# Patient Record
Sex: MALE | Race: WHITE | HISPANIC OR LATINO | Employment: OTHER | URBAN - METROPOLITAN AREA
[De-identification: names, ages, dates, MRNs, and addresses within clinical notes are randomized per-mention and may not be internally consistent; named-entity substitution may affect disease eponyms.]

---

## 2017-01-03 ENCOUNTER — NON-PROVIDER VISIT (OUTPATIENT)
Dept: CARDIOLOGY | Facility: MEDICAL CENTER | Age: 62
End: 2017-01-03
Payer: COMMERCIAL

## 2017-01-03 DIAGNOSIS — Z95.810 AICD (AUTOMATIC CARDIOVERTER/DEFIBRILLATOR) PRESENT: ICD-10-CM

## 2017-01-03 PROCEDURE — 93282 PRGRMG EVAL IMPLANTABLE DFB: CPT | Performed by: INTERNAL MEDICINE

## 2017-01-10 ENCOUNTER — OFFICE VISIT (OUTPATIENT)
Dept: CARDIOLOGY | Facility: MEDICAL CENTER | Age: 62
End: 2017-01-10
Payer: COMMERCIAL

## 2017-01-10 VITALS
HEART RATE: 62 BPM | WEIGHT: 150 LBS | SYSTOLIC BLOOD PRESSURE: 102 MMHG | BODY MASS INDEX: 24.99 KG/M2 | HEIGHT: 65 IN | DIASTOLIC BLOOD PRESSURE: 72 MMHG | OXYGEN SATURATION: 92 %

## 2017-01-10 DIAGNOSIS — I25.10 CORONARY ARTERY DISEASE DUE TO CALCIFIED CORONARY LESION: ICD-10-CM

## 2017-01-10 DIAGNOSIS — I25.84 CORONARY ARTERY DISEASE DUE TO CALCIFIED CORONARY LESION: ICD-10-CM

## 2017-01-10 DIAGNOSIS — Z95.810 AICD (AUTOMATIC CARDIOVERTER/DEFIBRILLATOR) PRESENT: ICD-10-CM

## 2017-01-10 DIAGNOSIS — E78.5 DYSLIPIDEMIA: ICD-10-CM

## 2017-01-10 DIAGNOSIS — R06.00 NOCTURNAL DYSPNEA: ICD-10-CM

## 2017-01-10 DIAGNOSIS — I25.5 CARDIOMYOPATHY, ISCHEMIC: ICD-10-CM

## 2017-01-10 PROCEDURE — 99214 OFFICE O/P EST MOD 30 MIN: CPT | Performed by: INTERNAL MEDICINE

## 2017-01-10 NOTE — PROGRESS NOTES
"Subjective:   Abram aBrillas is a 61 y.o. male who presents today for followup of his coronary artery disease with an ischemic cardiomyopathy and AICD. He also has a history of hypertension and hyperlipidemia.     A couple times a month he will have significant anxiety from job stress. With this he has some dyspnea. He will become \"dizzy\". This usually lasts 5 minutes and improves when he goes out and walks around a bit.    He is walking at 3 miles an hour for about an hour on his treadmill. He does this about 3 times a week.   Over the last several months he's noted some nocturnal dyspnea. He will awaken at night dyspneic 2-3 times a week. This will resolve in a few minutes. He's also noticed some daytime somnolence. He's also had some mild edema.    He's had no chest discomfort, palpitations or lightheadedness.      Past Medical History   Diagnosis Date   • Fatty liver    • Dyslipidemia    • Hypertension      ON NO MEDS, now on meds   • Bronchitis    • Cardiogenic shock 1/2012   • Myocardial infarction of anterolateral wall (HCC) 12/29/2011   • Pacemaker 04/23/2012   • Congestive heart failure    • Atrial fib/flutter, transient      when in cardiogenic shock   • Acute MI, anterior wall s/p bms to LAD 12/11 with -RCA and residual CX dz (stent failed) 12/31/2011   • Cardiomyopathy, ischemic EF 35% 12/31/2011   • AICD (automatic cardioverter/defibrillator) present St Zach placed 4/23/12 4/24/2012     DEVICE DATA:  1. Pulse generator:  St. Zach, model #KB1543-17, serial  #[id number]  2. Right ventricular lead:  St. Zach, model #7120/60,  serial #[id number]  MEASURED INTRAOPERATIVE DATA: R-waves measured 11.7 mV, pacing  threshold 0.75 volts at 0.5 msec, lead impedance of 859 ohms.  DEVICE SETTINGS: VVI 40 to 120.    • CAD (coronary artery disease)    • Arrhythmia    • Myocardial infarct 12/29/2011   • ASTHMA      as a child   • Tuberculosis      20 years ago on meds for 6  mo     Past " "Surgical History   Procedure Laterality Date   • Multiple coronary artery bypass endo vein harvest  1/3/2012     Performed by PADMINI OCHOA at SURGERY [facility] ORS   • Colonoscopy  2007     normal   • Umbilical hernia repair  1/14/2011     Performed by CHUY CHRISTENSEN at SURGERY SAME DAY [facility] ORS   • Recovery  4/23/2012     Performed by SURGERY, CATH-RECOVERY at SURGERY [facility]   • Recovery  8/30/2013     Performed by Cath-Recovery Surgery at SURGERY [facility] DAY [facility]     Family History   Problem Relation Age of Onset   • Cancer Mother      ovarian cancer   • Heart Disease Maternal Aunt 17     unclear but MI!   • Heart Disease Maternal Uncle 38     History   Smoking status   • Former Smoker   • Types: Cigarettes   • Quit date: 03/01/1982   Smokeless tobacco   • Never Used     Comment: QUIT 1985     Allergies   Allergen Reactions   • Pcn [Penicillins] Rash     Outpatient Encounter Prescriptions as of 1/10/2017   Medication Sig Dispense Refill   • clopidogrel (PLAVIX) 75 MG Tab TAKE 1 TABLET BY MOUTH EVERY DAY 90 Tab 3   • metoprolol SR (TOPROL XL) 50 MG TABLET SR 24 HR TAKE 1 TABLET BY MOUTH EVERY DAY 30 Tab 6   • lisinopril (PRINIVIL) 10 MG Tab TAKE 1 TABLET BY MOUTH EVERY DAY 30 Tab 6   • atorvastatin (LIPITOR) 40 MG Tab TAKE 1 TAB BY MOUTH EVERY DAY. 30 Tab 11   • aspirin EC (ECOTRIN) 81 MG TBEC Take 81 mg by mouth every day.     • Multiple Vitamin (MULTI-VITAMIN PO) Take  by mouth.       No facility-administered encounter medications on file as of 1/10/2017.     ROS       Objective:   /72 mmHg  Pulse 62  Ht 1.651 m (5' 5\")  Wt 68.04 kg (150 lb)  BMI 24.96 kg/m2  SpO2 92%    Physical Exam   Neck: No JVD present.   Cardiovascular: Normal rate and regular rhythm.  Exam reveals no gallop.    No murmur heard.  Pulmonary/Chest: Effort normal. He has no rales.   Abdominal: Soft. There is no tenderness.   Musculoskeletal: He exhibits no edema.     Lab Results   Component Value " Date/Time    CHOLESTEROL, 12/27/2016 07:15 AM    LDL 68 12/27/2016 07:15 AM    HDL 27* 12/27/2016 07:15 AM    TRIGLYCERIDES 107 12/27/2016 07:15 AM       Lab Results   Component Value Date/Time    SODIUM 138 12/27/2016 07:15 AM    POTASSIUM 4.1 12/27/2016 07:15 AM    CHLORIDE 104 12/27/2016 07:15 AM    CO2 28 12/27/2016 07:15 AM    GLUCOSE 101* 12/27/2016 07:15 AM    BUN 10 12/27/2016 07:15 AM    CREATININE 0.62 12/27/2016 07:15 AM    BUN-CREATININE RATIO 15 12/08/2010 07:11 AM    GLOM FILT RATE, EST >59 12/08/2010 07:11 AM     Lab Results   Component Value Date/Time    ALKALINE PHOSPHATASE 84 12/27/2016 07:15 AM    AST(SGOT) 26 12/27/2016 07:15 AM    ALT(SGPT) 30 12/27/2016 07:15 AM    TOTAL BILIRUBIN 0.7 12/27/2016 07:15 AM      Lab Results   Component Value Date/Time    B NATRIURETIC PEPTIDE 42 12/27/2016 07:15 AM      Echocardiogram:  CONCLUSIONS  Normal left ventricular chamber size.  Left ventricular ejection fraction is visually estimated to be 45%.  [place], anteroseptum and inferoseptum hypokinetic.  Grade I diastolic dysfunction.  Mild mitral regurgitation.  Estimated right ventricular systolic pressure is 20 mmHg.  Normal inferior vena cava size and inspiratory collapse.  Exam Date: 06/17/2016       Assessment:     1. Coronary artery disease due to calcified coronary lesion:Acute MI, anterior wall s/p bms to LAD 12/11 with -RCA and residual CX dz (stent failed)     2. AICD (automatic cardioverter/defibrillator) present St Zach placed 4/23/12     3. Cardiomyopathy, ischemic EF 35%     4. Dyslipidemia         Medical Decision Making:  Today's Assessment / Status / Plan:     Mr. Barillas is clinically stable. He is having some difficulty with nocturnal dyspnea. However, his BNP was quite low when it was checked a couple of weeks ago. He may have nocturnal hypoxemia and we will obtain an overnight pulse oximetry. His lipid status has deteriorated. This may be due to some dietary indiscretion over the  holidays. We discussed better dietary compliance and we will recheck a lipid panel in a few months prior to his follow-up appointment.

## 2017-01-10 NOTE — MR AVS SNAPSHOT
"Abram Barillas   1/10/2017 2:30 PM   Office Visit   MRN: [id number]    Department:  [facility]   Dept Phone:  [phone]    Description:  Male : 1955   Provider:  Kyree Son M.D.           Allergies as of 1/10/2017     Allergen Noted Reactions    Pcn [Penicillins] 2010   Rash      You were diagnosed with     Coronary artery disease due to calcified coronary lesion   [2493543]       AICD (automatic cardioverter/defibrillator) present   [426742]       Cardiomyopathy, ischemic   [717186]       Dyslipidemia   [664817]       Nocturnal dyspnea   [178808]         Vital Signs     Blood Pressure Pulse Height Weight Body Mass Index Oxygen Saturation    102/72 mmHg 62 1.651 m (5' 5\") 68.04 kg (150 lb) 24.96 kg/m2 92%    Smoking Status                   Former Smoker           Basic Information     Date Of Birth Sex Race Ethnicity Preferred Language    1955 Male  or   Origin (Peruvian,Kosovan,Serbian,Jonnie, etc) English      Your appointments     2017  2:20 PM   Established Patient with Jessy Cervantes M.D.   [facility] [address]  [place] [address]   [phone]           You will be receiving a confirmation call a few days before your appointment from our automated call confirmation system.            Mar 30, 2017  2:30 PM   PACER CHECK ONLY with PACER CHECK-[facility]   [facility] (--)    [address], [address]  [place] [address]   [phone]            Mar 30, 2017  2:45 PM   FOLLOW UP with Kyree Son M.D.   [facility] (--)    [address], [address]  [place] [address]   [phone]            2017  3:00 PM   Employee Health MA 10 with [place] GURJIT LANGLEY MA   [facility] [address]  [address]  [place] [address]   [phone]              Problem List              ICD-10-CM " Priority Class Noted - Resolved    Dyslipidemia E78.5   Unknown - Present    Fatty liver K76.0   Unknown - Present    Acute MI, anterior wall s/p bms to LAD 12/11 with -RCA and residual CX dz (stent failed) I21.09   12/31/2011 - Present    Cardiomyopathy, ischemic EF 35% I25.5   12/31/2011 - Present    AICD (automatic cardioverter/defibrillator) present St Zach placed 4/23/12 Z95.810   4/24/2012 - Present    Mild intermittent asthma J45.20   1/15/2013 - Present    Coronary artery disease due to calcified coronary lesion:Acute MI, anterior wall s/p bms to LAD 12/11 with -RCA and residual CX dz (stent failed) I25.10, I25.84   Unknown - Present    AICD lead malfunction T82.110A   8/23/2013 - Present    Mechanical complication of cardiac pacemaker electrode T82.190A   8/30/2013 - Present    Essential hypertension I10   Unknown - Present    Cardiogenic shock (HCC) R57.0   1/1/2012 - Present    Myocardial infarction of anterolateral wall (Chronic) I21.09   12/29/2011 - Present    Pacemaker (Chronic) Z95.0   4/23/2012 - Present    Congestive heart failure (Chronic) I50.9   Unknown - Present    Atrial fib/flutter, transient (Chronic) [id number]   Unknown - Present    Impotence of organic origin N52.9   2/22/2016 - Present      Health Maintenance        Date Due Completion Dates    IMM DTaP/Tdap/Td Vaccine (1 - Tdap) 8/10/1975 ---    IMM ZOSTER VACCINE 8/10/2016 ---    COLONOSCOPY 1/18/2017 1/18/2007 (Prv Comp)    Override on 1/18/2007: Previously completed (angi, Dr. Post)            Current Immunizations     Hepatitis A Vaccine, Adult 12/27/2016    Influenza TIV (IM) 10/11/2013, 10/1/2012, 12/1/2011    Influenza Vaccine Quad Inj (Pf) 10/3/2016 10:43 AM, 10/15/2015, 10/8/2014    Pneumococcal Vaccine (UF)Historical Data 1/1/2007    Pneumococcal polysaccharide vaccine (PPSV-23) 10/1/2012      Below and/or attached are the medications your provider expects you to take. Review all of your home medications and newly  ordered medications with your provider and/or pharmacist. Follow medication instructions as directed by your provider and/or pharmacist. Please keep your medication list with you and share with your provider. Update the information when medications are discontinued, doses are changed, or new medications (including over-the-counter products) are added; and carry medication information at all times in the event of emergency situations     Allergies:  PCN - Rash               Medications  Valid as of: January 10, 2017 -  2:49 PM    Generic Name Brand Name Tablet Size Instructions for use    Aspirin (Tablet Delayed Response) ECOTRIN 81 MG Take 81 mg by mouth every day.        Atorvastatin Calcium (Tab) LIPITOR 40 MG TAKE 1 TAB BY MOUTH EVERY DAY.        Clopidogrel Bisulfate (Tab) PLAVIX 75 MG TAKE 1 TABLET BY MOUTH EVERY DAY        Lisinopril (Tab) PRINIVIL 10 MG TAKE 1 TABLET BY MOUTH EVERY DAY        Metoprolol Succinate (TABLET SR 24 HR) TOPROL XL 50 MG TAKE 1 TABLET BY MOUTH EVERY DAY        Multiple Vitamin   Take  by mouth.        .                 Medicines prescribed today were sent to:     [facility]/PHARMACY #[id number] - [place], [place] - [address] [place] [address] AT [address] [address]    Phone: [phone] Fax: [phone]    Open 24 Hours?: No      Medication refill instructions:       If your prescription bottle indicates you have medication refills left, it is not necessary to call your provider’s office. Please contact your pharmacy and they will refill your medication.    If your prescription bottle indicates you do not have any refills left, you may request refills at any time through one of the following ways: The online [employer] system (except Urgent Care), by calling your provider’s office, or by asking your pharmacy to contact your provider’s office with a refill request. Medication refills are processed only during regular business hours and may not be available until the next  business day. Your provider may request additional information or to have a follow-up visit with you prior to refilling your medication.   *Please Note: Medication refills are assigned a new Rx number when refilled electronically. Your pharmacy may indicate that no refills were authorized even though a new prescription for the same medication is available at the pharmacy. Please request the medicine by name with the pharmacy before contacting your provider for a refill.        Your To Do List     Future Labs/Procedures Complete By Expires    LIPID PROFILE  As directed 1/10/2018         [employer] Access Code: Activation code not generated  Current [employer] Status: Active

## 2017-01-11 ENCOUNTER — TELEPHONE (OUTPATIENT)
Dept: CARDIOLOGY | Facility: MEDICAL CENTER | Age: 62
End: 2017-01-11

## 2017-01-18 ENCOUNTER — OFFICE VISIT (OUTPATIENT)
Dept: MEDICAL GROUP | Facility: CLINIC | Age: 62
End: 2017-01-18
Payer: COMMERCIAL

## 2017-01-18 VITALS
SYSTOLIC BLOOD PRESSURE: 100 MMHG | RESPIRATION RATE: 12 BRPM | DIASTOLIC BLOOD PRESSURE: 72 MMHG | OXYGEN SATURATION: 92 % | HEIGHT: 65 IN | HEART RATE: 71 BPM | TEMPERATURE: 97.7 F | WEIGHT: 147 LBS | BODY MASS INDEX: 24.49 KG/M2

## 2017-01-18 DIAGNOSIS — Z23 NEED FOR PROPHYLACTIC VACCINATION WITH TETANUS-DIPHTHERIA (TD): ICD-10-CM

## 2017-01-18 DIAGNOSIS — N52.9 IMPOTENCE OF ORGANIC ORIGIN: ICD-10-CM

## 2017-01-18 DIAGNOSIS — Z12.5 PROSTATE CANCER SCREENING: ICD-10-CM

## 2017-01-18 DIAGNOSIS — Z23 NEED FOR SHINGLES VACCINE: ICD-10-CM

## 2017-01-18 DIAGNOSIS — Z12.11 COLON CANCER SCREENING: ICD-10-CM

## 2017-01-18 PROCEDURE — 90472 IMMUNIZATION ADMIN EACH ADD: CPT | Performed by: FAMILY MEDICINE

## 2017-01-18 PROCEDURE — 90471 IMMUNIZATION ADMIN: CPT | Performed by: FAMILY MEDICINE

## 2017-01-18 PROCEDURE — 90715 TDAP VACCINE 7 YRS/> IM: CPT | Performed by: FAMILY MEDICINE

## 2017-01-18 PROCEDURE — 99213 OFFICE O/P EST LOW 20 MIN: CPT | Mod: 25 | Performed by: FAMILY MEDICINE

## 2017-01-18 PROCEDURE — 90736 HZV VACCINE LIVE SUBQ: CPT | Performed by: FAMILY MEDICINE

## 2017-01-18 NOTE — MR AVS SNAPSHOT
"Abram Barillas   2017 2:20 PM   Office Visit   MRN: [id number]    Department:  [facility]   Dept Phone:  [phone]    Description:  Male : 1955   Provider:  Jessy Cervantes M.D.           Reason for Visit     Back Pain x 1 week      Allergies as of 2017     Allergen Noted Reactions    Pcn [Penicillins] 2010   Rash      You were diagnosed with     Need for shingles vaccine   [475990]       Need for prophylactic vaccination with tetanus-diphtheria (TD)   [V06.5.ICD-9-CM]       Colon cancer screening   [884012]       Impotence of organic origin   [607.84.ICD-9-CM]       Prostate cancer screening   [735505]         Vital Signs     Blood Pressure Pulse Temperature Respirations Height Weight    100/72 mmHg 71 36.5 °C (97.7 °F) 12 1.651 m (5' 5\") 66.679 kg (147 lb)    Body Mass Index Oxygen Saturation Smoking Status             24.46 kg/m2 92% Former Smoker         Basic Information     Date Of Birth Sex Race Ethnicity Preferred Language    1955 Male  or   Origin (Japanese,British,Sierra Leonean,Botswanan, etc) English      Your appointments     Mar 30, 2017  2:30 PM   PACER CHECK ONLY with PACER CHECK-[facility] B   [facility] (--)    [address] [facility] [address]  [place] [address]   [phone]            Mar 30, 2017  2:45 PM   FOLLOW UP with Kyree Son M.D.   [facility] (--)    [address]  [place] [address]   [phone]            2017  3:00 PM   Employee Health MA 10 with [facility] MA   [facility] - [address]  [address]  [place] [address]   [phone]              Problem List              ICD-10-CM Priority Class Noted - Resolved    Dyslipidemia E78.5   Unknown - Present    Fatty liver K76.0   Unknown - Present    Acute MI, anterior wall s/p bms to LAD  with -RCA and residual CX dz (stent failed) " I21.09   12/31/2011 - Present    Cardiomyopathy, ischemic EF 35% I25.5   12/31/2011 - Present    AICD (automatic cardioverter/defibrillator) present St Zach placed 4/23/12 Z95.810   4/24/2012 - Present    Mild intermittent asthma J45.20   1/15/2013 - Present    Coronary artery disease due to calcified coronary lesion:Acute MI, anterior wall s/p bms to LAD 12/11 with -RCA and residual CX dz (stent failed) I25.10, I25.84   Unknown - Present    AICD lead malfunction T82.110A   8/23/2013 - Present    Mechanical complication of cardiac pacemaker electrode T82.190A   8/30/2013 - Present    Essential hypertension I10   Unknown - Present    Cardiogenic shock (CMS-HCC) R57.0   1/1/2012 - Present    Myocardial infarction of anterolateral wall (Chronic) I21.09   12/29/2011 - Present    Pacemaker (Chronic) Z95.0   4/23/2012 - Present    Congestive heart failure (Chronic) I50.9   Unknown - Present    Atrial fib/flutter, transient (Chronic) LEJ8104   Unknown - Present    Impotence of organic origin N52.9   2/22/2016 - Present      Health Maintenance        Date Due Completion Dates    IMM DTaP/Tdap/Td Vaccine (1 - Tdap) 8/10/1975 ---    IMM ZOSTER VACCINE 8/10/2016 ---    COLONOSCOPY 1/18/2017 1/18/2007 (Prv Comp)    Override on 1/18/2007: Previously completed (angi, Dr. Post)            Current Immunizations     Hepatitis A Vaccine, Adult 12/27/2016    Influenza TIV (IM) 10/11/2013, 10/1/2012, 12/1/2011    Influenza Vaccine Quad Inj (Pf) 10/3/2016 10:43 AM, 10/15/2015, 10/8/2014    Pneumococcal Vaccine (UF)Historical Data 1/1/2007    Pneumococcal polysaccharide vaccine (PPSV-23) 10/1/2012    SHINGLES VACCINE 1/18/2017    Tdap Vaccine 1/18/2017      Below and/or attached are the medications your provider expects you to take. Review all of your home medications and newly ordered medications with your provider and/or pharmacist. Follow medication instructions as directed by your provider and/or pharmacist. Please keep your  medication list with you and share with your provider. Update the information when medications are discontinued, doses are changed, or new medications (including over-the-counter products) are added; and carry medication information at all times in the event of emergency situations     Allergies:  PCN - Rash               Medications  Valid as of: January 18, 2017 -  3:20 PM    Generic Name Brand Name Tablet Size Instructions for use    Aspirin (Tablet Delayed Response) ECOTRIN 81 MG Take 81 mg by mouth every day.        Atorvastatin Calcium (Tab) LIPITOR 40 MG TAKE 1 TAB BY MOUTH EVERY DAY.        Clopidogrel Bisulfate (Tab) PLAVIX 75 MG TAKE 1 TABLET BY MOUTH EVERY DAY        Lisinopril (Tab) PRINIVIL 10 MG TAKE 1 TABLET BY MOUTH EVERY DAY        Metoprolol Succinate (TABLET SR 24 HR) TOPROL XL 50 MG TAKE 1 TABLET BY MOUTH EVERY DAY        Multiple Vitamin   Take  by mouth.        .                 Medicines prescribed today were sent to:     [facility]/PHARMACY #[id number] - [place], [place] - [address] [facility] [address]    [address] [place] [address]    Phone: [phone] Fax: [phone]    Open 24 Hours?: No      Medication refill instructions:       If your prescription bottle indicates you have medication refills left, it is not necessary to call your provider’s office. Please contact your pharmacy and they will refill your medication.    If your prescription bottle indicates you do not have any refills left, you may request refills at any time through one of the following ways: The online "[employer] system (except Urgent Care), by calling your provider’s office, or by asking your pharmacy to contact your provider’s office with a refill request. Medication refills are processed only during regular business hours and may not be available until the next business day. Your provider may request additional information or to have a follow-up visit with you prior to refilling your medication.   *Please Note:  Medication refills are assigned a new Rx number when refilled electronically. Your pharmacy may indicate that no refills were authorized even though a new prescription for the same medication is available at the pharmacy. Please request the medicine by name with the pharmacy before contacting your provider for a refill.        Your To Do List     Future Labs/Procedures Complete By Expires    PROSTATE SPECIFIC AG SCREENING  As directed 1/19/2018      Referral     A referral request has been sent to our patient care coordination department. Please allow 3-5 business days for us to process this request and contact you either by phone or mail. If you do not hear from us by the 5th business day, please call us at [phone].           [employer] Access Code: Activation code not generated  Current [employer] Status: Active

## 2017-01-19 NOTE — PROGRESS NOTES
CC: Immunizations, referrals    HPI:   Abram presents today with the following.  He had originally scheduled for back pain but this has resolved and seems to have been a muscle spasm.    1. Need for shingles vaccine  He is due    2. Need for prophylactic vaccination with tetanus-diphtheria (TD)  He is due    3. Colon cancer screening  He is due    4. Impotence of organic origin  Needs referral to urology    5. Prostate cancer screening  Needs yearly screening      Patient Active Problem List    Diagnosis Date Noted   • Impotence of organic origin 02/22/2016   • Essential hypertension    • Congestive heart failure    • Atrial fib/flutter, transient    • Mechanical complication of cardiac pacemaker electrode 08/30/2013   • AICD lead malfunction 08/23/2013   • Coronary artery disease due to calcified coronary lesion:Acute MI, anterior wall s/p bms to LAD 12/11 with -RCA and residual CX dz (stent failed)    • Mild intermittent asthma 01/15/2013   • AICD (automatic cardioverter/defibrillator) present St Zach placed 4/23/12 04/24/2012   • Pacemaker 04/23/2012   • Cardiogenic shock (CMS-HCC) 01/01/2012   • Acute MI, anterior wall s/p bms to LAD 12/11 with -RCA and residual CX dz (stent failed) 12/31/2011   • Cardiomyopathy, ischemic EF 35% 12/31/2011   • Myocardial infarction of anterolateral wall 12/29/2011   • Dyslipidemia    • Fatty liver        Current Outpatient Prescriptions   Medication Sig Dispense Refill   • clopidogrel (PLAVIX) 75 MG Tab TAKE 1 TABLET BY MOUTH EVERY DAY 90 Tab 3   • metoprolol SR (TOPROL XL) 50 MG TABLET SR 24 HR TAKE 1 TABLET BY MOUTH EVERY DAY 30 Tab 6   • lisinopril (PRINIVIL) 10 MG Tab TAKE 1 TABLET BY MOUTH EVERY DAY 30 Tab 6   • atorvastatin (LIPITOR) 40 MG Tab TAKE 1 TAB BY MOUTH EVERY DAY. 30 Tab 11   • aspirin EC (ECOTRIN) 81 MG TBEC Take 81 mg by mouth every day.     • Multiple Vitamin (MULTI-VITAMIN PO) Take  by mouth.       No current facility-administered medications for this  "visit.         Allergies as of 01/18/2017 - Peter as Reviewed 01/18/2017   Allergen Reaction Noted   • Pcn [penicillins] Rash 09/21/2010        ROS: As per HPI.    /72 mmHg  Pulse 71  Temp(Src) 36.5 °C (97.7 °F)  Resp 12  Ht 1.651 m (5' 5\")  Wt 66.679 kg (147 lb)  BMI 24.46 kg/m2  SpO2 92%    Physical Exam:  Gen:         Alert and oriented, No apparent distress.  Neck:       Full range of motion, no JVD or carotid bruits appreciated. No thyromegaly or neck mass appreciated.   Lungs:     Clear to auscultation bilaterally  CV:          Regular rate and rhythm. No murmurs, rubs or gallops.               Ext:          No clubbing, cyanosis, no peripheral edema.      Assessment and Plan.   61 y.o. male with the following issues.    1. Need for shingles vaccine  Administered today  - TDAP VACCINE =>6YO IM    2. Need for prophylactic vaccination with tetanus-diphtheria (TD)  Administered today  - VARICELLA ZOSTER VACCINE SQ    3. Colon cancer screening  Referral discussed and placed  - REFERRAL TO GASTROENTEROLOGY    4. Impotence of organic origin  Referral discussed and placed  - REFERRAL TO UROLOGY    5. Prostate cancer screening  Laboratory discussed  - PROSTATE SPECIFIC AG SCREENING; Future          "

## 2017-02-01 ENCOUNTER — TELEPHONE (OUTPATIENT)
Dept: CARDIOLOGY | Facility: MEDICAL CENTER | Age: 62
End: 2017-02-01
Payer: COMMERCIAL

## 2017-02-01 DIAGNOSIS — I25.5 CARDIOMYOPATHY, ISCHEMIC: ICD-10-CM

## 2017-02-01 DIAGNOSIS — G47.34 NOCTURNAL HYPOXEMIA: Primary | ICD-10-CM

## 2017-02-01 DIAGNOSIS — J98.4 CHRONIC LUNG DISEASE: ICD-10-CM

## 2017-02-10 ENCOUNTER — TELEPHONE (OUTPATIENT)
Dept: MEDICAL GROUP | Facility: CLINIC | Age: 62
End: 2017-02-10

## 2017-02-10 DIAGNOSIS — J45.20 MILD INTERMITTENT ASTHMA WITHOUT COMPLICATION: ICD-10-CM

## 2017-02-10 DIAGNOSIS — I25.5 CARDIOMYOPATHY, ISCHEMIC: ICD-10-CM

## 2017-02-10 DIAGNOSIS — G47.34 NOCTURNAL HYPOXIA: ICD-10-CM

## 2017-02-10 NOTE — TELEPHONE ENCOUNTER
1. Caller Name: cardiology                      Call Back Number: [phone]    2. Message: received a call from cardiology requesting a referral for patient. Per pts cardiologist patient needs to be seen by a pulmonologist due to Nocturnal hypoxia.    3. Patient approves office to leave a detailed voicemail/MyChart message: yes

## 2017-02-18 ENCOUNTER — HOSPITAL ENCOUNTER (OUTPATIENT)
Dept: LAB | Facility: MEDICAL CENTER | Age: 62
End: 2017-02-18
Attending: FAMILY MEDICINE
Payer: COMMERCIAL

## 2017-02-18 ENCOUNTER — HOSPITAL ENCOUNTER (OUTPATIENT)
Dept: LAB | Facility: MEDICAL CENTER | Age: 62
End: 2017-02-18
Attending: NURSE PRACTITIONER
Payer: COMMERCIAL

## 2017-02-18 DIAGNOSIS — Z12.5 PROSTATE CANCER SCREENING: ICD-10-CM

## 2017-02-18 LAB
PSA SERPL DL<=0.01 NG/ML-MCNC: 0.97 NG/ML (ref 0–4)
PSA SERPL DL<=0.01 NG/ML-MCNC: 1 NG/ML (ref 0–4)

## 2017-02-18 PROCEDURE — 84153 ASSAY OF PSA TOTAL: CPT

## 2017-02-18 PROCEDURE — 84153 ASSAY OF PSA TOTAL: CPT | Mod: 91

## 2017-02-18 PROCEDURE — 36415 COLL VENOUS BLD VENIPUNCTURE: CPT

## 2017-03-16 ENCOUNTER — TELEPHONE (OUTPATIENT)
Dept: CARDIOLOGY | Facility: MEDICAL CENTER | Age: 62
End: 2017-03-16

## 2017-03-16 DIAGNOSIS — J34.89 DRY NARES: ICD-10-CM

## 2017-03-16 DIAGNOSIS — I50.22 CHRONIC SYSTOLIC CHF (CONGESTIVE HEART FAILURE) (HCC): ICD-10-CM

## 2017-03-16 DIAGNOSIS — G47.34 NOCTURNAL HYPOXIA: ICD-10-CM

## 2017-03-16 NOTE — TELEPHONE ENCOUNTER
----- Message from Marilynn Sam sent at 3/16/2017  1:38 PM PDT -----  Regarding: Pt has question about at home oxygen   BEAU/Ysabel,    Patient states at home oxygen is causing him to get itchy eyes, allergies and bloody nose. He would please like a call back at  [phone] to find out what he should do?

## 2017-03-16 NOTE — TELEPHONE ENCOUNTER
Called patient. He states that his home oxygen is causing him to have itchy eyes, allergies, and a bloody nose.    Advised patient that he could try receiving his oxygen by mask or adding a humidifier to his oxygen. Patient would like to try both.    Updated oxygen order faxed to [facility] at fax # [phone].    DADA BRANHAM

## 2017-03-18 ENCOUNTER — HOSPITAL ENCOUNTER (OUTPATIENT)
Dept: LAB | Facility: MEDICAL CENTER | Age: 62
End: 2017-03-18
Attending: INTERNAL MEDICINE
Payer: COMMERCIAL

## 2017-03-18 DIAGNOSIS — E78.5 DYSLIPIDEMIA: ICD-10-CM

## 2017-03-18 LAB
CHOLEST SERPL-MCNC: 109 MG/DL (ref 100–199)
HDLC SERPL-MCNC: 30 MG/DL
LDLC SERPL CALC-MCNC: 57 MG/DL
TRIGL SERPL-MCNC: 111 MG/DL (ref 0–149)

## 2017-03-18 PROCEDURE — 80061 LIPID PANEL: CPT

## 2017-03-18 PROCEDURE — 36415 COLL VENOUS BLD VENIPUNCTURE: CPT

## 2017-03-30 ENCOUNTER — OFFICE VISIT (OUTPATIENT)
Dept: CARDIOLOGY | Facility: MEDICAL CENTER | Age: 62
End: 2017-03-30
Payer: COMMERCIAL

## 2017-03-30 ENCOUNTER — NON-PROVIDER VISIT (OUTPATIENT)
Dept: CARDIOLOGY | Facility: MEDICAL CENTER | Age: 62
End: 2017-03-30
Payer: COMMERCIAL

## 2017-03-30 VITALS
HEART RATE: 64 BPM | SYSTOLIC BLOOD PRESSURE: 110 MMHG | DIASTOLIC BLOOD PRESSURE: 62 MMHG | WEIGHT: 146 LBS | BODY MASS INDEX: 24.32 KG/M2 | OXYGEN SATURATION: 97 % | HEIGHT: 65 IN

## 2017-03-30 DIAGNOSIS — I25.84 CORONARY ARTERY DISEASE DUE TO CALCIFIED CORONARY LESION: ICD-10-CM

## 2017-03-30 DIAGNOSIS — G47.33 OBSTRUCTIVE SLEEP APNEA: ICD-10-CM

## 2017-03-30 DIAGNOSIS — E78.5 DYSLIPIDEMIA: ICD-10-CM

## 2017-03-30 DIAGNOSIS — I10 ESSENTIAL HYPERTENSION: ICD-10-CM

## 2017-03-30 DIAGNOSIS — I50.9 CONGESTIVE HEART FAILURE, UNSPECIFIED: ICD-10-CM

## 2017-03-30 DIAGNOSIS — Z95.810 AICD (AUTOMATIC CARDIOVERTER/DEFIBRILLATOR) PRESENT: ICD-10-CM

## 2017-03-30 DIAGNOSIS — I25.10 CORONARY ARTERY DISEASE DUE TO CALCIFIED CORONARY LESION: ICD-10-CM

## 2017-03-30 DIAGNOSIS — I25.5 CARDIOMYOPATHY, ISCHEMIC: ICD-10-CM

## 2017-03-30 PROCEDURE — 93282 PRGRMG EVAL IMPLANTABLE DFB: CPT | Performed by: INTERNAL MEDICINE

## 2017-03-30 PROCEDURE — 99214 OFFICE O/P EST MOD 30 MIN: CPT | Performed by: INTERNAL MEDICINE

## 2017-03-30 RX ORDER — ROSUVASTATIN CALCIUM 20 MG/1
20 TABLET, COATED ORAL EVERY EVENING
Qty: 30 TAB | Refills: 11 | Status: SHIPPED | OUTPATIENT
Start: 2017-03-30 | End: 2018-03-04 | Stop reason: SDUPTHER

## 2017-03-30 NOTE — MR AVS SNAPSHOT
"        Abram Barillas   3/30/2017 2:45 PM   Office Visit   MRN: [id number]    Department:  [facility]   Dept Phone:  [phone]    Description:  Male : 1955   Provider:  Kyree Son M.D.           Reason for Visit     Follow-Up           Allergies as of 3/30/2017     Allergen Noted Reactions    Pcn [Penicillins] 2010   Rash      You were diagnosed with     Coronary artery disease due to calcified coronary lesion   [1135251]       Essential hypertension   [6924394]       AICD (automatic cardioverter/defibrillator) present   [260118]       Cardiomyopathy, ischemic   [521607]       Dyslipidemia   [480671]       Obstructive sleep apnea   [802232]         Vital Signs     Blood Pressure Pulse Height Weight Body Mass Index Oxygen Saturation    110/62 mmHg 64 1.651 m (5' 5\") 66.225 kg (146 lb) 24.30 kg/m2 97%    Smoking Status                   Former Smoker           Basic Information     Date Of Birth Sex Race Ethnicity Preferred Language    1955 Male  or   Origin (Turkmen,Maldivian,Central African,Australian, etc) English      Your appointments     May 19, 2017  1:00 PM   Pulmonary Function Test with [id number]   [facility] (--)    [address]  [address]  [place] [id number] [phone]            May 19, 2017  2:00 PM   New Patient Pulmonary with A Rotation   [facility] (--)    [address]  [address]  [place] [address] [phone]            2017  3:00 PM   Employee Health MA 10 with [facility] ([facility])    [address] [place] [address]  [address]  [place] [address]   [phone]              Problem List              ICD-10-CM Priority Class Noted - Resolved    Dyslipidemia E78.5   Unknown - Present    Fatty liver K76.0   Unknown - Present    Acute MI, anterior wall s/p bms to LAD  with -RCA and residual CX dz (stent failed) I21.09   2011 - Present   " Cardiomyopathy, ischemic EF 35% I25.5   12/31/2011 - Present    AICD (automatic cardioverter/defibrillator) present St Zach placed 4/23/12 Z95.810   4/24/2012 - Present    Mild intermittent asthma J45.20   1/15/2013 - Present    Coronary artery disease due to calcified coronary lesion:Acute MI, anterior wall s/p bms to LAD 12/11 with -RCA and residual CX dz (stent failed) I25.10, I25.84   Unknown - Present    AICD lead malfunction T82.110A   8/23/2013 - Present    Mechanical complication of cardiac pacemaker electrode T82.190A   8/30/2013 - Present    Essential hypertension I10   Unknown - Present    Myocardial infarction of anterolateral wall (Chronic) I21.09   12/29/2011 - Present    Pacemaker (Chronic) Z95.0   4/23/2012 - Present    Congestive heart failure (Chronic) I50.9   Unknown - Present    Atrial fib/flutter, transient (Chronic) MKR3447   Unknown - Present    Impotence of organic origin N52.9   2/22/2016 - Present    Obstructive sleep apnea G47.33   3/30/2017 - Present      Health Maintenance        Date Due Completion Dates    COLONOSCOPY 1/18/2017 1/18/2007 (Prv Comp)    Override on 1/18/2007: Previously completed (normal, Dr. Post)    IMM DTaP/Tdap/Td Vaccine (2 - Td) 1/18/2027 1/18/2017            Current Immunizations     Hepatitis A Vaccine, Adult 12/27/2016    Influenza TIV (IM) 10/11/2013, 10/1/2012, 12/1/2011    Influenza Vaccine Quad Inj (Pf) 10/3/2016 10:43 AM, 10/15/2015, 10/8/2014    Pneumococcal Vaccine (UF)Historical Data 1/1/2007    Pneumococcal polysaccharide vaccine (PPSV-23) 10/1/2012    SHINGLES VACCINE 1/18/2017    Tdap Vaccine 1/18/2017      Below and/or attached are the medications your provider expects you to take. Review all of your home medications and newly ordered medications with your provider and/or pharmacist. Follow medication instructions as directed by your provider and/or pharmacist. Please keep your medication list with you and share with your provider. Update the  information when medications are discontinued, doses are changed, or new medications (including over-the-counter products) are added; and carry medication information at all times in the event of emergency situations     Allergies:  PCN - Rash               Medications  Valid as of: March 30, 2017 -  2:48 PM    Generic Name Brand Name Tablet Size Instructions for use    Aspirin (Tablet Delayed Response) ECOTRIN 81 MG Take 81 mg by mouth every day.        Clopidogrel Bisulfate (Tab) PLAVIX 75 MG TAKE 1 TABLET BY MOUTH EVERY DAY        Lisinopril (Tab) PRINIVIL 10 MG TAKE 1 TABLET BY MOUTH EVERY DAY        Metoprolol Succinate (TABLET SR 24 HR) TOPROL XL 50 MG TAKE 1 TABLET BY MOUTH EVERY DAY        Multiple Vitamin   Take  by mouth.        Rosuvastatin Calcium (Tab) CRESTOR 20 MG Take 1 Tab by mouth every evening.        .                 Medicines prescribed today were sent to:     [facility]Y #[id number] - [place], [place] - [address]    [address] [place] [address]    Phone: [phone] Fax: [phone]    Open 24 Hours?: No      Medication refill instructions:       If your prescription bottle indicates you have medication refills left, it is not necessary to call your provider’s office. Please contact your pharmacy and they will refill your medication.    If your prescription bottle indicates you do not have any refills left, you may request refills at any time through one of the following ways: The online Inuvo system (except Urgent Care), by calling your provider’s office, or by asking your pharmacy to contact your provider’s office with a refill request. Medication refills are processed only during regular business hours and may not be available until the next business day. Your provider may request additional information or to have a follow-up visit with you prior to refilling your medication.   *Please Note: Medication refills are assigned a new Rx number when refilled  electronically. Your pharmacy may indicate that no refills were authorized even though a new prescription for the same medication is available at the pharmacy. Please request the medicine by name with the pharmacy before contacting your provider for a refill.        Your To Do List     Future Labs/Procedures Complete By Expires    COMP METABOLIC PANEL  As directed 3/30/2018    COMP METABOLIC PANEL  As directed 3/30/2018    LIPID PROFILE  As directed 3/30/2018    LIPID PROFILE  As directed 3/30/2018         MyChart Access Code: Activation code not generated  Current [employer] Status: Active

## 2017-03-30 NOTE — PROGRESS NOTES
Subjective:   Abram Barillas is a 61 y.o. male who presents today for followup of his coronary artery disease with an ischemic cardiomyopathy and AICD. He also has a history of hypertension and hyperlipidemia.     He has had difficulty with nocturnal dyspnea about once a night. He underwent evaluation with an overnight pulse oximetry and was found to have nocturnal hypoxemia. He underwent a sleep study and does have obstructive sleep apnea. He is on CPAP therapy with oxygen at night. He has follow-up with pulmonary in May.    He's had no dyspnea on exertion. He is walking around the [place] about 3 times in an hour and a half. This would be the equivalent to about 6 miles. He's had no chest discomfort, palpitations, lightheadedness or edema.      Past Medical History   Diagnosis Date   • Fatty liver    • Dyslipidemia    • Hypertension      ON NO MEDS, now on meds   • Bronchitis    • Cardiogenic shock (CMS-HCC) 1/2012   • Myocardial infarction of anterolateral wall (CMS-HCC) 12/29/2011   • Pacemaker 04/23/2012   • Congestive heart failure (CMS-HCC)    • Atrial fib/flutter, transient      when in cardiogenic shock   • Acute MI, anterior wall s/p bms to LAD 12/11 with -RCA and residual CX dz (stent failed) 12/31/2011   • Cardiomyopathy, ischemic EF 35% 12/31/2011   • AICD (automatic cardioverter/defibrillator) present St Zach placed 4/23/12 4/24/2012     DEVICE DATA:  1. Pulse generator:  [employer] Zach, model #KZ6992-26, serial  #[id number]  2. Right ventricular lead:  St. Zach, model #7120/60,  serial #[id number]  MEASURED INTRAOPERATIVE DATA: R-waves measured 11.7 mV, pacing  threshold 0.75 volts at 0.5 msec, lead impedance of 859 ohms.  DEVICE SETTINGS: VVI 40 to 120.    • CAD (coronary artery disease)    • Arrhythmia    • Myocardial infarct (CMS-HCC) 12/29/2011   • ASTHMA      as a child   • Tuberculosis      20 years ago on meds for 6  mo     Past Surgical History   Procedure Laterality Date  "  • Multiple coronary artery bypass endo vein harvest  1/3/2012     Performed by PADMINI OCHOA at SURGERY [facility] ORS   • Colonoscopy  2007     normal   • Umbilical hernia repair  1/14/2011     Performed by CHUY CHRISTENSEN at SURGERY SAME DAY [facility] ORS   • Recovery  4/23/2012     Performed by SURGERY, [facility]RECOVERY [facility]   • Recovery  8/30/2013     Performed by Cath-Recovery Surgery at SURGERY SAME DAY [facility] ORS     Family History   Problem Relation Age of Onset   • Cancer Mother      ovarian cancer   • Heart Disease Maternal Aunt 17     unclear but MI!   • Heart Disease Maternal Uncle 38     History   Smoking status   • Former Smoker   • Types: Cigarettes   • Quit date: 03/01/1982   Smokeless tobacco   • Never Used     Comment: QUIT 1985     Allergies   Allergen Reactions   • Pcn [Penicillins] Rash     Outpatient Encounter Prescriptions as of 3/30/2017   Medication Sig Dispense Refill   • clopidogrel (PLAVIX) 75 MG Tab TAKE 1 TABLET BY MOUTH EVERY DAY 90 Tab 3   • metoprolol SR (TOPROL XL) 50 MG TABLET SR 24 HR TAKE 1 TABLET BY MOUTH EVERY DAY 30 Tab 6   • lisinopril (PRINIVIL) 10 MG Tab TAKE 1 TABLET BY MOUTH EVERY DAY 30 Tab 6   • atorvastatin (LIPITOR) 40 MG Tab TAKE 1 TAB BY MOUTH EVERY DAY. 30 Tab 11   • aspirin EC (ECOTRIN) 81 MG TBEC Take 81 mg by mouth every day.     • Multiple Vitamin (MULTI-VITAMIN PO) Take  by mouth.       No facility-administered encounter medications on file as of 3/30/2017.     ROS       Objective:   /62 mmHg  Pulse 64  Ht 1.651 m (5' 5\")  Wt 66.225 kg (146 lb)  BMI 24.30 kg/m2  SpO2 97%    Physical Exam   Neck: No JVD present.   Cardiovascular: Normal rate and regular rhythm.  Exam reveals no gallop.    No murmur heard.  Pulmonary/Chest: Effort normal. He has no rales.   Abdominal: Soft. There is no tenderness.   Musculoskeletal: He exhibits no edema.     Lab Results   Component Value Date/Time    CHOLESTEROL, 03/18/2017 " 07:06 AM    LDL 57 03/18/2017 07:06 AM    HDL 30* 03/18/2017 07:06 AM    TRIGLYCERIDES 111 03/18/2017 07:06 AM       Lab Results   Component Value Date/Time    SODIUM 138 12/27/2016 07:15 AM    POTASSIUM 4.1 12/27/2016 07:15 AM    CHLORIDE 104 12/27/2016 07:15 AM    CO2 28 12/27/2016 07:15 AM    GLUCOSE 101* 12/27/2016 07:15 AM    BUN 10 12/27/2016 07:15 AM    CREATININE 0.62 12/27/2016 07:15 AM    BUN-CREATININE RATIO 15 12/08/2010 07:11 AM    GLOM FILT RATE, EST >59 12/08/2010 07:11 AM     Lab Results   Component Value Date/Time    ALKALINE PHOSPHATASE 84 12/27/2016 07:15 AM    AST(SGOT) 26 12/27/2016 07:15 AM    ALT(SGPT) 30 12/27/2016 07:15 AM    TOTAL BILIRUBIN 0.7 12/27/2016 07:15 AM      Lab Results   Component Value Date/Time    B NATRIURETIC PEPTIDE 42 12/27/2016 07:15 AM      Echocardiogram:  CONCLUSIONS  Normal left ventricular chamber size.  Left ventricular ejection fraction is visually estimated to be 45%.  [place], anteroseptum and inferoseptum hypokinetic.  Grade I diastolic dysfunction.  Mild mitral regurgitation.  Estimated right ventricular systolic pressure is 20 mmHg.  Normal inferior vena cava size and inspiratory collapse.  Exam Date: 06/17/2016       Assessment:     1. Coronary artery disease due to calcified coronary lesion:Acute MI, anterior wall s/p bms to LAD 12/11 with -RCA and residual CX dz (stent failed)     2. Essential hypertension     3. AICD (automatic cardioverter/defibrillator) present St Zach placed 4/23/12     4. Cardiomyopathy, ischemic EF 35%     5. Dyslipidemia         Medical Decision Making:  Today's Assessment / Status / Plan:     Mr. Barillas is clinically stable. He's exercising regularly and is asymptomatic from a cardiovascular standpoint. His blood pressure is under excellent control. His device was interrogated today and apparently is functioning normally. His lipid status is under fairly good control though his HDL is low. We will try switching him to rosuvastatin 20  mg daily. He'll have lab work in about 6 weeks and prior to follow-up in 6 months.

## 2017-03-30 NOTE — Clinical Note
[facility]   [address]  [place] [address]  Phone: [phone]  Fax: [phone]              Abram Barillas  1955    Encounter Date: 3/30/2017    Kyree Son M.D.          PROGRESS NOTE:  Subjective:   Abram Barillas is a 61 y.o. male who presents today for followup of his coronary artery disease with an ischemic cardiomyopathy and AICD. He also has a history of hypertension and hyperlipidemia.     He has had difficulty with nocturnal dyspnea about once a night. He underwent evaluation with an overnight pulse oximetry and was found to have nocturnal hypoxemia. He underwent a sleep study and does have obstructive sleep apnea. He is on CPAP therapy with oxygen at night. He has follow-up with pulmonary in May.    He's had no dyspnea on exertion. He is walking around the [place] about 3 times in an hour and a half. This would be the equivalent to about 6 miles. He's had no chest discomfort, palpitations, lightheadedness or edema.      Past Medical History   Diagnosis Date   • Fatty liver    • Dyslipidemia    • Hypertension      ON NO MEDS, now on meds   • Bronchitis    • Cardiogenic shock (CMS-HCC) 1/2012   • Myocardial infarction of anterolateral wall (CMS-HCC) 12/29/2011   • Pacemaker 04/23/2012   • Congestive heart failure (CMS-HCC)    • Atrial fib/flutter, transient      when in cardiogenic shock   • Acute MI, anterior wall s/p bms to LAD 12/11 with -RCA and residual CX dz (stent failed) 12/31/2011   • Cardiomyopathy, ischemic EF 35% 12/31/2011   • AICD (automatic cardioverter/defibrillator) present St Zach placed 4/23/12 4/24/2012     DEVICE DATA:  1. Pulse generator:  St. Zach, model #IG6416-08, serial  #[id number]  2. Right ventricular lead:  St. Zach, model #7120/60,  serial #[id number]  MEASURED INTRAOPERATIVE DATA: R-waves measured 11.7 mV, pacing  threshold 0.75 volts at 0.5 msec, lead impedance of 859  "ohms.  DEVICE SETTINGS: VVI 40 to 120.    • CAD (coronary artery disease)    • Arrhythmia    • Myocardial infarct (CMS-HCC) 12/29/2011   • ASTHMA      as a child   • Tuberculosis      20 years ago on meds for 6  mo     Past Surgical History   Procedure Laterality Date   • Multiple coronary artery bypass endo vein harvest  1/3/2012     Performed by PADMINI OCHOA at SURGERY [facility] ORS   • Colonoscopy  2007     normal   • Umbilical hernia repair  1/14/2011     Performed by CHUY CHRISTENSEN at SURGERY SAME DAY [facility] ORS   • Recovery  4/23/2012     Performed by SURGERY, CATH-RECOVERY at SURGERY SAME DAY [facility] ORS   • Recovery  8/30/2013     Performed by Cath-Recovery Surgery at SURGERY SAME DAY [facility] ORS     Family History   Problem Relation Age of Onset   • Cancer Mother      ovarian cancer   • Heart Disease Maternal Aunt 17     unclear but MI!   • Heart Disease Maternal Uncle 38     History   Smoking status   • Former Smoker   • Types: Cigarettes   • Quit date: 03/01/1982   Smokeless tobacco   • Never Used     Comment: QUIT 1985     Allergies   Allergen Reactions   • Pcn [Penicillins] Rash     Outpatient Encounter Prescriptions as of 3/30/2017   Medication Sig Dispense Refill   • clopidogrel (PLAVIX) 75 MG Tab TAKE 1 TABLET BY MOUTH EVERY DAY 90 Tab 3   • metoprolol SR (TOPROL XL) 50 MG TABLET SR 24 HR TAKE 1 TABLET BY MOUTH EVERY DAY 30 Tab 6   • lisinopril (PRINIVIL) 10 MG Tab TAKE 1 TABLET BY MOUTH EVERY DAY 30 Tab 6   • atorvastatin (LIPITOR) 40 MG Tab TAKE 1 TAB BY MOUTH EVERY DAY. 30 Tab 11   • aspirin EC (ECOTRIN) 81 MG TBEC Take 81 mg by mouth every day.     • Multiple Vitamin (MULTI-VITAMIN PO) Take  by mouth.       No facility-administered encounter medications on file as of 3/30/2017.     ROS       Objective:   /62 mmHg  Pulse 64  Ht 1.651 m (5' 5\")  Wt 66.225 kg (146 lb)  BMI 24.30 kg/m2  SpO2 97%    Physical Exam   Neck: No JVD present.   Cardiovascular: Normal rate and " regular rhythm.  Exam reveals no gallop.    No murmur heard.  Pulmonary/Chest: Effort normal. He has no rales.   Abdominal: Soft. There is no tenderness.   Musculoskeletal: He exhibits no edema.     Lab Results   Component Value Date/Time    CHOLESTEROL, 03/18/2017 07:06 AM    LDL 57 03/18/2017 07:06 AM    HDL 30* 03/18/2017 07:06 AM    TRIGLYCERIDES 111 03/18/2017 07:06 AM       Lab Results   Component Value Date/Time    SODIUM 138 12/27/2016 07:15 AM    POTASSIUM 4.1 12/27/2016 07:15 AM    CHLORIDE 104 12/27/2016 07:15 AM    CO2 28 12/27/2016 07:15 AM    GLUCOSE 101* 12/27/2016 07:15 AM    BUN 10 12/27/2016 07:15 AM    CREATININE 0.62 12/27/2016 07:15 AM    BUN-CREATININE RATIO 15 12/08/2010 07:11 AM    GLOM FILT RATE, EST >59 12/08/2010 07:11 AM     Lab Results   Component Value Date/Time    ALKALINE PHOSPHATASE 84 12/27/2016 07:15 AM    AST(SGOT) 26 12/27/2016 07:15 AM    ALT(SGPT) 30 12/27/2016 07:15 AM    TOTAL BILIRUBIN 0.7 12/27/2016 07:15 AM      Lab Results   Component Value Date/Time    B NATRIURETIC PEPTIDE 42 12/27/2016 07:15 AM      Echocardiogram:  CONCLUSIONS  Normal left ventricular chamber size.  Left ventricular ejection fraction is visually estimated to be 45%.  [place], anteroseptum and inferoseptum hypokinetic.  Grade I diastolic dysfunction.  Mild mitral regurgitation.  Estimated right ventricular systolic pressure is 20 mmHg.  Normal inferior vena cava size and inspiratory collapse.  Exam Date: 06/17/2016       Assessment:     1. Coronary artery disease due to calcified coronary lesion:Acute MI, anterior wall s/p bms to LAD 12/11 with -RCA and residual CX dz (stent failed)     2. Essential hypertension     3. AICD (automatic cardioverter/defibrillator) present St Zach placed 4/23/12     4. Cardiomyopathy, ischemic EF 35%     5. Dyslipidemia         Medical Decision Making:  Today's Assessment / Status / Plan:     Mr. Barillas is clinically stable. He's exercising regularly and is  asymptomatic from a cardiovascular standpoint. His blood pressure is under excellent control. His device was interrogated today and apparently is functioning normally. His lipid status is under fairly good control though his HDL is low. We will try switching him to rosuvastatin 20 mg daily. He'll have lab work in about 6 weeks and prior to follow-up in 6 months.      Jessy Cervantes M.D.  [address]  [address]  [place] [address]  VIA In Basket

## 2017-04-25 ENCOUNTER — OFFICE VISIT (OUTPATIENT)
Dept: URGENT CARE | Facility: CLINIC | Age: 62
End: 2017-04-25
Payer: COMMERCIAL

## 2017-04-25 VITALS
HEIGHT: 65 IN | SYSTOLIC BLOOD PRESSURE: 116 MMHG | TEMPERATURE: 98.5 F | HEART RATE: 80 BPM | WEIGHT: 146 LBS | RESPIRATION RATE: 14 BRPM | DIASTOLIC BLOOD PRESSURE: 74 MMHG | OXYGEN SATURATION: 97 % | BODY MASS INDEX: 24.32 KG/M2

## 2017-04-25 DIAGNOSIS — J45.901 ASTHMA EXACERBATION: ICD-10-CM

## 2017-04-25 DIAGNOSIS — I10 ESSENTIAL HYPERTENSION: ICD-10-CM

## 2017-04-25 DIAGNOSIS — R05.2 SUBACUTE COUGH: ICD-10-CM

## 2017-04-25 DIAGNOSIS — Z87.09 HISTORY OF ASTHMA: ICD-10-CM

## 2017-04-25 PROCEDURE — 99213 OFFICE O/P EST LOW 20 MIN: CPT | Performed by: NURSE PRACTITIONER

## 2017-04-25 RX ORDER — ALBUTEROL SULFATE 90 UG/1
2 AEROSOL, METERED RESPIRATORY (INHALATION) EVERY 6 HOURS PRN
Qty: 8.5 G | Refills: 0 | Status: SHIPPED | OUTPATIENT
Start: 2017-04-25 | End: 2018-07-13

## 2017-04-25 RX ORDER — METOPROLOL SUCCINATE 50 MG/1
TABLET, EXTENDED RELEASE ORAL
Qty: 30 TAB | Refills: 11 | Status: SHIPPED | OUTPATIENT
Start: 2017-04-25 | End: 2018-05-16 | Stop reason: SDUPTHER

## 2017-04-25 RX ORDER — PREDNISONE 20 MG/1
TABLET ORAL
Qty: 10 TAB | Refills: 0 | Status: SHIPPED | OUTPATIENT
Start: 2017-04-25 | End: 2018-02-13

## 2017-04-25 RX ORDER — CODEINE PHOSPHATE AND GUAIFENESIN 10; 100 MG/5ML; MG/5ML
5 SOLUTION ORAL EVERY 4 HOURS PRN
Qty: 300 ML | Refills: 0 | Status: SHIPPED | OUTPATIENT
Start: 2017-04-25 | End: 2018-07-13

## 2017-04-25 RX ORDER — BENZONATATE 100 MG/1
100 CAPSULE ORAL 3 TIMES DAILY PRN
Qty: 60 CAP | Refills: 0 | Status: SHIPPED | OUTPATIENT
Start: 2017-04-25 | End: 2018-07-13

## 2017-04-25 RX ORDER — LISINOPRIL 10 MG/1
TABLET ORAL
Qty: 30 TAB | Refills: 11 | Status: SHIPPED | OUTPATIENT
Start: 2017-04-25 | End: 2018-05-16 | Stop reason: SDUPTHER

## 2017-04-25 ASSESSMENT — ENCOUNTER SYMPTOMS
WHEEZING: 1
SPUTUM PRODUCTION: 0
CHILLS: 0
COUGH: 1
SHORTNESS OF BREATH: 1
FEVER: 0

## 2017-04-25 NOTE — PROGRESS NOTES
Subjective:      Abram Barillas is a 61 y.o. male who presents with URI            URI   This is a new problem. Episode onset: 3 days ago. The problem has been gradually worsening. There has been no fever. Associated symptoms include coughing, sneezing and wheezing. Pertinent negatives include no congestion or ear pain. Associated symptoms comments: He has a hx of asthma, does not take any daily medications for control but feels like he is having an asthma attack because his cough and shortness of breath is getting worse. Treatments tried: Thera Flu. The treatment provided no relief.       Review of Systems   Constitutional: Negative for fever, chills and malaise/fatigue.   HENT: Positive for sneezing. Negative for congestion and ear pain.    Respiratory: Positive for cough, shortness of breath and wheezing. Negative for sputum production.    All other systems reviewed and are negative.    Past Medical History   Diagnosis Date   • Fatty liver    • Dyslipidemia    • Hypertension      ON NO MEDS, now on meds   • Bronchitis    • Cardiogenic shock (CMS-HCC) 1/2012   • Myocardial infarction of anterolateral wall (CMS-HCC) 12/29/2011   • Pacemaker 04/23/2012   • Congestive heart failure (CMS-HCC)    • Atrial fib/flutter, transient      when in cardiogenic shock   • Acute MI, anterior wall s/p bms to LAD 12/11 with -RCA and residual CX dz (stent failed) 12/31/2011   • Cardiomyopathy, ischemic EF 35% 12/31/2011   • AICD (automatic cardioverter/defibrillator) present St Zach placed 4/23/12 4/24/2012     DEVICE DATA:  1. Pulse generator:  [employer] Zach, model #HN1107-21, serial  #[id number]  2. Right ventricular lead:  St. Zach, model #7120/60,  serial #[id number]  MEASURED INTRAOPERATIVE DATA: R-waves measured 11.7 mV, pacing  threshold 0.75 volts at 0.5 msec, lead impedance of 859 ohms.  DEVICE SETTINGS: VVI 40 to 120.    • CAD (coronary artery disease)    • Arrhythmia    • Myocardial infarct  "(CMS-[facility]) 12/29/2011   • ASTHMA      as a child   • Tuberculosis      20 years ago on meds for 6  mo      Past Surgical History   Procedure Laterality Date   • Multiple coronary artery bypass endo vein harvest  1/3/2012     Performed by PADMINI OCHOA at SURGERY [facility] ORS   • Colonoscopy  2007     normal   • Umbilical hernia repair  1/14/2011     Performed by CHUY CHRISTENSEN at SURGERY SAME DAY [facility] ORS   • Recovery  4/23/2012     Performed by SURGERY, CATH-RECOVERY at SURGERY SAME DAY [facility] ORS   • Recovery  8/30/2013     Performed by Cath-Recovery Surgery at SURGERY SAME DAY [facility]      Social History     Social History   • Marital Status:      Spouse Name: N/A   • Number of Children: N/A   • Years of Education: N/A     Occupational History   • Not on file.     Social History Main Topics   • Smoking status: Former Smoker     Types: Cigarettes     Quit date: 03/01/1982   • Smokeless tobacco: Never Used      Comment: QUIT 1985   • Alcohol Use: 0.0 oz/week     0 Standard drinks or equivalent per week      Comment: occasionally ( 2 times a year)   • Drug Use: No   • Sexual Activity: Not Currently     Other Topics Concern   • Not on file     Social History Narrative          Objective:     /74 mmHg  Pulse 80  Temp(Src) 36.9 °C (98.5 °F)  Resp 14  Ht 1.651 m (5' 5\")  Wt 66.225 kg (146 lb)  BMI 24.30 kg/m2  SpO2 97%     Physical Exam   Constitutional: He is oriented to person, place, and time. Vital signs are normal. He appears well-developed and well-nourished.   HENT:   Head: Normocephalic.   Right Ear: Tympanic membrane and external ear normal.   Left Ear: External ear normal. Tympanic membrane is erythematous.   Mouth/Throat: Oropharynx is clear and moist.   Eyes: EOM are normal. Pupils are equal, round, and reactive to light.   Neck: Trachea normal, normal range of motion and phonation normal.   Cardiovascular: Normal rate and regular rhythm.    Pulmonary/Chest: Effort " normal. He has wheezes in the right upper field, the right lower field, the left upper field and the left lower field. He has rhonchi in the right upper field and the left upper field.   Musculoskeletal: Normal range of motion.   Lymphadenopathy:        Head (right side): Submandibular adenopathy present.        Head (left side): Submandibular adenopathy present.   Neurological: He is alert and oriented to person, place, and time.   Skin: Skin is warm and dry.   Psychiatric: He has a normal mood and affect. His speech is normal and behavior is normal. Thought content normal.   Vitals reviewed.              Assessment/Plan:     1. Asthma exacerbation  - predniSONE (DELTASONE) 20 MG Tab; Take 2 tabs PO daily for five days  Dispense: 10 Tab; Refill: 0  - albuterol 108 (90 BASE) MCG/ACT Aero Soln inhalation aerosol; Inhale 2 Puffs by mouth every 6 hours as needed for Shortness of Breath.  Dispense: 8.5 g; Refill: 0    2. Subacute cough  - benzonatate (TESSALON) 100 MG Cap; Take 1 Cap by mouth 3 times a day as needed for Cough.  Dispense: 60 Cap; Refill: 0  - guaifenesin-codeine (ROBITUSSIN AC) Solution oral solution; Take 5 mL by mouth every four hours as needed for Cough.  Dispense: 300 mL; Refill: 0    3. History of asthma    Increase fluid intake  Daily OTC antihistamine  May continue Thera Flu  Supportive care, differential diagnoses, and indications for immediate follow-up discussed with patient.    Pathogenesis of diagnosis discussed including typical length and natural progression.      Instructed to return to  or nearest emergency department if symptoms fail to improve, for any change in condition, further concerns, or new concerning symptoms.  Patient states understanding of the plan of care and discharge instructions.

## 2017-04-25 NOTE — MR AVS SNAPSHOT
"        Abram Workman Barillas   2017 8:00 AM   Office Visit   MRN: [id number]    Department:  [facility]   Dept Phone:  [phone]    Description:  Male : 1955   Provider:  SIOBHAN Velasquez           Reason for Visit     URI uri , sob x 3 days .      Allergies as of 2017     Allergen Noted Reactions    Pcn [Penicillins] 2010   Rash      You were diagnosed with     Asthma exacerbation   [039914]       Subacute cough   [679461]       History of asthma   [043081]         Vital Signs     Blood Pressure Pulse Temperature Respirations Height Weight    116/74 mmHg 80 36.9 °C (98.5 °F) 14 1.651 m (5' 5\") 66.225 kg (146 lb)    Body Mass Index Oxygen Saturation Smoking Status             24.30 kg/m2 97% Former Smoker         Basic Information     Date Of Birth Sex Race Ethnicity Preferred Language    1955 Male  or   Origin (Armenian,Salvadorean,Bangladeshi,Jonnie, etc) English      Your appointments     May 19, 2017  1:00 PM   Pulmonary Function Test with PFT-RM1   [facility] (--)    [address]  [address]  [place] [id number] [phone]            May 19, 2017  2:00 PM   New Patient Pulmonary with A Rotation   [facility] (--)    [address]  [address]  [place] [address] [phone]            2017  3:00 PM   [facility] with [facility] [address]  [address]  [place] [address]   [phone]              Problem List              ICD-10-CM Priority Class Noted - Resolved    Dyslipidemia E78.5   Unknown - Present    Fatty liver K76.0   Unknown - Present    Acute MI, anterior wall s/p bms to LAD  with -RCA and residual CX dz (stent failed) I21.09   2011 - Present    Cardiomyopathy, ischemic EF 35% I25.5   2011 - Present    AICD (automatic cardioverter/defibrillator) present St Zach placed 12 Z95.810   " 4/24/2012 - Present    Mild intermittent asthma J45.20   1/15/2013 - Present    Coronary artery disease due to calcified coronary lesion:Acute MI, anterior wall s/p bms to LAD 12/11 with -RCA and residual CX dz (stent failed) I25.10, I25.84   Unknown - Present    AICD lead malfunction T82.110A   8/23/2013 - Present    Mechanical complication of cardiac pacemaker electrode T82.190A   8/30/2013 - Present    Essential hypertension I10   Unknown - Present    Myocardial infarction of anterolateral wall (Chronic) I21.09   12/29/2011 - Present    Pacemaker (Chronic) Z95.0   4/23/2012 - Present    Congestive heart failure (Chronic) I50.9   Unknown - Present    Atrial fib/flutter, transient (Chronic) [id number]   Unknown - Present    Impotence of organic origin N52.9   2/22/2016 - Present    Obstructive sleep apnea G47.33   3/30/2017 - Present      Health Maintenance        Date Due Completion Dates    COLONOSCOPY 1/18/2017 1/18/2007 (Prv Comp)    Override on 1/18/2007: Previously completed (normal, Dr. Post)    IMM DTaP/Tdap/Td Vaccine (2 - Td) 1/18/2027 1/18/2017            Current Immunizations     Hepatitis A Vaccine, Adult 12/27/2016    Influenza TIV (IM) 10/11/2013, 10/1/2012, 12/1/2011    Influenza Vaccine Quad Inj (Pf) 10/3/2016 10:43 AM, 10/15/2015, 10/8/2014    Pneumococcal Vaccine (UF)Historical Data 1/1/2007    Pneumococcal polysaccharide vaccine (PPSV-23) 10/1/2012    SHINGLES VACCINE 1/18/2017    Tdap Vaccine 1/18/2017      Below and/or attached are the medications your provider expects you to take. Review all of your home medications and newly ordered medications with your provider and/or pharmacist. Follow medication instructions as directed by your provider and/or pharmacist. Please keep your medication list with you and share with your provider. Update the information when medications are discontinued, doses are changed, or new medications (including over-the-counter products) are added; and carry  medication information at all times in the event of emergency situations     Allergies:  PCN - Rash               Medications  Valid as of: April 25, 2017 -  8:57 AM    Generic Name Brand Name Tablet Size Instructions for use    Albuterol Sulfate (Aero Soln) albuterol 108 (90 BASE) MCG/ACT Inhale 2 Puffs by mouth every 6 hours as needed for Shortness of Breath.        Aspirin (Tablet Delayed Response) ECOTRIN 81 MG Take 81 mg by mouth every day.        Benzonatate (Cap) TESSALON 100 MG Take 1 Cap by mouth 3 times a day as needed for Cough.        Clopidogrel Bisulfate (Tab) PLAVIX 75 MG TAKE 1 TABLET BY MOUTH EVERY DAY        Guaifenesin-Codeine (Solution) ROBITUSSIN -10 mg/5mL Take 5 mL by mouth every four hours as needed for Cough.        Lisinopril (Tab) PRINIVIL 10 MG TAKE 1 TABLET BY MOUTH EVERY DAY        Metoprolol Succinate (TABLET SR 24 HR) TOPROL XL 50 MG TAKE 1 TABLET BY MOUTH EVERY DAY        Multiple Vitamin   Take  by mouth.        Phenylephrine-Pheniramine-DM   Take  by mouth.        PredniSONE (Tab) DELTASONE 20 MG Take 2 tabs PO daily for five days        Rosuvastatin Calcium (Tab) CRESTOR 20 MG Take 1 Tab by mouth every evening.        .                 Medicines prescribed today were sent to:     [facility]ACY #[id number] - [place], [place] - [address] [place] [address] AT [address]    [address] [place] [address]    Phone: [phone] Fax: [phone]    Open 24 Hours?: No      Medication refill instructions:       If your prescription bottle indicates you have medication refills left, it is not necessary to call your provider’s office. Please contact your pharmacy and they will refill your medication.    If your prescription bottle indicates you do not have any refills left, you may request refills at any time through one of the following ways: The online Checkout10 system (except Urgent Care), by calling your provider’s office, or by asking your pharmacy to contact your provider’s office with a  refill request. Medication refills are processed only during regular business hours and may not be available until the next business day. Your provider may request additional information or to have a follow-up visit with you prior to refilling your medication.   *Please Note: Medication refills are assigned a new Rx number when refilled electronically. Your pharmacy may indicate that no refills were authorized even though a new prescription for the same medication is available at the pharmacy. Please request the medicine by name with the pharmacy before contacting your provider for a refill.           [employer] Access Code: Activation code not generated  Current [employer] Status: Active

## 2017-05-01 ENCOUNTER — TELEPHONE (OUTPATIENT)
Dept: PULMONOLOGY | Facility: HOSPICE | Age: 62
End: 2017-05-01

## 2017-05-01 DIAGNOSIS — R06.00 DYSPNEA, UNSPECIFIED TYPE: ICD-10-CM

## 2017-05-13 ENCOUNTER — HOSPITAL ENCOUNTER (OUTPATIENT)
Dept: LAB | Facility: MEDICAL CENTER | Age: 62
End: 2017-05-13
Attending: INTERNAL MEDICINE
Payer: COMMERCIAL

## 2017-05-13 DIAGNOSIS — I25.84 CORONARY ARTERY DISEASE DUE TO CALCIFIED CORONARY LESION: ICD-10-CM

## 2017-05-13 DIAGNOSIS — E78.5 DYSLIPIDEMIA: ICD-10-CM

## 2017-05-13 DIAGNOSIS — I25.10 CORONARY ARTERY DISEASE DUE TO CALCIFIED CORONARY LESION: ICD-10-CM

## 2017-05-13 DIAGNOSIS — I10 ESSENTIAL HYPERTENSION: ICD-10-CM

## 2017-05-13 LAB
ALBUMIN SERPL BCP-MCNC: 4.5 G/DL (ref 3.2–4.9)
ALBUMIN/GLOB SERPL: 1.6 G/DL
ALP SERPL-CCNC: 64 U/L (ref 30–99)
ALT SERPL-CCNC: 38 U/L (ref 2–50)
ANION GAP SERPL CALC-SCNC: 6 MMOL/L (ref 0–11.9)
AST SERPL-CCNC: 34 U/L (ref 12–45)
BILIRUB SERPL-MCNC: 1.2 MG/DL (ref 0.1–1.5)
BUN SERPL-MCNC: 10 MG/DL (ref 8–22)
CALCIUM SERPL-MCNC: 9.2 MG/DL (ref 8.5–10.5)
CHLORIDE SERPL-SCNC: 105 MMOL/L (ref 96–112)
CHOLEST SERPL-MCNC: 101 MG/DL (ref 100–199)
CO2 SERPL-SCNC: 27 MMOL/L (ref 20–33)
CREAT SERPL-MCNC: 0.53 MG/DL (ref 0.5–1.4)
GFR SERPL CREATININE-BSD FRML MDRD: >60 ML/MIN/1.73 M 2
GLOBULIN SER CALC-MCNC: 2.9 G/DL (ref 1.9–3.5)
GLUCOSE SERPL-MCNC: 101 MG/DL (ref 65–99)
HDLC SERPL-MCNC: 31 MG/DL
LDLC SERPL CALC-MCNC: 49 MG/DL
POTASSIUM SERPL-SCNC: 3.9 MMOL/L (ref 3.6–5.5)
PROT SERPL-MCNC: 7.4 G/DL (ref 6–8.2)
SODIUM SERPL-SCNC: 138 MMOL/L (ref 135–145)
TRIGL SERPL-MCNC: 107 MG/DL (ref 0–149)

## 2017-05-13 PROCEDURE — 80061 LIPID PANEL: CPT

## 2017-05-13 PROCEDURE — 36415 COLL VENOUS BLD VENIPUNCTURE: CPT

## 2017-05-13 PROCEDURE — 80053 COMPREHEN METABOLIC PANEL: CPT

## 2017-05-14 ASSESSMENT — ENCOUNTER SYMPTOMS
SHORTNESS OF BREATH: 1
RESPIRATORY SYMPTOMS COMMENTS: NO
HEMOPTYSIS: 0
CHEST TIGHTNESS: 1
WHEEZING: 1
DYSPNEA AT REST: 0

## 2017-05-19 ENCOUNTER — OFFICE VISIT (OUTPATIENT)
Dept: PULMONOLOGY | Facility: HOSPICE | Age: 62
End: 2017-05-19
Payer: COMMERCIAL

## 2017-05-19 VITALS
BODY MASS INDEX: 25.43 KG/M2 | DIASTOLIC BLOOD PRESSURE: 70 MMHG | HEIGHT: 65 IN | RESPIRATION RATE: 16 BRPM | OXYGEN SATURATION: 95 % | SYSTOLIC BLOOD PRESSURE: 110 MMHG | TEMPERATURE: 98.5 F | WEIGHT: 152.6 LBS | HEART RATE: 61 BPM

## 2017-05-19 DIAGNOSIS — I25.5 CARDIOMYOPATHY, ISCHEMIC: ICD-10-CM

## 2017-05-19 DIAGNOSIS — G47.33 OSA (OBSTRUCTIVE SLEEP APNEA): ICD-10-CM

## 2017-05-19 DIAGNOSIS — I25.84 CORONARY ARTERY DISEASE DUE TO CALCIFIED CORONARY LESION: ICD-10-CM

## 2017-05-19 DIAGNOSIS — I10 ESSENTIAL HYPERTENSION: ICD-10-CM

## 2017-05-19 DIAGNOSIS — I25.10 CORONARY ARTERY DISEASE DUE TO CALCIFIED CORONARY LESION: ICD-10-CM

## 2017-05-19 DIAGNOSIS — J45.20 MILD INTERMITTENT ASTHMA WITHOUT COMPLICATION: ICD-10-CM

## 2017-05-19 PROCEDURE — 99244 OFF/OP CNSLTJ NEW/EST MOD 40: CPT | Performed by: INTERNAL MEDICINE

## 2017-05-19 RX ORDER — ZOLPIDEM TARTRATE 5 MG/1
TABLET ORAL
Qty: 3 TAB | Refills: 0 | Status: SHIPPED | OUTPATIENT
Start: 2017-05-19 | End: 2018-07-13

## 2017-05-19 RX ORDER — BUDESONIDE AND FORMOTEROL FUMARATE DIHYDRATE 160; 4.5 UG/1; UG/1
2 AEROSOL RESPIRATORY (INHALATION) 2 TIMES DAILY
Qty: 1 INHALER | Refills: 6 | Status: SHIPPED | OUTPATIENT
Start: 2017-05-19 | End: 2018-08-18 | Stop reason: SDUPTHER

## 2017-05-19 ASSESSMENT — PAIN SCALES - GENERAL: PAINLEVEL: NO PAIN

## 2017-05-19 NOTE — PROGRESS NOTES
CC: Referred by Dr. Chemo Son for evaluation and management of obstructive sleep apnea and pulmonary symptoms.      HPI:     The patient is a 61-year-old who has intermittent coughing sneezing and wheezing. He has a history of asthma but does not take any medications for control. In April he was prescribed Tessalon, and guaifenesin codeine for an asthma exacerbation. Asthma was first identified when he was a child, but got worse when he moved here from [email or web address]  He had an overnight oximetry performed in March which showed some very mild hypoxemia but was wearing O2 that night. Has been O2 prescribed by Dr. Son several months ago. Has loud snoring and non-restorative sleep, napping, and minimal EDS.    Exposure to yard dust is a potent asthma trigger. Previously took Symbicort.     Has had animal exposure. Worked as a , , , and cook. Has worked at [employer] for 26 years.    Stopped smoking in 1982. Only smoked for a few years and only minimally.      He has a history of coronary artery disease with an ischemic cardiomyopathy and has an AICD. Additionally he has a history of hypertension and dyslipidemia. His last echo in December 2016 showed that the left ventricular ejection fraction was 45%. He had apical anteroseptal and and inferoseptal hypokinesis as well as grade 1 diastolic dysfunction.          Patient Active Problem List    Diagnosis Date Noted   • Obstructive sleep apnea 03/30/2017   • Impotence of organic origin 02/22/2016   • Essential hypertension    • Congestive heart failure    • Atrial fib/flutter, transient    • Mechanical complication of cardiac pacemaker electrode 08/30/2013   • AICD lead malfunction 08/23/2013   • Coronary artery disease due to calcified coronary lesion:Acute MI, anterior wall s/p bms to LAD 12/11 with -RCA and residual CX dz (stent failed)    • Mild intermittent asthma 01/15/2013   • AICD (automatic cardioverter/defibrillator)  present St Zach placed 4/23/12 04/24/2012   • Pacemaker 04/23/2012   • Acute MI, anterior wall s/p bms to LAD 12/11 with -RCA and residual CX dz (stent failed) 12/31/2011   • Cardiomyopathy, ischemic EF 35% 12/31/2011   • Myocardial infarction of anterolateral wall 12/29/2011   • Dyslipidemia    • Fatty liver        Past Medical History   Diagnosis Date   • Fatty liver    • Dyslipidemia    • Hypertension      ON NO MEDS, now on meds   • Bronchitis    • Cardiogenic shock (CMS-[facility]) 1/2012   • Myocardial infarction of anterolateral wall (CMS-[facility]) 12/29/2011   • Pacemaker 04/23/2012   • Congestive heart failure (CMS-HCC)    • Atrial fib/flutter, transient      when in cardiogenic shock   • Acute MI, anterior wall s/p bms to LAD 12/11 with -RCA and residual CX dz (stent failed) 12/31/2011   • Cardiomyopathy, ischemic EF 35% 12/31/2011   • AICD (automatic cardioverter/defibrillator) present St Zach placed 4/23/12 4/24/2012     DEVICE DATA:  1. Pulse generator:  St. Zach, model #HW6120-54, serial  #[id number]  2. Right ventricular lead:  St. Zach, model #7120/60,  serial #[id number]  MEASURED INTRAOPERATIVE DATA: R-waves measured 11.7 mV, pacing  threshold 0.75 volts at 0.5 msec, lead impedance of 859 ohms.  DEVICE SETTINGS: VVI 40 to 120.    • CAD (coronary artery disease)    • Arrhythmia    • Myocardial infarct (CMS-HCC) 12/29/2011   • ASTHMA      as a child   • Tuberculosis      20 years ago on meds for 6  mo   • Influenza         Past Surgical History   Procedure Laterality Date   • Multiple coronary artery bypass endo vein harvest  1/3/2012     Performed by PADMINI OCHOA at SURGERY [facility] ORS   • Colonoscopy  2007     normal   • Umbilical hernia repair  1/14/2011     Performed by CHUY CHRISTENSEN at SURGERY SAME DAY [facility] ORS   • Recovery  4/23/2012     Performed by SURGERY, CATH-RECOVERY at SURGERY SAME DAY [facility] ORS   • Recovery  8/30/2013     Performed by Cath-Recovery  Surgery at SURGERY SAME DAY [facility] ORS       Family History   Problem Relation Age of Onset   • Cancer Mother      ovarian cancer   • Heart Disease Maternal Aunt 17     unclear but MI!   • Heart Disease Maternal Uncle 38       Social History     Social History   • Marital Status:      Spouse Name: N/A   • Number of Children: N/A   • Years of Education: N/A     Occupational History   • Not on file.     Social History Main Topics   • Smoking status: Former Smoker     Types: Cigarettes     Quit date: 03/01/1982   • Smokeless tobacco: Never Used      Comment: QUIT 1985   • Alcohol Use: No      Comment: occasionally ( 2 times a year)   • Drug Use: No   • Sexual Activity: Not Currently     Other Topics Concern   • Not on file     Social History Narrative       Current Outpatient Prescriptions   Medication Sig Dispense Refill   • zolpidem (AMBIEN) 5 MG Tab Take 1-2 tablets by mouth every evening as needed for insomnia. Bring to sleep study. 3 Tab 0   • metoprolol SR (TOPROL XL) 50 MG TABLET SR 24 HR TAKE 1 TABLET BY MOUTH EVERY DAY 30 Tab 11   • lisinopril (PRINIVIL) 10 MG Tab TAKE 1 TABLET BY MOUTH EVERY DAY 30 Tab 11   • Phenylephrine-Pheniramine-DM (THERAFLU COLD & COUGH PO) Take  by mouth.     • predniSONE (DELTASONE) 20 MG Tab Take 2 tabs PO daily for five days 10 Tab 0   • albuterol 108 (90 BASE) MCG/ACT Aero Soln inhalation aerosol Inhale 2 Puffs by mouth every 6 hours as needed for Shortness of Breath. 8.5 g 0   • benzonatate (TESSALON) 100 MG Cap Take 1 Cap by mouth 3 times a day as needed for Cough. 60 Cap 0   • guaifenesin-codeine (ROBITUSSIN AC) Solution oral solution Take 5 mL by mouth every four hours as needed for Cough. 300 mL 0   • rosuvastatin (CRESTOR) 20 MG Tab Take 1 Tab by mouth every evening. 30 Tab 11   • clopidogrel (PLAVIX) 75 MG Tab TAKE 1 TABLET BY MOUTH EVERY DAY 90 Tab 3   • aspirin EC (ECOTRIN) 81 MG TBEC Take 81 mg by mouth every day.     • Multiple Vitamin (MULTI-VITAMIN PO) Take   "by mouth.       No current facility-administered medications for this visit.    \"CURRENT RX\"    ALLERGIES: Pcn    ROS  Per HPI otherwise negative      PHYSICAL EXAM    /70 mmHg  Pulse 61  Temp(Src) 36.9 °C (98.5 °F)  Resp 16  Ht 1.651 m (5' 5\")  Wt 69.219 kg (152 lb 9.6 oz)  BMI 25.39 kg/m2  SpO2 95%  Appearance: Well-nourished, well-developed, no acute distress  Eyes:  PERRLA, EOMI; wearing glasses  Hearing:  Grossly intact  Nose:  Normal, no lesions or deformities, turbinates moist  Oropharynx:  Tongue normal, posterior pharynx without erythema or exudate  Mallampati classification:    Neck: Supple, trachea midline, no masses  Respiratory effort:  No intercostal retractions or use of accessory muscles  Lung auscultation:  No wheezes rhonchi rubs or rales  Cardiac auscultation:  No murmurs, rubs, or gallops, no regular rhythm, normal rate  Abdomen:  No tenderness, no organomegaly  Extremities:  No cyanosis, clubbing, edema  Gait and Station:  Normal  Digits and nails: No clubbing, cyanosis, petechiae, or nodes  Musculoskeletal:  Grossly normal  Skin:  No rashes  Orientation:  Oriented time, place, and person  Mood and affect:  No depression, anxiety, agitation  Judgment:  Intact    PROBLEMS:  1. Mild intermittent asthma without complication  - AMB PULMONARY FUNCTION TEST/LAB; Future  - AMB MULTIPLE OXIMETRY; Future  - DX-CHEST-2 VIEWS; Future  - ALLERGEN PANEL, IGE BY IMMUNOCAP CHANDLER; Future  2. Coronary artery disease due to calcified coronary lesion:Acute MI, anterior wall s/p bms to LAD 12/11 with -RCA and residual CX dz (stent failed)  3. Cardiomyopathy, ischemic EF 35%  4. Essential hypertension  5. GRANT (obstructive sleep apnea)  - zolpidem (AMBIEN) 5 MG Tab; Take 1-2 tablets by mouth every evening as needed for insomnia. Bring to sleep study.  Dispense: 3 Tab; Refill: 0  - POLYSOMNOGRAPHY, 4 OR MORE; Future        PLAN:   The patient has signs and symptoms consistent with obstructive sleep " apnea hypopnea syndrome. Will schedule him to have a nocturnal polysomnogram using zolpidem to assist with sleep onset and maintenance should the need arise. Will return after the results are available to determine further diagnostic needs and/or treatment options.  The risks of untreated sleep apnea were discussed with the patient at length. Patients with GRANT are at increased risk of cardiovascular disease including coronary artery disease, systemic arterial hypertension, pulmonary arterial hypertension, cardiac arrythmias, and stroke. GRANT patients have an increased risk of motor vehicle accidents, type 2 diabetes, chronic kidney disease, and non-alcoholic liver disease. The patient was advised to avoid driving a motor vehicle when drowsy.    Positive airway pressure, such as CPAP, is considered first-line and preferred therapy for sleep apnea and may reverse both symptoms and risks.     For his asthma, he will continue Albuterol prn. If he has to use the MDI more than 2 times a week, will start Symbicort 160/4.5 2 puffs bid. Demo given of Symbicort use, sample given.    Will check PFTs, CXR, Allergen Panel, and multi-ox then RTC.                          Answers for HPI/ROS submitted by the patient on 5/14/2017   What is the reason for your visit today?: Lungs check  Have to stop when walking or walk at a slow pace? : Feb 2017, No  Do you cough first thing in the morning or at other times during the day?: No  Do you have a cough on most days? If so, how long have you had this cough?: No  Bring up phlegm (mucus, sputum) in the morning or other times during the day?: No  Do you cough up blood from your chest?: No  Do you experience wheezing?: Yes  How often?: rarely  Do you experience any chest tightness?: Yes  How often?: rare  Experience shortness of breath?: Yes  Experience shortness of breath at rest?: No  How far can you walk before becoming short of breath or need to rest? : Not jay walking.  Please list what  causes you to become short of breath:: Allergies   Have you ever been hospitalized?: Yes  Reason, year, and hospital in which you were hospitalized:: Bypass 1/2012 [facility]  If yes, when and for how long?: 2 days  Have you ever had problems with anesthesia?: No  Any complications with surgery?: No  What year did you receive your last Flu shot?: 2016  Have you had a TB skin test? If so, please list the year and result:: No  Have you had Allergy skin testing? If so, please list the year and result:: No  Please list all your occupations from your first job to your current job. 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  a rosey Job: Pick fruit  Please list the places you have lived, the year, and Country or State in the [place] /1977. [employer]/ 1978.[facility]/1984/  Birds?: Yes  Dogs?: Yes  Cats?: Yes  Mice and/or Deer Mice?: Yes  Reptiles?: Yes  Blood Chemistries: [employer]  Blood Count: [employer]  Cardiac Ultrasound: [employer]  Chest X-ray: [employer]  Electrocardiogram: [employer]  Coffee: 2cups  Energy drinks: N/a  Tea: 1 cup  Carbonated soft drinks: N/a

## 2017-05-19 NOTE — MR AVS SNAPSHOT
"        Abram Workman Barillas   2017 2:00 PM   Office Visit   MRN: [id number]    Department:  [facility]   Dept Phone:  [phone]    Description:  Male : 1955   Provider:  Oliver Rodríguez M.D.           Reason for Visit     Establish Care Ref by Jessy Cervantes for Asthma/Noc Hypoxia/Cardiomyopathy,ischemic      Allergies as of 2017     Allergen Noted Reactions    Pcn [Penicillins] 2010   Rash      You were diagnosed with     Mild intermittent asthma without complication   [103759]       Coronary artery disease due to calcified coronary lesion   [1783180]       Cardiomyopathy, ischemic   [292830]       Essential hypertension   [4549670]       GRANT (obstructive sleep apnea)   [180612]         Vital Signs     Blood Pressure Pulse Temperature Respirations Height Weight    110/70 mmHg 61 36.9 °C (98.5 °F) 16 1.651 m (5' 5\") 69.219 kg (152 lb 9.6 oz)    Body Mass Index Oxygen Saturation Smoking Status             25.39 kg/m2 95% Former Smoker         Basic Information     Date Of Birth Sex Race Ethnicity Preferred Language    1955 Male White  Origin (Vincentian,Cymraes,Monegasque,Jonnie, etc) English      Your appointments     2017  1:40 PM   [facility] [address] with [address]  [place] [address]   [phone]            Aug 03, 2017 11:00 AM   Pulmonary Function Test with PFT-RM2   [facility] (--)    [address]  [address]  [place] [id number] [phone]            Aug 03, 2017 12:40 PM   Established Patient Pul with A Rotation   [facility] (--)    [address]  [address]  [place] [address] [phone]              Problem List              ICD-10-CM Priority Class Noted - Resolved    Dyslipidemia E78.5   Unknown - Present    Fatty liver K76.0   Unknown - Present    Acute MI, anterior wall s/p bms to LAD  with -RCA and " residual CX dz (stent failed) I21.09   12/31/2011 - Present    Cardiomyopathy, ischemic EF 35% I25.5   12/31/2011 - Present    AICD (automatic cardioverter/defibrillator) present St Zach placed 4/23/12 Z95.810   4/24/2012 - Present    Mild intermittent asthma J45.20   1/15/2013 - Present    Coronary artery disease due to calcified coronary lesion:Acute MI, anterior wall s/p bms to LAD 12/11 with -RCA and residual CX dz (stent failed) I25.10, I25.84   Unknown - Present    AICD lead malfunction T82.110A   8/23/2013 - Present    Mechanical complication of cardiac pacemaker electrode T82.190A   8/30/2013 - Present    Essential hypertension I10   Unknown - Present    Myocardial infarction of anterolateral wall (Chronic) I21.09   12/29/2011 - Present    Pacemaker (Chronic) Z95.0   4/23/2012 - Present    Congestive heart failure (Chronic) I50.9   Unknown - Present    Atrial fib/flutter, transient (Chronic) IVL7852   Unknown - Present    Impotence of organic origin N52.9   2/22/2016 - Present    Obstructive sleep apnea G47.33   3/30/2017 - Present      Health Maintenance        Date Due Completion Dates    COLONOSCOPY 1/18/2017 1/18/2007 (Prv Comp)    Override on 1/18/2007: Previously completed (normal, Dr. Post)    IMM DTaP/Tdap/Td Vaccine (2 - Td) 1/18/2027 1/18/2017            Current Immunizations     Hepatitis A Vaccine, Adult 12/27/2016    Influenza TIV (IM) 10/11/2013, 10/1/2012, 12/1/2011    Influenza Vaccine Quad Inj (Pf) 10/3/2016 10:43 AM, 10/15/2015, 10/8/2014    Pneumococcal Vaccine (UF)Historical Data 1/1/2007    Pneumococcal polysaccharide vaccine (PPSV-23) 10/1/2012    SHINGLES VACCINE 1/18/2017    Tdap Vaccine 1/18/2017      Below and/or attached are the medications your provider expects you to take. Review all of your home medications and newly ordered medications with your provider and/or pharmacist. Follow medication instructions as directed by your provider and/or pharmacist. Please keep your  medication list with you and share with your provider. Update the information when medications are discontinued, doses are changed, or new medications (including over-the-counter products) are added; and carry medication information at all times in the event of emergency situations     Allergies:  PCN - Rash               Medications  Valid as of: May 19, 2017 -  2:43 PM    Generic Name Brand Name Tablet Size Instructions for use    Albuterol Sulfate (Aero Soln) albuterol 108 (90 BASE) MCG/ACT Inhale 2 Puffs by mouth every 6 hours as needed for Shortness of Breath.        Aspirin (Tablet Delayed Response) ECOTRIN 81 MG Take 81 mg by mouth every day.        Benzonatate (Cap) TESSALON 100 MG Take 1 Cap by mouth 3 times a day as needed for Cough.        Budesonide-Formoterol Fumarate (Aerosol) SYMBICORT 160-4.5 MCG/ACT Inhale 2 Puffs by mouth 2 Times a Day. Use spacer. Rinse mouth after each use.        Clopidogrel Bisulfate (Tab) PLAVIX 75 MG TAKE 1 TABLET BY MOUTH EVERY DAY        Guaifenesin-Codeine (Solution) ROBITUSSIN -10 mg/5mL Take 5 mL by mouth every four hours as needed for Cough.        Lisinopril (Tab) PRINIVIL 10 MG TAKE 1 TABLET BY MOUTH EVERY DAY        Metoprolol Succinate (TABLET SR 24 HR) TOPROL XL 50 MG TAKE 1 TABLET BY MOUTH EVERY DAY        Multiple Vitamin   Take  by mouth.        Phenylephrine-Pheniramine-DM   Take  by mouth.        PredniSONE (Tab) DELTASONE 20 MG Take 2 tabs PO daily for five days        Rosuvastatin Calcium (Tab) CRESTOR 20 MG Take 1 Tab by mouth every evening.        Zolpidem Tartrate (Tab) AMBIEN 5 MG Take 1-2 tablets by mouth every evening as needed for insomnia. Bring to sleep study.        Zolpidem Tartrate (Tab) AMBIEN 5 MG Take 1-2 tablets by mouth every evening as needed for insomnia. Bring to sleep study.        .                 Medicines prescribed today were sent to:     [facility]/PHARMACY #[id number] - [place], [place] - [address]    [address] [place] [address]  [place] [address]    Phone: [phone] Fax: [phone]    Open 24 Hours?: No      Medication refill instructions:       If your prescription bottle indicates you have medication refills left, it is not necessary to call your provider’s office. Please contact your pharmacy and they will refill your medication.    If your prescription bottle indicates you do not have any refills left, you may request refills at any time through one of the following ways: The online Contactual system (except Urgent Care), by calling your provider’s office, or by asking your pharmacy to contact your provider’s office with a refill request. Medication refills are processed only during regular business hours and may not be available until the next business day. Your provider may request additional information or to have a follow-up visit with you prior to refilling your medication.   *Please Note: Medication refills are assigned a new Rx number when refilled electronically. Your pharmacy may indicate that no refills were authorized even though a new prescription for the same medication is available at the pharmacy. Please request the medicine by name with the pharmacy before contacting your provider for a refill.        Your To Do List     Future Labs/Procedures Complete By Expires    ALLERGEN PANEL, IGE BY IMMUNOCAP CHANDLER  As directed 5/19/2018    AMB MULTIPLE OXIMETRY  As directed 5/19/2018    AMB PULMONARY FUNCTION TEST/LAB  As directed 5/19/2018    DX-CHEST-2 VIEWS  As directed 5/19/2018    POLYSOMNOGRAPHY, 4 OR MORE  As directed 5/19/2018         Contactual Access Code: Activation code not generated  Current Contactual Status: Active

## 2017-05-25 ENCOUNTER — HOSPITAL ENCOUNTER (OUTPATIENT)
Dept: LAB | Facility: MEDICAL CENTER | Age: 62
End: 2017-05-25
Attending: INTERNAL MEDICINE
Payer: COMMERCIAL

## 2017-05-25 DIAGNOSIS — J45.20 MILD INTERMITTENT ASTHMA WITHOUT COMPLICATION: ICD-10-CM

## 2017-05-25 PROCEDURE — 36415 COLL VENOUS BLD VENIPUNCTURE: CPT

## 2017-05-25 PROCEDURE — 86003 ALLG SPEC IGE CRUDE XTRC EA: CPT

## 2017-06-12 LAB — PATHOLOGY STUDY: NORMAL

## 2017-06-28 ENCOUNTER — EH NON-PROVIDER (OUTPATIENT)
Dept: OCCUPATIONAL MEDICINE | Facility: CLINIC | Age: 62
End: 2017-06-28

## 2017-06-28 DIAGNOSIS — Z23 NEED FOR HEPATITIS A IMMUNIZATION: ICD-10-CM

## 2017-06-28 PROCEDURE — 90632 HEPA VACCINE ADULT IM: CPT | Performed by: PREVENTIVE MEDICINE

## 2017-08-03 ENCOUNTER — OFFICE VISIT (OUTPATIENT)
Dept: PULMONOLOGY | Facility: HOSPICE | Age: 62
End: 2017-08-03
Payer: COMMERCIAL

## 2017-08-03 ENCOUNTER — NON-PROVIDER VISIT (OUTPATIENT)
Dept: PULMONOLOGY | Facility: HOSPICE | Age: 62
End: 2017-08-03
Payer: COMMERCIAL

## 2017-08-03 VITALS — WEIGHT: 150 LBS | BODY MASS INDEX: 25.61 KG/M2 | HEIGHT: 64 IN

## 2017-08-03 VITALS
SYSTOLIC BLOOD PRESSURE: 108 MMHG | OXYGEN SATURATION: 94 % | BODY MASS INDEX: 24.99 KG/M2 | HEART RATE: 72 BPM | RESPIRATION RATE: 14 BRPM | TEMPERATURE: 98.4 F | DIASTOLIC BLOOD PRESSURE: 70 MMHG | HEIGHT: 65 IN | WEIGHT: 150 LBS

## 2017-08-03 DIAGNOSIS — R06.00 DYSPNEA, UNSPECIFIED TYPE: ICD-10-CM

## 2017-08-03 DIAGNOSIS — I25.5 CARDIOMYOPATHY, ISCHEMIC: ICD-10-CM

## 2017-08-03 DIAGNOSIS — J45.20 MILD INTERMITTENT ASTHMA WITHOUT COMPLICATION: ICD-10-CM

## 2017-08-03 DIAGNOSIS — I10 ESSENTIAL HYPERTENSION: ICD-10-CM

## 2017-08-03 DIAGNOSIS — G47.33 OSA (OBSTRUCTIVE SLEEP APNEA): ICD-10-CM

## 2017-08-03 PROCEDURE — 94726 PLETHYSMOGRAPHY LUNG VOLUMES: CPT | Performed by: INTERNAL MEDICINE

## 2017-08-03 PROCEDURE — 94060 EVALUATION OF WHEEZING: CPT | Performed by: INTERNAL MEDICINE

## 2017-08-03 PROCEDURE — 99214 OFFICE O/P EST MOD 30 MIN: CPT | Performed by: INTERNAL MEDICINE

## 2017-08-03 PROCEDURE — 94729 DIFFUSING CAPACITY: CPT | Performed by: INTERNAL MEDICINE

## 2017-08-03 ASSESSMENT — PULMONARY FUNCTION TESTS
FVC_PREDICTED: 3.8
FEV1_PERCENT_PREDICTED: 92
FEV1/FVC_PERCENT_PREDICTED: 92
FVC: 3.61
FEV1/FVC: 68
FEV1_PERCENT_PREDICTED: 84
FEV1/FVC: 70.23
FEV1: 2.69
FEV1/FVC_PERCENT_PREDICTED: 89
FEV1/FVC_PERCENT_CHANGE: 180
FVC_PERCENT_PREDICTED: 94
FVC_PERCENT_PREDICTED: 100
FEV1/FVC_PERCENT_PREDICTED: 76
FVC: 3.83
FEV1: 2.46
FEV1_PREDICTED: 2.9
FEV1_PERCENT_CHANGE: 9
FEV1_PERCENT_CHANGE: 5

## 2017-08-03 ASSESSMENT — PATIENT HEALTH QUESTIONNAIRE - PHQ9: CLINICAL INTERPRETATION OF PHQ2 SCORE: 0

## 2017-08-03 NOTE — PROCEDURES
Technician: MOLINA Gleason    Technician Comment:  Good patient effort & cooperation.  The results of this test meet the ATS/ERS standards for acceptability & reproducibility.  Test was performed on the OpenWhere Body Plethysmograph-Elite DX system.  Predicted values were [facility]-3 for spirometry, [facility] for DLCO, ITS for Lung Volumes.  The DLCO was uncorrected for Hgb.  A bronchodilator of Ventolin HFA -2puffs via spacer administered.    Interpretation:    FEV1 is 2.46 L which is 84% of predicted. FEV1 FVC ratio is reduced at 68%. MCV is 91% of predicted.  After administration of an inhaled bronchodilator there is a 9% improvement in FEV1.  Lung volumes demonstrate hyperinflation.  DLCO was 156% of predicted.    This study demonstrates mild obstructive lung disease with a partially reversible component and is compatible with a clinical diagnosis of asthma.

## 2017-08-03 NOTE — MR AVS SNAPSHOT
"Abram Barillas   8/3/2017 12:40 PM   Office Visit   MRN: [id number]    Department:  [facility]   Dept Phone:  [phone]    Description:  Male : 1955   Provider:  Huber Hall M.D.           Reason for Visit     Follow-Up Review PFT, Labs and multi oximetry results      Allergies as of 8/3/2017     Allergen Noted Reactions    Pcn [Penicillins] 2010   Rash      You were diagnosed with     Dyspnea, unspecified type   [6873209]       Mild intermittent asthma without complication   [685157]       Cardiomyopathy, ischemic   [462347]       GRANT (obstructive sleep apnea)   [878998]       Essential hypertension   [3897831]         Vital Signs     Blood Pressure Pulse Temperature Respirations Height Weight    108/70 mmHg 72 36.9 °C (98.4 °F) 14 1.638 m (5' 4.5\") 68.04 kg (150 lb)    Body Mass Index Oxygen Saturation Smoking Status             25.36 kg/m2 94% Former Smoker         Basic Information     Date Of Birth Sex Race Ethnicity Preferred Language    1955 Male White  Origin (Occitan,Algerian,Omani,Japanese, etc) English      Your appointments     Aug 26, 2017  7:05 PM   Sleep Study Diagnostic with SLEEP TECH   [facility] (--)    [address]  [place] [address] [phone]            Sep 01, 2017  3:00 PM   Follow UP with SIOBHAN Amador   [facility] (--)    [address]  [place] [address] [phone]            2018  2:20 PM   Established Patient Pul with A Rotation   [facility] (--)    [address]  [address]  [place] [id number] [phone]              Problem List              ICD-10-CM Priority Class Noted - Resolved    Dyslipidemia E78.5   Unknown - Present    Fatty liver K76.0   Unknown - Present    Acute MI, anterior wall s/p bms to LAD  with -RCA and residual CX dz (stent failed) I21.09   2011 - Present  "    Cardiomyopathy, ischemic EF 35% I25.5   12/31/2011 - Present    AICD (automatic cardioverter/defibrillator) present St Zach placed 4/23/12 Z95.810   4/24/2012 - Present    Mild intermittent asthma J45.20   1/15/2013 - Present    Coronary artery disease due to calcified coronary lesion:Acute MI, anterior wall s/p bms to LAD 12/11 with -RCA and residual CX dz (stent failed) I25.10, I25.84   Unknown - Present    AICD lead malfunction T82.110A   8/23/2013 - Present    Mechanical complication of cardiac pacemaker electrode T82.190A   8/30/2013 - Present    Essential hypertension I10   Unknown - Present    Myocardial infarction of anterolateral wall (Chronic) I21.09   12/29/2011 - Present    Pacemaker (Chronic) Z95.0   4/23/2012 - Present    Congestive heart failure (Chronic) I50.9   Unknown - Present    Atrial fib/flutter, transient (Chronic) [id number]   Unknown - Present    Impotence of organic origin N52.9   2/22/2016 - Present    Obstructive sleep apnea G47.33   3/30/2017 - Present      Health Maintenance        Date Due Completion Dates    COLONOSCOPY 1/18/2017 1/18/2007 (Prv Comp)    Override on 1/18/2007: Previously completed (normal, Dr. Post)    IMM INFLUENZA (1) 9/1/2017 10/3/2016, 10/15/2015, 10/8/2014, 10/11/2013, 10/1/2012, 12/1/2011    IMM DTaP/Tdap/Td Vaccine (2 - Td) 1/18/2027 1/18/2017            Current Immunizations     Hepatitis A Vaccine, Adult 6/28/2017, 12/27/2016    Influenza TIV (IM) 10/11/2013, 10/1/2012, 12/1/2011    Influenza Vaccine Quad Inj (Pf) 10/3/2016 10:43 AM, 10/15/2015, 10/8/2014    Pneumococcal Vaccine (UF)Historical Data 1/1/2007    Pneumococcal polysaccharide vaccine (PPSV-23) 10/1/2012    SHINGLES VACCINE 1/18/2017    Tdap Vaccine 1/18/2017      Below and/or attached are the medications your provider expects you to take. Review all of your home medications and newly ordered medications with your provider and/or pharmacist. Follow medication instructions as directed by your  provider and/or pharmacist. Please keep your medication list with you and share with your provider. Update the information when medications are discontinued, doses are changed, or new medications (including over-the-counter products) are added; and carry medication information at all times in the event of emergency situations     Allergies:  PCN - Rash               Medications  Valid as of: August 03, 2017 - 12:54 PM    Generic Name Brand Name Tablet Size Instructions for use    Albuterol Sulfate (Aero Soln) albuterol 108 (90 BASE) MCG/ACT Inhale 2 Puffs by mouth every 6 hours as needed for Shortness of Breath.        Aspirin (Tablet Delayed Response) ECOTRIN 81 MG Take 81 mg by mouth every day.        Benzonatate (Cap) TESSALON 100 MG Take 1 Cap by mouth 3 times a day as needed for Cough.        Budesonide-Formoterol Fumarate (Aerosol) SYMBICORT 160-4.5 MCG/ACT Inhale 2 Puffs by mouth 2 Times a Day. Use spacer. Rinse mouth after each use.        Clopidogrel Bisulfate (Tab) PLAVIX 75 MG TAKE 1 TABLET BY MOUTH EVERY DAY        Guaifenesin-Codeine (Solution) ROBITUSSIN -10 mg/5mL Take 5 mL by mouth every four hours as needed for Cough.        Lisinopril (Tab) PRINIVIL 10 MG TAKE 1 TABLET BY MOUTH EVERY DAY        Metoprolol Succinate (TABLET SR 24 HR) TOPROL XL 50 MG TAKE 1 TABLET BY MOUTH EVERY DAY        Multiple Vitamin   Take  by mouth.        Phenylephrine-Pheniramine-DM   Take  by mouth.        PredniSONE (Tab) DELTASONE 20 MG Take 2 tabs PO daily for five days        Rosuvastatin Calcium (Tab) CRESTOR 20 MG Take 1 Tab by mouth every evening.        Zolpidem Tartrate (Tab) AMBIEN 5 MG Take 1-2 tablets by mouth every evening as needed for insomnia. Bring to sleep study.        Zolpidem Tartrate (Tab) AMBIEN 5 MG Take 1-2 tablets by mouth every evening as needed for insomnia. Bring to sleep study.        .                 Medicines prescribed today were sent to:     [facility]/PHARMACY #[id number] - [place], [place] - [address]    [address] [place] [address]    Phone: [phone] Fax: [phone]    Open 24 Hours?: No      Medication refill instructions:       If your prescription bottle indicates you have medication refills left, it is not necessary to call your provider’s office. Please contact your pharmacy and they will refill your medication.    If your prescription bottle indicates you do not have any refills left, you may request refills at any time through one of the following ways: The online Picaboo system (except Urgent Care), by calling your provider’s office, or by asking your pharmacy to contact your provider’s office with a refill request. Medication refills are processed only during regular business hours and may not be available until the next business day. Your provider may request additional information or to have a follow-up visit with you prior to refilling your medication.   *Please Note: Medication refills are assigned a new Rx number when refilled electronically. Your pharmacy may indicate that no refills were authorized even though a new prescription for the same medication is available at the pharmacy. Please request the medicine by name with the pharmacy before contacting your provider for a refill.           Picaboo Access Code: Activation code not generated  Current Picaboo Status: Active

## 2017-08-03 NOTE — PROGRESS NOTES
Chief Complaint   Patient presents with   • Follow-Up     Review PFT, Labs and multi oximetry results       HPI:  The patient is a 61-year-old man who has a history of asthma since childhood. His treatment has consisted mostly of albuterol with occasional Kenalog injections. A recent allergy panel showed a response to various grasses, trees, and pollens consistent with his history of working in the yard as a trigger for his symptoms. Pulmonary function testing today demonstrated    FEV1 is 2.46 L which is 84% of predicted. FEV1 FVC ratio is reduced at 68%. MCV is 91% of predicted.  After administration of an inhaled bronchodilator there is a 9% improvement in FEV1.  Lung volumes demonstrate hyperinflation.  DLCO was 156% of predicted.    This study demonstrates mild obstructive lung disease with a partially reversible component and is compatible with a clinical diagnosis of asthma.    Multi-oximetry on room air showed no evidence of oxygen desaturation.    He has been given a Symbicort inhaler but he has not been using it. He is not symptomatic today.    Other problems include possible obstructive sleep apnea. A sleep study is pending. He also has a history of an ischemic cardiomyopathy with an AICD. His ejection fraction is 45% and he has diastolic dysfunction.    Past Medical History   Diagnosis Date   • Fatty liver    • Dyslipidemia    • Hypertension      ON NO MEDS, now on meds   • Bronchitis    • Cardiogenic shock (CMS-HCC) 1/2012   • Myocardial infarction of anterolateral wall (CMS-HCC) 12/29/2011   • Pacemaker 04/23/2012   • Congestive heart failure (CMS-HCC)    • Atrial fib/flutter, transient      when in cardiogenic shock   • Acute MI, anterior wall s/p bms to LAD 12/11 with -RCA and residual CX dz (stent failed) 12/31/2011   • Cardiomyopathy, ischemic EF 35% 12/31/2011   • AICD (automatic cardioverter/defibrillator) present St Zach placed 4/23/12 4/24/2012     DEVICE DATA:  1. Pulse generator:   St. Zach, model #IQ3863-07, serial  #[id number]  2. Right ventricular lead:  St. Zach, model #7120/60,  serial #[id number]  MEASURED INTRAOPERATIVE DATA: R-waves measured 11.7 mV, pacing  threshold 0.75 volts at 0.5 msec, lead impedance of 859 ohms.  DEVICE SETTINGS: VVI 40 to 120.    • CAD (coronary artery disease)    • Arrhythmia    • Myocardial infarct (CMS-HCC) 12/29/2011   • ASTHMA      as a child   • Tuberculosis      20 years ago on meds for 6  mo   • Influenza        ROS:   Constitutional: Denies fevers, chills, night sweats, fatigue or weight loss  Eyes: Denies vision loss, pain, drainage, double vision  Ears, Nose, Throat: Denies earache, tinnitus, hoarseness  Cardiovascular: Denies chest pain, tightness, palpitations  Respiratory: Denies shortness of breath, sputum production, cough, hemoptysis  Sleep: Denies, snoring, apnea  GI: Denies abdominal pain, nausea, vomiting, diarrhea  : Denies frequent urination, hematuria, painful urination  Musculoskeletal: Denies back pain, painful joints, sore muscles  Neurological: Denies headaches, seizures  Skin: Denies rashes, color changes  Psychiatric: Denies depression or thoughts of suicide  Hematologic: Denies bleeding tendency or clotting tendency  Allergic/Immunologic: Denies rhinitis, skin sensitivity    Social History     Social History   • Marital Status:      Spouse Name: N/A   • Number of Children: N/A   • Years of Education: N/A     Occupational History   • Not on file.     Social History Main Topics   • Smoking status: Former Smoker     Types: Cigarettes     Quit date: 03/01/1982   • Smokeless tobacco: Never Used      Comment: QUIT 1985   • Alcohol Use: No      Comment: occasionally ( 2 times a year)   • Drug Use: No   • Sexual Activity: Not Currently     Other Topics Concern   • Not on file     Social History Narrative     Pcn  Current Outpatient Prescriptions on File Prior to Visit   Medication Sig Dispense Refill   •  "budesonide-formoterol (SYMBICORT) 160-4.5 MCG/ACT Aerosol Inhale 2 Puffs by mouth 2 Times a Day. Use spacer. Rinse mouth after each use. 1 Inhaler 6   • metoprolol SR (TOPROL XL) 50 MG TABLET SR 24 HR TAKE 1 TABLET BY MOUTH EVERY DAY 30 Tab 11   • lisinopril (PRINIVIL) 10 MG Tab TAKE 1 TABLET BY MOUTH EVERY DAY 30 Tab 11   • albuterol 108 (90 BASE) MCG/ACT Aero Soln inhalation aerosol Inhale 2 Puffs by mouth every 6 hours as needed for Shortness of Breath. 8.5 g 0   • benzonatate (TESSALON) 100 MG Cap Take 1 Cap by mouth 3 times a day as needed for Cough. 60 Cap 0   • guaifenesin-codeine (ROBITUSSIN AC) Solution oral solution Take 5 mL by mouth every four hours as needed for Cough. 300 mL 0   • rosuvastatin (CRESTOR) 20 MG Tab Take 1 Tab by mouth every evening. 30 Tab 11   • clopidogrel (PLAVIX) 75 MG Tab TAKE 1 TABLET BY MOUTH EVERY DAY 90 Tab 3   • aspirin EC (ECOTRIN) 81 MG TBEC Take 81 mg by mouth every day.     • Multiple Vitamin (MULTI-VITAMIN PO) Take  by mouth.     • zolpidem (AMBIEN) 5 MG Tab Take 1-2 tablets by mouth every evening as needed for insomnia. Bring to sleep study. (Patient not taking: Reported on 8/3/2017) 3 Tab 0   • zolpidem (AMBIEN) 5 MG Tab Take 1-2 tablets by mouth every evening as needed for insomnia. Bring to sleep study. (Patient not taking: Reported on 8/3/2017) 3 Tab 0   • Phenylephrine-Pheniramine-DM (THERAFLU COLD & COUGH PO) Take  by mouth.     • predniSONE (DELTASONE) 20 MG Tab Take 2 tabs PO daily for five days (Patient not taking: Reported on 8/3/2017) 10 Tab 0     No current facility-administered medications on file prior to visit.     Blood pressure 108/70, pulse 72, temperature 36.9 °C (98.4 °F), resp. rate 14, height 1.638 m (5' 4.5\"), weight 68.04 kg (150 lb), SpO2 94 %.  Family History   Problem Relation Age of Onset   • Cancer Mother      ovarian cancer   • Heart Disease Maternal Aunt 17     unclear but MI!   • Heart Disease Maternal Uncle 38       Physical " Exam:    HEENT: PERRLA, EOMI, no scleral icterus, no nasal or oral lesions  Neck: No thyromegaly, no adenopathy, no bruits  Mallampatti: Grade II  Lungs: Equal breath sounds, no wheezes or crackles  Heart: Regular rate and rhythm, no gallops or murmurs  Abdomen: Soft, benign, no organomegaly  Extremities: No clubbing, cyanosis, or edema  Neurologic: Cranial nerve, motor, and sensory exam are normal    1. Dyspnea, unspecified type    2. Mild intermittent asthma without complication    3. Cardiomyopathy, ischemic EF 35%    4. GRANT (obstructive sleep apnea)    5. Essential hypertension        I have encouraged him to use Symbicort on a daily basis 2 puffs twice a day using a spacer and rinsing. At this point his asthma is in reasonable control. The use of Symbicort may allow him to have excellent control.  We will see him back in 6 months for repeat evaluation.  He will also have his sleep study and be seen in sleep clinic.

## 2017-08-03 NOTE — MR AVS SNAPSHOT
"        Abram Chackobrittanie Barillas   8/3/2017 11:00 AM   Non-Provider Visit   MRN: [id number]    Department:  [facility]   Dept Phone:  [phone]    Description:  Male : 1955   Provider:  Huber Hall M.D.           Reason for Visit     Asthma           Allergies as of 8/3/2017     Allergen Noted Reactions    Pcn [Penicillins] 2010   Rash      You were diagnosed with     Dyspnea, unspecified type   [[id number]]         Vital Signs     Height Weight Body Mass Index Smoking Status          1.638 m (5' 4.49\") 68.04 kg (150 lb) 25.36 kg/m2 Former Smoker        Basic Information     Date Of Birth Sex Race Ethnicity Preferred Language    1955 Male White  Origin (Belarusian,Chinese,Ethiopian,Jonnie, etc) English      Your appointments     Aug 03, 2017 12:40 PM   Established Patient Pul with A Rotation   [facility] Pulmonary Medicine (--)    [address]  [address]  [place] [address] [phone]            Aug 26, 2017  7:05 PM   Sleep Study Diagnostic with SLEEP TECH   [facility] Sleep Medicine (--)    [address]  [address]  [place] [id number] [phone]            Sep 01, 2017  3:00 PM   Follow UP with SIOBHAN Amador   [facility] Sleep Medicine (--)    [address]  [address]  [place] [id number] [phone]              Problem List              ICD-10-CM Priority Class Noted - Resolved    Dyslipidemia E78.5   Unknown - Present    Fatty liver K76.0   Unknown - Present    Acute MI, anterior wall s/p bms to LAD  with -RCA and residual CX dz (stent failed) I21.09   2011 - Present    Cardiomyopathy, ischemic EF 35% I25.5   2011 - Present    AICD (automatic cardioverter/defibrillator) present St Zach placed 12 Z95.810   2012 - Present    Mild intermittent asthma J45.20   1/15/2013 - Present    Coronary artery disease due to calcified coronary lesion:Acute MI, anterior wall s/p bms to LAD  with " -RCA and residual CX dz (stent failed) I25.10, I25.84   Unknown - Present    AICD lead malfunction T82.110A   8/23/2013 - Present    Mechanical complication of cardiac pacemaker electrode T82.190A   8/30/2013 - Present    Essential hypertension I10   Unknown - Present    Myocardial infarction of anterolateral wall (Chronic) I21.09   12/29/2011 - Present    Pacemaker (Chronic) Z95.0   4/23/2012 - Present    Congestive heart failure (Chronic) I50.9   Unknown - Present    Atrial fib/flutter, transient (Chronic) [id number]   Unknown - Present    Impotence of organic origin N52.9   2/22/2016 - Present    Obstructive sleep apnea G47.33   3/30/2017 - Present      Health Maintenance        Date Due Completion Dates    COLONOSCOPY 1/18/2017 1/18/2007 (Prv Comp)    Override on 1/18/2007: Previously completed (normal, Dr. Post)    IMM INFLUENZA (1) 9/1/2017 10/3/2016, 10/15/2015, 10/8/2014, 10/11/2013, 10/1/2012, 12/1/2011    IMM DTaP/Tdap/Td Vaccine (2 - Td) 1/18/2027 1/18/2017            Current Immunizations     Hepatitis A Vaccine, Adult 6/28/2017, 12/27/2016    Influenza TIV (IM) 10/11/2013, 10/1/2012, 12/1/2011    Influenza Vaccine Quad Inj (Pf) 10/3/2016 10:43 AM, 10/15/2015, 10/8/2014    Pneumococcal Vaccine (UF)Historical Data 1/1/2007    Pneumococcal polysaccharide vaccine (PPSV-23) 10/1/2012    SHINGLES VACCINE 1/18/2017    Tdap Vaccine 1/18/2017      Below and/or attached are the medications your provider expects you to take. Review all of your home medications and newly ordered medications with your provider and/or pharmacist. Follow medication instructions as directed by your provider and/or pharmacist. Please keep your medication list with you and share with your provider. Update the information when medications are discontinued, doses are changed, or new medications (including over-the-counter products) are added; and carry medication information at all times in the event of emergency situations     Allergies:   PCN - Rash               Medications  Valid as of: August 03, 2017 - 11:30 AM    Generic Name Brand Name Tablet Size Instructions for use    Albuterol Sulfate (Aero Soln) albuterol 108 (90 BASE) MCG/ACT Inhale 2 Puffs by mouth every 6 hours as needed for Shortness of Breath.        Aspirin (Tablet Delayed Response) ECOTRIN 81 MG Take 81 mg by mouth every day.        Benzonatate (Cap) TESSALON 100 MG Take 1 Cap by mouth 3 times a day as needed for Cough.        Budesonide-Formoterol Fumarate (Aerosol) SYMBICORT 160-4.5 MCG/ACT Inhale 2 Puffs by mouth 2 Times a Day. Use spacer. Rinse mouth after each use.        Clopidogrel Bisulfate (Tab) PLAVIX 75 MG TAKE 1 TABLET BY MOUTH EVERY DAY        Guaifenesin-Codeine (Solution) ROBITUSSIN -10 mg/5mL Take 5 mL by mouth every four hours as needed for Cough.        Lisinopril (Tab) PRINIVIL 10 MG TAKE 1 TABLET BY MOUTH EVERY DAY        Metoprolol Succinate (TABLET SR 24 HR) TOPROL XL 50 MG TAKE 1 TABLET BY MOUTH EVERY DAY        Multiple Vitamin   Take  by mouth.        Phenylephrine-Pheniramine-DM   Take  by mouth.        PredniSONE (Tab) DELTASONE 20 MG Take 2 tabs PO daily for five days        Rosuvastatin Calcium (Tab) CRESTOR 20 MG Take 1 Tab by mouth every evening.        Zolpidem Tartrate (Tab) AMBIEN 5 MG Take 1-2 tablets by mouth every evening as needed for insomnia. Bring to sleep study.        Zolpidem Tartrate (Tab) AMBIEN 5 MG Take 1-2 tablets by mouth every evening as needed for insomnia. Bring to sleep study.        .                 Medicines prescribed today were sent to:     [facility] #[id number] - [place], [place] - [address]    [address] [place] [address]    Phone: [phone] Fax: [phone]    Open 24 Hours?: No      Medication refill instructions:       If your prescription bottle indicates you have medication refills left, it is not necessary to call your provider’s office. Please contact your pharmacy and they will  refill your medication.    If your prescription bottle indicates you do not have any refills left, you may request refills at any time through one of the following ways: The online makr system (except Urgent Care), by calling your provider’s office, or by asking your pharmacy to contact your provider’s office with a refill request. Medication refills are processed only during regular business hours and may not be available until the next business day. Your provider may request additional information or to have a follow-up visit with you prior to refilling your medication.   *Please Note: Medication refills are assigned a new Rx number when refilled electronically. Your pharmacy may indicate that no refills were authorized even though a new prescription for the same medication is available at the pharmacy. Please request the medicine by name with the pharmacy before contacting your provider for a refill.           makr Access Code: Activation code not generated  Current makr Status: Active

## 2017-08-04 NOTE — PROCEDURES
Multi-Ox Readings  Multi Ox #1 Room air   O2 sat % at rest 94   O2 sat % on exertion 93   O2 sat average on exertion 93   Multi Ox #2     O2 sat % at rest     O2 sat % on exertion     O2 sat average on exertion       Oxygen Use     Oxygen Frequency     Duration of need     Is the patient mobile within the home?     CPAP Use?     BIPAP Use?     Servo Titration

## 2017-08-26 ENCOUNTER — SLEEP STUDY (OUTPATIENT)
Dept: SLEEP MEDICINE | Facility: MEDICAL CENTER | Age: 62
End: 2017-08-26
Attending: INTERNAL MEDICINE
Payer: COMMERCIAL

## 2017-08-26 DIAGNOSIS — G47.33 OSA (OBSTRUCTIVE SLEEP APNEA): ICD-10-CM

## 2017-08-26 PROCEDURE — 95811 POLYSOM 6/>YRS CPAP 4/> PARM: CPT | Performed by: INTERNAL MEDICINE

## 2017-08-29 NOTE — PROCEDURES
CLINICAL COMMENTS:  The patient underwent a split night polysomnogram with a CPAP titration using the standard montage for measurement of parameters of sleep, respiratory events, movement abnormalities, heart rate and rhythm. A microphone was used to monitor snoring.     INTERPRETATION: The diagnostic recording time was 207.9 minutes with a sleep period of 154.8 minutes.  Total sleep time was 116.5 minutes with a sleep efficiency of 56.0%.  The sleep latency was 53.1 minutes, and REM latency was 52.5 minutes.  The patient had 67 arousals in total, for an arousal index of 34.5.          RESPIRATORY: The patient had 2 apneas in total.  Of these, 1 were obstructive apneas, and 1 were central apneas.  This resulted in an apnea index (AI) of 1.0.  The patient had 31 hypopneas in total, which resulted in a hypopnea index of 16.0.  The overall AHI was 17.0, while the AHI during REM was 35.3.  The supine AHI = 48.6.     OXIMETRY: Oxygen saturation monitoring showed a mean SpO2 of 93.1% for the diagnostic part of the study, with a minimum oxygen saturation of 88.0%.  Oxygen saturations were below 89% for 0.1% of sleep time.     CARDIAC: The highest heart rate for the first part of the study was 70.0 beats per minute.  The average heart rate during sleep was 52.9 bpm, while the highest heart rate was 63.0 bpm.     LIMB MOVEMENTS: There were a total of 0 periodic limb movements during sleep, of which 0 were PLMS arousals.  This resulted in a PLMS index of 0.0 and a PLMS arousal index of 0.0.     TREATMENT     Treatment recording time was 342.5 minutes with a total sleep time of 238.0min.  The patient had an arousal index of 23.9.       RESPIRATORY: The patient had 0 obstructive apneas, 12 central apneas, and 30 hypopneas for an overall AHI was 10.6.     OXIMETRY: The mean SpO2 during treatment was 93.8%, with a minimum oxygen saturation of 89.0%.       CPAP was tried from 5 to 12 cmH2O.    Interpretation:    This was an  overnight diagnostic polysomnogram with a subsequent CPAP titration.    During the diagnostic phase the patient's sleep efficiency was significantly reduced to 56.0% and he experienced one REM period. The sleep stage durations showed 38.3 minutes of wake after sleep onset, 40 minutes of stage I sleep, 80 minutes of stage II sleep, 40 minutes of stage III sleep, and 8.5 minutes of REM.    The latency to sleep was prolonged at 53.1 minutes but latency to REM was normal to shortened. Significant sleep fragmentation was noted. The arousal index was 34.5. The patient did not experience any significant limb movements. Moderate obstructive sleep apnea hypopnea was found. The apnea hypopnea index was 17.0, the mean event duration was 29.4 seconds, and the longest event lasted 65.2 seconds.    The supine position and REM sleep both aggravated the sleep disordered breathing. The minimum saturation during sleep was 88% and saturations were less than 90% for only 2.3% of the recording.    Once the patient met the split-night protocol, the technician performed to positive airway pressure titration.    CPAP was initiated at 5 cm and increased to a maximum pressure of 12 cm. The patient appeared to do best on CPAP at 12 cm where supine REM sleep was achieved. The resultant AHI on CPAP at 12 cm was 4.8. The minimum saturation was 92% and the mean saturation was 94.2%. The patient chose to use a small Simplus full facemask and heated humidification.       Recommendation:    Recommend initiating CPAP at 12 cm using a mask of choice and heated humidification followed by clinical and data card review in 6 weeks.

## 2017-09-02 ENCOUNTER — HOSPITAL ENCOUNTER (OUTPATIENT)
Dept: LAB | Facility: MEDICAL CENTER | Age: 62
End: 2017-09-02
Payer: COMMERCIAL

## 2017-09-02 LAB
BDY FAT % MEASURED: 23 %
BP DIAS: 78 MMHG
BP SYS: 121 MMHG
CHOLEST SERPL-MCNC: 86 MG/DL (ref 100–199)
DIABETES HTDIA: NO
EVENT NAME HTEVT: NORMAL
FASTING HTFAS: YES
GLUCOSE SERPL-MCNC: 82 MG/DL (ref 65–99)
HDLC SERPL-MCNC: 30 MG/DL
HYPERTENSION HTHYP: YES
LDLC SERPL CALC-MCNC: 34 MG/DL
SCREENING LOC CITY HTCIT: NORMAL
SCREENING LOC STATE HTSTA: NORMAL
SCREENING LOCATION HTLOC: NORMAL
SMOKING HTSMO: NO
SUBSCRIBER ID HTSID: NORMAL
TRIGL SERPL-MCNC: 110 MG/DL (ref 0–149)

## 2017-09-02 PROCEDURE — 82947 ASSAY GLUCOSE BLOOD QUANT: CPT

## 2017-09-02 PROCEDURE — 36415 COLL VENOUS BLD VENIPUNCTURE: CPT

## 2017-09-02 PROCEDURE — S5190 WELLNESS ASSESSMENT BY NONPH: HCPCS

## 2017-09-02 PROCEDURE — 80061 LIPID PANEL: CPT

## 2017-09-23 ENCOUNTER — HOSPITAL ENCOUNTER (OUTPATIENT)
Dept: LAB | Facility: MEDICAL CENTER | Age: 62
End: 2017-09-23
Attending: INTERNAL MEDICINE
Payer: COMMERCIAL

## 2017-09-23 DIAGNOSIS — I10 ESSENTIAL HYPERTENSION: ICD-10-CM

## 2017-09-23 DIAGNOSIS — E78.5 DYSLIPIDEMIA: ICD-10-CM

## 2017-09-23 DIAGNOSIS — I25.84 CORONARY ARTERY DISEASE DUE TO CALCIFIED CORONARY LESION: ICD-10-CM

## 2017-09-23 DIAGNOSIS — I25.10 CORONARY ARTERY DISEASE DUE TO CALCIFIED CORONARY LESION: ICD-10-CM

## 2017-09-23 LAB
ALBUMIN SERPL BCP-MCNC: 4.5 G/DL (ref 3.2–4.9)
ALBUMIN/GLOB SERPL: 1.6 G/DL
ALP SERPL-CCNC: 80 U/L (ref 30–99)
ALT SERPL-CCNC: 46 U/L (ref 2–50)
ANION GAP SERPL CALC-SCNC: 7 MMOL/L (ref 0–11.9)
AST SERPL-CCNC: 38 U/L (ref 12–45)
BILIRUB SERPL-MCNC: 1 MG/DL (ref 0.1–1.5)
BUN SERPL-MCNC: 13 MG/DL (ref 8–22)
CALCIUM SERPL-MCNC: 9.2 MG/DL (ref 8.5–10.5)
CHLORIDE SERPL-SCNC: 107 MMOL/L (ref 96–112)
CHOLEST SERPL-MCNC: 89 MG/DL (ref 100–199)
CO2 SERPL-SCNC: 24 MMOL/L (ref 20–33)
CREAT SERPL-MCNC: 0.49 MG/DL (ref 0.5–1.4)
GFR SERPL CREATININE-BSD FRML MDRD: >60 ML/MIN/1.73 M 2
GLOBULIN SER CALC-MCNC: 2.9 G/DL (ref 1.9–3.5)
GLUCOSE SERPL-MCNC: 89 MG/DL (ref 65–99)
HDLC SERPL-MCNC: 31 MG/DL
LDLC SERPL CALC-MCNC: 39 MG/DL
POTASSIUM SERPL-SCNC: 4 MMOL/L (ref 3.6–5.5)
PROT SERPL-MCNC: 7.4 G/DL (ref 6–8.2)
SODIUM SERPL-SCNC: 138 MMOL/L (ref 135–145)
TRIGL SERPL-MCNC: 96 MG/DL (ref 0–149)

## 2017-09-23 PROCEDURE — 80061 LIPID PANEL: CPT

## 2017-09-23 PROCEDURE — 80053 COMPREHEN METABOLIC PANEL: CPT

## 2017-09-23 PROCEDURE — 36415 COLL VENOUS BLD VENIPUNCTURE: CPT

## 2017-10-12 ENCOUNTER — IMMUNIZATION (OUTPATIENT)
Dept: OCCUPATIONAL MEDICINE | Facility: CLINIC | Age: 62
End: 2017-10-12

## 2017-10-12 DIAGNOSIS — Z23 NEED FOR VACCINATION: ICD-10-CM

## 2017-10-12 PROCEDURE — 90686 IIV4 VACC NO PRSV 0.5 ML IM: CPT | Performed by: PREVENTIVE MEDICINE

## 2017-10-26 DIAGNOSIS — I25.2 HISTORY OF MI (MYOCARDIAL INFARCTION): ICD-10-CM

## 2017-10-26 DIAGNOSIS — I25.751 CORONARY ARTERY DISEASE INVOLVING NATIVE ARTERY OF TRANSPLANTED HEART WITH ANGINA PECTORIS WITH DOCUMENTED SPASM (HCC): ICD-10-CM

## 2017-10-26 RX ORDER — CLOPIDOGREL BISULFATE 75 MG/1
75 TABLET ORAL DAILY
Qty: 90 TAB | Refills: 0 | OUTPATIENT
Start: 2017-10-26 | End: 2018-01-23 | Stop reason: SDUPTHER

## 2017-11-30 ENCOUNTER — NON-PROVIDER VISIT (OUTPATIENT)
Dept: CARDIOLOGY | Facility: MEDICAL CENTER | Age: 62
End: 2017-11-30
Payer: COMMERCIAL

## 2017-11-30 DIAGNOSIS — Z95.810 AICD (AUTOMATIC CARDIOVERTER/DEFIBRILLATOR) PRESENT: ICD-10-CM

## 2017-11-30 PROCEDURE — 93282 PRGRMG EVAL IMPLANTABLE DFB: CPT | Performed by: INTERNAL MEDICINE

## 2017-12-03 DIAGNOSIS — Z95.810 AICD (AUTOMATIC CARDIOVERTER/DEFIBRILLATOR) PRESENT: ICD-10-CM

## 2017-12-03 DIAGNOSIS — I25.84 CORONARY ARTERY DISEASE DUE TO CALCIFIED CORONARY LESION: ICD-10-CM

## 2017-12-03 DIAGNOSIS — I25.10 CORONARY ARTERY DISEASE DUE TO CALCIFIED CORONARY LESION: ICD-10-CM

## 2017-12-03 DIAGNOSIS — I25.5 CARDIOMYOPATHY, ISCHEMIC: ICD-10-CM

## 2017-12-12 ENCOUNTER — OFFICE VISIT (OUTPATIENT)
Dept: CARDIOLOGY | Facility: MEDICAL CENTER | Age: 62
End: 2017-12-12
Payer: COMMERCIAL

## 2017-12-12 VITALS
HEIGHT: 65 IN | HEART RATE: 56 BPM | OXYGEN SATURATION: 95 % | BODY MASS INDEX: 25.16 KG/M2 | SYSTOLIC BLOOD PRESSURE: 100 MMHG | WEIGHT: 151 LBS | DIASTOLIC BLOOD PRESSURE: 62 MMHG

## 2017-12-12 DIAGNOSIS — I25.10 CORONARY ARTERY DISEASE DUE TO CALCIFIED CORONARY LESION: ICD-10-CM

## 2017-12-12 DIAGNOSIS — Z95.810 AICD (AUTOMATIC CARDIOVERTER/DEFIBRILLATOR) PRESENT: ICD-10-CM

## 2017-12-12 DIAGNOSIS — I10 ESSENTIAL HYPERTENSION: ICD-10-CM

## 2017-12-12 DIAGNOSIS — E78.5 DYSLIPIDEMIA: ICD-10-CM

## 2017-12-12 DIAGNOSIS — I25.84 CORONARY ARTERY DISEASE DUE TO CALCIFIED CORONARY LESION: ICD-10-CM

## 2017-12-12 PROCEDURE — 99214 OFFICE O/P EST MOD 30 MIN: CPT | Performed by: INTERNAL MEDICINE

## 2017-12-12 NOTE — PROGRESS NOTES
Subjective:   Abram Barillas is a 62 y.o. male who presents today for followup of his coronary artery disease with an ischemic cardiomyopathy and AICD. He also has a history of hypertension and hyperlipidemia.     He denies any chest discomfort, dyspnea, edema, palpitations or lightheadedness. He is walking a couple of times a week. When he does walk, he walks about 4 miles.      Past Medical History:   asdfasdfads Date   • Acute MI, anterior wall s/p bms to LAD 12/11 with -RCA and residual CX dz (stent failed) 12/31/2011   • AICD (automatic cardioverter/defibrillator) present St Zach placed 4/23/12 4/24/2012    DEVICE DATA:  1. Pulse generator:  St. Zach, model #LJ3508-65, serial  #[id number]  2. Right ventricular lead:  St. Zach, model #7120/60,  serial #[id number]  MEASURED INTRAOPERATIVE DATA: R-waves measured 11.7 mV, pacing  threshold 0.75 volts at 0.5 msec, lead impedance of 859 ohms.  DEVICE SETTINGS: VVI 40 to 120.    • Arrhythmia    • ASTHMA     as a child   • Atrial fib/flutter, transient     when in cardiogenic shock   • Bronchitis    • CAD (coronary artery disease)    • Cardiogenic shock (CMS-HCC) 1/2012   • Cardiomyopathy, ischemic EF 35% 12/31/2011   • Congestive heart failure (CMS-[facility])    • Dyslipidemia    • Fatty liver    • Hypertension     ON NO MEDS, now on meds   • Influenza    • Myocardial infarct 12/29/2011   • Myocardial infarction of anterolateral wall (CMS-HCC) 12/29/2011   • Pacemaker 04/23/2012   • Tuberculosis     20 years ago on meds for 6  mo     Past Surgical History:   Procedure Laterality Date   • COLONOSCOPY  2007    normal   • MULTIPLE CORONARY ARTERY BYPASS ENDO VEIN HARVEST  1/3/2012    Performed by PADMINI OCHOA at SURGERY [facility] ORS   • PACEMAKER INSERTION     • RECOVERY  4/23/2012    Performed by SURGERY, CATH-RECOVERY at SURGERY SAME DAY [facility] ORS   • RECOVERY  8/30/2013    Performed by Cath-Recovery Surgery at SURGERY SAME DAY [facility]  ORS   • UMBILICAL HERNIA REPAIR  1/14/2011    Performed by CHUY CHRISTENSEN at SURGERY SAME DAY [facility] ORS     Family History   Problem Relation Age of Onset   • Cancer Mother      ovarian cancer   • Heart Disease Maternal Aunt 17     unclear but MI!   • Heart Disease Maternal Uncle 38     History   Smoking Status   • Former Smoker   • Types: Cigarettes   • Quit date: 3/1/1982   Smokeless Tobacco   • Never Used     Comment: QUIT 1985     Allergies   Allergen Reactions   • Pcn [Penicillins] Rash     Outpatient Encounter Prescriptions as of 12/12/2017   Medication Sig Dispense Refill   • clopidogrel (PLAVIX) 75 MG Tab Take 1 Tab by mouth every day. 90 Tab 0   • budesonide-formoterol (SYMBICORT) 160-4.5 MCG/ACT Aerosol Inhale 2 Puffs by mouth 2 Times a Day. Use spacer. Rinse mouth after each use. 1 Inhaler 6   • metoprolol SR (TOPROL XL) 50 MG TABLET SR 24 HR TAKE 1 TABLET BY MOUTH EVERY DAY 30 Tab 11   • lisinopril (PRINIVIL) 10 MG Tab TAKE 1 TABLET BY MOUTH EVERY DAY 30 Tab 11   • Phenylephrine-Pheniramine-DM (THERAFLU COLD & COUGH PO) Take  by mouth.     • rosuvastatin (CRESTOR) 20 MG Tab Take 1 Tab by mouth every evening. 30 Tab 11   • aspirin EC (ECOTRIN) 81 MG TBEC Take 81 mg by mouth every day.     • Multiple Vitamin (MULTI-VITAMIN PO) Take  by mouth.     • zolpidem (AMBIEN) 5 MG Tab Take 1-2 tablets by mouth every evening as needed for insomnia. Bring to sleep study. 3 Tab 0   • zolpidem (AMBIEN) 5 MG Tab Take 1-2 tablets by mouth every evening as needed for insomnia. Bring to sleep study. 3 Tab 0   • predniSONE (DELTASONE) 20 MG Tab Take 2 tabs PO daily for five days (Patient not taking: Reported on 8/3/2017) 10 Tab 0   • albuterol 108 (90 BASE) MCG/ACT Aero Soln inhalation aerosol Inhale 2 Puffs by mouth every 6 hours as needed for Shortness of Breath. 8.5 g 0   • benzonatate (TESSALON) 100 MG Cap Take 1 Cap by mouth 3 times a day as needed for Cough. 60 Cap 0   • guaifenesin-codeine (ROBITUSSIN AC)  "Solution oral solution Take 5 mL by mouth every four hours as needed for Cough. 300 mL 0     No facility-administered encounter medications on file as of 12/12/2017.      ROS       Objective:   /62   Pulse (!) 56   Ht 1.651 m (5' 5\")   Wt 68.5 kg (151 lb)   SpO2 95%   BMI 25.13 kg/m²     Physical Exam   Neck: No JVD present.   Cardiovascular: Normal rate and regular rhythm.  Exam reveals no gallop.    No murmur heard.  Pulmonary/Chest: Effort normal. He has no rales.   Abdominal: Soft. There is no tenderness.   Musculoskeletal: He exhibits no edema.     Lab Results   Component Value Date/Time    CHOLSTRLTOT 89 (L) 09/23/2017 07:44 AM    LDL 39 09/23/2017 07:44 AM    HDL 31 (A) 09/23/2017 07:44 AM    TRIGLYCERIDE 96 09/23/2017 07:44 AM       Lab Results   Component Value Date/Time    SODIUM 138 09/23/2017 07:44 AM    POTASSIUM 4.0 09/23/2017 07:44 AM    CHLORIDE 107 09/23/2017 07:44 AM    CO2 24 09/23/2017 07:44 AM    GLUCOSE 89 09/23/2017 07:44 AM    BUN 13 09/23/2017 07:44 AM    CREATININE 0.49 (L) 09/23/2017 07:44 AM    CREATININE 0.71 (L) 12/08/2010 07:11 AM    BUNCREATRAT 15 12/08/2010 07:11 AM    GLOMRATE >59 12/08/2010 07:11 AM     Lab Results   Component Value Date/Time    ALKPHOSPHAT 80 09/23/2017 07:44 AM    ASTSGOT 38 09/23/2017 07:44 AM    ALTSGPT 46 09/23/2017 07:44 AM    TBILIRUBIN 1.0 09/23/2017 07:44 AM      Lab Results   Component Value Date/Time    BNPBTYPENAT 42 12/27/2016 07:15 AM      Echocardiogram:  CONCLUSIONS  Normal left ventricular chamber size.  Left ventricular ejection fraction is visually estimated to be 45%.  [place]pine, anteroseptum and inferoseptum hypokinetic.  Grade I diastolic dysfunction.  Mild mitral regurgitation.  Estimated right ventricular systolic pressure is 20 mmHg.  Normal inferior vena cava size and inspiratory collapse.  Exam Date: 06/17/2016       Assessment:     1. Coronary artery disease due to calcified coronary lesion:Acute MI, anterior wall s/p bms to LAD " 12/11 with -RCA and residual CX dz (stent failed)     2. AICD (automatic cardioverter/defibrillator) present St Zach placed 4/23/12     3. Essential hypertension     4. Dyslipidemia         Medical Decision Making:  Today's Assessment / Status / Plan:     Mr. Barillas is clinically stable. He is walking regularly without difficulty. His blood pressure is somewhat low but he is asymptomatic from this. His lipid status appears to be under good control. He will follow-up in about a year with repeat lab work.

## 2018-02-13 ENCOUNTER — OFFICE VISIT (OUTPATIENT)
Dept: PULMONOLOGY | Facility: HOSPICE | Age: 63
End: 2018-02-13
Payer: COMMERCIAL

## 2018-02-13 VITALS
OXYGEN SATURATION: 97 % | BODY MASS INDEX: 24.99 KG/M2 | SYSTOLIC BLOOD PRESSURE: 128 MMHG | HEIGHT: 65 IN | RESPIRATION RATE: 15 BRPM | HEART RATE: 54 BPM | WEIGHT: 150 LBS | DIASTOLIC BLOOD PRESSURE: 80 MMHG

## 2018-02-13 DIAGNOSIS — G47.33 OBSTRUCTIVE SLEEP APNEA: ICD-10-CM

## 2018-02-13 DIAGNOSIS — J45.20 MILD INTERMITTENT ASTHMA WITHOUT COMPLICATION: ICD-10-CM

## 2018-02-13 DIAGNOSIS — Z95.0 PACEMAKER: Chronic | ICD-10-CM

## 2018-02-13 DIAGNOSIS — I50.9 CHRONIC CONGESTIVE HEART FAILURE, UNSPECIFIED CONGESTIVE HEART FAILURE TYPE: Chronic | ICD-10-CM

## 2018-02-13 PROCEDURE — 99213 OFFICE O/P EST LOW 20 MIN: CPT | Performed by: NURSE PRACTITIONER

## 2018-02-13 NOTE — PROGRESS NOTES
Chief Complaint   Patient presents with   • Results     SS RESULTS         HPI: This patient is a 62 y.o. male, who presents for sleep study results. History of asthma recently evaluated with Dr. Hall. Also has a history of ischemic cardiomyopathy with AICD, EF 45% of predicted. Followed by cardiology.    In regards to asthma, he has had asthma since a child. This treatment has consisted of albuterol and occasional Kenalog injections. Recent allergy panel showed response to various grasses, trees and pollens. PFTs indicating FEV1 of 2.46 L 84% predicted, FEV1 FVC ratio of 68, DLCO 156% of predicted. Partial reversibility with albuterol. Multiox showed adequate saturation. He was started on Symbicort with subjective benefit. He has albuterol to use as needed, but rarely requires use. He denies pulmonary complaints. Denies recurrent URIs. Is up-to-date on influenza and pneumococcal 23.    PSG shows poor sleep efficiency but does confirm moderate sleep apnea with AHI of 17, REM index 35, minimum saturation 88%. Patient did well on CPAP 12 cm H2O which resulted in an AHI of 4.8 and a minimum saturation of 94%. Patient tells me that he did not sleep at all with CPAP. He found it very uncomfortable and claustrophobic.      Past Medical History:   Diagnosis Date   • Acute MI, anterior wall s/p bms to LAD 12/11 with -RCA and residual CX dz (stent failed) 12/31/2011   • AICD (automatic cardioverter/defibrillator) present St Zach placed 4/23/12 4/24/2012    DEVICE DATA:  1. Pulse generator:  St. Zach, model #YE0376-28, serial  #[id number]  2. Right ventricular lead:  St. Zach, model #7120/60,  serial #[id number]  MEASURED INTRAOPERATIVE DATA: R-waves measured 11.7 mV, pacing  threshold 0.75 volts at 0.5 msec, lead impedance of 859 ohms.  DEVICE SETTINGS: VVI 40 to 120.    • Arrhythmia    • ASTHMA     as a child   • Atrial fib/flutter, transient     when in cardiogenic shock   • Bronchitis    • CAD  (coronary artery disease)    • Cardiogenic shock (CMS-[facility]) 1/2012   • Cardiomyopathy, ischemic EF 35% 12/31/2011   • Congestive heart failure (CMS-HCC)    • Dyslipidemia    • Fatty liver    • Hypertension     ON NO MEDS, now on meds   • Influenza    • Myocardial infarct 12/29/2011   • Myocardial infarction of anterolateral wall (CMS-[facility]) 12/29/2011   • Pacemaker 04/23/2012   • Tuberculosis     20 years ago on meds for 6  mo       Social History   Substance Use Topics   • Smoking status: Former Smoker     Types: Cigarettes     Quit date: 3/1/1982   • Smokeless tobacco: Never Used      Comment: QUIT 1985   • Alcohol use No      Comment: occasionally ( 2 times a year)       Family History   Problem Relation Age of Onset   • Cancer Mother      ovarian cancer   • Heart Disease Maternal Aunt 17     unclear but MI!   • Heart Disease Maternal Uncle 38       Current medications as of today   Current Outpatient Prescriptions   Medication Sig Dispense Refill   • clopidogrel (PLAVIX) 75 MG Tab Take 1 Tab by mouth every day. 90 Tab 3   • zolpidem (AMBIEN) 5 MG Tab Take 1-2 tablets by mouth every evening as needed for insomnia. Bring to sleep study. 3 Tab 0   • budesonide-formoterol (SYMBICORT) 160-4.5 MCG/ACT Aerosol Inhale 2 Puffs by mouth 2 Times a Day. Use spacer. Rinse mouth after each use. 1 Inhaler 6   • zolpidem (AMBIEN) 5 MG Tab Take 1-2 tablets by mouth every evening as needed for insomnia. Bring to sleep study. 3 Tab 0   • metoprolol SR (TOPROL XL) 50 MG TABLET SR 24 HR TAKE 1 TABLET BY MOUTH EVERY DAY 30 Tab 11   • lisinopril (PRINIVIL) 10 MG Tab TAKE 1 TABLET BY MOUTH EVERY DAY 30 Tab 11   • Phenylephrine-Pheniramine-DM (THERAFLU COLD & COUGH PO) Take  by mouth.     • albuterol 108 (90 BASE) MCG/ACT Aero Soln inhalation aerosol Inhale 2 Puffs by mouth every 6 hours as needed for Shortness of Breath. 8.5 g 0   • benzonatate (TESSALON) 100 MG Cap Take 1 Cap by mouth 3 times a day as needed for Cough. 60 Cap 0   •  "guaifenesin-codeine (ROBITUSSIN AC) Solution oral solution Take 5 mL by mouth every four hours as needed for Cough. 300 mL 0   • rosuvastatin (CRESTOR) 20 MG Tab Take 1 Tab by mouth every evening. 30 Tab 11   • aspirin EC (ECOTRIN) 81 MG TBEC Take 81 mg by mouth every day.     • Multiple Vitamin (MULTI-VITAMIN PO) Take  by mouth.       No current facility-administered medications for this visit.        Allergies: Pcn [penicillins]    Blood pressure 128/80, pulse (!) 54, resp. rate 15, height 1.651 m (5' 5\"), weight 68 kg (150 lb), SpO2 97 %.      ROS:   Constitutional: Denies fevers, chills, night sweats, weight loss or fatigue  Eyes: Denies pain, discharge/drainage  ENT: Denies hearing loss, sinusitis, hoarseness   Allergic: Denies Allergic rhinitis or hayfever  Respiratory: See HPI  Cardiovascular: Denies chest pain, tightness, palpitations, orthopnea or edema  Sleep: Denies snoring, resuscitative gasps, frequent nocturnal awakenings, insomnia  GI/: Denies heartburn, nausea, vomiting, urinary incontinence, hematuria  Musculoskeletal: Denies back pain, painful joints  Neurological: Denies vertigo or headaches  Skin: Denies rashes, lesions  Psychiatric: Denies depression or anxiety      Physical exam:   Constitutional: Well-nourished, well-developed, in no acute distress  Eyes: PERRL  Neck: supple, trachea midline  Respiratory: no intercostal retractions or accessory muscle use   Lungs auscultation: Clear to auscultation bilaterally  Cardiovascular: Regular rate rhythm no murmurs, rubs or gallops  Musculoskeletal:  no clubbing or cyanosis  Skin: No rashes or lesions noted on exposed skin  Neuro: No focal deficit noted  Psychiatric: Oriented to time, person and place.     Diagnosis:  1. Mild intermittent asthma without complication     2. Obstructive sleep apnea     3. Pacemaker     4. Chronic congestive heart failure, unspecified congestive heart failure type (CMS-HCC)         Plan:  CPAP testing reviewed with the " patient today.  I reviewed with the patient the pathophysiology of obstructive sleep apnea, as well as potential cardiac and neurologic risks associated with untreated sleep apnea. We reviewed treatment options including CPAP/BiPAP therapy, dental appliance. Patient adamantly declined CPAP he does not feel he will tolerate. He is amendable to dental appliance.    Follow-up in 4 months after obtaining dental appliance for repeat HSS.     Follow with PCP and cardiology as scheduled    Continue Symbicort 2 puff BID and albuterol as needed

## 2018-05-16 DIAGNOSIS — I10 ESSENTIAL HYPERTENSION: ICD-10-CM

## 2018-05-17 RX ORDER — METOPROLOL SUCCINATE 50 MG/1
TABLET, EXTENDED RELEASE ORAL
Qty: 30 TAB | Refills: 11 | Status: SHIPPED | OUTPATIENT
Start: 2018-05-17 | End: 2019-04-22 | Stop reason: SDUPTHER

## 2018-05-17 RX ORDER — LISINOPRIL 10 MG/1
TABLET ORAL
Qty: 30 TAB | Refills: 11 | Status: SHIPPED | OUTPATIENT
Start: 2018-05-17 | End: 2019-04-22 | Stop reason: SDUPTHER

## 2018-05-18 ENCOUNTER — HOSPITAL ENCOUNTER (OUTPATIENT)
Dept: LAB | Facility: MEDICAL CENTER | Age: 63
End: 2018-05-18
Attending: NURSE PRACTITIONER
Payer: COMMERCIAL

## 2018-05-18 LAB — PSA SERPL-MCNC: 1.43 NG/ML (ref 0–4)

## 2018-05-18 PROCEDURE — 36415 COLL VENOUS BLD VENIPUNCTURE: CPT

## 2018-05-18 PROCEDURE — 84153 ASSAY OF PSA TOTAL: CPT

## 2018-05-21 ENCOUNTER — TELEPHONE (OUTPATIENT)
Dept: MEDICAL GROUP | Facility: CLINIC | Age: 63
End: 2018-05-21

## 2018-05-21 DIAGNOSIS — N52.9 ERECTILE DYSFUNCTION, UNSPECIFIED ERECTILE DYSFUNCTION TYPE: ICD-10-CM

## 2018-05-21 DIAGNOSIS — Z12.11 COLON CANCER SCREENING: ICD-10-CM

## 2018-05-21 NOTE — TELEPHONE ENCOUNTER
1. Caller Name: Patient                                         Call Back Number: [phone] (home) [phone] (work)      Patient approves a detailed voicemail message: no    2. SPECIFIC Action To Be Taken: Referral pending, please sign.    3. Diagnosis/Clinical Reason for Request: Andrew Grider    4. Specialty & Provider Name/Lab/Imaging Location: Urology     5. Is appointment scheduled for requested order/referral: yes - 5/22/18- please approve asap     Patient informed they will receive a return phone call from the office ONLY if there are any questions before processing their request. Advised to call back if they haven't received a call from the referral department in 5 days.

## 2018-06-20 ENCOUNTER — NON-PROVIDER VISIT (OUTPATIENT)
Dept: CARDIOLOGY | Facility: MEDICAL CENTER | Age: 63
End: 2018-06-20
Payer: COMMERCIAL

## 2018-06-20 DIAGNOSIS — Z95.810 AICD (AUTOMATIC CARDIOVERTER/DEFIBRILLATOR) PRESENT: ICD-10-CM

## 2018-06-20 PROCEDURE — 93282 PRGRMG EVAL IMPLANTABLE DFB: CPT | Performed by: INTERNAL MEDICINE

## 2018-06-28 ENCOUNTER — OFFICE VISIT (OUTPATIENT)
Dept: URGENT CARE | Facility: CLINIC | Age: 63
End: 2018-06-28
Payer: COMMERCIAL

## 2018-06-28 VITALS
OXYGEN SATURATION: 98 % | HEIGHT: 65 IN | TEMPERATURE: 98.6 F | SYSTOLIC BLOOD PRESSURE: 112 MMHG | BODY MASS INDEX: 24.32 KG/M2 | HEART RATE: 72 BPM | DIASTOLIC BLOOD PRESSURE: 72 MMHG | RESPIRATION RATE: 16 BRPM | WEIGHT: 146 LBS

## 2018-06-28 DIAGNOSIS — J40 BRONCHITIS: ICD-10-CM

## 2018-06-28 DIAGNOSIS — R05.9 COUGH: ICD-10-CM

## 2018-06-28 DIAGNOSIS — J45.20 MILD INTERMITTENT ASTHMA WITHOUT COMPLICATION: ICD-10-CM

## 2018-06-28 PROCEDURE — 99214 OFFICE O/P EST MOD 30 MIN: CPT | Performed by: NURSE PRACTITIONER

## 2018-06-28 RX ORDER — ALBUTEROL SULFATE 90 UG/1
2 AEROSOL, METERED RESPIRATORY (INHALATION) EVERY 6 HOURS PRN
Qty: 8.5 G | Refills: 0 | Status: SHIPPED | OUTPATIENT
Start: 2018-06-28 | End: 2023-05-13

## 2018-06-28 RX ORDER — AZITHROMYCIN 250 MG/1
TABLET, FILM COATED ORAL
Qty: 6 TAB | Refills: 0 | Status: SHIPPED | OUTPATIENT
Start: 2018-06-28 | End: 2018-07-13

## 2018-06-28 RX ORDER — BENZONATATE 200 MG/1
200 CAPSULE ORAL 3 TIMES DAILY PRN
Qty: 60 CAP | Refills: 0 | Status: SHIPPED | OUTPATIENT
Start: 2018-06-28 | End: 2018-11-26

## 2018-06-28 ASSESSMENT — ENCOUNTER SYMPTOMS
CHILLS: 0
SHORTNESS OF BREATH: 1
SPUTUM PRODUCTION: 1
FEVER: 0
COUGH: 1

## 2018-06-28 NOTE — PROGRESS NOTES
Subjective:      Abram Barillas is a 62 y.o. male who presents with Cough (x 4 days. needs a work note)    Past Medical History:   Diagnosis Date   • Acute MI, anterior wall s/p bms to LAD 12/11 with -RCA and residual CX dz (stent failed) 12/31/2011   • AICD (automatic cardioverter/defibrillator) present St Zach placed 4/23/12 4/24/2012    DEVICE DATA:  1. Pulse generator:  [employer] Zach, model #PF1542-13, serial  #[id number]  2. Right ventricular lead:  [employer]. Zach, model #7120/60,  serial #[id number]  MEASURED INTRAOPERATIVE DATA: R-waves measured 11.7 mV, pacing  threshold 0.75 volts at 0.5 msec, lead impedance of 859 ohms.  DEVICE SETTINGS: VVI 40 to 120.    • Arrhythmia    • ASTHMA     as a child   • Atrial fib/flutter, transient     when in cardiogenic shock   • Bronchitis    • CAD (coronary artery disease)    • Cardiogenic shock (HCC) 1/2012   • Cardiomyopathy, ischemic EF 35% 12/31/2011   • Congestive heart failure (HCC)    • Dyslipidemia    • Fatty liver    • Hypertension     ON NO MEDS, now on meds   • Influenza    • Myocardial infarct (HCC) 12/29/2011   • Myocardial infarction of anterolateral wall (HCC) 12/29/2011   • Pacemaker 04/23/2012   • Tuberculosis     20 years ago on meds for 6  mo     Social History     Social History   • Marital status:      Spouse name: N/A   • Number of children: N/A   • Years of education: N/A     Occupational History   • Not on file.     Social History Main Topics   • Smoking status: Former Smoker     Types: Cigarettes     Quit date: 3/1/1982   • Smokeless tobacco: Never Used      Comment: QUIT 1985   • Alcohol use No      Comment: occasionally ( 2 times a year)   • Drug use: No   • Sexual activity: Not Currently     Other Topics Concern   • Not on file     Social History Narrative   • No narrative on file     Family History   Problem Relation Age of Onset   • Cancer Mother      ovarian cancer   • Heart Disease Maternal Aunt 17     unclear but MI!  "  • Heart Disease Maternal Uncle 38       Allergies: Pcn [penicillins]            Cough   This is a new problem. The current episode started in the past 7 days. The problem has been unchanged. The cough is productive of sputum. Associated symptoms include nasal congestion, postnasal drip and shortness of breath. Pertinent negatives include no chills or fever. Nothing aggravates the symptoms. He has tried nothing for the symptoms. The treatment provided no relief.       Review of Systems   Constitutional: Positive for malaise/fatigue. Negative for chills and fever.   HENT: Positive for postnasal drip.    Respiratory: Positive for cough, sputum production and shortness of breath.    Skin: Negative.    All other systems reviewed and are negative.         Objective:     /72   Pulse 72   Temp 37 °C (98.6 °F)   Resp 16   Ht 1.651 m (5' 5\")   Wt 66.2 kg (146 lb)   SpO2 98%   BMI 24.30 kg/m²      Physical Exam   Constitutional: He is oriented to person, place, and time. He appears well-developed and well-nourished.   HENT:   Head: Normocephalic.   Right Ear: External ear normal.   Left Ear: External ear normal.   Nose: Nose normal.   Mouth/Throat: Oropharynx is clear and moist. No oropharyngeal exudate.   Eyes: EOM are normal. Pupils are equal, round, and reactive to light.   Neck: Normal range of motion. Neck supple.   Cardiovascular: Normal rate and regular rhythm.    Pulmonary/Chest: Effort normal. No respiratory distress. He has wheezes.   Bronchospastic cough   Musculoskeletal: Normal range of motion.   Neurological: He is alert and oriented to person, place, and time.   Skin: Skin is warm and dry. Capillary refill takes less than 2 seconds.   Psychiatric: He has a normal mood and affect. His behavior is normal. Judgment and thought content normal.   Vitals reviewed.              Assessment/Plan:     1. Cough  2. Bronchitis  3. History of asthma  -albuterol inhaler  -continue present medications  -tessalon " PRN cough  -Zithromax; start ONLY for production of purulent sputum.

## 2018-06-28 NOTE — LETTER
June 28, 2018       Patient: Abram Barillas   YOB: 1955   Date of Visit: 6/28/2018         To Whom It May Concern:    It is my medical opinion that Abram Barillas may return to work on 7/2/18.    If you have any questions or concerns, please don't hesitate to call [phone]          Sincerely,          Cathey J Hamman, A.P.KILO.  Electronically Signed

## 2018-07-13 ENCOUNTER — HOSPITAL ENCOUNTER (OUTPATIENT)
Dept: LAB | Facility: MEDICAL CENTER | Age: 63
End: 2018-07-13
Attending: FAMILY MEDICINE
Payer: COMMERCIAL

## 2018-07-13 ENCOUNTER — OFFICE VISIT (OUTPATIENT)
Dept: MEDICAL GROUP | Facility: CLINIC | Age: 63
End: 2018-07-13
Payer: COMMERCIAL

## 2018-07-13 VITALS
TEMPERATURE: 98.4 F | DIASTOLIC BLOOD PRESSURE: 70 MMHG | SYSTOLIC BLOOD PRESSURE: 100 MMHG | HEART RATE: 64 BPM | RESPIRATION RATE: 12 BRPM | BODY MASS INDEX: 24.78 KG/M2 | OXYGEN SATURATION: 99 % | HEIGHT: 65 IN | WEIGHT: 148.7 LBS

## 2018-07-13 DIAGNOSIS — R25.2 MUSCLE CRAMPS: ICD-10-CM

## 2018-07-13 DIAGNOSIS — R25.1 INTERMITTENT TREMOR: ICD-10-CM

## 2018-07-13 LAB
ANION GAP SERPL CALC-SCNC: 10 MMOL/L (ref 0–11.9)
BUN SERPL-MCNC: 17 MG/DL (ref 8–22)
CALCIUM SERPL-MCNC: 9.2 MG/DL (ref 8.5–10.5)
CHLORIDE SERPL-SCNC: 105 MMOL/L (ref 96–112)
CO2 SERPL-SCNC: 23 MMOL/L (ref 20–33)
CREAT SERPL-MCNC: 0.6 MG/DL (ref 0.5–1.4)
GLUCOSE SERPL-MCNC: 111 MG/DL (ref 65–99)
MAGNESIUM SERPL-MCNC: 2.3 MG/DL (ref 1.5–2.5)
PHOSPHATE SERPL-MCNC: 2.5 MG/DL (ref 2.5–4.5)
POTASSIUM SERPL-SCNC: 3.7 MMOL/L (ref 3.6–5.5)
SODIUM SERPL-SCNC: 138 MMOL/L (ref 135–145)
TSH SERPL DL<=0.005 MIU/L-ACNC: 0.94 UIU/ML (ref 0.38–5.33)

## 2018-07-13 PROCEDURE — 80048 BASIC METABOLIC PNL TOTAL CA: CPT

## 2018-07-13 PROCEDURE — 84100 ASSAY OF PHOSPHORUS: CPT

## 2018-07-13 PROCEDURE — 84443 ASSAY THYROID STIM HORMONE: CPT

## 2018-07-13 PROCEDURE — 83735 ASSAY OF MAGNESIUM: CPT

## 2018-07-13 PROCEDURE — 36415 COLL VENOUS BLD VENIPUNCTURE: CPT

## 2018-07-13 PROCEDURE — 99213 OFFICE O/P EST LOW 20 MIN: CPT | Performed by: FAMILY MEDICINE

## 2018-07-13 ASSESSMENT — PATIENT HEALTH QUESTIONNAIRE - PHQ9: CLINICAL INTERPRETATION OF PHQ2 SCORE: 0

## 2018-07-13 NOTE — PROGRESS NOTES
Chief Complaint   Patient presents with   • Muscle Pain     cramps arms and legs       Subjective:     HPI:   Abram Barillas presents today with the followin. Muscle cramps  He is getting muscle cramps in his hands during the day at work.  This began about 4 months ago.  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  He is also getting leg and foot cramps during the night and some quite severe cramps behind the right knee if he sits for too long.  He is wondering if some of this is just stress an old age.  I have discussed that we need to check his potassium and other blood salts.  Discussed hydration, he makes an appointment to stay well-hydrated.  He has had no change in medication or new medication.  Lab orders are discussed and placed.    2. Intermittent tremor  He also notices that in the middle of the day at times he has a strong tremor in his hands.  There was no tremor right now when he was examined.  Denies hand weakness.  No previous thyroid test found.  Order discussed and placed.        Patient Active Problem List    Diagnosis Date Noted   • Obstructive sleep apnea 2017   • Impotence of organic origin 2016   • Essential hypertension    • Congestive heart failure    • Atrial fib/flutter, transient    • AICD lead malfunction 2013   • Coronary artery disease due to calcified coronary lesion:Acute MI, anterior wall s/p bms to LAD  with -RCA and residual CX dz (stent failed)    • Mild intermittent asthma 01/15/2013   • AICD (automatic cardioverter/defibrillator) present St Zach placed 2012   • Pacemaker 2012   • Acute MI, anterior wall s/p bms to LAD  with -RCA and residual CX dz (stent failed) 2011   • Cardiomyopathy, ischemic EF 35% 2011   • Myocardial infarction of anterolateral wall 2011   • Dyslipidemia    • Fatty liver        Current  "medicines (including changes today)  Current Outpatient Prescriptions   Medication Sig Dispense Refill   • albuterol 108 (90 Base) MCG/ACT Aero Soln inhalation aerosol Inhale 2 Puffs by mouth every 6 hours as needed. 8.5 g 0   • benzonatate (TESSALON) 200 MG capsule Take 1 Cap by mouth 3 times a day as needed. 60 Cap 0   • metoprolol SR (TOPROL XL) 50 MG TABLET SR 24 HR TAKE 1 TABLET BY MOUTH EVERY DAY 30 Tab 11   • lisinopril (PRINIVIL) 10 MG Tab TAKE 1 TABLET BY MOUTH EVERY DAY 30 Tab 11   • rosuvastatin (CRESTOR) 20 MG Tab TAKE 1 TAB BY MOUTH EVERY EVENING. 30 Tab 9   • clopidogrel (PLAVIX) 75 MG Tab Take 1 Tab by mouth every day. 90 Tab 3   • budesonide-formoterol (SYMBICORT) 160-4.5 MCG/ACT Aerosol Inhale 2 Puffs by mouth 2 Times a Day. Use spacer. Rinse mouth after each use. 1 Inhaler 6   • aspirin EC (ECOTRIN) 81 MG TBEC Take 81 mg by mouth every day.     • Multiple Vitamin (MULTI-VITAMIN PO) Take  by mouth.       No current facility-administered medications for this visit.        Allergies   Allergen Reactions   • Pcn [Penicillins] Rash       ROS: As per HPI       Objective:     Blood pressure 100/70, pulse 64, temperature 36.9 °C (98.4 °F), resp. rate 12, height 1.651 m (5' 5\"), weight 67.4 kg (148 lb 11.2 oz), SpO2 99 %. Body mass index is 24.74 kg/m².    Physical Exam:  Constitutional: Well-developed and well-nourished. Not diaphoretic. No distress. Lucid and fluent.  Skin: Skin is warm and dry. No rash noted.  Head: Atraumatic without lesions.  Eyes: Conjunctivae and extraocular motions are normal. Pupils are equal, round, and reactive to light. No scleral icterus.   Mouth/Throat: Tongue normal. Oropharynx is clear and moist. Posterior pharynx without erythema or exudates.  Neck: Supple, trachea midline. No thyromegaly present. No cervical or supraclavicular lymphadenopathy. No JVD or carotid bruits appreciated  Cardiovascular: Regular rate and rhythm.  Normal S1, S2 without murmur appreciated.  Chest: " Effort normal. Clear to auscultation throughout. No adventitious sounds.  Good air movement.  Abdomen: Soft, non tender, and without distention. Active bowel sounds in all four quadrants. No rebound, guarding, masses or hepatosplenomegaly.  Extremities: No cyanosis, clubbing, erythema, nor edema.  Excellent hip ROM.    Neurological: Alert and oriented x 3. Gait normal.  Muscles strength is normal and symmetric.  No current tremor.  Psychiatric:  Behavior, mood, and affect are appropriate.       Assessment and Plan:     62 y.o. male with the following issues:    1. Muscle cramps  BASIC METABOLIC PANEL    PHOSPHORUS    MAGNESIUM   2. Intermittent tremor  BASIC METABOLIC PANEL    PHOSPHORUS    MAGNESIUM    TSH         Followup: No Follow-up on file.

## 2018-07-16 ENCOUNTER — TELEPHONE (OUTPATIENT)
Dept: MEDICAL GROUP | Facility: CLINIC | Age: 63
End: 2018-07-16

## 2018-07-16 DIAGNOSIS — G47.34 NOCTURNAL HYPOXIA: ICD-10-CM

## 2018-07-16 DIAGNOSIS — J45.20 MILD INTERMITTENT ASTHMA WITHOUT COMPLICATION: ICD-10-CM

## 2018-07-16 DIAGNOSIS — G47.33 OBSTRUCTIVE SLEEP APNEA: ICD-10-CM

## 2018-07-17 NOTE — TELEPHONE ENCOUNTER
----- Message from Addie Dias sent at 7/16/2018  3:28 PM PDT -----  Regarding: PT NEEDS REFERRAL PLACED   Good Afternoon,    The attached pt has a follow up appt with [facility] on 7/23/18. Due to pt's insurance being [employer] a referral needs to be placed by PCP. Can you please place a referral?    Thank you  Addie Kwok

## 2018-07-20 ENCOUNTER — TELEPHONE (OUTPATIENT)
Dept: PULMONOLOGY | Facility: HOSPICE | Age: 63
End: 2018-07-20

## 2018-07-20 NOTE — TELEPHONE ENCOUNTER
Per last Note 2/13/18: Follow-up in 4 months after obtaining dental appliance for repeat HSS    I don't see an order for HSS. Do you want pt to do testing prior to Apt if Dental Appliance has been obtained ?

## 2018-07-23 ENCOUNTER — OFFICE VISIT (OUTPATIENT)
Dept: PULMONOLOGY | Facility: HOSPICE | Age: 63
End: 2018-07-23
Payer: COMMERCIAL

## 2018-07-23 VITALS
OXYGEN SATURATION: 95 % | DIASTOLIC BLOOD PRESSURE: 70 MMHG | HEART RATE: 71 BPM | SYSTOLIC BLOOD PRESSURE: 110 MMHG | RESPIRATION RATE: 16 BRPM | WEIGHT: 147 LBS | TEMPERATURE: 98.6 F | BODY MASS INDEX: 24.49 KG/M2 | HEIGHT: 65 IN

## 2018-07-23 DIAGNOSIS — I50.9 CONGESTIVE HEART FAILURE, UNSPECIFIED HF CHRONICITY, UNSPECIFIED HEART FAILURE TYPE (HCC): Chronic | ICD-10-CM

## 2018-07-23 DIAGNOSIS — J45.20 MILD INTERMITTENT ASTHMA WITHOUT COMPLICATION: ICD-10-CM

## 2018-07-23 DIAGNOSIS — G47.33 OBSTRUCTIVE SLEEP APNEA: ICD-10-CM

## 2018-07-23 DIAGNOSIS — Z95.810 AICD (AUTOMATIC CARDIOVERTER/DEFIBRILLATOR) PRESENT: ICD-10-CM

## 2018-07-23 PROCEDURE — 99214 OFFICE O/P EST MOD 30 MIN: CPT | Performed by: NURSE PRACTITIONER

## 2018-07-23 ASSESSMENT — ENCOUNTER SYMPTOMS
NEUROLOGICAL NEGATIVE: 1
GASTROINTESTINAL NEGATIVE: 1
CONSTITUTIONAL NEGATIVE: 1
CARDIOVASCULAR NEGATIVE: 1
MUSCULOSKELETAL NEGATIVE: 1
EYE PAIN: 0
PSYCHIATRIC NEGATIVE: 1
BRUISES/BLEEDS EASILY: 0
RESPIRATORY NEGATIVE: 1
EYE DISCHARGE: 0

## 2018-07-23 NOTE — PROGRESS NOTES
Chief Complaint   Patient presents with   • Apnea   • Asthma         HPI: This patient is a 62 y.o. male, who presents for follow-up of GRANT and asthma.    Past medical history include ischemic cardiomyopathy with AICD, EF 45% of predicted. Followed by cardiology.     In regards to asthma, he has had asthma since a child. His treatment has consisted of albuterol and occasional Kenalog injections. Recent allergy panel showed response to various grasses, trees and pollens. PFTs indicating FEV1 of 2.46 L 84% predicted, FEV1 FVC ratio of 68, DLCO 156% of predicted. Partial reversibility with albuterol. Multiox showed adequate saturation. He was started on Symbicort with subjective benefit.  He rarely requires use of albuterol.  He denies allergy symptoms.  Denies dyspnea, cough or wheeze since initiating Symbicort.  He denies URIs since he was last seen.     PSG shows poor sleep efficiency but does confirm moderate sleep apnea with AHI of 17, REM index 35, minimum saturation 88%.  Patient has adamantly declined CPAP therapy.  Dental appliance was discussed at his last visit.  He was interested in this but has not pursued treatment.    Past Medical History:   Diagnosis Date   • Acute MI, anterior wall s/p bms to LAD 12/11 with -RCA and residual CX dz (stent failed) 12/31/2011   • AICD (automatic cardioverter/defibrillator) present St Zach placed 4/23/12 4/24/2012    DEVICE DATA:  1. Pulse generator:  [employer]. [employer], model #ID3549-81, serial  #[id number]  2. Right ventricular lead:  [employer]. Zach, model #7120/60,  serial #[id number]  MEASURED INTRAOPERATIVE DATA: R-waves measured 11.7 mV, pacing  threshold 0.75 volts at 0.5 msec, lead impedance of 859 ohms.  DEVICE SETTINGS: VVI 40 to 120.    • Arrhythmia    • ASTHMA     as a child   • Atrial fib/flutter, transient     when in cardiogenic shock   • Bronchitis    • CAD (coronary artery disease)    • Cardiogenic shock ([facility]) 1/2012   • Cardiomyopathy, ischemic EF  35% 12/31/2011   • Congestive heart failure (HCC)    • Dyslipidemia    • Fatty liver    • Hypertension     ON NO MEDS, now on meds   • Influenza    • Myocardial infarct (HCC) 12/29/2011   • Myocardial infarction of anterolateral wall (HCC) 12/29/2011   • Pacemaker 04/23/2012   • Tuberculosis     20 years ago on meds for 6  mo       Social History   Substance Use Topics   • Smoking status: Former Smoker     Types: Cigarettes     Quit date: 3/1/1982   • Smokeless tobacco: Never Used      Comment: QUIT 1985   • Alcohol use No      Comment: occasionally ( 2 times a year)       Family History   Problem Relation Age of Onset   • Cancer Mother      ovarian cancer   • Heart Disease Maternal Aunt 17     unclear but MI!   • Heart Disease Maternal Uncle 38       Immunization History   Administered Date(s) Administered   • Hepatitis A Vaccine, Adult 12/27/2016, 06/28/2017   • Influenza TIV (IM) 12/01/2011, 10/01/2012, 10/11/2013   • Influenza Vaccine Quad Inj (Pf) 10/08/2014, 10/15/2015, 10/03/2016, 10/12/2017   • Pneumococcal Vaccine (UF)Historical Data 01/01/2007   • Pneumococcal polysaccharide vaccine (PPSV-23) 10/01/2012   • Tdap Vaccine 01/18/2017   • Zoster Vaccine Live (ZVL) (Zostavax) 01/18/2017       Current medications as of today   Current Outpatient Prescriptions   Medication Sig Dispense Refill   • albuterol 108 (90 Base) MCG/ACT Aero Soln inhalation aerosol Inhale 2 Puffs by mouth every 6 hours as needed. 8.5 g 0   • benzonatate (TESSALON) 200 MG capsule Take 1 Cap by mouth 3 times a day as needed. 60 Cap 0   • metoprolol SR (TOPROL XL) 50 MG TABLET SR 24 HR TAKE 1 TABLET BY MOUTH EVERY DAY 30 Tab 11   • lisinopril (PRINIVIL) 10 MG Tab TAKE 1 TABLET BY MOUTH EVERY DAY 30 Tab 11   • rosuvastatin (CRESTOR) 20 MG Tab TAKE 1 TAB BY MOUTH EVERY EVENING. 30 Tab 9   • clopidogrel (PLAVIX) 75 MG Tab Take 1 Tab by mouth every day. 90 Tab 3   • budesonide-formoterol (SYMBICORT) 160-4.5 MCG/ACT Aerosol Inhale 2 Puffs by  "mouth 2 Times a Day. Use spacer. Rinse mouth after each use. 1 Inhaler 6   • aspirin EC (ECOTRIN) 81 MG TBEC Take 81 mg by mouth every day.     • Multiple Vitamin (MULTI-VITAMIN PO) Take  by mouth.       No current facility-administered medications for this visit.        Allergies: Pcn [penicillins]    Blood pressure 110/70, pulse 71, temperature 37 °C (98.6 °F), resp. rate 16, height 1.651 m (5' 5\"), weight 66.7 kg (147 lb), SpO2 95 %.      Review of Systems   Constitutional: Negative.    HENT: Negative.    Eyes: Negative for pain and discharge.   Respiratory: Negative.    Cardiovascular: Negative.    Gastrointestinal: Negative.    Musculoskeletal: Negative.    Skin: Negative.    Neurological: Negative.    Endo/Heme/Allergies: Negative for environmental allergies. Does not bruise/bleed easily.   Psychiatric/Behavioral: Negative.        Physical Exam   Constitutional: He is oriented to person, place, and time and well-developed, well-nourished, and in no distress.   HENT:   Head: Normocephalic and atraumatic.   Eyes: Pupils are equal, round, and reactive to light.   Neck: Normal range of motion. Neck supple. No tracheal deviation present.   Cardiovascular: Normal rate, regular rhythm and normal heart sounds.    Pulmonary/Chest: Effort normal and breath sounds normal.   Musculoskeletal: Normal range of motion.   Neurological: He is alert and oriented to person, place, and time. Gait normal.   Skin: Skin is warm and dry.   Psychiatric: Mood, memory, affect and judgment normal.   Vitals reviewed.      Diagnoses/Plan:    1. Congestive heart failure, unspecified HF chronicity, unspecified heart failure type (HCC)  Chronic, stable, patient asymptomatic.  He follows with cardiology regularly    2. AICD (automatic cardioverter/defibrillator) present St Zach placed 4/23/12      3. Mild intermittent asthma without complication  Stable, compliant with Symbicort twice daily and rare use of albuterol.  No change to " bronchodilators at present time    4. Obstructive sleep apnea    I reviewed with the patient the pathophysiology of obstructive sleep apnea, as well as potential cardiac and neurologic risks associated with untreated sleep apnea including CAD, HTN, pulmonary arterial hypertension, cardiac arrhythmias, heart attack or stroke.  Given his cardiac history, heart failure and A. fib it is imperative that his sleep apnea be treated.  I reiterated the importance of this today.  He is amenable to obtaining dental appliance but again adamantly declines trial of CPAP therapy due to claustrophobia.      He will follow-up in 4 months, after if he obtains dental appliance he will require repeat home sleep study.

## 2018-08-04 ENCOUNTER — HOSPITAL ENCOUNTER (OUTPATIENT)
Dept: LAB | Facility: MEDICAL CENTER | Age: 63
End: 2018-08-04
Payer: COMMERCIAL

## 2018-08-04 LAB
BDY FAT % MEASURED: 23.6 %
BP DIAS: 79 MMHG
BP SYS: 116 MMHG
CHOLEST SERPL-MCNC: 115 MG/DL (ref 100–199)
DIABETES HTDIA: NO
EVENT NAME HTEVT: NORMAL
FASTING HTFAS: YES
GLUCOSE SERPL-MCNC: 97 MG/DL (ref 65–99)
HDLC SERPL-MCNC: 37 MG/DL
HYPERTENSION HTHYP: NO
LDLC SERPL CALC-MCNC: 55 MG/DL
SCREENING LOC CITY HTCIT: NORMAL
SCREENING LOC STATE HTSTA: NORMAL
SCREENING LOCATION HTLOC: NORMAL
SMOKING HTSMO: NO
SUBSCRIBER ID HTSID: NORMAL
TRIGL SERPL-MCNC: 115 MG/DL (ref 0–149)

## 2018-08-04 PROCEDURE — S5190 WELLNESS ASSESSMENT BY NONPH: HCPCS

## 2018-08-04 PROCEDURE — 36415 COLL VENOUS BLD VENIPUNCTURE: CPT

## 2018-08-04 PROCEDURE — 80061 LIPID PANEL: CPT

## 2018-08-04 PROCEDURE — 82947 ASSAY GLUCOSE BLOOD QUANT: CPT

## 2018-08-18 DIAGNOSIS — J45.20 MILD INTERMITTENT ASTHMA WITHOUT COMPLICATION: ICD-10-CM

## 2018-08-20 RX ORDER — BUDESONIDE AND FORMOTEROL FUMARATE DIHYDRATE 160; 4.5 UG/1; UG/1
2 AEROSOL RESPIRATORY (INHALATION) 2 TIMES DAILY
Qty: 1 INHALER | Refills: 5 | Status: SHIPPED | OUTPATIENT
Start: 2018-08-20 | End: 2019-11-17 | Stop reason: SDUPTHER

## 2018-08-20 NOTE — TELEPHONE ENCOUNTER
Have we ever prescribed this med? Yes.  If yes, what date? 5/19/17    Last OV: 7/23/18    Next OV: 11/29/18    DX: Asthma    Medications: budesonide-formoterol (SYMBICORT) 160-4.5 MCG

## 2018-09-21 ENCOUNTER — IMMUNIZATION (OUTPATIENT)
Dept: OCCUPATIONAL MEDICINE | Facility: CLINIC | Age: 63
End: 2018-09-21

## 2018-09-21 DIAGNOSIS — Z23 NEED FOR VACCINATION: ICD-10-CM

## 2018-09-21 PROCEDURE — 90686 IIV4 VACC NO PRSV 0.5 ML IM: CPT | Performed by: PREVENTIVE MEDICINE

## 2018-11-26 ENCOUNTER — OFFICE VISIT (OUTPATIENT)
Dept: PULMONOLOGY | Facility: HOSPICE | Age: 63
End: 2018-11-26
Payer: COMMERCIAL

## 2018-11-26 VITALS
BODY MASS INDEX: 25.09 KG/M2 | HEIGHT: 65 IN | RESPIRATION RATE: 16 BRPM | WEIGHT: 150.6 LBS | OXYGEN SATURATION: 94 % | DIASTOLIC BLOOD PRESSURE: 84 MMHG | HEART RATE: 51 BPM | SYSTOLIC BLOOD PRESSURE: 136 MMHG | TEMPERATURE: 97.7 F

## 2018-11-26 DIAGNOSIS — Z23 NEED FOR VACCINATION: ICD-10-CM

## 2018-11-26 DIAGNOSIS — G47.33 OBSTRUCTIVE SLEEP APNEA: ICD-10-CM

## 2018-11-26 DIAGNOSIS — J45.20 MILD INTERMITTENT ASTHMA WITHOUT COMPLICATION: ICD-10-CM

## 2018-11-26 PROCEDURE — 90471 IMMUNIZATION ADMIN: CPT | Performed by: NURSE PRACTITIONER

## 2018-11-26 PROCEDURE — 99214 OFFICE O/P EST MOD 30 MIN: CPT | Mod: 25 | Performed by: NURSE PRACTITIONER

## 2018-11-26 PROCEDURE — 90686 IIV4 VACC NO PRSV 0.5 ML IM: CPT | Performed by: NURSE PRACTITIONER

## 2018-11-26 ASSESSMENT — ENCOUNTER SYMPTOMS
CONSTITUTIONAL NEGATIVE: 1
MUSCULOSKELETAL NEGATIVE: 1
EYE PAIN: 0
RESPIRATORY NEGATIVE: 1
NEUROLOGICAL NEGATIVE: 1
BRUISES/BLEEDS EASILY: 0
CARDIOVASCULAR NEGATIVE: 1
PSYCHIATRIC NEGATIVE: 1
GASTROINTESTINAL NEGATIVE: 1
EYE DISCHARGE: 0

## 2018-11-26 NOTE — PROGRESS NOTES
Chief Complaint   Patient presents with   • Follow-Up     GRANT/Asthma         HPI: This patient is a 63 y.o. male, who presents for follow-up obstructive sleep and mild intermittent asthma.      Past medical history include ischemic cardiomyopathy with AICD, EF 45% of predicted. Followed by cardiology.     PSG shows poor sleep efficiency but does confirm moderate sleep apnea with AHI of 17, REM index 35, minimum saturation 88%.  Patient has adamantly declined CPAP therapy.  Dental appliance was discussed at 2 previous visits.  The importance of treating his apnea was stressed to him.  He was again referred for dental appliance after his last visit. He has been seen and had dental appliance made.  He has not yet received it but plans to in the next 1-2 weeks.  He is eager to start therapy.    He is a history of lifelong asthma.  His treatment has consisted of albuterol and occasional Kenalog injections. allergy panel showed response to various grasses, trees and pollens. PFTs indicating FEV1 of 2.46 L 84% predicted, FEV1 FVC ratio of 68, DLCO 156% of predicted. Partial reversibility with albuterol. He was started on Symbicort with subjective benefit.  He rarely requires use of albuterol.  Denies pulmonary complaints.  Denies URI since he was last seen. he requests influenza vaccine today.    Past Medical History:   Diagnosis Date   • Acute MI, anterior wall s/p bms to LAD 12/11 with -RCA and residual CX dz (stent failed) 12/31/2011   • AICD (automatic cardioverter/defibrillator) present St Zach placed 4/23/12 4/24/2012    DEVICE DATA:  1. Pulse generator:  [employer]. Zach, model #HW4202-65, serial  #[id number]  2. Right ventricular lead:  [employer]. Zach, model #7120/60,  serial #[id number]  MEASURED INTRAOPERATIVE DATA: R-waves measured 11.7 mV, pacing  threshold 0.75 volts at 0.5 msec, lead impedance of 859 ohms.  DEVICE SETTINGS: VVI 40 to 120.    • Arrhythmia    • ASTHMA     as a child   • Atrial  fib/flutter, transient     when in cardiogenic shock   • Bronchitis    • CAD (coronary artery disease)    • Cardiogenic shock (HCC) 1/2012   • Cardiomyopathy, ischemic EF 35% 12/31/2011   • Congestive heart failure (HCC)    • Dyslipidemia    • Fatty liver    • Hypertension     ON NO MEDS, now on meds   • Influenza    • Myocardial infarct (HCC) 12/29/2011   • Myocardial infarction of anterolateral wall (HCC) 12/29/2011   • Pacemaker 04/23/2012   • Tuberculosis     20 years ago on meds for 6  mo       Social History   Substance Use Topics   • Smoking status: Former Smoker     Types: Cigarettes     Quit date: 3/1/1982   • Smokeless tobacco: Never Used      Comment: QUIT 1985   • Alcohol use No      Comment: occasionally ( 2 times a year)       Family History   Problem Relation Age of Onset   • Cancer Mother         ovarian cancer   • Heart Disease Maternal Aunt 17        unclear but MI!   • Heart Disease Maternal Uncle 38       Immunization History   Administered Date(s) Administered   • Hepatitis A Vaccine, Adult 12/27/2016, 06/28/2017   • Influenza (IM) Preservative Free 10/10/2011   • Influenza Seasonal Injectable 10/11/2013   • Influenza TIV (IM) 12/01/2011, 10/01/2012, 10/11/2013   • Influenza Vaccine Quad Inj (Pf) 10/08/2014, 10/15/2015, 10/03/2016, 10/12/2017, 09/21/2018   • Pneumococcal Vaccine (UF)Historical Data 01/01/2007   • Pneumococcal polysaccharide vaccine (PPSV-23) 10/01/2012   • Tdap Vaccine 01/18/2017   • Zoster Vaccine Live (ZVL) (Zostavax) 01/18/2017       Current medications as of today   Current Outpatient Prescriptions   Medication Sig Dispense Refill   • SYMBICORT 160-4.5 MCG/ACT Aerosol INHALE 2 PUFFS BY MOUTH 2 TIMES A DAY. USE SPACER. RINSE MOUTH AFTER EACH USE. 1 Inhaler 5   • albuterol 108 (90 Base) MCG/ACT Aero Soln inhalation aerosol Inhale 2 Puffs by mouth every 6 hours as needed. 8.5 g 0   • metoprolol SR (TOPROL XL) 50 MG TABLET SR 24 HR TAKE 1 TABLET BY MOUTH EVERY DAY 30 Tab 11   •  "lisinopril (PRINIVIL) 10 MG Tab TAKE 1 TABLET BY MOUTH EVERY DAY 30 Tab 11   • rosuvastatin (CRESTOR) 20 MG Tab TAKE 1 TAB BY MOUTH EVERY EVENING. 30 Tab 9   • clopidogrel (PLAVIX) 75 MG Tab Take 1 Tab by mouth every day. 90 Tab 3   • aspirin EC (ECOTRIN) 81 MG TBEC Take 81 mg by mouth every day.     • Multiple Vitamin (MULTI-VITAMIN PO) Take  by mouth.       No current facility-administered medications for this visit.        Allergies: Pcn [penicillins]    Blood pressure 136/84, pulse (!) 51, temperature 36.5 °C (97.7 °F), temperature source Temporal, resp. rate 16, height 1.651 m (5' 5\"), weight 68.3 kg (150 lb 9.6 oz), SpO2 94 %.      Review of Systems   Constitutional: Negative.    HENT: Negative.    Eyes: Negative for pain and discharge.   Respiratory: Negative.    Cardiovascular: Negative.    Gastrointestinal: Negative.    Musculoskeletal: Negative.    Skin: Negative.    Neurological: Negative.    Endo/Heme/Allergies: Negative for environmental allergies. Does not bruise/bleed easily.   Psychiatric/Behavioral: Negative.        Physical Exam   Constitutional: He is oriented to person, place, and time and well-developed, well-nourished, and in no distress.   HENT:   Head: Normocephalic and atraumatic.   Eyes: Pupils are equal, round, and reactive to light.   Neck: Normal range of motion. Neck supple. No tracheal deviation present.   Cardiovascular: Normal rate, regular rhythm and normal heart sounds.    Pulmonary/Chest: Effort normal and breath sounds normal.   Musculoskeletal: Normal range of motion.   Neurological: He is alert and oriented to person, place, and time.   Skin: Skin is warm and dry.   Psychiatric: Mood, memory, affect and judgment normal.       Diagnoses/Plan:    1. Obstructive sleep apnea  Patient plans to obtain dental appliance in the next 1-2 weeks.  He will require adjustments and some time to acclimate.  I will order a home sleep study in approximately 3 months to determine if dental " appliance has been beneficial.  He will follow-up shortly after this to review results.  - Overnight Home Sleep Study; Future    2. Mild intermittent asthma without complication  Symptoms are stable with Symbicort twice daily and rare use of albuterol.    3. Need for vaccination  - Influenza Vaccine Quad Injection >3Y (PF)      F/U 3 months    This dictation was created using voice recognition software. The accuracy of the dictation is limited to the abilities of the software. I expect there may be some errors of grammar and possibly content.

## 2018-12-08 ENCOUNTER — HOSPITAL ENCOUNTER (OUTPATIENT)
Dept: LAB | Facility: MEDICAL CENTER | Age: 63
End: 2018-12-08
Attending: INTERNAL MEDICINE
Payer: COMMERCIAL

## 2018-12-08 DIAGNOSIS — E78.5 DYSLIPIDEMIA: ICD-10-CM

## 2018-12-08 DIAGNOSIS — I10 ESSENTIAL HYPERTENSION: ICD-10-CM

## 2018-12-08 DIAGNOSIS — I25.84 CORONARY ARTERY DISEASE DUE TO CALCIFIED CORONARY LESION: ICD-10-CM

## 2018-12-08 DIAGNOSIS — I25.10 CORONARY ARTERY DISEASE DUE TO CALCIFIED CORONARY LESION: ICD-10-CM

## 2018-12-08 LAB
ALBUMIN SERPL BCP-MCNC: 4.7 G/DL (ref 3.2–4.9)
ALBUMIN/GLOB SERPL: 1.7 G/DL
ALP SERPL-CCNC: 74 U/L (ref 30–99)
ALT SERPL-CCNC: 44 U/L (ref 2–50)
ANION GAP SERPL CALC-SCNC: 5 MMOL/L (ref 0–11.9)
AST SERPL-CCNC: 35 U/L (ref 12–45)
BILIRUB SERPL-MCNC: 0.9 MG/DL (ref 0.1–1.5)
BUN SERPL-MCNC: 12 MG/DL (ref 8–22)
CALCIUM SERPL-MCNC: 9.6 MG/DL (ref 8.5–10.5)
CHLORIDE SERPL-SCNC: 107 MMOL/L (ref 96–112)
CHOLEST SERPL-MCNC: 106 MG/DL (ref 100–199)
CO2 SERPL-SCNC: 26 MMOL/L (ref 20–33)
CREAT SERPL-MCNC: 0.69 MG/DL (ref 0.5–1.4)
FASTING STATUS PATIENT QL REPORTED: NORMAL
GLOBULIN SER CALC-MCNC: 2.8 G/DL (ref 1.9–3.5)
GLUCOSE SERPL-MCNC: 100 MG/DL (ref 65–99)
HDLC SERPL-MCNC: 39 MG/DL
LDLC SERPL CALC-MCNC: 46 MG/DL
POTASSIUM SERPL-SCNC: 4.1 MMOL/L (ref 3.6–5.5)
PROT SERPL-MCNC: 7.5 G/DL (ref 6–8.2)
SODIUM SERPL-SCNC: 138 MMOL/L (ref 135–145)
TRIGL SERPL-MCNC: 106 MG/DL (ref 0–149)

## 2018-12-08 PROCEDURE — 36415 COLL VENOUS BLD VENIPUNCTURE: CPT

## 2018-12-08 PROCEDURE — 80061 LIPID PANEL: CPT

## 2018-12-08 PROCEDURE — 80053 COMPREHEN METABOLIC PANEL: CPT

## 2018-12-10 DIAGNOSIS — I25.84 CORONARY ARTERY DISEASE DUE TO CALCIFIED CORONARY LESION: ICD-10-CM

## 2018-12-10 DIAGNOSIS — I25.5 CARDIOMYOPATHY, ISCHEMIC: ICD-10-CM

## 2018-12-10 DIAGNOSIS — I50.9 CHRONIC CONGESTIVE HEART FAILURE, UNSPECIFIED HEART FAILURE TYPE (HCC): Chronic | ICD-10-CM

## 2018-12-10 DIAGNOSIS — I10 ESSENTIAL HYPERTENSION: ICD-10-CM

## 2018-12-10 DIAGNOSIS — Z95.810 AICD (AUTOMATIC CARDIOVERTER/DEFIBRILLATOR) PRESENT: ICD-10-CM

## 2018-12-10 DIAGNOSIS — E78.5 DYSLIPIDEMIA: ICD-10-CM

## 2018-12-10 DIAGNOSIS — Z95.0 PACEMAKER: Chronic | ICD-10-CM

## 2018-12-10 DIAGNOSIS — I25.10 CORONARY ARTERY DISEASE DUE TO CALCIFIED CORONARY LESION: ICD-10-CM

## 2018-12-11 ENCOUNTER — NON-PROVIDER VISIT (OUTPATIENT)
Dept: CARDIOLOGY | Facility: MEDICAL CENTER | Age: 63
End: 2018-12-11
Payer: COMMERCIAL

## 2018-12-11 DIAGNOSIS — I25.5 CARDIOMYOPATHY, ISCHEMIC: ICD-10-CM

## 2018-12-11 DIAGNOSIS — Z95.810 AICD (AUTOMATIC CARDIOVERTER/DEFIBRILLATOR) PRESENT: ICD-10-CM

## 2018-12-11 PROCEDURE — 93282 PRGRMG EVAL IMPLANTABLE DFB: CPT | Performed by: INTERNAL MEDICINE

## 2018-12-12 ENCOUNTER — OFFICE VISIT (OUTPATIENT)
Dept: CARDIOLOGY | Facility: MEDICAL CENTER | Age: 63
End: 2018-12-12
Payer: COMMERCIAL

## 2018-12-12 VITALS
HEART RATE: 48 BPM | SYSTOLIC BLOOD PRESSURE: 110 MMHG | DIASTOLIC BLOOD PRESSURE: 66 MMHG | OXYGEN SATURATION: 96 % | WEIGHT: 151 LBS | HEIGHT: 65 IN | BODY MASS INDEX: 25.16 KG/M2

## 2018-12-12 DIAGNOSIS — I25.5 CARDIOMYOPATHY, ISCHEMIC: ICD-10-CM

## 2018-12-12 DIAGNOSIS — I25.10 CORONARY ARTERY DISEASE DUE TO CALCIFIED CORONARY LESION: ICD-10-CM

## 2018-12-12 DIAGNOSIS — I25.84 CORONARY ARTERY DISEASE DUE TO CALCIFIED CORONARY LESION: ICD-10-CM

## 2018-12-12 DIAGNOSIS — E78.5 DYSLIPIDEMIA: ICD-10-CM

## 2018-12-12 DIAGNOSIS — Z95.0 PACEMAKER: Chronic | ICD-10-CM

## 2018-12-12 DIAGNOSIS — Z95.810 AICD (AUTOMATIC CARDIOVERTER/DEFIBRILLATOR) PRESENT: ICD-10-CM

## 2018-12-12 DIAGNOSIS — I10 ESSENTIAL HYPERTENSION: ICD-10-CM

## 2018-12-12 PROCEDURE — 99214 OFFICE O/P EST MOD 30 MIN: CPT | Performed by: INTERNAL MEDICINE

## 2018-12-12 NOTE — PROGRESS NOTES
Chief Complaint   Patient presents with   • Coronary Artery Disease     F/V: 1 YR/ LABS IN EPIC       Subjective:   Abram Barillas is a 63 y.o. male who presents today for followup of his coronary artery disease with an ischemic cardiomyopathy and AICD. He also has a history of hypertension and hyperlipidemia.     He has been exercising regularly without difficulty.  He denies any chest discomfort, dyspnea, edema, palpitations or lightheadedness.    Past Medical History:   Diagnosis Date   • Acute MI, anterior wall s/p bms to LAD 12/11 with -RCA and residual CX dz (stent failed) 12/31/2011   • AICD (automatic cardioverter/defibrillator) present St Zach placed 4/23/12 4/24/2012    DEVICE DATA:  1. Pulse generator:  [employer], model #SU5960-61, serial  #[id number]  2. Right ventricular lead:  [employer] Zach, model #7120/60,  serial #[id number]  MEASURED INTRAOPERATIVE DATA: R-waves measured 11.7 mV, pacing  threshold 0.75 volts at 0.5 msec, lead impedance of 859 ohms.  DEVICE SETTINGS: VVI 40 to 120.    • Arrhythmia    • ASTHMA     as a child   • Atrial fib/flutter, transient     when in cardiogenic shock   • Bronchitis    • CAD (coronary artery disease)    • Cardiogenic shock (HCC) 1/2012   • Cardiomyopathy, ischemic EF 35% 12/31/2011   • Congestive heart failure (HCC)    • Dyslipidemia    • Fatty liver    • Hypertension     ON NO MEDS, now on meds   • Influenza    • Myocardial infarct (HCC) 12/29/2011   • Myocardial infarction of anterolateral wall ([facility]) 12/29/2011   • Pacemaker 04/23/2012   • Tuberculosis     20 years ago on meds for 6  mo     Past Surgical History:   Procedure Laterality Date   • RECOVERY  8/30/2013    Performed by Cath-Recovery Surgery at SURGERY SAME DAY [facility] ORS   • RECOVERY  4/23/2012    Performed by SURGERY, CATH-RECOVERY at SURGERY SAME DAY [facility] ORS   • MULTIPLE CORONARY ARTERY BYPASS ENDO VEIN HARVEST  1/3/2012    Performed by PADMINI OCHOA at SURGERY [facility] ORS   • UMBILICAL HERNIA REPAIR  1/14/2011    Performed by CHUY CHRISTENSEN at SURGERY SAME DAY [facility] ORS   • COLONOSCOPY  2007    normal   • PACEMAKER INSERTION       Family History   Problem Relation Age of Onset   • Cancer Mother         ovarian cancer   • Heart Disease Maternal Aunt 17        unclear but MI!   • Heart Disease Maternal Uncle 38     Social History     Social History   • Marital status:      Spouse name: N/A   • Number of children: N/A   • Years of education: N/A     Occupational History   • Not on file.     Social History Main Topics   • Smoking status: Former Smoker     Types: Cigarettes     Quit date: 3/1/1982   • Smokeless tobacco: Never Used      Comment: QUIT 1985   • Alcohol use No      Comment: occasionally ( 2 times a year)   • Drug use: No   • Sexual activity: Not Currently     Other Topics Concern   • Not on file     Social History Narrative   • No narrative on file     Allergies   Allergen Reactions   • Pcn [Penicillins] Rash     Outpatient Encounter Prescriptions as of 12/12/2018   Medication Sig Dispense Refill   • SYMBICORT 160-4.5 MCG/ACT Aerosol INHALE 2 PUFFS BY MOUTH 2 TIMES A DAY. USE SPACER. RINSE MOUTH AFTER EACH USE. 1 Inhaler 5   • metoprolol SR (TOPROL XL) 50 MG TABLET SR 24 HR TAKE 1 TABLET BY MOUTH EVERY DAY 30 Tab 11   • lisinopril (PRINIVIL) 10 MG Tab TAKE 1 TABLET BY MOUTH EVERY DAY 30 Tab 11   • rosuvastatin (CRESTOR) 20 MG Tab TAKE 1 TAB BY MOUTH EVERY EVENING. 30 Tab 9   • clopidogrel (PLAVIX) 75 MG Tab Take 1 Tab by mouth every day. 90 Tab 3   • aspirin EC (ECOTRIN) 81 MG TBEC Take 81 mg by mouth every day.     • Multiple Vitamin (MULTI-VITAMIN PO) Take  by mouth.     • albuterol 108 (90 Base) MCG/ACT Aero Soln inhalation aerosol Inhale 2 Puffs by mouth every 6 hours as needed. 8.5 g 0     No facility-administered encounter medications on file as of 12/12/2018.      ROS     Objective:   /66 (BP Location: Left arm, Patient Position:  "Sitting, BP Cuff Size: Adult)   Pulse (!) 48   Ht 1.651 m (5' 5\")   Wt 68.5 kg (151 lb)   SpO2 96%   BMI 25.13 kg/m²     Physical Exam   Constitutional: He appears well-developed and well-nourished.   Neck: No JVD present.   Cardiovascular: Normal rate and regular rhythm.    No murmur heard.  Pulmonary/Chest: Effort normal and breath sounds normal. He has no rales.   Abdominal: Soft. There is no tenderness.   Musculoskeletal: He exhibits no edema.     Lab Results   Component Value Date/Time    CHOLSTRLTOT 106 12/08/2018 07:10 AM    LDL 46 12/08/2018 07:10 AM    HDL 39 (A) 12/08/2018 07:10 AM    TRIGLYCERIDE 106 12/08/2018 07:10 AM       Lab Results   Component Value Date/Time    SODIUM 138 12/08/2018 07:10 AM    POTASSIUM 4.1 12/08/2018 07:10 AM    CHLORIDE 107 12/08/2018 07:10 AM    CO2 26 12/08/2018 07:10 AM    GLUCOSE 100 (H) 12/08/2018 07:10 AM    BUN 12 12/08/2018 07:10 AM    CREATININE 0.69 12/08/2018 07:10 AM    CREATININE 0.71 (L) 12/08/2010 07:11 AM    BUNCREATRAT 15 12/08/2010 07:11 AM    GLOMRATE >59 12/08/2010 07:11 AM     Lab Results   Component Value Date/Time    ALKPHOSPHAT 74 12/08/2018 07:10 AM    ASTSGOT 35 12/08/2018 07:10 AM    ALTSGPT 44 12/08/2018 07:10 AM    TBILIRUBIN 0.9 12/08/2018 07:10 AM      Lab Results   Component Value Date/Time    BNPBTYPENAT 42 12/27/2016 07:15 AM          Assessment:     1. Coronary artery disease due to calcified coronary lesion:Acute MI, anterior wall s/p bms to LAD 12/11 with -RCA and residual CX dz (stent failed)     2. AICD (automatic cardioverter/defibrillator) present St Zach placed 4/23/12     3. Cardiomyopathy, ischemic EF 35%     4. Dyslipidemia     5. Pacemaker     6. Essential hypertension         Medical Decision Making:  Today's Assessment / Status / Plan:     Mr. Barillas is clinically stable.  He is exercising regularly without difficulty and is asymptomatic from a cardiovascular standpoint.  He is well beta blocked and his lipid status is under " good control.  His blood pressure is also under good control.  He will follow-up in about 6 months with a high sensitivity CRP prior.  He continues on routine checks of his AICD.

## 2018-12-12 NOTE — LETTER
[facility]   [address], [address]  [place] [address]  Phone: [phone]  Fax: [phone]              Abram Barillas  1955    Encounter Date: 12/12/2018    Kyree Son M.D.          PROGRESS NOTE:  Chief Complaint   Patient presents with   • Coronary Artery Disease     F/V: 1 YR/ LABS IN EPIC       Subjective:   Abram Barillas is a 63 y.o. male who presents today for followup of his coronary artery disease with an ischemic cardiomyopathy and AICD. He also has a history of hypertension and hyperlipidemia.     He has been exercising regularly without difficulty.  He denies any chest discomfort, dyspnea, edema, palpitations or lightheadedness.    Past Medical History:   Diagnosis Date   • Acute MI, anterior wall s/p bms to LAD 12/11 with -RCA and residual CX dz (stent failed) 12/31/2011   • AICD (automatic cardioverter/defibrillator) present St Zach placed 4/23/12 4/24/2012    DEVICE DATA:  1. Pulse generator:  [employer] Zach, model #TE8280-95, serial  #[id number]  2. Right ventricular lead:  [employer] Zach, model #7120/60,  serial #[id number]  MEASURED INTRAOPERATIVE DATA: R-waves measured 11.7 mV, pacing  threshold 0.75 volts at 0.5 msec, lead impedance of 859 ohms.  DEVICE SETTINGS: VVI 40 to 120.    • Arrhythmia    • ASTHMA     as a child   • Atrial fib/flutter, transient     when in cardiogenic shock   • Bronchitis    • CAD (coronary artery disease)    • Cardiogenic shock (HCC) 1/2012   • Cardiomyopathy, ischemic EF 35% 12/31/2011   • Congestive heart failure (HCC)    • Dyslipidemia    • Fatty liver    • Hypertension     ON NO MEDS, now on meds   • Influenza    • Myocardial infarct (HCC) 12/29/2011   • Myocardial infarction of anterolateral wall (HCC) 12/29/2011   • Pacemaker 04/23/2012   • Tuberculosis     20 years ago on meds for 6  mo     Past Surgical History:   Procedure Laterality Date   • RECOVERY  8/30/2013    Performed  by Cath-Recovery Surgery at SURGERY SAME DAY [facility] ORS   • RECOVERY  4/23/2012    Performed by SURGERY, CATH-RECOVERY at SURGERY SAME DAY [facility] ORS   • MULTIPLE CORONARY ARTERY BYPASS ENDO VEIN HARVEST  1/3/2012    Performed by PADMINI OCHOA at SURGERY [facility] ORS   • UMBILICAL HERNIA REPAIR  1/14/2011    Performed by CHUY CHRISTENSEN at SURGERY SAME DAY [facility] ORS   • COLONOSCOPY  2007    normal   • PACEMAKER INSERTION       Family History   Problem Relation Age of Onset   • Cancer Mother         ovarian cancer   • Heart Disease Maternal Aunt 17        unclear but MI!   • Heart Disease Maternal Uncle 38     Social History     Social History   • Marital status:      Spouse name: N/A   • Number of children: N/A   • Years of education: N/A     Occupational History   • Not on file.     Social History Main Topics   • Smoking status: Former Smoker     Types: Cigarettes     Quit date: 3/1/1982   • Smokeless tobacco: Never Used      Comment: QUIT 1985   • Alcohol use No      Comment: occasionally ( 2 times a year)   • Drug use: No   • Sexual activity: Not Currently     Other Topics Concern   • Not on file     Social History Narrative   • No narrative on file     Allergies   Allergen Reactions   • Pcn [Penicillins] Rash     Outpatient Encounter Prescriptions as of 12/12/2018   Medication Sig Dispense Refill   • SYMBICORT 160-4.5 MCG/ACT Aerosol INHALE 2 PUFFS BY MOUTH 2 TIMES A DAY. USE SPACER. RINSE MOUTH AFTER EACH USE. 1 Inhaler 5   • metoprolol SR (TOPROL XL) 50 MG TABLET SR 24 HR TAKE 1 TABLET BY MOUTH EVERY DAY 30 Tab 11   • lisinopril (PRINIVIL) 10 MG Tab TAKE 1 TABLET BY MOUTH EVERY DAY 30 Tab 11   • rosuvastatin (CRESTOR) 20 MG Tab TAKE 1 TAB BY MOUTH EVERY EVENING. 30 Tab 9   • clopidogrel (PLAVIX) 75 MG Tab Take 1 Tab by mouth every day. 90 Tab 3   • aspirin EC (ECOTRIN) 81 MG TBEC Take 81 mg by mouth every day.     • Multiple Vitamin (MULTI-VITAMIN PO) Take  by mouth.     • albuterol 108  "(90 Base) MCG/ACT Aero Soln inhalation aerosol Inhale 2 Puffs by mouth every 6 hours as needed. 8.5 g 0     No facility-administered encounter medications on file as of 12/12/2018.      ROS     Objective:   /66 (BP Location: Left arm, Patient Position: Sitting, BP Cuff Size: Adult)   Pulse (!) 48   Ht 1.651 m (5' 5\")   Wt 68.5 kg (151 lb)   SpO2 96%   BMI 25.13 kg/m²      Physical Exam   Constitutional: He appears well-developed and well-nourished.   Neck: No JVD present.   Cardiovascular: Normal rate and regular rhythm.    No murmur heard.  Pulmonary/Chest: Effort normal and breath sounds normal. He has no rales.   Abdominal: Soft. There is no tenderness.   Musculoskeletal: He exhibits no edema.     Lab Results   Component Value Date/Time    CHOLSTRLTOT 106 12/08/2018 07:10 AM    LDL 46 12/08/2018 07:10 AM    HDL 39 (A) 12/08/2018 07:10 AM    TRIGLYCERIDE 106 12/08/2018 07:10 AM       Lab Results   Component Value Date/Time    SODIUM 138 12/08/2018 07:10 AM    POTASSIUM 4.1 12/08/2018 07:10 AM    CHLORIDE 107 12/08/2018 07:10 AM    CO2 26 12/08/2018 07:10 AM    GLUCOSE 100 (H) 12/08/2018 07:10 AM    BUN 12 12/08/2018 07:10 AM    CREATININE 0.69 12/08/2018 07:10 AM    CREATININE 0.71 (L) 12/08/2010 07:11 AM    BUNCREATRAT 15 12/08/2010 07:11 AM    GLOMRATE >59 12/08/2010 07:11 AM     Lab Results   Component Value Date/Time    ALKPHOSPHAT 74 12/08/2018 07:10 AM    ASTSGOT 35 12/08/2018 07:10 AM    ALTSGPT 44 12/08/2018 07:10 AM    TBILIRUBIN 0.9 12/08/2018 07:10 AM      Lab Results   Component Value Date/Time    BNPBTYPENAT 42 12/27/2016 07:15 AM          Assessment:     1. Coronary artery disease due to calcified coronary lesion:Acute MI, anterior wall s/p bms to LAD 12/11 with -RCA and residual CX dz (stent failed)     2. AICD (automatic cardioverter/defibrillator) present St Zach placed 4/23/12     3. Cardiomyopathy, ischemic EF 35%     4. Dyslipidemia     5. Pacemaker     6. Essential hypertension   "       Medical Decision Making:  Today's Assessment / Status / Plan:     Mr. Barillas is clinically stable.  He is exercising regularly without difficulty and is asymptomatic from a cardiovascular standpoint.  He is well beta blocked and his lipid status is under good control.  His blood pressure is also under good control.  He will follow-up in about 6 months with a high sensitivity CRP prior.  He continues on routine checks of his AICD.      David Cervantes M.D.  [address] [place] [id number]  VIA In Basket

## 2019-01-07 DIAGNOSIS — I25.2 HISTORY OF MI (MYOCARDIAL INFARCTION): ICD-10-CM

## 2019-01-07 DIAGNOSIS — I25.751 CORONARY ARTERY DISEASE INVOLVING NATIVE ARTERY OF TRANSPLANTED HEART WITH ANGINA PECTORIS WITH DOCUMENTED SPASM (HCC): ICD-10-CM

## 2019-01-08 RX ORDER — CLOPIDOGREL BISULFATE 75 MG/1
TABLET ORAL
Qty: 90 TAB | Refills: 3 | Status: SHIPPED | OUTPATIENT
Start: 2019-01-08 | End: 2020-02-10

## 2019-02-04 ENCOUNTER — HOME STUDY (OUTPATIENT)
Dept: SLEEP MEDICINE | Facility: MEDICAL CENTER | Age: 64
End: 2019-02-04
Attending: NURSE PRACTITIONER
Payer: COMMERCIAL

## 2019-02-04 DIAGNOSIS — G47.33 OBSTRUCTIVE SLEEP APNEA: ICD-10-CM

## 2019-02-04 PROCEDURE — 95806 SLEEP STUDY UNATT&RESP EFFT: CPT | Performed by: INTERNAL MEDICINE

## 2019-02-06 NOTE — PROCEDURES
The patient is a 63-year-old male with history of GRANT, using an oral appliance for treatment.  Polysomnography is requested for assessment of efficacy of treatment.    A total recording time of 432 minutes was obtained with good-quality data noted.    There were 268 snore episodes noted.  There were mild residual obstructive apneas with overall AHI 11/h.  Of note apnea was noted principally supine.  The TRISTON was 4-5/hr in the non supine position.  14 minutes was spent below SPO2 of 90%.  The SPO2 cheri was 86%.  Mean heart rate was 55 BPM.    Interpretation:  Mild GRANT, supine related, with overall AHI 11/h.    Recommendations:  Positional modification therapy recommended with the use of an oral appliance.  In general, maintaining a healthy BMI, avoiding alcohol, sedatives, and muscle relaxants, can be of added benefit.

## 2019-02-25 ENCOUNTER — OFFICE VISIT (OUTPATIENT)
Dept: PULMONOLOGY | Facility: HOSPICE | Age: 64
End: 2019-02-25
Payer: COMMERCIAL

## 2019-02-25 VITALS
HEART RATE: 58 BPM | HEIGHT: 65 IN | DIASTOLIC BLOOD PRESSURE: 68 MMHG | OXYGEN SATURATION: 92 % | RESPIRATION RATE: 16 BRPM | BODY MASS INDEX: 25.49 KG/M2 | WEIGHT: 153 LBS | SYSTOLIC BLOOD PRESSURE: 124 MMHG

## 2019-02-25 DIAGNOSIS — G47.33 OBSTRUCTIVE SLEEP APNEA: ICD-10-CM

## 2019-02-25 DIAGNOSIS — I25.5 CARDIOMYOPATHY, ISCHEMIC: ICD-10-CM

## 2019-02-25 DIAGNOSIS — J45.20 MILD INTERMITTENT ASTHMA WITHOUT COMPLICATION: ICD-10-CM

## 2019-02-25 PROCEDURE — 99214 OFFICE O/P EST MOD 30 MIN: CPT | Performed by: NURSE PRACTITIONER

## 2019-02-25 ASSESSMENT — ENCOUNTER SYMPTOMS
BRUISES/BLEEDS EASILY: 0
EYE PAIN: 0
NEUROLOGICAL NEGATIVE: 1
CONSTITUTIONAL NEGATIVE: 1
EYE DISCHARGE: 0
PSYCHIATRIC NEGATIVE: 1
CARDIOVASCULAR NEGATIVE: 1
MUSCULOSKELETAL NEGATIVE: 1
GASTROINTESTINAL NEGATIVE: 1
RESPIRATORY NEGATIVE: 1

## 2019-04-22 DIAGNOSIS — I10 ESSENTIAL HYPERTENSION: ICD-10-CM

## 2019-04-22 RX ORDER — LISINOPRIL 10 MG/1
TABLET ORAL
Qty: 30 TAB | Refills: 11 | Status: SHIPPED | OUTPATIENT
Start: 2019-04-22 | End: 2020-07-06 | Stop reason: SDUPTHER

## 2019-04-22 RX ORDER — METOPROLOL SUCCINATE 50 MG/1
TABLET, EXTENDED RELEASE ORAL
Qty: 30 TAB | Refills: 11 | Status: SHIPPED | OUTPATIENT
Start: 2019-04-22 | End: 2020-06-05 | Stop reason: SDUPTHER

## 2019-05-31 ENCOUNTER — OFFICE VISIT (OUTPATIENT)
Dept: MEDICAL GROUP | Facility: MEDICAL CENTER | Age: 64
End: 2019-05-31
Payer: COMMERCIAL

## 2019-05-31 VITALS
HEART RATE: 58 BPM | TEMPERATURE: 98.4 F | HEIGHT: 65 IN | RESPIRATION RATE: 14 BRPM | WEIGHT: 153 LBS | OXYGEN SATURATION: 97 % | SYSTOLIC BLOOD PRESSURE: 116 MMHG | BODY MASS INDEX: 25.49 KG/M2 | DIASTOLIC BLOOD PRESSURE: 64 MMHG

## 2019-05-31 DIAGNOSIS — R05.9 COUGH: ICD-10-CM

## 2019-05-31 DIAGNOSIS — J45.41 MODERATE PERSISTENT ASTHMA WITH EXACERBATION: ICD-10-CM

## 2019-05-31 DIAGNOSIS — Z11.59 NEED FOR HEPATITIS C SCREENING TEST: ICD-10-CM

## 2019-05-31 DIAGNOSIS — J20.9 ACUTE BRONCHITIS, UNSPECIFIED ORGANISM: ICD-10-CM

## 2019-05-31 PROCEDURE — 99213 OFFICE O/P EST LOW 20 MIN: CPT | Performed by: FAMILY MEDICINE

## 2019-05-31 RX ORDER — PREDNISONE 20 MG/1
20 TABLET ORAL 2 TIMES DAILY WITH MEALS
Qty: 6 TAB | Refills: 0 | Status: SHIPPED | OUTPATIENT
Start: 2019-05-31 | End: 2019-06-03

## 2019-05-31 RX ORDER — PREDNISONE 20 MG/1
20 TABLET ORAL 2 TIMES DAILY WITH MEALS
Qty: 6 TAB | Refills: 0 | Status: SHIPPED | OUTPATIENT
Start: 2019-05-31 | End: 2019-05-31 | Stop reason: SDUPTHER

## 2019-05-31 RX ORDER — AZITHROMYCIN 250 MG/1
TABLET, FILM COATED ORAL
Qty: 6 TAB | Refills: 0 | Status: SHIPPED | OUTPATIENT
Start: 2019-05-31 | End: 2019-08-27

## 2019-05-31 ASSESSMENT — PATIENT HEALTH QUESTIONNAIRE - PHQ9: CLINICAL INTERPRETATION OF PHQ2 SCORE: 0

## 2019-05-31 NOTE — PROGRESS NOTES
Chief Complaint   Patient presents with   • Cough   • Asthma       Subjective:     HPI:   Abram Barillas presents today with the followin. Moderate persistent asthma with exacerbation/Cough  Continues the symbicort twice daily steadily.  Uses albuterol for rescue.  He has not been doing this very much.  Encouraged to use it at night.  Discussed using short course of prednisone as well.  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    2. Acute bronchitis, unspecified organism  2 weeks of productive cough.  Patient's wife came back from vacation a week ago and has noticed that he is wheezing and coughing throughout the night.  He states his appetite has been pretty stable.  He feels congested and feels as if he does have a chest cold or bronchitis.  Auscultation reveals wet rhonchi most consistent with bronchitis.  There is no JVD noted, no peripheral edema or rales.  No sign of congestive heart failure.  Discussed treatment with antibiotics and a very very short course of prednisone as above.  I am keeping the prednisone course very short due to his underlying cardiac issues.    Needs hep C screen, order placed      Patient Active Problem List    Diagnosis Date Noted   • Moderate persistent asthma with exacerbation 2019   • Nocturnal hypoxia 2018   • Obstructive sleep apnea 2017   • Impotence of organic origin 2016   • Essential hypertension    • Congestive heart failure    • Atrial fib/flutter, transient    • AICD lead malfunction 2013   • Coronary artery disease due to calcified coronary lesion:Acute MI, anterior wall s/p bms to LAD  with -RCA and residual CX dz (stent failed)    • AICD (automatic cardioverter/defibrillator) present St Zach placed 2012   • Pacemaker 2012   • Acute MI, anterior wall s/p bms to LAD  with -RCA and residual CX dz (stent failed) 2011   •  "Cardiomyopathy, ischemic EF 35% 12/31/2011   • Myocardial infarction of anterolateral wall 12/29/2011   • Dyslipidemia    • Fatty liver        Current medicines (including changes today)  Current Outpatient Prescriptions   Medication Sig Dispense Refill   • azithromycin (ZITHROMAX) 250 MG Tab As directed on pack 6 Tab 0   • predniSONE (DELTASONE) 20 MG Tab Take 1 Tab by mouth 2 times a day, with meals for 3 days. 6 Tab 0   • metoprolol SR (TOPROL XL) 50 MG TABLET SR 24 HR TAKE 1 TABLET BY MOUTH EVERY DAY 30 Tab 11   • lisinopril (PRINIVIL) 10 MG Tab TAKE 1 TABLET BY MOUTH EVERY DAY 30 Tab 11   • rosuvastatin (CRESTOR) 20 MG Tab TAKE 1 TABLET BY MOUTH EVERY DAY AT BEDTIME 90 Tab 1   • clopidogrel (PLAVIX) 75 MG Tab TAKE 1 TABLET BY MOUTH EVERY DAY 90 Tab 3   • SYMBICORT 160-4.5 MCG/ACT Aerosol INHALE 2 PUFFS BY MOUTH 2 TIMES A DAY. USE SPACER. RINSE MOUTH AFTER EACH USE. 1 Inhaler 5   • albuterol 108 (90 Base) MCG/ACT Aero Soln inhalation aerosol Inhale 2 Puffs by mouth every 6 hours as needed. 8.5 g 0   • aspirin EC (ECOTRIN) 81 MG TBEC Take 81 mg by mouth every day.     • Multiple Vitamin (MULTI-VITAMIN PO) Take  by mouth.       No current facility-administered medications for this visit.        Allergies   Allergen Reactions   • Pcn [Penicillins] Rash       ROS: As per HPI       Objective:     /64   Pulse (!) 58   Temp 36.9 °C (98.4 °F)   Resp 14   Ht 1.651 m (5' 5\")   Wt 69.4 kg (153 lb)   SpO2 97%  Body mass index is 25.46 kg/m².    Physical Exam:  Constitutional: Well-developed and well-nourished. Not diaphoretic. No distress. Lucid and fluent.  Skin: Skin is warm and dry. No rash noted.  Head: Atraumatic without lesions.  Eyes: Conjunctivae and extraocular motions are normal. Pupils are equal, round, and reactive to light. No scleral icterus.   Nose: Nares patent. Mucosa without edema or erythema. No discharge. No facial tenderness.  Mouth/Throat: Tongue normal. Oropharynx is clear and moist. " Posterior pharynx without erythema or exudates.  Neck: Supple, trachea midline. No thyromegaly present. No cervical or supraclavicular lymphadenopathy. No JVD or carotid bruits appreciated  Cardiovascular: Regular rate and rhythm.  Normal S1, S2 without murmur appreciated.  Chest: Effort normal. Wet rhonchi bilaterally, worse right upper chest, good air movement.  No rales appreciated.  Extremities: No cyanosis, clubbing, erythema, nor edema.   Neurological: Alert and oriented x 3. Movements and gait normal.  Psychiatric:  Behavior, mood, and affect are appropriate.       Assessment and Plan:     63 y.o. male with the following issues:    1. Acute bronchitis, unspecified organism  azithromycin (ZITHROMAX) 250 MG Tab   2. Moderate persistent asthma with exacerbation  azithromycin (ZITHROMAX) 250 MG Tab    predniSONE (DELTASONE) 20 MG Tab    DISCONTINUED: predniSONE (DELTASONE) 20 MG Tab   3. Cough     4. Need for hepatitis C screening test  HEP C VIRUS ANTIBODY         Followup: Return in about 6 months (around 11/30/2019), or if symptoms worsen or fail to improve.

## 2019-06-14 ENCOUNTER — NON-PROVIDER VISIT (OUTPATIENT)
Dept: CARDIOLOGY | Facility: MEDICAL CENTER | Age: 64
End: 2019-06-14
Payer: COMMERCIAL

## 2019-06-14 DIAGNOSIS — Z95.810 AICD (AUTOMATIC CARDIOVERTER/DEFIBRILLATOR) PRESENT: ICD-10-CM

## 2019-06-14 PROCEDURE — 93282 PRGRMG EVAL IMPLANTABLE DFB: CPT | Performed by: INTERNAL MEDICINE

## 2019-06-21 ENCOUNTER — HOSPITAL ENCOUNTER (OUTPATIENT)
Dept: LAB | Facility: MEDICAL CENTER | Age: 64
End: 2019-06-21
Attending: INTERNAL MEDICINE
Payer: COMMERCIAL

## 2019-06-21 ENCOUNTER — HOSPITAL ENCOUNTER (OUTPATIENT)
Dept: LAB | Facility: MEDICAL CENTER | Age: 64
End: 2019-06-21
Attending: FAMILY MEDICINE
Payer: COMMERCIAL

## 2019-06-21 DIAGNOSIS — I25.5 CARDIOMYOPATHY, ISCHEMIC: ICD-10-CM

## 2019-06-21 DIAGNOSIS — I25.84 CORONARY ARTERY DISEASE DUE TO CALCIFIED CORONARY LESION: ICD-10-CM

## 2019-06-21 DIAGNOSIS — Z11.59 NEED FOR HEPATITIS C SCREENING TEST: ICD-10-CM

## 2019-06-21 DIAGNOSIS — Z95.810 AICD (AUTOMATIC CARDIOVERTER/DEFIBRILLATOR) PRESENT: ICD-10-CM

## 2019-06-21 DIAGNOSIS — I25.10 CORONARY ARTERY DISEASE DUE TO CALCIFIED CORONARY LESION: ICD-10-CM

## 2019-06-21 DIAGNOSIS — E78.5 DYSLIPIDEMIA: ICD-10-CM

## 2019-06-21 LAB
CRP SERPL HS-MCNC: 6 MG/L (ref 0–7.5)
HCV AB SER QL: NEGATIVE

## 2019-06-21 PROCEDURE — 86803 HEPATITIS C AB TEST: CPT

## 2019-06-21 PROCEDURE — 86141 C-REACTIVE PROTEIN HS: CPT

## 2019-06-21 PROCEDURE — 36415 COLL VENOUS BLD VENIPUNCTURE: CPT

## 2019-06-22 ENCOUNTER — HOSPITAL ENCOUNTER (OUTPATIENT)
Dept: LAB | Facility: MEDICAL CENTER | Age: 64
End: 2019-06-22
Attending: UROLOGY
Payer: COMMERCIAL

## 2019-06-22 LAB — PSA SERPL-MCNC: 1.15 NG/ML (ref 0–4)

## 2019-06-22 PROCEDURE — 84153 ASSAY OF PSA TOTAL: CPT

## 2019-06-22 PROCEDURE — 36415 COLL VENOUS BLD VENIPUNCTURE: CPT

## 2019-06-25 ENCOUNTER — OFFICE VISIT (OUTPATIENT)
Dept: CARDIOLOGY | Facility: MEDICAL CENTER | Age: 64
End: 2019-06-25
Payer: COMMERCIAL

## 2019-06-25 VITALS
WEIGHT: 152.2 LBS | BODY MASS INDEX: 25.36 KG/M2 | SYSTOLIC BLOOD PRESSURE: 112 MMHG | HEART RATE: 62 BPM | HEIGHT: 65 IN | OXYGEN SATURATION: 95 % | DIASTOLIC BLOOD PRESSURE: 70 MMHG

## 2019-06-25 DIAGNOSIS — I25.5 CARDIOMYOPATHY, ISCHEMIC: ICD-10-CM

## 2019-06-25 DIAGNOSIS — E78.5 DYSLIPIDEMIA: ICD-10-CM

## 2019-06-25 DIAGNOSIS — I25.84 CORONARY ARTERY DISEASE DUE TO CALCIFIED CORONARY LESION: ICD-10-CM

## 2019-06-25 DIAGNOSIS — I25.10 CORONARY ARTERY DISEASE DUE TO CALCIFIED CORONARY LESION: ICD-10-CM

## 2019-06-25 DIAGNOSIS — Z95.810 AICD (AUTOMATIC CARDIOVERTER/DEFIBRILLATOR) PRESENT: ICD-10-CM

## 2019-06-25 DIAGNOSIS — I10 ESSENTIAL HYPERTENSION: ICD-10-CM

## 2019-06-25 PROCEDURE — 99214 OFFICE O/P EST MOD 30 MIN: CPT | Performed by: INTERNAL MEDICINE

## 2019-06-25 NOTE — PROGRESS NOTES
Chief Complaint   Patient presents with   • Coronary Artery Disease       Subjective:   Abram Barillas is a 63 y.o. male who presents todayfor followup of his coronary artery disease with an ischemic cardiomyopathy and AICD. He also has a history of hypertension and hyperlipidemia.     He denies any chest discomfort, dyspnea on exertion, PND, orthopnea or edema.  He has had no palpitations or lightheadedness.  His AICD was checked last week.       Past Medical History:   Diagnosis Date   • Acute MI, anterior wall s/p bms to LAD 12/11 with -RCA and residual CX dz (stent failed) 12/31/2011   • AICD (automatic cardioverter/defibrillator) present St Zach placed 4/23/12 4/24/2012    DEVICE DATA:  1. Pulse generator:  [employer] Zach, model #KT3596-17, serial  #[id number]  2. Right ventricular lead:  [employer]. Zach, model #7120/60,  serial #[id number]  MEASURED INTRAOPERATIVE DATA: R-waves measured 11.7 mV, pacing  threshold 0.75 volts at 0.5 msec, lead impedance of 859 ohms.  DEVICE SETTINGS: VVI 40 to 120.    • Arrhythmia    • ASTHMA     as a child   • Atrial fib/flutter, transient     when in cardiogenic shock   • Bronchitis    • CAD (coronary artery disease)    • Cardiogenic shock (HCC) 1/2012   • Cardiomyopathy, ischemic EF 35% 12/31/2011   • Congestive heart failure (HCC)    • Dyslipidemia    • Fatty liver    • Hypertension     ON NO MEDS, now on meds   • Influenza    • Myocardial infarct (HCC) 12/29/2011   • Myocardial infarction of anterolateral wall ([facility]) 12/29/2011   • Pacemaker 04/23/2012   • Tuberculosis     20 years ago on meds for 6  mo     Past Surgical History:   Procedure Laterality Date   • RECOVERY  8/30/2013    Performed by Cath-Recovery Surgery at SURGERY SAME DAY [facility] ORS   • RECOVERY  4/23/2012    Performed by SURGERY, CATH-RECOVERY at SURGERY SAME DAY [facility] ORS   • MULTIPLE CORONARY ARTERY BYPASS ENDO VEIN HARVEST  1/3/2012    Performed by PADMINI OCHOA at SURGERY [facility] ORS   • UMBILICAL HERNIA REPAIR  1/14/2011    Performed by CHUY CHRISTENSEN at SURGERY SAME DAY [facility] ORS   • COLONOSCOPY  2007    normal   • PACEMAKER INSERTION       Family History   Problem Relation Age of Onset   • Cancer Mother         ovarian cancer   • Heart Disease Maternal Aunt 17        unclear but MI!   • Heart Disease Maternal Uncle 38     Social History     Social History   • Marital status:      Spouse name: N/A   • Number of children: N/A   • Years of education: N/A     Occupational History   • Not on file.     Social History Main Topics   • Smoking status: Former Smoker     Types: Cigarettes     Quit date: 3/1/1982   • Smokeless tobacco: Never Used      Comment: QUIT 1985   • Alcohol use No      Comment: occasionally ( 2 times a year)   • Drug use: No   • Sexual activity: Not Currently     Other Topics Concern   • Not on file     Social History Narrative   • No narrative on file     Allergies   Allergen Reactions   • Pcn [Penicillins] Rash     Outpatient Encounter Prescriptions as of 6/25/2019   Medication Sig Dispense Refill   • azithromycin (ZITHROMAX) 250 MG Tab As directed on pack 6 Tab 0   • metoprolol SR (TOPROL XL) 50 MG TABLET SR 24 HR TAKE 1 TABLET BY MOUTH EVERY DAY 30 Tab 11   • lisinopril (PRINIVIL) 10 MG Tab TAKE 1 TABLET BY MOUTH EVERY DAY 30 Tab 11   • rosuvastatin (CRESTOR) 20 MG Tab TAKE 1 TABLET BY MOUTH EVERY DAY AT BEDTIME 90 Tab 1   • clopidogrel (PLAVIX) 75 MG Tab TAKE 1 TABLET BY MOUTH EVERY DAY 90 Tab 3   • SYMBICORT 160-4.5 MCG/ACT Aerosol INHALE 2 PUFFS BY MOUTH 2 TIMES A DAY. USE SPACER. RINSE MOUTH AFTER EACH USE. 1 Inhaler 5   • albuterol 108 (90 Base) MCG/ACT Aero Soln inhalation aerosol Inhale 2 Puffs by mouth every 6 hours as needed. 8.5 g 0   • aspirin EC (ECOTRIN) 81 MG TBEC Take 81 mg by mouth every day.     • Multiple Vitamin (MULTI-VITAMIN PO) Take  by mouth.       No facility-administered encounter medications on file as of 6/25/2019.   "    ROS     Objective:   /70 (BP Location: Left arm, Patient Position: Sitting, BP Cuff Size: Adult)   Pulse 62   Ht 1.651 m (5' 5\")   Wt 69 kg (152 lb 3.2 oz)   SpO2 95%   BMI 25.33 kg/m²     Physical Exam   Constitutional: He appears well-developed and well-nourished.   Neck: No JVD present.   Cardiovascular: Normal rate and regular rhythm.    No murmur heard.  Pulmonary/Chest: Effort normal and breath sounds normal. He has no rales.   Abdominal: Soft. There is no tenderness.   Musculoskeletal: He exhibits no edema.     Lab Results   Component Value Date/Time    CHOLSTRLTOT 106 12/08/2018 07:10 AM    LDL 46 12/08/2018 07:10 AM    HDL 39 (A) 12/08/2018 07:10 AM    TRIGLYCERIDE 106 12/08/2018 07:10 AM       Lab Results   Component Value Date/Time    SODIUM 138 12/08/2018 07:10 AM    POTASSIUM 4.1 12/08/2018 07:10 AM    CHLORIDE 107 12/08/2018 07:10 AM    CO2 26 12/08/2018 07:10 AM    GLUCOSE 100 (H) 12/08/2018 07:10 AM    BUN 12 12/08/2018 07:10 AM    CREATININE 0.69 12/08/2018 07:10 AM    CREATININE 0.71 (L) 12/08/2010 07:11 AM    BUNCREATRAT 15 12/08/2010 07:11 AM    GLOMRATE >59 12/08/2010 07:11 AM     Lab Results   Component Value Date/Time    ALKPHOSPHAT 74 12/08/2018 07:10 AM    ASTSGOT 35 12/08/2018 07:10 AM    ALTSGPT 44 12/08/2018 07:10 AM    TBILIRUBIN 0.9 12/08/2018 07:10 AM      Lab Results   Component Value Date/Time    BNPBTYPENAT 42 12/27/2016 07:15 AM          Assessment:     1. Coronary artery disease due to calcified coronary lesion:Acute MI, anterior wall s/p bms to LAD 12/11 with -RCA and residual CX dz (stent failed)     2. Cardiomyopathy, ischemic EF 35%     3. AICD (automatic cardioverter/defibrillator) present St Zach placed 4/23/12     4. Essential hypertension     5. Dyslipidemia         Medical Decision Making:  Today's Assessment / Status / Plan:     Mr. Barillas is clinically stable.  His AICD check showed 5 years of battery life remaining.  He had no device therapies.  He is " asymptomatic from a cardiovascular standpoint.  His blood pressure and lipid status appear to be under excellent control.  He will follow-up in about 6 months.  He has not had an echocardiogram since 2016 and one will be obtained prior to his follow-up to reassess LV function.

## 2019-06-25 NOTE — LETTER
[facility]   [address]  [place] [address]  Phone: [phone]  Fax: [phone]              Abram Barillas  1955    Encounter Date: 6/25/2019    Kyree Son M.D.          PROGRESS NOTE:  Chief Complaint   Patient presents with   • Coronary Artery Disease       Subjective:   Abram Barillas is a 63 y.o. male who presents todayfor followup of his coronary artery disease with an ischemic cardiomyopathy and AICD. He also has a history of hypertension and hyperlipidemia.     He denies any chest discomfort, dyspnea on exertion, PND, orthopnea or edema.  He has had no palpitations or lightheadedness.  His AICD was checked last week.       Past Medical History:   Diagnosis Date   • Acute MI, anterior wall s/p bms to LAD 12/11 with -RCA and residual CX dz (stent failed) 12/31/2011   • AICD (automatic cardioverter/defibrillator) present St Zach placed 4/23/12 4/24/2012    DEVICE DATA:  1. Pulse generator:  [employer] Zach, model #FR3043-75, serial  #[id number]  2. Right ventricular lead:  [employer]. Zach, model #7120/60,  serial #[id number]  MEASURED INTRAOPERATIVE DATA: R-waves measured 11.7 mV, pacing  threshold 0.75 volts at 0.5 msec, lead impedance of 859 ohms.  DEVICE SETTINGS: VVI 40 to 120.    • Arrhythmia    • ASTHMA     as a child   • Atrial fib/flutter, transient     when in cardiogenic shock   • Bronchitis    • CAD (coronary artery disease)    • Cardiogenic shock (HCC) 1/2012   • Cardiomyopathy, ischemic EF 35% 12/31/2011   • Congestive heart failure (HCC)    • Dyslipidemia    • Fatty liver    • Hypertension     ON NO MEDS, now on meds   • Influenza    • Myocardial infarct (HCC) 12/29/2011   • Myocardial infarction of anterolateral wall (HCC) 12/29/2011   • Pacemaker 04/23/2012   • Tuberculosis     20 years ago on meds for 6  mo     Past Surgical History:   Procedure Laterality Date   • RECOVERY  8/30/2013    Performed by  Cath-Recovery Surgery at SURGERY SAME DAY [facility] ORS   • RECOVERY  4/23/2012    Performed by SURGERY, CATH-RECOVERY at SURGERY SAME DAY [facility] ORS   • MULTIPLE CORONARY ARTERY BYPASS ENDO VEIN HARVEST  1/3/2012    Performed by PADMINI OCHOA at SURGERY [facility]   • UMBILICAL HERNIA REPAIR  1/14/2011    Performed by CHUY CHRISTENSEN at SURGERY SAME DAY [facility] ORS   • COLONOSCOPY  2007    normal   • PACEMAKER INSERTION       Family History   Problem Relation Age of Onset   • Cancer Mother         ovarian cancer   • Heart Disease Maternal Aunt 17        unclear but MI!   • Heart Disease Maternal Uncle 38     Social History     Social History   • Marital status:      Spouse name: N/A   • Number of children: N/A   • Years of education: N/A     Occupational History   • Not on file.     Social History Main Topics   • Smoking status: Former Smoker     Types: Cigarettes     Quit date: 3/1/1982   • Smokeless tobacco: Never Used      Comment: QUIT 1985   • Alcohol use No      Comment: occasionally ( 2 times a year)   • Drug use: No   • Sexual activity: Not Currently     Other Topics Concern   • Not on file     Social History Narrative   • No narrative on file     Allergies   Allergen Reactions   • Pcn [Penicillins] Rash     Outpatient Encounter Prescriptions as of 6/25/2019   Medication Sig Dispense Refill   • azithromycin (ZITHROMAX) 250 MG Tab As directed on pack 6 Tab 0   • metoprolol SR (TOPROL XL) 50 MG TABLET SR 24 HR TAKE 1 TABLET BY MOUTH EVERY DAY 30 Tab 11   • lisinopril (PRINIVIL) 10 MG Tab TAKE 1 TABLET BY MOUTH EVERY DAY 30 Tab 11   • rosuvastatin (CRESTOR) 20 MG Tab TAKE 1 TABLET BY MOUTH EVERY DAY AT BEDTIME 90 Tab 1   • clopidogrel (PLAVIX) 75 MG Tab TAKE 1 TABLET BY MOUTH EVERY DAY 90 Tab 3   • SYMBICORT 160-4.5 MCG/ACT Aerosol INHALE 2 PUFFS BY MOUTH 2 TIMES A DAY. USE SPACER. RINSE MOUTH AFTER EACH USE. 1 Inhaler 5   • albuterol 108 (90 Base) MCG/ACT Aero Soln inhalation aerosol  "Inhale 2 Puffs by mouth every 6 hours as needed. 8.5 g 0   • aspirin EC (ECOTRIN) 81 MG TBEC Take 81 mg by mouth every day.     • Multiple Vitamin (MULTI-VITAMIN PO) Take  by mouth.       No facility-administered encounter medications on file as of 6/25/2019.      ROS     Objective:   /70 (BP Location: Left arm, Patient Position: Sitting, BP Cuff Size: Adult)   Pulse 62   Ht 1.651 m (5' 5\")   Wt 69 kg (152 lb 3.2 oz)   SpO2 95%   BMI 25.33 kg/m²      Physical Exam   Constitutional: He appears well-developed and well-nourished.   Neck: No JVD present.   Cardiovascular: Normal rate and regular rhythm.    No murmur heard.  Pulmonary/Chest: Effort normal and breath sounds normal. He has no rales.   Abdominal: Soft. There is no tenderness.   Musculoskeletal: He exhibits no edema.     Lab Results   Component Value Date/Time    CHOLSTRLTOT 106 12/08/2018 07:10 AM    LDL 46 12/08/2018 07:10 AM    HDL 39 (A) 12/08/2018 07:10 AM    TRIGLYCERIDE 106 12/08/2018 07:10 AM       Lab Results   Component Value Date/Time    SODIUM 138 12/08/2018 07:10 AM    POTASSIUM 4.1 12/08/2018 07:10 AM    CHLORIDE 107 12/08/2018 07:10 AM    CO2 26 12/08/2018 07:10 AM    GLUCOSE 100 (H) 12/08/2018 07:10 AM    BUN 12 12/08/2018 07:10 AM    CREATININE 0.69 12/08/2018 07:10 AM    CREATININE 0.71 (L) 12/08/2010 07:11 AM    BUNCREATRAT 15 12/08/2010 07:11 AM    GLOMRATE >59 12/08/2010 07:11 AM     Lab Results   Component Value Date/Time    ALKPHOSPHAT 74 12/08/2018 07:10 AM    ASTSGOT 35 12/08/2018 07:10 AM    ALTSGPT 44 12/08/2018 07:10 AM    TBILIRUBIN 0.9 12/08/2018 07:10 AM      Lab Results   Component Value Date/Time    BNPBTYPENAT 42 12/27/2016 07:15 AM          Assessment:     1. Coronary artery disease due to calcified coronary lesion:Acute MI, anterior wall s/p bms to LAD 12/11 with -RCA and residual CX dz (stent failed)     2. Cardiomyopathy, ischemic EF 35%     3. AICD (automatic cardioverter/defibrillator) present St Zach " placed 4/23/12     4. Essential hypertension     5. Dyslipidemia         Medical Decision Making:  Today's Assessment / Status / Plan:     Mr. Barillas is clinically stable.  His AICD check showed 5 years of battery life remaining.  He had no device therapies.  He is asymptomatic from a cardiovascular standpoint.  His blood pressure and lipid status appear to be under excellent control.  He will follow-up in about 6 months.  He has not had an echocardiogram since 2016 and one will be obtained prior to his follow-up to reassess LV function.      David Cervantes M.D.  [address] [place] [id number]  VIA In Basket

## 2019-07-21 DIAGNOSIS — I25.84 CORONARY ARTERY DISEASE DUE TO CALCIFIED CORONARY LESION: ICD-10-CM

## 2019-07-21 DIAGNOSIS — I25.10 CORONARY ARTERY DISEASE DUE TO CALCIFIED CORONARY LESION: ICD-10-CM

## 2019-07-21 DIAGNOSIS — E78.5 DYSLIPIDEMIA: ICD-10-CM

## 2019-07-22 RX ORDER — ROSUVASTATIN CALCIUM 20 MG/1
TABLET, COATED ORAL
Qty: 90 TAB | Refills: 2 | Status: SHIPPED | OUTPATIENT
Start: 2019-07-22 | End: 2020-05-12 | Stop reason: SDUPTHER

## 2019-08-13 ENCOUNTER — APPOINTMENT (OUTPATIENT)
Dept: PULMONOLOGY | Facility: HOSPICE | Age: 64
End: 2019-08-13
Payer: COMMERCIAL

## 2019-08-27 ENCOUNTER — OFFICE VISIT (OUTPATIENT)
Dept: PULMONOLOGY | Facility: HOSPICE | Age: 64
End: 2019-08-27
Payer: COMMERCIAL

## 2019-08-27 VITALS
WEIGHT: 151 LBS | BODY MASS INDEX: 25.16 KG/M2 | HEIGHT: 65 IN | OXYGEN SATURATION: 95 % | HEART RATE: 58 BPM | SYSTOLIC BLOOD PRESSURE: 100 MMHG | RESPIRATION RATE: 16 BRPM | DIASTOLIC BLOOD PRESSURE: 80 MMHG

## 2019-08-27 DIAGNOSIS — J45.40 MODERATE PERSISTENT ASTHMA WITHOUT COMPLICATION: ICD-10-CM

## 2019-08-27 DIAGNOSIS — I50.9 CHRONIC CONGESTIVE HEART FAILURE, UNSPECIFIED HEART FAILURE TYPE (HCC): Chronic | ICD-10-CM

## 2019-08-27 DIAGNOSIS — G47.33 OBSTRUCTIVE SLEEP APNEA: ICD-10-CM

## 2019-08-27 DIAGNOSIS — Z87.891 FORMER SMOKER: ICD-10-CM

## 2019-08-27 PROCEDURE — 99214 OFFICE O/P EST MOD 30 MIN: CPT | Performed by: NURSE PRACTITIONER

## 2019-08-27 ASSESSMENT — ENCOUNTER SYMPTOMS
GASTROINTESTINAL NEGATIVE: 1
EYE DISCHARGE: 0
CARDIOVASCULAR NEGATIVE: 1
EYE PAIN: 0
HALLUCINATIONS: 0
CONSTITUTIONAL NEGATIVE: 1
NEUROLOGICAL NEGATIVE: 1
MEMORY LOSS: 0
INSOMNIA: 1
BRUISES/BLEEDS EASILY: 0
NERVOUS/ANXIOUS: 0
MUSCULOSKELETAL NEGATIVE: 1
DEPRESSION: 0
RESPIRATORY NEGATIVE: 1

## 2019-08-27 ASSESSMENT — LIFESTYLE VARIABLES: SUBSTANCE_ABUSE: 0

## 2019-08-27 NOTE — LETTER
SIOBHAN Amador  [facility]monary [facility]   [address],   [facility] [place] [address]  Phone: [phone] - Fax: [phone]           Encounter Date: 8/27/2019  Provider: SIOBHAN Amador  Location of Care: [facility] PULMONARY MEDICINE      Patient:   Abram Barillas   MR Number: [id number]   YOB: 1955     PROGRESS NOTE:  Chief Complaint   Patient presents with   • Apnea     Last Seen 02/25/19         HPI: This patient is a 64 y.o. male, who presents for 6 month follow-up of moderate persistent asthma and GRANT.     Past medical history includes ischemic cardiomyopathy with AICD, EF 45% of predicted. Followed by cardiology.  He denies cardiac complaints.  Recently saw his cardiologist.     Former PSG shows poor sleep efficiency but does confirm moderate sleep apnea with AHI of 17, REM index 35, minimum saturation 88%. Patient has adamantly declined CPAP therapy despite review of potential cardiovascular risks. He  opted for dental appliance.  Repeat home testing shows mild GRANT with an AHI of 11.  He return to his dentist for additional adjustments.  He returns today stating the oral appliance is not working and he still snoring.  He cannot sleep longer than 6 hours.  The oral appliance is causing pain.  It has been adjusted optimally.       He is a history of lifelong asthma.  He is a former smoker but quit smoking in 1982.  His treatment had consisted of albuterol and occasional Kenalog injections. Allergy panel showed response to various grasses, trees and pollens. PFTs indicating FEV1 of 2.46 L 84% predicted, FEV1 FVC ratio of 68, DLCO 156% of predicted. Partial reversibility with albuterol. He was started on Symbicort with subjective benefit.  Influenza and pneumococcal vaccine is up-to-date.  He denies any pulmonary complaints.  Denies URI since last being seen.  He remains very active and runs 6 miles a couple days per  week.    Past Medical History:   Diagnosis Date   • Acute MI, anterior wall s/p bms to LAD  with -RCA and residual CX dz (stent failed) 2011   • AICD (automatic cardioverter/defibrillator) present St Zach placed 12    DEVICE DATA:  1. Pulse generator:  [employer]. Zach, model #PW0135-56, serial  #[id number]  2. Right ventricular lead:  [employer]. Zach, model #7120/60,  serial #[id number]  MEASURED INTRAOPERATIVE DATA: R-waves measured 11.7 mV, pacing  threshold 0.75 volts at 0.5 msec, lead impedance of 859 ohms.  DEVICE SETTINGS: VVI 40 to 120.    • Arrhythmia    • ASTHMA     as a child   • Atrial fib/flutter, transient     when in cardiogenic shock   • Bronchitis    • CAD (coronary artery disease)    • Cardiogenic shock (HCC) 2012   • Cardiomyopathy, ischemic EF 35% 2011   • Congestive heart failure (HCC)    • Dyslipidemia    • Fatty liver    • Hypertension     ON NO MEDS, now on meds   • Influenza    • Myocardial infarct (HCC) 2011   • Myocardial infarction of anterolateral wall (HCC) 2011   • Pacemaker 2012   • Tuberculosis     20 years ago on meds for 6  mo       Social History     Tobacco Use   • Smoking status: Former Smoker     Types: Cigarettes     Last attempt to quit: 3/1/1982     Years since quittin.5   • Smokeless tobacco: Never Used   • Tobacco comment: QUIT 1985   Substance Use Topics   • Alcohol use: No     Alcohol/week: 0.0 oz     Comment: occasionally ( 2 times a year)   • Drug use: No       Family History   Problem Relation Age of Onset   • Cancer Mother         ovarian cancer   • Heart Disease Maternal Aunt 17        unclear but MI!   • Heart Disease Maternal Uncle 38       Immunization History   Administered Date(s) Administered   • Hepatitis A Vaccine, Adult 2016, 2017   • Influenza (IM) Preservative Free 10/10/2011   • Influenza Seasonal Injectable 10/11/2013   • Influenza TIV (IM) 2011, 10/01/2012, 10/11/2013   •  Influenza Vaccine Quad Inj (Pf) 10/08/2014, 10/15/2015, 10/03/2016, 10/12/2017, 09/21/2018, 11/26/2018   • Pneumococcal Vaccine (UF)Historical Data 01/01/2007   • Pneumococcal polysaccharide vaccine (PPSV-23) 10/01/2012   • Tdap Vaccine 01/18/2017   • Zoster Vaccine Live (ZVL) (Zostavax) 01/18/2017       Current medications as of today   Current Outpatient Medications   Medication Sig Dispense Refill   • rosuvastatin (CRESTOR) 20 MG Tab TAKE 1 TABLET BY MOUTH EVERYDAY AT BEDTIME 90 Tab 2   • azithromycin (ZITHROMAX) 250 MG Tab As directed on pack 6 Tab 0   • metoprolol SR (TOPROL XL) 50 MG TABLET SR 24 HR TAKE 1 TABLET BY MOUTH EVERY DAY 30 Tab 11   • lisinopril (PRINIVIL) 10 MG Tab TAKE 1 TABLET BY MOUTH EVERY DAY 30 Tab 11   • clopidogrel (PLAVIX) 75 MG Tab TAKE 1 TABLET BY MOUTH EVERY DAY 90 Tab 3   • SYMBICORT 160-4.5 MCG/ACT Aerosol INHALE 2 PUFFS BY MOUTH 2 TIMES A DAY. USE SPACER. RINSE MOUTH AFTER EACH USE. 1 Inhaler 5   • albuterol 108 (90 Base) MCG/ACT Aero Soln inhalation aerosol Inhale 2 Puffs by mouth every 6 hours as needed. 8.5 g 0   • aspirin EC (ECOTRIN) 81 MG TBEC Take 81 mg by mouth every day.     • Multiple Vitamin (MULTI-VITAMIN PO) Take  by mouth.       No current facility-administered medications for this visit.        Allergies: Pcn [penicillins]    There were no vitals taken for this visit.      Review of Systems   Constitutional: Negative.         + snoring     HENT: Negative.    Eyes: Negative for pain and discharge.   Respiratory: Negative.    Cardiovascular: Negative.    Gastrointestinal: Negative.    Musculoskeletal: Negative.    Skin: Negative.    Neurological: Negative.    Endo/Heme/Allergies: Negative for environmental allergies. Does not bruise/bleed easily.   Psychiatric/Behavioral: Negative for depression, hallucinations, memory loss, substance abuse and suicidal ideas. The patient has insomnia. The patient is not nervous/anxious.        Physical Exam   Constitutional: He is  oriented to person, place, and time and well-developed, well-nourished, and in no distress.   HENT:   Head: Normocephalic and atraumatic.   Eyes: Pupils are equal, round, and reactive to light.   Neck: Normal range of motion. Neck supple. No tracheal deviation present.   Cardiovascular: Normal rate, regular rhythm, normal heart sounds and intact distal pulses.   Pulmonary/Chest: Effort normal and breath sounds normal.   Musculoskeletal: Normal range of motion.   Neurological: He is alert and oriented to person, place, and time. Gait normal.   Skin: Skin is warm and dry.   Psychiatric: Mood, memory, affect and judgment normal.   Vitals reviewed.      Diagnoses/Plan:      1. Moderate persistent asthma without complication  Stable, he reports no pulmonary symptoms.  No URI since last being seen.  I will continue current bronchodilators as prescribed.    2. Obstructive sleep apnea  CPAP therapy was revisited today with him , he continues to adamantly decline CPAP therapy.  His dental appliance has not been effective in resolving symptoms and has mildly improve AHI.  I again reiterated the risks associated with untreated sleep apnea particularly cardiovascular risks including worsening heart failure, heart attack and stroke.  He acknowledges these risks and declined CPAP therapy.  If he changes his mind we can certainly try him on AutoPap and I did discuss this with him today.  He does not have a narrow airway nor tonsils enlarged.  Surgical intervention would likely be of no benefit.    3. Chronic congestive heart failure, unspecified heart failure type (HCC)  Stable, no evidence of decompensated heart failure on exam.  Lung sounds are clear.  He continues to follow closely with cardiology.  He has not required diuretics.    4. Former smoker  Permanent smoking cessation recommended    He will follow-up with the pulmonary clinic in 6 months time, sooner if needed.          This dictation was created using voice  recognition software. The accuracy of the dictation is limited to the abilities of the software. I expect there may be some errors of grammar and possibly content.         Electronically signed by SIOBHAN Amador  on 08/27/19    Kyree Son M.D.  [address]  [address]  [place] [address]  VIA In Basket

## 2019-08-27 NOTE — PROGRESS NOTES
Chief Complaint   Patient presents with   • Apnea     Last Seen 02/25/19         HPI: This patient is a 64 y.o. male, who presents for 6 month follow-up of moderate persistent asthma and GRANT.     Past medical history includes ischemic cardiomyopathy with AICD, EF 45% of predicted. Followed by cardiology.  He denies cardiac complaints.  Recently saw his cardiologist.     Former PSG shows poor sleep efficiency but does confirm moderate sleep apnea with AHI of 17, REM index 35, minimum saturation 88%. Patient has adamantly declined CPAP therapy despite review of potential cardiovascular risks. He opted for dental appliance.  Repeat home testing shows mild GRANT with an AHI of 11. He return to his dentist for additional adjustments.  He returns today stating the oral appliance is not working and he still snoring.  He cannot sleep longer than 6 hours.  The oral appliance is causing pain.  It has been adjusted optimally.       He is a history of lifelong asthma.  He is a former smoker but quit smoking in 1982.  His treatment had consisted of albuterol and occasional Kenalog injections. Allergy panel showed response to various grasses, trees and pollens. PFTs indicating FEV1 of 2.46 L 84% predicted, FEV1 FVC ratio of 68, DLCO 156% of predicted. Partial reversibility with albuterol. He was started on Symbicort with subjective benefit.  Influenza and pneumococcal vaccine is up-to-date.  He denies any pulmonary complaints.  Denies URI since last being seen.  He remains very active and runs 6 miles a couple days per week.    Past Medical History:   Diagnosis Date   • Acute MI, anterior wall s/p bms to LAD 12/11 with -RCA and residual CX dz (stent failed) 12/31/2011   • AICD (automatic cardioverter/defibrillator) present [employer] Zach placed 4/23/12 4/24/2012    DEVICE DATA:  1. Pulse generator:  [employer], model #GL3271-17, serial  #[id number]  2. Right ventricular lead:  [employer], model #7120/60,  serial  #[id number]  MEASURED INTRAOPERATIVE DATA: R-waves measured 11.7 mV, pacing  threshold 0.75 volts at 0.5 msec, lead impedance of 859 ohms.  DEVICE SETTINGS: VVI 40 to 120.    • Arrhythmia    • ASTHMA     as a child   • Atrial fib/flutter, transient     when in cardiogenic shock   • Bronchitis    • CAD (coronary artery disease)    • Cardiogenic shock (HCC) 2012   • Cardiomyopathy, ischemic EF 35% 2011   • Congestive heart failure (HCC)    • Dyslipidemia    • Fatty liver    • Hypertension     ON NO MEDS, now on meds   • Influenza    • Myocardial infarct (HCC) 2011   • Myocardial infarction of anterolateral wall (HCC) 2011   • Pacemaker 2012   • Tuberculosis     20 years ago on meds for 6  mo       Social History     Tobacco Use   • Smoking status: Former Smoker     Types: Cigarettes     Last attempt to quit: 3/1/1982     Years since quittin.5   • Smokeless tobacco: Never Used   • Tobacco comment: QUIT 1985   Substance Use Topics   • Alcohol use: No     Alcohol/week: 0.0 oz     Comment: occasionally ( 2 times a year)   • Drug use: No       Family History   Problem Relation Age of Onset   • Cancer Mother         ovarian cancer   • Heart Disease Maternal Aunt 17        unclear but MI!   • Heart Disease Maternal Uncle 38       Immunization History   Administered Date(s) Administered   • Hepatitis A Vaccine, Adult 2016, 2017   • Influenza (IM) Preservative Free 10/10/2011   • Influenza Seasonal Injectable 10/11/2013   • Influenza TIV (IM) 2011, 10/01/2012, 10/11/2013   • Influenza Vaccine Quad Inj (Pf) 10/08/2014, 10/15/2015, 10/03/2016, 10/12/2017, 2018, 2018   • Pneumococcal Vaccine (UF)Historical Data 2007   • Pneumococcal polysaccharide vaccine (PPSV-23) 10/01/2012   • Tdap Vaccine 2017   • Zoster Vaccine Live (ZVL) (Zostavax) 2017       Current medications as of today   Current Outpatient Medications   Medication Sig Dispense Refill   •  rosuvastatin (CRESTOR) 20 MG Tab TAKE 1 TABLET BY MOUTH EVERYDAY AT BEDTIME 90 Tab 2   • azithromycin (ZITHROMAX) 250 MG Tab As directed on pack 6 Tab 0   • metoprolol SR (TOPROL XL) 50 MG TABLET SR 24 HR TAKE 1 TABLET BY MOUTH EVERY DAY 30 Tab 11   • lisinopril (PRINIVIL) 10 MG Tab TAKE 1 TABLET BY MOUTH EVERY DAY 30 Tab 11   • clopidogrel (PLAVIX) 75 MG Tab TAKE 1 TABLET BY MOUTH EVERY DAY 90 Tab 3   • SYMBICORT 160-4.5 MCG/ACT Aerosol INHALE 2 PUFFS BY MOUTH 2 TIMES A DAY. USE SPACER. RINSE MOUTH AFTER EACH USE. 1 Inhaler 5   • albuterol 108 (90 Base) MCG/ACT Aero Soln inhalation aerosol Inhale 2 Puffs by mouth every 6 hours as needed. 8.5 g 0   • aspirin EC (ECOTRIN) 81 MG TBEC Take 81 mg by mouth every day.     • Multiple Vitamin (MULTI-VITAMIN PO) Take  by mouth.       No current facility-administered medications for this visit.        Allergies: Pcn [penicillins]    There were no vitals taken for this visit.      Review of Systems   Constitutional: Negative.         + snoring     HENT: Negative.    Eyes: Negative for pain and discharge.   Respiratory: Negative.    Cardiovascular: Negative.    Gastrointestinal: Negative.    Musculoskeletal: Negative.    Skin: Negative.    Neurological: Negative.    Endo/Heme/Allergies: Negative for environmental allergies. Does not bruise/bleed easily.   Psychiatric/Behavioral: Negative for depression, hallucinations, memory loss, substance abuse and suicidal ideas. The patient has insomnia. The patient is not nervous/anxious.        Physical Exam   Constitutional: He is oriented to person, place, and time and well-developed, well-nourished, and in no distress.   HENT:   Head: Normocephalic and atraumatic.   Eyes: Pupils are equal, round, and reactive to light.   Neck: Normal range of motion. Neck supple. No tracheal deviation present.   Cardiovascular: Normal rate, regular rhythm, normal heart sounds and intact distal pulses.   Pulmonary/Chest: Effort normal and breath sounds  normal.   Musculoskeletal: Normal range of motion.   Neurological: He is alert and oriented to person, place, and time. Gait normal.   Skin: Skin is warm and dry.   Psychiatric: Mood, memory, affect and judgment normal.   Vitals reviewed.      Diagnoses/Plan:      1. Moderate persistent asthma without complication  Stable, he reports no pulmonary symptoms.  No URI since last being seen.  I will continue current bronchodilators as prescribed.    2. Obstructive sleep apnea  CPAP therapy was revisited today with him , he continues to adamantly decline CPAP therapy.  His dental appliance has not been effective in resolving symptoms and has mildly improve AHI.  I again reiterated the risks associated with untreated sleep apnea particularly cardiovascular risks including worsening heart failure, heart attack and stroke.  He acknowledges these risks and declined CPAP therapy.  If he changes his mind we can certainly try him on AutoPap and I did discuss this with him today.  He does not have a narrow airway nor tonsils enlarged.  Surgical intervention would likely be of no benefit.    3. Chronic congestive heart failure, unspecified heart failure type (HCC)  Stable, no evidence of decompensated heart failure on exam.  Lung sounds are clear.  He continues to follow closely with cardiology.  He has not required diuretics.    4. Former smoker  Permanent smoking cessation recommended    He will follow-up with the pulmonary clinic in 6 months time, sooner if needed.          This dictation was created using voice recognition software. The accuracy of the dictation is limited to the abilities of the software. I expect there may be some errors of grammar and possibly content.

## 2019-09-24 ENCOUNTER — IMMUNIZATION (OUTPATIENT)
Dept: OCCUPATIONAL MEDICINE | Facility: CLINIC | Age: 64
End: 2019-09-24

## 2019-09-24 DIAGNOSIS — Z23 NEED FOR VACCINATION: ICD-10-CM

## 2019-09-25 PROCEDURE — 90686 IIV4 VACC NO PRSV 0.5 ML IM: CPT | Performed by: PREVENTIVE MEDICINE

## 2019-09-28 ENCOUNTER — HOSPITAL ENCOUNTER (OUTPATIENT)
Dept: LAB | Facility: MEDICAL CENTER | Age: 64
End: 2019-09-28
Payer: COMMERCIAL

## 2019-09-28 LAB
CHOLEST SERPL-MCNC: 101 MG/DL (ref 100–199)
DIABETES HTDIA: NO
EVENT NAME HTEVT: NORMAL
FASTING HTFAS: YES
GLUCOSE SERPL-MCNC: 94 MG/DL (ref 65–99)
HDLC SERPL-MCNC: 34 MG/DL
LDLC SERPL CALC-MCNC: 47 MG/DL
SCREENING LOC CITY HTCIT: NORMAL
SCREENING LOC STATE HTSTA: NORMAL
SCREENING LOCATION HTLOC: NORMAL
SMOKING HTSMO: NO
SUBSCRIBER ID HTSID: NORMAL
TRIGL SERPL-MCNC: 98 MG/DL (ref 0–149)

## 2019-09-28 PROCEDURE — 80061 LIPID PANEL: CPT

## 2019-09-28 PROCEDURE — S5190 WELLNESS ASSESSMENT BY NONPH: HCPCS

## 2019-09-28 PROCEDURE — 36415 COLL VENOUS BLD VENIPUNCTURE: CPT

## 2019-09-28 PROCEDURE — 82947 ASSAY GLUCOSE BLOOD QUANT: CPT

## 2019-09-30 LAB
BDY FAT % MEASURED: NORMAL %
BP DIAS: 72 MMHG
BP SYS: 113 MMHG
HYPERTENSION HTHYP: YES

## 2019-11-17 DIAGNOSIS — J45.20 MILD INTERMITTENT ASTHMA WITHOUT COMPLICATION: ICD-10-CM

## 2019-11-18 RX ORDER — BUDESONIDE AND FORMOTEROL FUMARATE DIHYDRATE 160; 4.5 UG/1; UG/1
2 AEROSOL RESPIRATORY (INHALATION) 2 TIMES DAILY
Qty: 10.2 INHALER | Refills: 5 | Status: SHIPPED | OUTPATIENT
Start: 2019-11-18 | End: 2020-06-09

## 2019-11-18 NOTE — TELEPHONE ENCOUNTER
Have we ever prescribed this med? Yes.  If yes, what date? 08/20/18 ([facility])    Last OV: 08/27/19 with Ysabel CUEVAS    Next OV: 03/02/2020 with Ysabel CUEVAS    DX: Mild intermittent asthma without complication (J45.20)    Medications:   Requested Prescriptions     Pending Prescriptions Disp Refills   • SYMBICORT 160-4.5 MCG/ACT Aerosol [Pharmacy Med Name: SYMBICORT 160-4.5 MCG INHALER] 10.2 Inhaler 5     Sig: INHALE 2 PUFFS BY MOUTH 2 TIMES A DAY. USE SPACER. RINSE MOUTH AFTER EACH USE.

## 2019-12-03 ENCOUNTER — HOSPITAL ENCOUNTER (OUTPATIENT)
Dept: CARDIOLOGY | Facility: MEDICAL CENTER | Age: 64
End: 2019-12-03
Attending: INTERNAL MEDICINE
Payer: COMMERCIAL

## 2019-12-03 DIAGNOSIS — I25.10 CORONARY ARTERY DISEASE DUE TO CALCIFIED CORONARY LESION: ICD-10-CM

## 2019-12-03 DIAGNOSIS — I10 ESSENTIAL HYPERTENSION: ICD-10-CM

## 2019-12-03 DIAGNOSIS — I25.5 CARDIOMYOPATHY, ISCHEMIC: ICD-10-CM

## 2019-12-03 DIAGNOSIS — I25.84 CORONARY ARTERY DISEASE DUE TO CALCIFIED CORONARY LESION: ICD-10-CM

## 2019-12-03 PROCEDURE — 93306 TTE W/DOPPLER COMPLETE: CPT | Mod: 26 | Performed by: INTERNAL MEDICINE

## 2019-12-03 PROCEDURE — 93306 TTE W/DOPPLER COMPLETE: CPT

## 2019-12-03 PROCEDURE — 700117 HCHG RX CONTRAST REV CODE 255: Performed by: INTERNAL MEDICINE

## 2019-12-03 RX ADMIN — HUMAN ALBUMIN MICROSPHERES AND PERFLUTREN 3 ML: 10; .22 INJECTION, SOLUTION INTRAVENOUS at 11:57

## 2019-12-04 LAB
LV EJECT FRACT  99904: 45
LV EJECT FRACT MOD 2C 99903: 47.82
LV EJECT FRACT MOD 4C 99902: 37.2
LV EJECT FRACT MOD BP 99901: 34.98

## 2019-12-06 ENCOUNTER — NON-PROVIDER VISIT (OUTPATIENT)
Dept: CARDIOLOGY | Facility: MEDICAL CENTER | Age: 64
End: 2019-12-06
Payer: COMMERCIAL

## 2019-12-06 ENCOUNTER — OFFICE VISIT (OUTPATIENT)
Dept: CARDIOLOGY | Facility: MEDICAL CENTER | Age: 64
End: 2019-12-06
Payer: COMMERCIAL

## 2019-12-06 VITALS
DIASTOLIC BLOOD PRESSURE: 60 MMHG | BODY MASS INDEX: 29.64 KG/M2 | SYSTOLIC BLOOD PRESSURE: 94 MMHG | HEIGHT: 60 IN | OXYGEN SATURATION: 94 % | WEIGHT: 151 LBS | HEART RATE: 60 BPM

## 2019-12-06 DIAGNOSIS — I21.09 ACUTE MI, ANTERIOR WALL, INITIAL EPISODE OF CARE (HCC): ICD-10-CM

## 2019-12-06 DIAGNOSIS — E78.5 DYSLIPIDEMIA: ICD-10-CM

## 2019-12-06 DIAGNOSIS — I25.5 CARDIOMYOPATHY, ISCHEMIC: ICD-10-CM

## 2019-12-06 DIAGNOSIS — Z95.810 AICD (AUTOMATIC CARDIOVERTER/DEFIBRILLATOR) PRESENT: ICD-10-CM

## 2019-12-06 PROCEDURE — 99214 OFFICE O/P EST MOD 30 MIN: CPT | Performed by: INTERNAL MEDICINE

## 2019-12-06 PROCEDURE — 93282 PRGRMG EVAL IMPLANTABLE DFB: CPT | Performed by: INTERNAL MEDICINE

## 2019-12-06 NOTE — PROGRESS NOTES
Chief Complaint   Patient presents with   • Coronary Artery Disease     F/V: 6 Mo       Subjective:   Abram Barillas is a 64 y.o. male who presents todayfor followup of his coronary artery disease with an ischemic cardiomyopathy and AICD. He also has a history of hypertension and hyperlipidemia.     He is doing well clinically.  He is exercising regularly without difficulty.  He denies any chest discomfort, dyspnea, edema, palpitations or lightheadedness.       Past Medical History:   Diagnosis Date   • Acute MI, anterior wall s/p bms to LAD 12/11 with -RCA and residual CX dz (stent failed) 12/31/2011   • AICD (automatic cardioverter/defibrillator) present [employer] Zach placed 4/23/12 4/24/2012    DEVICE DATA:  1. Pulse generator:  [employer], model #SM2892-42, serial  #[id number]  2. Right ventricular lead:  [employer], model #7120/60,  serial #[id number]  MEASURED INTRAOPERATIVE DATA: R-waves measured 11.7 mV, pacing  threshold 0.75 volts at 0.5 msec, lead impedance of 859 ohms.  DEVICE SETTINGS: VVI 40 to 120.    • Arrhythmia    • ASTHMA     as a child   • Atrial fib/flutter, transient     when in cardiogenic shock   • Bronchitis    • CAD (coronary artery disease)    • Cardiogenic shock (HCC) 1/2012   • Cardiomyopathy, ischemic EF 35% 12/31/2011   • Congestive heart failure (HCC)    • Dyslipidemia    • Fatty liver    • Hypertension     ON NO MEDS, now on meds   • Influenza    • Myocardial infarct ([facility]) 12/29/2011   • Myocardial infarction of anterolateral wall ([facility]) 12/29/2011   • Pacemaker 04/23/2012   • Tuberculosis     20 years ago on meds for 6  mo     Past Surgical History:   Procedure Laterality Date   • RECOVERY  8/30/2013    Performed by Cath-Recovery Surgery at SURGERY SAME DAY [facility]   • RECOVERY  4/23/2012    Performed by SURGERY, CATH-RECOVERY at SURGERY SAME DAY [facility]   • MULTIPLE CORONARY ARTERY BYPASS ENDO VEIN HARVEST  1/3/2012    Performed by PADMINI OCHOA  [facility]   • UMBILICAL HERNIA REPAIR  2011    Performed by CHUY CHRISTENSEN at SURGERY SAME DAY [facility] ORS   • COLONOSCOPY  2007    normal   • PACEMAKER INSERTION       Family History   Problem Relation Age of Onset   • Cancer Mother         ovarian cancer   • Heart Disease Maternal Aunt 17        unclear but MI!   • Heart Disease Maternal Uncle 38     Social History     Socioeconomic History   • Marital status:      Spouse name: Not on file   • Number of children: Not on file   • Years of education: Not on file   • Highest education level: Not on file   Occupational History   • Not on file   Social Needs   • Financial resource strain: Not on file   • Food insecurity:     Worry: Not on file     Inability: Not on file   • Transportation needs:     Medical: Not on file     Non-medical: Not on file   Tobacco Use   • Smoking status: Former Smoker     Years: 10.00     Types: Cigarettes     Last attempt to quit: 3/1/1982     Years since quittin.7   • Smokeless tobacco: Never Used   • Tobacco comment: QUIT , per patient half a cigarette a day   Substance and Sexual Activity   • Alcohol use: No     Alcohol/week: 0.0 oz     Comment: occasionally ( 2 times a year)   • Drug use: No   • Sexual activity: Not Currently   Lifestyle   • Physical activity:     Days per week: Not on file     Minutes per session: Not on file   • Stress: Not on file   Relationships   • Social connections:     Talks on phone: Not on file     Gets together: Not on file     Attends Sabianist service: Not on file     Active member of club or organization: Not on file     Attends meetings of clubs or organizations: Not on file     Relationship status: Not on file   • Intimate partner violence:     Fear of current or ex partner: Not on file     Emotionally abused: Not on file     Physically abused: Not on file     Forced sexual activity: Not on file   Other Topics Concern   • Not on file   Social History Narrative    • Not on file     Allergies   Allergen Reactions   • Pcn [Penicillins] Rash     Outpatient Encounter Medications as of 12/6/2019   Medication Sig Dispense Refill   • SYMBICORT 160-4.5 MCG/ACT Aerosol INHALE 2 PUFFS BY MOUTH 2 TIMES A DAY. USE SPACER. RINSE MOUTH AFTER EACH USE. 10.2 Inhaler 5   • rosuvastatin (CRESTOR) 20 MG Tab TAKE 1 TABLET BY MOUTH EVERYDAY AT BEDTIME 90 Tab 2   • metoprolol SR (TOPROL XL) 50 MG TABLET SR 24 HR TAKE 1 TABLET BY MOUTH EVERY DAY 30 Tab 11   • lisinopril (PRINIVIL) 10 MG Tab TAKE 1 TABLET BY MOUTH EVERY DAY 30 Tab 11   • clopidogrel (PLAVIX) 75 MG Tab TAKE 1 TABLET BY MOUTH EVERY DAY 90 Tab 3   • albuterol 108 (90 Base) MCG/ACT Aero Soln inhalation aerosol Inhale 2 Puffs by mouth every 6 hours as needed. 8.5 g 0   • aspirin EC (ECOTRIN) 81 MG TBEC Take 81 mg by mouth every day.     • Multiple Vitamin (MULTI-VITAMIN PO) Take  by mouth.       No facility-administered encounter medications on file as of 12/6/2019.      ROS       Objective:   BP (!) 94/60 (BP Location: Left arm, Patient Position: Sitting, BP Cuff Size: Adult)   Pulse 60   Ht 1.524 m (5')   Wt 68.5 kg (151 lb)   SpO2 94%   BMI 29.49 kg/m²     Physical Exam   Constitutional: He appears well-developed and well-nourished.   Neck: No JVD present.   Cardiovascular: Normal rate and regular rhythm.   No murmur heard.  Pulmonary/Chest: Effort normal and breath sounds normal. He has no rales.   Abdominal: Soft. There is no tenderness.   Musculoskeletal:         General: No edema.     Lab Results   Component Value Date/Time    CHOLSTRLTOT 101 09/28/2019 09:13 AM    LDL 47 09/28/2019 09:13 AM    HDL 34 (A) 09/28/2019 09:13 AM    TRIGLYCERIDE 98 09/28/2019 09:13 AM       Lab Results   Component Value Date/Time    SODIUM 138 12/08/2018 07:10 AM    POTASSIUM 4.1 12/08/2018 07:10 AM    CHLORIDE 107 12/08/2018 07:10 AM    CO2 26 12/08/2018 07:10 AM    GLUCOSE 94 09/28/2019 09:13 AM    BUN 12 12/08/2018 07:10 AM    CREATININE 0.69  12/08/2018 07:10 AM    CREATININE 0.71 (L) 12/08/2010 07:11 AM    BUNCREATRAT 15 12/08/2010 07:11 AM    GLOMRATE >59 12/08/2010 07:11 AM     Lab Results   Component Value Date/Time    ALKPHOSPHAT 74 12/08/2018 07:10 AM    ASTSGOT 35 12/08/2018 07:10 AM    ALTSGPT 44 12/08/2018 07:10 AM    TBILIRUBIN 0.9 12/08/2018 07:10 AM      Lab Results   Component Value Date/Time    BNPBTYPENAT 42 12/27/2016 07:15 AM      Echocardiography Laboratory     CONCLUSIONS  Normal left ventricular chamber size. Normal left ventricular wall   thickness.  Left ventricular ejection fraction is visually estimated to   be 45%. Hypokinesis of inferoseptal wall and apex.  Compared to the report of the study done 6/2016- there has been no   significant change.      IVONNE MCCOY  Exam Date:         12/03/2019     Assessment:     1. Acute MI, anterior wall s/p bms to LAD 12/11 with -RCA and residual CX dz (stent failed)     2. Cardiomyopathy, ischemic EF 35%     3. AICD (automatic cardioverter/defibrillator) present St Zach placed 4/23/12     4. Dyslipidemia         Medical Decision Making:  Today's Assessment / Status / Plan:     Mr. Mccoy is clinically stable.  He is exercising regularly and asymptomatic from a cardiovascular standpoint.  His lipid status appears to be under good control.  His AICD was checked today and continues to function normally.  He will follow-up in about 6 months.

## 2020-02-06 ENCOUNTER — PATIENT MESSAGE (OUTPATIENT)
Dept: HEALTH INFORMATION MANAGEMENT | Facility: OTHER | Age: 65
End: 2020-02-06

## 2020-02-10 DIAGNOSIS — I25.751 CORONARY ARTERY DISEASE INVOLVING NATIVE ARTERY OF TRANSPLANTED HEART WITH ANGINA PECTORIS WITH DOCUMENTED SPASM (HCC): ICD-10-CM

## 2020-02-10 DIAGNOSIS — I25.2 HISTORY OF MI (MYOCARDIAL INFARCTION): ICD-10-CM

## 2020-02-12 RX ORDER — CLOPIDOGREL BISULFATE 75 MG/1
TABLET ORAL
Qty: 90 TAB | Refills: 3 | Status: SHIPPED | OUTPATIENT
Start: 2020-02-12 | End: 2020-06-10

## 2020-05-12 DIAGNOSIS — I25.10 CORONARY ARTERY DISEASE DUE TO CALCIFIED CORONARY LESION: ICD-10-CM

## 2020-05-12 DIAGNOSIS — E78.5 DYSLIPIDEMIA: ICD-10-CM

## 2020-05-12 DIAGNOSIS — I25.84 CORONARY ARTERY DISEASE DUE TO CALCIFIED CORONARY LESION: ICD-10-CM

## 2020-05-12 RX ORDER — ROSUVASTATIN CALCIUM 20 MG/1
20 TABLET, COATED ORAL
Qty: 90 TAB | Refills: 2 | Status: SHIPPED | OUTPATIENT
Start: 2020-05-12 | End: 2021-01-31

## 2020-06-05 DIAGNOSIS — I10 ESSENTIAL HYPERTENSION: ICD-10-CM

## 2020-06-06 ENCOUNTER — HOSPITAL ENCOUNTER (OUTPATIENT)
Dept: LAB | Facility: MEDICAL CENTER | Age: 65
End: 2020-06-06
Attending: PHYSICIAN ASSISTANT
Payer: COMMERCIAL

## 2020-06-06 LAB — PSA SERPL-MCNC: 1.31 NG/ML (ref 0–4)

## 2020-06-06 PROCEDURE — 36415 COLL VENOUS BLD VENIPUNCTURE: CPT

## 2020-06-06 PROCEDURE — 84153 ASSAY OF PSA TOTAL: CPT

## 2020-06-06 RX ORDER — METOPROLOL SUCCINATE 50 MG/1
50 TABLET, EXTENDED RELEASE ORAL
Qty: 30 TAB | Refills: 11 | Status: SHIPPED | OUTPATIENT
Start: 2020-06-06 | End: 2021-06-01

## 2020-06-09 DIAGNOSIS — J45.20 MILD INTERMITTENT ASTHMA WITHOUT COMPLICATION: ICD-10-CM

## 2020-06-09 RX ORDER — BUDESONIDE AND FORMOTEROL FUMARATE DIHYDRATE 160; 4.5 UG/1; UG/1
2 AEROSOL RESPIRATORY (INHALATION) 2 TIMES DAILY
Qty: 1 INHALER | Refills: 5 | Status: SHIPPED | OUTPATIENT
Start: 2020-06-09 | End: 2020-12-28

## 2020-06-09 NOTE — TELEPHONE ENCOUNTER
Have we ever prescribed this med? Yes.  If yes, what date? 11/18/19    Last OV: 08/27/2019 with Ysabel CUEVAS    Next OV: 07/31/2020 with Ольга Johns PA-C    DX: Mild intermittent asthma without complication (J45.20)     Medications:   Requested Prescriptions     Pending Prescriptions Disp Refills   • SYMBICORT 160-4.5 MCG/ACT Aerosol [Pharmacy Med Name: SYMBICORT 160-4.5 MCG INHALER] 10.2 Inhaler 5     Sig: INHALE 2 PUFFS BY MOUTH 2 TIMES A DAY. USE SPACER. RINSE MOUTH AFTER EACH USE.     '

## 2020-06-10 ENCOUNTER — OFFICE VISIT (OUTPATIENT)
Dept: CARDIOLOGY | Facility: MEDICAL CENTER | Age: 65
End: 2020-06-10
Payer: COMMERCIAL

## 2020-06-10 ENCOUNTER — NON-PROVIDER VISIT (OUTPATIENT)
Dept: CARDIOLOGY | Facility: MEDICAL CENTER | Age: 65
End: 2020-06-10
Payer: COMMERCIAL

## 2020-06-10 VITALS
DIASTOLIC BLOOD PRESSURE: 70 MMHG | HEIGHT: 65 IN | BODY MASS INDEX: 25.46 KG/M2 | HEART RATE: 54 BPM | OXYGEN SATURATION: 94 % | WEIGHT: 152.8 LBS | SYSTOLIC BLOOD PRESSURE: 102 MMHG

## 2020-06-10 DIAGNOSIS — I50.9 CHRONIC CONGESTIVE HEART FAILURE, UNSPECIFIED HEART FAILURE TYPE (HCC): Chronic | ICD-10-CM

## 2020-06-10 DIAGNOSIS — I25.10 CORONARY ARTERY DISEASE DUE TO CALCIFIED CORONARY LESION: ICD-10-CM

## 2020-06-10 DIAGNOSIS — Z95.810 AICD (AUTOMATIC CARDIOVERTER/DEFIBRILLATOR) PRESENT: ICD-10-CM

## 2020-06-10 DIAGNOSIS — I10 ESSENTIAL HYPERTENSION: ICD-10-CM

## 2020-06-10 DIAGNOSIS — Z95.0 PACEMAKER: Chronic | ICD-10-CM

## 2020-06-10 DIAGNOSIS — I25.84 CORONARY ARTERY DISEASE DUE TO CALCIFIED CORONARY LESION: ICD-10-CM

## 2020-06-10 DIAGNOSIS — Z87.891 FORMER SMOKER: ICD-10-CM

## 2020-06-10 DIAGNOSIS — K76.0 FATTY LIVER: ICD-10-CM

## 2020-06-10 DIAGNOSIS — I21.09 ACUTE MI, ANTERIOR WALL, INITIAL EPISODE OF CARE (HCC): ICD-10-CM

## 2020-06-10 DIAGNOSIS — G47.34 NOCTURNAL HYPOXIA: ICD-10-CM

## 2020-06-10 DIAGNOSIS — T82.110A AICD LEAD MALFUNCTION: ICD-10-CM

## 2020-06-10 DIAGNOSIS — G47.33 OBSTRUCTIVE SLEEP APNEA: ICD-10-CM

## 2020-06-10 DIAGNOSIS — E78.5 DYSLIPIDEMIA: ICD-10-CM

## 2020-06-10 DIAGNOSIS — I25.5 CARDIOMYOPATHY, ISCHEMIC: ICD-10-CM

## 2020-06-10 DIAGNOSIS — I21.09 MYOCARDIAL INFARCTION OF ANTEROLATERAL WALL (HCC): Chronic | ICD-10-CM

## 2020-06-10 PROCEDURE — 93282 PRGRMG EVAL IMPLANTABLE DFB: CPT | Performed by: INTERNAL MEDICINE

## 2020-06-10 PROCEDURE — 99214 OFFICE O/P EST MOD 30 MIN: CPT | Performed by: INTERNAL MEDICINE

## 2020-06-10 ASSESSMENT — ENCOUNTER SYMPTOMS
MUSCULOSKELETAL NEGATIVE: 1
WEAKNESS: 0
EYES NEGATIVE: 1
COUGH: 0
BRUISES/BLEEDS EASILY: 0
STRIDOR: 0
ORTHOPNEA: 0
LOSS OF CONSCIOUSNESS: 0
PALPITATIONS: 0
CLAUDICATION: 0
CONSTITUTIONAL NEGATIVE: 1
SORE THROAT: 0
WHEEZING: 0
CARDIOVASCULAR NEGATIVE: 1
FEVER: 0
PND: 0
NEUROLOGICAL NEGATIVE: 1
RESPIRATORY NEGATIVE: 1
DIZZINESS: 0
SPUTUM PRODUCTION: 0
CHILLS: 0
HEMOPTYSIS: 0
GASTROINTESTINAL NEGATIVE: 1
SHORTNESS OF BREATH: 0

## 2020-06-10 NOTE — PROGRESS NOTES
Chief Complaint   Patient presents with   • Coronary Artery Disease     PRevious patient        Subjective:   Abram Barillas is a 64 y.o. male who presents today as a follow-up for his history of CAD status post CABG with hypertension hyperlipidemia and heart failure with reduced ejection fraction with mostly normalization of his function.  He had bypass surgery in 2012.  Is followed by Dr. Miramontes.  Since then he is doing well.  He is functionally active.  He is back at work he has no functional imitations and his blood pressure is controlled.  His last LDL was at goal.    Past Medical History:   Diagnosis Date   • Acute MI, anterior wall s/p bms to LAD 12/11 with -RCA and residual CX dz (stent failed) 12/31/2011   • AICD (automatic cardioverter/defibrillator) present St Zach placed 4/23/12 4/24/2012    DEVICE DATA:  1. Pulse generator:  [employer], model #AV1559-38, serial  #[id number]  2. Right ventricular lead:  [employer]. Zach, model #7120/60,  serial #[id number]  MEASURED INTRAOPERATIVE DATA: R-waves measured 11.7 mV, pacing  threshold 0.75 volts at 0.5 msec, lead impedance of 859 ohms.  DEVICE SETTINGS: VVI 40 to 120.    • Arrhythmia    • ASTHMA     as a child   • Atrial fib/flutter, transient     when in cardiogenic shock   • Bronchitis    • CAD (coronary artery disease)    • Cardiogenic shock (HCC) 1/2012   • Cardiomyopathy, ischemic EF 35% 12/31/2011   • Congestive heart failure (HCC)    • Dyslipidemia    • Fatty liver    • Hypertension     ON NO MEDS, now on meds   • Influenza    • Myocardial infarct (HCC) 12/29/2011   • Myocardial infarction of anterolateral wall (HCC) 12/29/2011   • Pacemaker 04/23/2012   • Tuberculosis     20 years ago on meds for 6  mo     Past Surgical History:   Procedure Laterality Date   • RECOVERY  8/30/2013    Performed by Cath-Recovery Surgery at SURGERY SAME DAY [facility] ORS   • RECOVERY  4/23/2012    Performed by SURGERY, CATH-RECOVERY at [facility]ERY [facility] DAY  [facility]   • MULTIPLE CORONARY ARTERY BYPASS ENDO VEIN HARVEST  1/3/2012    Performed by PADMINI OCHOA at [facility]   • UMBILICAL HERNIA REPAIR  2011    Performed by CHUY CHRISTENSEN at SURGERY SAME DAY [facility]   • COLONOSCOPY      normal   • PACEMAKER INSERTION       Family History   Problem Relation Age of Onset   • Cancer Mother         ovarian cancer   • Heart Disease Maternal Aunt 17        unclear but MI!   • Heart Disease Maternal Uncle 38     Social History     Socioeconomic History   • Marital status:      Spouse name: Not on file   • Number of children: Not on file   • Years of education: Not on file   • Highest education level: Not on file   Occupational History   • Not on file   Social Needs   • Financial resource strain: Not on file   • Food insecurity     Worry: Not on file     Inability: Not on file   • Transportation needs     Medical: Not on file     Non-medical: Not on file   Tobacco Use   • Smoking status: Former Smoker     Years: 10.00     Types: Cigarettes     Last attempt to quit: 3/1/1982     Years since quittin.3   • Smokeless tobacco: Never Used   • Tobacco comment: QUIT , per patient half a cigarette a day   Substance and Sexual Activity   • Alcohol use: No     Alcohol/week: 0.0 oz     Comment: occasionally ( 2 times a year)   • Drug use: No   • Sexual activity: Not Currently   Lifestyle   • Physical activity     Days per week: Not on file     Minutes per session: Not on file   • Stress: Not on file   Relationships   • Social connections     Talks on phone: Not on file     Gets together: Not on file     Attends [employer] service: Not on file     Active member of club or organization: Not on file     Attends meetings of clubs or organizations: Not on file     Relationship status: Not on file   • Intimate partner violence     Fear of current or ex partner: Not on file     Emotionally abused: Not on file     Physically abused: Not on file  "    Forced sexual activity: Not on file   Other Topics Concern   • Not on file   Social History Narrative   • Not on file     Allergies   Allergen Reactions   • Pcn [Penicillins] Rash     Outpatient Encounter Medications as of 6/10/2020   Medication Sig Dispense Refill   • SYMBICORT 160-4.5 MCG/ACT Aerosol INHALE 2 PUFFS BY MOUTH 2 TIMES A DAY. USE SPACER. RINSE MOUTH AFTER EACH USE. 1 Inhaler 5   • metoprolol SR (TOPROL XL) 50 MG TABLET SR 24 HR Take 1 Tab by mouth every day. 30 Tab 11   • rosuvastatin (CRESTOR) 20 MG Tab Take 1 Tab by mouth every bedtime. 90 Tab 2   • lisinopril (PRINIVIL) 10 MG Tab TAKE 1 TABLET BY MOUTH EVERY DAY 30 Tab 11   • albuterol 108 (90 Base) MCG/ACT Aero Soln inhalation aerosol Inhale 2 Puffs by mouth every 6 hours as needed. 8.5 g 0   • aspirin EC (ECOTRIN) 81 MG TBEC Take 81 mg by mouth every day.     • Multiple Vitamin (MULTI-VITAMIN PO) Take  by mouth.     • [DISCONTINUED] clopidogrel (PLAVIX) 75 MG Tab TAKE 1 TABLET BY MOUTH EVERY DAY 90 Tab 3     No facility-administered encounter medications on file as of 6/10/2020.      Review of Systems   Constitutional: Negative.  Negative for chills, fever and malaise/fatigue.   HENT: Negative.  Negative for sore throat.    Eyes: Negative.    Respiratory: Negative.  Negative for cough, hemoptysis, sputum production, shortness of breath, wheezing and stridor.    Cardiovascular: Negative.  Negative for chest pain, palpitations, orthopnea, claudication, leg swelling and PND.   Gastrointestinal: Negative.    Genitourinary: Negative.    Musculoskeletal: Negative.    Skin: Negative.    Neurological: Negative.  Negative for dizziness, loss of consciousness and weakness.   Endo/Heme/Allergies: Negative.  Does not bruise/bleed easily.   All other systems reviewed and are negative.       Objective:   /70 (BP Location: Left arm, Patient Position: Sitting, BP Cuff Size: Adult)   Pulse (!) 54   Ht 1.651 m (5' 5\")   Wt 69.3 kg (152 lb 12.8 oz)   " SpO2 94%   BMI 25.43 kg/m²     Physical Exam   Constitutional: He appears well-developed and well-nourished. No distress.   HENT:   Head: Normocephalic and atraumatic.   Right Ear: External ear normal.   Left Ear: External ear normal.   Nose: Nose normal.   Mouth/Throat: No oropharyngeal exudate.   Eyes: Pupils are equal, round, and reactive to light. Conjunctivae and EOM are normal. Right eye exhibits no discharge. Left eye exhibits no discharge. No scleral icterus.   Neck: Neck supple. No JVD present.   Cardiovascular: Normal rate, regular rhythm and intact distal pulses. Exam reveals no gallop and no friction rub.   No murmur heard.  Pulmonary/Chest: Effort normal. No stridor. No respiratory distress. He has no wheezes. He has no rales. He exhibits no tenderness.   Abdominal: Soft. He exhibits no distension. There is no guarding.   Musculoskeletal: Normal range of motion.         General: No tenderness, deformity or edema.   Neurological: He is alert. He has normal reflexes. He displays normal reflexes. No cranial nerve deficit. He exhibits normal muscle tone. Coordination normal.   Skin: Skin is warm and dry. No rash noted. He is not diaphoretic. No erythema. No pallor.   Psychiatric: He has a normal mood and affect. His behavior is normal. Judgment and thought content normal.   Nursing note and vitals reviewed.      Assessment:     1. Acute MI, anterior wall s/p bms to LAD 12/11 with -RCA and residual CX dz (stent failed)     2. AICD (automatic cardioverter/defibrillator) present St Zach placed 4/23/12     3. AICD lead malfunction     4. Cardiomyopathy, ischemic EF 35%     5. Chronic congestive heart failure, unspecified heart failure type (HCC)     6. Coronary artery disease due to calcified coronary lesion:Acute MI, anterior wall s/p bms to LAD 12/11 with -RCA and residual CX dz (stent failed)     7. Dyslipidemia     8. Essential hypertension     9. Fatty liver     10. Former smoker     11. Myocardial  infarction of anterolateral wall     12. Nocturnal hypoxia     13. Obstructive sleep apnea     14. Pacemaker         Medical Decision Making:  Today's Assessment / Status / Plan:     64-year-old male with CAD status post CABG hypertension hyperlipidemia and high risk medication usage.  I will stop his Plavix today given that is been 2012 since his bypass he has no stents.  He will continue aspirin.  He will stay on the rosuvastatin for lipids.  He will stay on the metoprolol and lisinopril for his heart failure.  His EF has normalized and therefore I will repeat an echocardiogram due to lack of changes in his symptoms.  We will see him back in 1 year.

## 2020-07-06 DIAGNOSIS — I10 ESSENTIAL HYPERTENSION: ICD-10-CM

## 2020-07-06 RX ORDER — LISINOPRIL 10 MG/1
10 TABLET ORAL
Qty: 30 TAB | Refills: 11 | Status: SHIPPED | OUTPATIENT
Start: 2020-07-06 | End: 2021-06-01

## 2020-07-31 ENCOUNTER — OFFICE VISIT (OUTPATIENT)
Dept: PULMONOLOGY | Facility: HOSPICE | Age: 65
End: 2020-07-31
Payer: COMMERCIAL

## 2020-07-31 VITALS
SYSTOLIC BLOOD PRESSURE: 102 MMHG | BODY MASS INDEX: 24.99 KG/M2 | RESPIRATION RATE: 14 BRPM | HEART RATE: 68 BPM | WEIGHT: 150 LBS | DIASTOLIC BLOOD PRESSURE: 64 MMHG | OXYGEN SATURATION: 100 % | HEIGHT: 65 IN | TEMPERATURE: 97.8 F

## 2020-07-31 DIAGNOSIS — G47.33 OBSTRUCTIVE SLEEP APNEA: ICD-10-CM

## 2020-07-31 DIAGNOSIS — I50.9 CHRONIC CONGESTIVE HEART FAILURE, UNSPECIFIED HEART FAILURE TYPE (HCC): ICD-10-CM

## 2020-07-31 DIAGNOSIS — J45.30 MILD PERSISTENT ASTHMA WITHOUT COMPLICATION: ICD-10-CM

## 2020-07-31 PROCEDURE — 99213 OFFICE O/P EST LOW 20 MIN: CPT | Performed by: PHYSICIAN ASSISTANT

## 2020-07-31 ASSESSMENT — ENCOUNTER SYMPTOMS
TREMORS: 0
HEARTBURN: 0
WHEEZING: 0
WEIGHT LOSS: 0
SPUTUM PRODUCTION: 0
DIZZINESS: 0
INSOMNIA: 0
PALPITATIONS: 0
ORTHOPNEA: 0
COUGH: 0
CHILLS: 0
SHORTNESS OF BREATH: 0
SORE THROAT: 0
SINUS PAIN: 0
HEADACHES: 0
FEVER: 0

## 2020-07-31 ASSESSMENT — PAIN SCALES - GENERAL: PAINLEVEL: NO PAIN

## 2020-07-31 NOTE — PATIENT INSTRUCTIONS
1-doing well on once daily Symbicort    2-will need updated lung function testing at next appointment    3-practice social distancing, wearing mask, washing hands frequently    4-follow up in one year with lung test, sooner if needed    5-get flu shot this fall

## 2020-07-31 NOTE — PROGRESS NOTES
CC: None    HPI:  Abram Barillas is a 64 y.o. year old male here today for follow-up on mild persistent asthma and GRANT.  Patient last seen in clinic 8/27/2019 by MASON Rogers.  Asthma history is lifelong, patient has required intermittent Kenalog injections.  Patient has a remote smoking history with reported quit date 1985 and 10 pack years.  Continued abstinence advised.  Pertinent past medical history includes cardiomyopathy with AICD, ejection fraction 45% predicted and followed by cardiology.  Lifelong asthma, bronchitis.  Patient has refused CPAP despite known potential cardiovascular risks.  Trialed oral appliance which did not adequately resolve his symptoms.  He does not have a narrow airway or enlarged heart tonsils therefore surgical intervention likely not beneficial.    Reviewed in clinic vitals including blood pressure of 102/64, heart rate 68, O2 sat 100% on room air and BMI of 24.96 kg/m².  He is very active and runs up to 6 miles per day 2 to 3 days/week.  Less due to wearing mask.    Reviewed home medication regimen including Symbicort, has but has not been requiring use of albuterol.  Reviewed indications for as needed use.     Reviewed most recent imaging including echocardiogram results obtained 12/3/2019 which demonstrated normal left ventricular chamber size, wall thickness and LVEF estimated at 45%.  Hypokinesis in inferior septal wall and apex, no significant change since comparison study 2016.  Normal right ventricular size, estimated RVSP of 20 mmHg.  No recent chest imaging.    Most recent pulmonary function testing obtained 8/3/2017 demonstrating FEV1 of 2.46 L or 84% predicted, 9% improvement postbronchodilator, FVC 3.61 L or 94% predicted, FEV1/FVC ratio 68, residual volume 157% predicted, total lung capacity 118% predicted, DLCO 156% predicted.  Per pulmonologist interpretation mild obstructive lung disease with partial reversibility component.    Review of Systems    Constitutional: Negative for chills, fever, malaise/fatigue and weight loss.   HENT: Negative for congestion, hearing loss, nosebleeds, sinus pain, sore throat and tinnitus.    Eyes:        Prescription eyeglasses for reading   Respiratory: Negative for cough, sputum production, shortness of breath and wheezing.    Cardiovascular: Negative for chest pain, palpitations, orthopnea and leg swelling.   Gastrointestinal: Negative for heartburn.        No dentures, no difficulties swallowing    Skin: Negative.    Neurological: Negative for dizziness, tremors and headaches.   Psychiatric/Behavioral: The patient does not have insomnia.        Past Medical History:   Diagnosis Date   • Acute MI, anterior wall s/p bms to LAD 12/11 with -RCA and residual CX dz (stent failed) 12/31/2011   • AICD (automatic cardioverter/defibrillator) present St Zach placed 4/23/12 4/24/2012    DEVICE DATA:  1. Pulse generator:  [employer], model #TD0386-54, serial  #[id number]  2. Right ventricular lead:  [employer]. [employer], model #7120/60,  serial #[id number]  MEASURED INTRAOPERATIVE DATA: R-waves measured 11.7 mV, pacing  threshold 0.75 volts at 0.5 msec, lead impedance of 859 ohms.  DEVICE SETTINGS: VVI 40 to 120.    • Arrhythmia    • ASTHMA     as a child   • Atrial fib/flutter, transient     when in cardiogenic shock   • Bronchitis    • CAD (coronary artery disease)    • Cardiogenic shock (HCC) 1/2012   • Cardiomyopathy, ischemic EF 35% 12/31/2011   • Congestive heart failure (HCC)    • Dyslipidemia    • Fatty liver    • Hypertension     ON NO MEDS, now on meds   • Influenza    • Myocardial infarct (HCC) 12/29/2011   • Myocardial infarction of anterolateral wall (HCC) 12/29/2011   • Pacemaker 04/23/2012   • Tuberculosis     20 years ago on meds for 6  mo       Past Surgical History:   Procedure Laterality Date   • RECOVERY  8/30/2013    Performed by Cath-Recovery Surgery at SURGERY SAME DAY [facility]   • RECOVERY  4/23/2012     Performed by SURGERY, CATH-RECOVERY at SURGERY SAME DAY [facility]S   • MULTIPLE CORONARY ARTERY BYPASS ENDO VEIN HARVEST  1/3/2012    Performed by PADMINI OCHOA at [facility]   • UMBILICAL HERNIA REPAIR  2011    Performed by CHUY CHRISTENSEN at SURGERY SAME DAY [facility]   • COLONOSCOPY      normal   • PACEMAKER INSERTION         Family History   Problem Relation Age of Onset   • Cancer Mother         ovarian cancer   • Heart Disease Maternal Aunt 17        unclear but MI!   • Heart Disease Maternal Uncle 38       Social History     Socioeconomic History   • Marital status:      Spouse name: Not on file   • Number of children: Not on file   • Years of education: Not on file   • Highest education level: Not on file   Occupational History   • Not on file   Social Needs   • Financial resource strain: Not on file   • Food insecurity     Worry: Not on file     Inability: Not on file   • Transportation needs     Medical: Not on file     Non-medical: Not on file   Tobacco Use   • Smoking status: Former Smoker     Years: 10.     Types: Cigarettes     Last attempt to quit: 3/1/1982     Years since quittin.4   • Smokeless tobacco: Never Used   • Tobacco comment: QUIT , per patient half a cigarette a day   Substance and Sexual Activity   • Alcohol use: No     Alcohol/week: 0.0 oz     Comment: occasionally ( 2 times a year)   • Drug use: No   • Sexual activity: Not Currently   Lifestyle   • Physical activity     Days per week: Not on file     Minutes per session: Not on file   • Stress: Not on file   Relationships   • Social connections     Talks on phone: Not on file     Gets together: Not on file     Attends Che[employer] service: Not on file     Active member of club or organization: Not on file     Attends meetings of clubs or organizations: Not on file     Relationship status: Not on file   • Intimate partner violence     Fear of current or ex partner: Not on file      "Emotionally abused: Not on file     Physically abused: Not on file     Forced sexual activity: Not on file   Other Topics Concern   • Not on file   Social History Narrative   • Not on file       Allergies as of 07/31/2020 - Reviewed 06/10/2020   Allergen Reaction Noted   • Pcn [penicillins] Rash 09/21/2010        @Vital signs for this encounter:  Vitals:    07/31/20 1505   Height: 1.651 m (5' 5\")   Weight: 68 kg (150 lb)   Weight % change since last entry.: 0 %   BP: 102/64   Pulse: 68   BMI (Calculated): 24.96   Resp: 14   Temp: 36.6 °C (97.8 °F)   TempSrc: Temporal       Current medications as of today   Current Outpatient Medications   Medication Sig Dispense Refill   • lisinopril (PRINIVIL) 10 MG Tab Take 1 Tab by mouth every day. 30 Tab 11   • SYMBICORT 160-4.5 MCG/ACT Aerosol INHALE 2 PUFFS BY MOUTH 2 TIMES A DAY. USE SPACER. RINSE MOUTH AFTER EACH USE. 1 Inhaler 5   • metoprolol SR (TOPROL XL) 50 MG TABLET SR 24 HR Take 1 Tab by mouth every day. 30 Tab 11   • rosuvastatin (CRESTOR) 20 MG Tab Take 1 Tab by mouth every bedtime. 90 Tab 2   • albuterol 108 (90 Base) MCG/ACT Aero Soln inhalation aerosol Inhale 2 Puffs by mouth every 6 hours as needed. 8.5 g 0   • aspirin EC (ECOTRIN) 81 MG TBEC Take 81 mg by mouth every day.     • Multiple Vitamin (MULTI-VITAMIN PO) Take  by mouth.       No current facility-administered medications for this visit.          Physical Exam:   Gen:           Alert and oriented, No apparent distress. Mood and affect     appropriate, normal interaction with provider.  Eyes:          sclere white, conjunctive moist.  Hearing:     Grossly intact.  Dentition:    Good dentition.  Oropharynx:   Tongue normal, posterior pharynx without erythema or exudate.  Neck:        Supple, trachea midline, no masses.  Respiratory Effort: No intercostal retractions or use of accessory muscles.   Lung Auscultation:      Clear to auscultation bilaterally; no rales, rhonchi or wheezing.  CV:          "   Regular rate and rhythm. No edema. No murmurs, rubs or gallops.  Digits, Nails, Ext: No clubbing, cyanosis, petechiae, or nodes.   Skin:        No rashes, lesions or ulcers noted on back or exposed skin    surfaces.                     Assessment:  1. Mild persistent asthma without complication  Spirometry   2. Obstructive sleep apnea   has dental appliance, declined CPAP   3. Chronic congestive heart failure, unspecified heart failure type (HCC)   follows with cardiology       Immunizations:    Flu: 9/25/2019  Pneumovax 23: 10/1/2012  Prevnar 13: Deferred    Plan:    64 y.o. year old male here today for follow-up on mild persistent asthma and GRANT.  Patient last seen in clinic 8/27/2019 by MASON Rogers.  Asthma history is lifelong, patient has required intermittent Kenalog injections in past.  Remote smoking history of 10 pack years with quit date reported 1985.  Continued abstinence advised.    Pertinent past medical history includes cardiomyopathy with AICD, ejection fraction 45% predicted and followed by cardiology.  Lifelong asthma, bronchitis.  Patient has refused CPAP despite known potential cardiovascular risks.  Trialed oral appliance which did not adequately resolve his symptoms.  He does not have a narrow airway or enlarged heart tonsils therefore surgical intervention likely not beneficial.    Reviewed in clinic vitals including blood pressure of 102/64, heart rate 68, O2 sat 100% on room air and BMI of 24.96 kg/m².  He is very active and runs up to 6 miles per day 2 to 3 days/week.  Less due to wearing mask.    Reviewed home medication regimen including Symbicort, has but has not been requiring use of albuterol.  Reviewed indications for as needed use.     Reviewed most recent imaging including echocardiogram results obtained 12/3/2019 which demonstrated normal left ventricular chamber size, wall thickness and LVEF estimated at 45%.  Hypokinesis in inferior septal wall and apex, no  significant change since comparison study 2016.  Normal right ventricular size, estimated RVSP of 20 mmHg.  No recent chest imaging.    Most recent pulmonary function testing obtained 8/3/2017 demonstrating mild obstructive lung disease with partial reversibility component.    Mild persistent asthma: doing well on once daily Symbicort.  Recommend updated PFT next appointment.  Prefers annual appointments unless symptomatic.  Follow-up 1 year, sooner if needed.    GRANT, using dental appliance, refuses CPAP despite potential cardiac sequelae.    Discussion of health topics particularly COVID-19 precautions, recommendation to follow CDC guidelines including wearing a mask, social distancing, frequent handwashing.  Maintain current flu vaccine.      This dictation was created using voice recognition software. The accuracy of the dictation is limited to the abilities of the software. I expect there may be some errors of grammar and possibly content.

## 2020-08-22 ENCOUNTER — HOSPITAL ENCOUNTER (OUTPATIENT)
Dept: LAB | Facility: MEDICAL CENTER | Age: 65
End: 2020-08-22
Payer: COMMERCIAL

## 2020-08-22 LAB
BDY FAT % MEASURED: 25.7 %
BP DIAS: 73 MMHG
BP SYS: 121 MMHG
CHOLEST SERPL-MCNC: 104 MG/DL (ref 100–199)
DIABETES HTDIA: NO
EVENT NAME HTEVT: NORMAL
FASTING HTFAS: YES
GLUCOSE SERPL-MCNC: 95 MG/DL (ref 65–99)
HDLC SERPL-MCNC: 35 MG/DL
HYPERTENSION HTHYP: YES
LDLC SERPL CALC-MCNC: 50 MG/DL
SCREENING LOC CITY HTCIT: NORMAL
SCREENING LOC STATE HTSTA: NORMAL
SCREENING LOCATION HTLOC: NORMAL
SMOKING HTSMO: NO
SUBSCRIBER ID HTSID: NORMAL
TRIGL SERPL-MCNC: 95 MG/DL (ref 0–149)

## 2020-08-22 PROCEDURE — 82947 ASSAY GLUCOSE BLOOD QUANT: CPT

## 2020-08-22 PROCEDURE — 80061 LIPID PANEL: CPT

## 2020-08-22 PROCEDURE — 36415 COLL VENOUS BLD VENIPUNCTURE: CPT

## 2020-08-22 PROCEDURE — S5190 WELLNESS ASSESSMENT BY NONPH: HCPCS

## 2020-09-21 ENCOUNTER — IMMUNIZATION (OUTPATIENT)
Dept: OCCUPATIONAL MEDICINE | Facility: CLINIC | Age: 65
End: 2020-09-21

## 2020-09-21 DIAGNOSIS — Z23 NEED FOR VACCINATION: ICD-10-CM

## 2020-09-21 PROCEDURE — 90686 IIV4 VACC NO PRSV 0.5 ML IM: CPT | Performed by: PREVENTIVE MEDICINE

## 2020-12-11 ENCOUNTER — NON-PROVIDER VISIT (OUTPATIENT)
Dept: CARDIOLOGY | Facility: MEDICAL CENTER | Age: 65
End: 2020-12-11
Payer: COMMERCIAL

## 2020-12-11 DIAGNOSIS — Z95.810 AICD (AUTOMATIC CARDIOVERTER/DEFIBRILLATOR) PRESENT: ICD-10-CM

## 2020-12-11 PROCEDURE — 93282 PRGRMG EVAL IMPLANTABLE DFB: CPT | Performed by: INTERNAL MEDICINE

## 2020-12-20 DIAGNOSIS — Z23 NEED FOR VACCINATION: ICD-10-CM

## 2020-12-25 DIAGNOSIS — J45.20 MILD INTERMITTENT ASTHMA WITHOUT COMPLICATION: ICD-10-CM

## 2020-12-28 RX ORDER — BUDESONIDE AND FORMOTEROL FUMARATE DIHYDRATE 160; 4.5 UG/1; UG/1
2 AEROSOL RESPIRATORY (INHALATION) 2 TIMES DAILY
Qty: 1 EACH | Refills: 2 | Status: SHIPPED | OUTPATIENT
Start: 2020-12-28 | End: 2021-04-22

## 2020-12-28 NOTE — TELEPHONE ENCOUNTER
Have we ever prescribed this med? Yes.  If yes, what date? 06/09/2020    Last OV: 07/31/2020 - NADEEN ALAS    Next OV: no follow up on file    DX: Asthma    Medications: Symbicort

## 2021-01-07 ENCOUNTER — HOSPITAL ENCOUNTER (OUTPATIENT)
Facility: MEDICAL CENTER | Age: 66
End: 2021-01-07
Attending: NURSE PRACTITIONER
Payer: COMMERCIAL

## 2021-01-07 ENCOUNTER — EH NON-PROVIDER (OUTPATIENT)
Dept: OCCUPATIONAL MEDICINE | Facility: CLINIC | Age: 66
End: 2021-01-07

## 2021-01-07 DIAGNOSIS — Z11.59 ENCOUNTER FOR SCREENING FOR OTHER VIRAL DISEASES: ICD-10-CM

## 2021-01-07 DIAGNOSIS — Z11.59 ENCOUNTER FOR SCREENING FOR OTHER VIRAL DISEASES: Primary | ICD-10-CM

## 2021-01-07 LAB — COVID ORDER STATUS COVID19: NORMAL

## 2021-01-07 PROCEDURE — U0003 INFECTIOUS AGENT DETECTION BY NUCLEIC ACID (DNA OR RNA); SEVERE ACUTE RESPIRATORY SYNDROME CORONAVIRUS 2 (SARS-COV-2) (CORONAVIRUS DISEASE [COVID-19]), AMPLIFIED PROBE TECHNIQUE, MAKING USE OF HIGH THROUGHPUT TECHNOLOGIES AS DESCRIBED BY CMS-2020-01-R: HCPCS | Performed by: NURSE PRACTITIONER

## 2021-01-08 LAB
SARS-COV-2 RNA RESP QL NAA+PROBE: DETECTED
SPECIMEN SOURCE: ABNORMAL

## 2021-01-19 ENCOUNTER — TELEPHONE (OUTPATIENT)
Dept: CARDIOLOGY | Facility: MEDICAL CENTER | Age: 66
End: 2021-01-19

## 2021-01-19 NOTE — TELEPHONE ENCOUNTER
Patients device transmitted via home monitor-- VT episode 1/17 @ 4:20 pm with ATP delivered-- appropriate and successful.

## 2021-01-19 NOTE — TELEPHONE ENCOUNTER
To SAMMI ELLIOTT    Called patient, he wasn't overly aware of the fast heart rate. Denied any dizziness, lightheadedness or chest pain. Does have some mild SOB and cough. Covid 19+ on 1/8/21.

## 2021-01-21 ENCOUNTER — NURSE TRIAGE (OUTPATIENT)
Dept: HEALTH INFORMATION MANAGEMENT | Facility: OTHER | Age: 66
End: 2021-01-21

## 2021-01-21 NOTE — TELEPHONE ENCOUNTER
To Schedulers.     Please call patient to make an appointment for 21 days after 1/8/21 due to +Covid test.

## 2021-01-22 ENCOUNTER — OFFICE VISIT (OUTPATIENT)
Dept: SLEEP MEDICINE | Facility: MEDICAL CENTER | Age: 66
End: 2021-01-22
Payer: COMMERCIAL

## 2021-01-22 VITALS
DIASTOLIC BLOOD PRESSURE: 70 MMHG | HEART RATE: 74 BPM | OXYGEN SATURATION: 93 % | RESPIRATION RATE: 16 BRPM | BODY MASS INDEX: 23.66 KG/M2 | SYSTOLIC BLOOD PRESSURE: 114 MMHG | HEIGHT: 65 IN | WEIGHT: 142 LBS

## 2021-01-22 DIAGNOSIS — R06.09 DYSPNEA ON EXERTION: ICD-10-CM

## 2021-01-22 DIAGNOSIS — Z86.16 HISTORY OF COVID-19: ICD-10-CM

## 2021-01-22 DIAGNOSIS — J45.41 MODERATE PERSISTENT ASTHMA WITH EXACERBATION: ICD-10-CM

## 2021-01-22 PROCEDURE — 99213 OFFICE O/P EST LOW 20 MIN: CPT | Performed by: PHYSICIAN ASSISTANT

## 2021-01-22 RX ORDER — ALBUTEROL SULFATE 90 UG/1
2 AEROSOL, METERED RESPIRATORY (INHALATION) EVERY 4 HOURS PRN
Qty: 1 EACH | Refills: 1 | Status: SHIPPED | OUTPATIENT
Start: 2021-01-22 | End: 2021-11-24

## 2021-01-22 RX ORDER — METHYLPREDNISOLONE 4 MG/1
TABLET ORAL
Qty: 21 TAB | Refills: 0 | Status: SHIPPED | OUTPATIENT
Start: 2021-01-22 | End: 2021-06-01

## 2021-01-22 ASSESSMENT — ENCOUNTER SYMPTOMS
SINUS PAIN: 0
ROS GI COMMENTS: NO DENTURES, NO DIFFICULTY SWALLOWING
INSOMNIA: 0
SPUTUM PRODUCTION: 1
DIZZINESS: 0
COUGH: 1
CHILLS: 0
SORE THROAT: 1
HEADACHES: 0
WEIGHT LOSS: 1
PALPITATIONS: 1
WHEEZING: 1
SHORTNESS OF BREATH: 1
ORTHOPNEA: 0
TREMORS: 0
FEVER: 0
HEARTBURN: 0

## 2021-01-22 NOTE — LETTER
2021      Abram Barillas  : [birth year]      To whom it may concern:    Abram continues to have some breathing issues post Covid 19.  He is being treated with medications and will be unable to return to work until  unless symptoms fail to resolve. If worsening of symptoms or failure to resolve in the expected time frame, he has been advised to seek additional care.     Any questions or concerns, feel free to contact the clinic.    Respectfully,    Ольга Johns PA-C

## 2021-01-22 NOTE — PATIENT INSTRUCTIONS
1-continued symptoms of shortness of breath post covid 19  2-treat with steroid course   3-no indication for antibiotics  4-renewed albuterol, 2 puff every 4 hours as needed    -increased cough   -wheezing   -increased shortness of breath  5-reviewed covid 19 precautions including wearing mask in public, social distancing, frequent handwashing, had flu shot  6-no vaccine for covid 19 for 90 days not til after April 7 and not if sick   7-immune boosters, continue multi vitamin, Vit D, Vit C and Zinc

## 2021-01-22 NOTE — PROGRESS NOTES
CC: Continued symptoms of shortness of breath post COVID-19    HPI:  Abram Barillas is a 65 y.o. year old male here today for follow-up on recent COVID-19 infection and mild persistent asthma.   Last seen in clinic 7/31/2020.  Patient has a remote smoking history of 10 pack years with reported quit date in 1985.    Pertinent past medical history includes cardiomyopathy with AICD, ejection fraction 45% predicted followed by cardiology, bronchitis and lifelong asthma which in the past has required intermittent Kenalog injections.    Reviewed in clinic vitals including blood pressure 114/70, heart rate of 74, O2 sat of 93% on room air and BMI of 23.63 kg/m².    Reviewed home medication regimen including lisinopril-denies ongoing cough, albuterol and Symbicort.    No recent chest imaging, request x-ray for clearance.    Echocardiogram obtained 12/3/2019 demonstrated normal left ventricular chamber size, wall thickness.  LVEF estimated at 45%.  Normal right ventricular size with reduced right ventricular systolic function, estimated RVSP of 20 mmHg, hypokinesis of the inferior septal wall and apex, no significant change since 2016 study.    Overnight home sleep study obtained 2/4/2019 demonstrated mild GRANT supine position with overall AHI of 11/h, low O2 sat of 86%, 14 minutes below 90%.  Patient using oral appliance.  Refused CPAP.    Most recent pulmonary function testing obtained 8/3/2017 demonstrating FEV1 of 2.46 L or 84% predicted, 9% improvement postbronchodilator, FVC 3.61 L or 94% predicted, FEV1/FVC ratio 68, residual volume 157% predicted, total lung capacity 118% predicted, DLCO 156% predicted.  Per pulmonologist interpretation mild obstructive lung disease with partial reversibility component.    Review of Systems   Constitutional: Positive for malaise/fatigue and weight loss (5 lbs in two weeks ). Negative for chills and fever.   HENT: Positive for sore throat (mild coughing). Negative for congestion,  hearing loss, nosebleeds, sinus pain and tinnitus.    Eyes:        Presc glasses    Respiratory: Positive for cough, sputum production (white), shortness of breath (with activity) and wheezing (mild at night).    Cardiovascular: Positive for chest pain and palpitations (occasional ). Negative for orthopnea and leg swelling.   Gastrointestinal: Negative for heartburn.        No dentures, no difficulty swallowing    Neurological: Negative for dizziness, tremors and headaches.   Psychiatric/Behavioral: The patient does not have insomnia.        Past Medical History:   Diagnosis Date   • Acute MI, anterior wall s/p bms to LAD 12/11 with -RCA and residual CX dz (stent failed) 12/31/2011   • AICD (automatic cardioverter/defibrillator) present [employer] placed 4/23/12 4/24/2012    DEVICE DATA:  1. Pulse generator:  [employer], model #KR6616-48, serial  #[id number]  2. Right ventricular lead:  [employer], model #7120/60,  serial #[id number]  MEASURED INTRAOPERATIVE DATA: R-waves measured 11.7 mV, pacing  threshold 0.75 volts at 0.5 msec, lead impedance of 859 ohms.  DEVICE SETTINGS: VVI 40 to 120.    • Arrhythmia    • ASTHMA     as a child   • Atrial fib/flutter, transient     when in cardiogenic shock   • Bronchitis    • CAD (coronary artery disease)    • Cardiogenic shock (HCC) 1/2012   • Cardiomyopathy, ischemic EF 35% 12/31/2011   • Congestive heart failure (HCC)    • Dyslipidemia    • Fatty liver    • Hypertension     ON NO MEDS, now on meds   • Influenza    • Myocardial infarct (HCC) 12/29/2011   • Myocardial infarction of anterolateral wall (HCC) 12/29/2011   • Pacemaker 04/23/2012   • Tuberculosis     20 years ago on meds for 6  mo       Past Surgical History:   Procedure Laterality Date   • RECOVERY  8/30/2013    Performed by Cath-Recovery Surgery at [facility]   • RECOVERY  4/23/2012    Performed by SURGERY, CATH-RECOVERY at [facility]   • MULTIPLE CORONARY  ARTERY BYPASS ENDO VEIN HARVEST  1/3/2012    Performed by PADMINI OCHOA at SURGERY [facility]   • UMBILICAL HERNIA REPAIR  2011    Performed by CHUY CHRISTENSEN at [facility]ERY [facility]   • COLONOSCOPY      normal   • PACEMAKER INSERTION         Family History   Problem Relation Age of Onset   • Cancer Mother         ovarian cancer   • Heart Disease Maternal Aunt 17        unclear but MI!   • Heart Disease Maternal Uncle 38       Social History     Socioeconomic History   • Marital status:      Spouse name: Not on file   • Number of children: Not on file   • Years of education: Not on file   • Highest education level: Not on file   Occupational History   • Not on file   Social Needs   • Financial resource strain: Not on file   • Food insecurity     Worry: Not on file     Inability: Not on file   • Transportation needs     Medical: Not on file     Non-medical: Not on file   Tobacco Use   • Smoking status: Former Smoker     Years: 10.00     Types: Cigarettes     Quit date: 3/1/1982     Years since quittin.9   • Smokeless tobacco: Never Used   • Tobacco comment: QUIT , per patient half a cigarette a day   Substance and Sexual Activity   • Alcohol use: No     Alcohol/week: 0.0 oz     Comment: occasionally ( 2 times a year)   • Drug use: No   • Sexual activity: Not Currently   Lifestyle   • Physical activity     Days per week: Not on file     Minutes per session: Not on file   • Stress: Not on file   Relationships   • Social connections     Talks on phone: Not on file     Gets together: Not on file     Attends Latter-day service: Not on file     Active member of club or organization: Not on file     Attends meetings of clubs or organizations: Not on file     Relationship status: Not on file   • Intimate partner violence     Fear of current or ex partner: Not on file     Emotionally abused: Not on file     Physically abused: Not on file     Forced sexual activity: Not on file  "  Other Topics Concern   • Not on file   Social History Narrative   • Not on file       Allergies as of 01/22/2021 - Reviewed 01/22/2021   Allergen Reaction Noted   • Pcn [penicillins] Rash 09/21/2010        @Vital signs for this encounter:  Vitals:    01/22/21 1511   Height: 1.651 m (5' 5\")   Weight: 64.4 kg (142 lb)   Weight % change since last entry.: 0 %   BP: 114/70   Pulse: 74   BMI (Calculated): 23.63   Resp: 16       Current medications as of today   Current Outpatient Medications   Medication Sig Dispense Refill   • SYMBICORT 160-4.5 MCG/ACT Aerosol INHALE 2 PUFFS BY MOUTH 2 TIMES A DAY. USE SPACER. RINSE MOUTH AFTER EACH USE. 1 Each 2   • lisinopril (PRINIVIL) 10 MG Tab Take 1 Tab by mouth every day. 30 Tab 11   • metoprolol SR (TOPROL XL) 50 MG TABLET SR 24 HR Take 1 Tab by mouth every day. 30 Tab 11   • rosuvastatin (CRESTOR) 20 MG Tab Take 1 Tab by mouth every bedtime. 90 Tab 2   • albuterol 108 (90 Base) MCG/ACT Aero Soln inhalation aerosol Inhale 2 Puffs by mouth every 6 hours as needed. 8.5 g 0   • aspirin EC (ECOTRIN) 81 MG TBEC Take 81 mg by mouth every day.     • Multiple Vitamin (MULTI-VITAMIN PO) Take  by mouth.       No current facility-administered medications for this visit.          Physical Exam:   Gen:           Alert and oriented, No apparent distress. Mood and affect appropriate, normal interaction with provider.  Eyes:          sclere white, conjunctive moist.  Hearing:     Grossly intact.  Dentition:    Fair dentition.  Oropharynx:   Tongue normal, posterior pharynx without erythema or exudate.  Neck:        Supple, trachea midline, no masses.  Respiratory Effort: No intercostal retractions or use of accessory muscles.   Lung Auscultation:      Diminished; no rales, rhonchi or wheezing.  CV:            Regular rate and rhythm. No edema. No murmurs, rubs or gallops.  Digits, Nails, Ext: No clubbing, cyanosis, petechiae, or nodes.   Skin:        No rashes, lesions or ulcers noted on " exposed skin surfaces.                     Assessment:  1. Moderate persistent asthma with exacerbation  albuterol (PROVENTIL HFA) 108 (90 Base) MCG/ACT Aero Soln inhalation aerosol    methylPREDNISolone (MEDROL DOSEPAK) 4 MG Tablet Therapy Pack   2. Dyspnea on exertion  Multiple Oximetry   3. History of COVID-19  DX-CHEST-2 VIEWS       Immunizations:    Flu: 9/21/2020  Pneumovax 23: 10/1/2012  Prevnar 13: Deferred    Plan:     65 y.o. year old male here today for follow-up on recent COVID-19 infection and mild persistent asthma. Last seen in clinic 7/31/2020.  Patient has a remote smoking history of 10 pack years with reported quit date in 1985.    Pertinent past medical history includes cardiomyopathy with AICD, ejection fraction 45% predicted followed by cardiology, bronchitis and lifelong asthma which in the past has required intermittent Kenalog injections.    Reviewed in clinic vitals including blood pressure 114/70, heart rate of 74, O2 sat of 93% on room air and BMI of 23.63 kg/m².    Dyspnea on exertion: Ambulating multi ox completed in clinic without evidence of decreased oxygen saturations with activity.    Reviewed home medication regimen including lisinopril-denies ongoing cough, albuterol and Symbicort.    Asthma with exacerbation: Treat with steroid course, no indication for antibiotics at this time.  Reviewed indications for albuterol use including increased cough, wheezing, increased shortness of breath.    History of COVID-19: Some mediastinal discomfort with deep inspiration or cough.  Not reproducible with palpation.  Advised to seek medical care if that worsens.    No recent chest imaging, patient requests x-ray for clearance.    Echocardiogram obtained 12/3/2019 demonstrated normal left ventricular chamber size, wall thickness.  LVEF estimated at 45%.  Normal right ventricular size with reduced right ventricular systolic function, estimated RVSP of 20 mmHg, hypokinesis of the inferior septal  wall and apex, no significant change since 2016 study.    Overnight home sleep study obtained 2/4/2019 demonstrated mild GRANT supine position with overall AHI of 11/h, low O2 sat of 86%, 14 minutes below 90%.  Patient using oral appliance.  Refused CPAP.    Most recent pulmonary function testing obtained 8/3/2017 demonstrating FEV1 of 2.46 L or 84% predicted, 9% improvement postbronchodilator, FVC 3.61 L or 94% predicted, FEV1/FVC ratio 68, residual volume 157% predicted, total lung capacity 118% predicted, DLCO 156% predicted.  Per pulmonologist interpretation mild obstructive lung disease with partial reversibility component.    Discussed COVID-19 recommendations including wearing mask in public, social distancing, frequent handwashing.  Discussion of immune system boosters, continue multivitamin, vitamin D, vitamin C and zinc.  No vaccine for COVID-19 recommended for 90 days which would be April 7, also not indicated if patient is sick.    Follow-up in 3 months, sooner if needed.    This dictation was created using voice recognition software. The accuracy of the dictation is limited to the abilities of the software. I expect there may be some errors of grammar and possibly content.

## 2021-01-31 DIAGNOSIS — E78.5 DYSLIPIDEMIA: ICD-10-CM

## 2021-01-31 DIAGNOSIS — I25.10 CORONARY ARTERY DISEASE DUE TO CALCIFIED CORONARY LESION: ICD-10-CM

## 2021-01-31 DIAGNOSIS — I25.84 CORONARY ARTERY DISEASE DUE TO CALCIFIED CORONARY LESION: ICD-10-CM

## 2021-01-31 RX ORDER — ROSUVASTATIN CALCIUM 20 MG/1
TABLET, COATED ORAL
Qty: 90 TAB | Refills: 2 | Status: SHIPPED | OUTPATIENT
Start: 2021-01-31 | End: 2021-06-01 | Stop reason: SDUPTHER

## 2021-02-01 ENCOUNTER — HOSPITAL ENCOUNTER (OUTPATIENT)
Dept: RADIOLOGY | Facility: MEDICAL CENTER | Age: 66
End: 2021-02-01
Attending: PHYSICIAN ASSISTANT
Payer: COMMERCIAL

## 2021-02-01 ENCOUNTER — TELEPHONE (OUTPATIENT)
Dept: SLEEP MEDICINE | Facility: MEDICAL CENTER | Age: 66
End: 2021-02-01

## 2021-02-01 DIAGNOSIS — Z86.16 HISTORY OF COVID-19: ICD-10-CM

## 2021-02-01 PROCEDURE — 71046 X-RAY EXAM CHEST 2 VIEWS: CPT

## 2021-04-22 ENCOUNTER — OFFICE VISIT (OUTPATIENT)
Dept: SLEEP MEDICINE | Facility: MEDICAL CENTER | Age: 66
End: 2021-04-22
Payer: COMMERCIAL

## 2021-04-22 VITALS
HEIGHT: 65 IN | DIASTOLIC BLOOD PRESSURE: 80 MMHG | RESPIRATION RATE: 16 BRPM | WEIGHT: 151 LBS | HEART RATE: 60 BPM | BODY MASS INDEX: 25.16 KG/M2 | OXYGEN SATURATION: 94 % | SYSTOLIC BLOOD PRESSURE: 120 MMHG

## 2021-04-22 DIAGNOSIS — Z86.16 HISTORY OF COVID-19: ICD-10-CM

## 2021-04-22 DIAGNOSIS — G47.33 OBSTRUCTIVE SLEEP APNEA: ICD-10-CM

## 2021-04-22 DIAGNOSIS — J45.30 MILD PERSISTENT ASTHMA WITHOUT COMPLICATION: ICD-10-CM

## 2021-04-22 PROCEDURE — 99213 OFFICE O/P EST LOW 20 MIN: CPT | Performed by: PHYSICIAN ASSISTANT

## 2021-04-22 RX ORDER — BUDESONIDE AND FORMOTEROL FUMARATE DIHYDRATE 80; 4.5 UG/1; UG/1
2 AEROSOL RESPIRATORY (INHALATION) 2 TIMES DAILY
Qty: 1 EACH | Refills: 3 | Status: SHIPPED | OUTPATIENT
Start: 2021-04-22 | End: 2021-04-30 | Stop reason: SDUPTHER

## 2021-04-22 ASSESSMENT — ENCOUNTER SYMPTOMS
TREMORS: 0
ORTHOPNEA: 0
SHORTNESS OF BREATH: 0
WHEEZING: 0
COUGH: 0
SPUTUM PRODUCTION: 0
SORE THROAT: 0
HEARTBURN: 1
INSOMNIA: 0
HEADACHES: 0
DIZZINESS: 0
SINUS PAIN: 0
WEIGHT LOSS: 0
FEVER: 0
ROS GI COMMENTS: NO DENTURES, NO DIFFICULTY SWALLOWING
CHILLS: 0
PALPITATIONS: 0

## 2021-04-22 NOTE — PROGRESS NOTES
CC    HPI:  Abram Barillas is a 65 y.o. year old male here today for follow-up on mild persistent asthma, history of COVID-19 infection 1/7/2021.  Last seen in clinic 1/22/2021.  Remote smoking history with reported quit date in 1985 and 10 pack years.    Pertinent past medical history includes mild GRANT using dental appliance, hypertension, MI, congestive heart failure, cardiomyopathy with AICD, EF 45% predicted followed by cardiology, bronchitis, lifelong asthma which has required intermittent Kenalog injections in the past.    Reviewed in clinic vitals including /80, HR 60, O2 sat 94% on room air and BMI of 25.13 kg/m².    Reviewed home medication regimen including Symbicort,lisinopril-no persistent cough, albuterol which he has not been requiring.    Reviewed most recent imaging including chest x-ray obtained 2/1/2021 demonstrating bilateral pulmonary opacities consistent with atypical pneumonitis/possible COVID-19 pneumonia.    Echocardiogram obtained 12/3/2019 demonstrated normal left ventricular chamber size, wall thickness.  LVEF estimated at 45%.  Normal right ventricular size with reduced right ventricular systolic function, estimated RVSP of 20 mmHg, hypokinesis of the inferior septal wall and apex, no significant change since 2016 study.     Overnight home sleep study obtained 2/4/2019 demonstrated mild GRANT supine position with overall AHI of 11/h, low O2 sat of 86%, 14 minutes below 90%.  Patient using oral appliance.  Refused CPAP.     Most recent pulmonary function testing obtained 8/3/2017 demonstrating FEV1 of 2.46 L or 84% predicted, 9% improvement postbronchodilator, FVC 3.61 L or 94% predicted, FEV1/FVC ratio 68, residual volume 157% predicted, total lung capacity 118% predicted, DLCO 156% predicted.  Per pulmonologist interpretation mild obstructive lung disease with partial reversibility component.    Review of Systems   Constitutional: Negative for chills, fever, malaise/fatigue and  weight loss.   HENT: Positive for congestion (allergies) and nosebleeds (occasionally, allergies). Negative for hearing loss, sinus pain, sore throat and tinnitus.    Eyes:        Presc reader    Respiratory: Negative for cough, sputum production, shortness of breath and wheezing.    Cardiovascular: Negative for chest pain (Resolved), palpitations, orthopnea and leg swelling.   Gastrointestinal: Positive for heartburn (occasionally dietary).        No dentures, no difficulty swallowing    Neurological: Negative for dizziness, tremors and headaches.   Psychiatric/Behavioral: The patient does not have insomnia.        Past Medical History:   Diagnosis Date   • Acute MI, anterior wall s/p bms to LAD 12/11 with -RCA and residual CX dz (stent failed) 12/31/2011   • AICD (automatic cardioverter/defibrillator) present [employer] placed 4/23/12 4/24/2012    DEVICE DATA:  1. Pulse generator:  [employer], model #DR7026-26, serial  #[id number]  2. Right ventricular lead:  [employer], model #7120/60,  serial #[id number]  MEASURED INTRAOPERATIVE DATA: R-waves measured 11.7 mV, pacing  threshold 0.75 volts at 0.5 msec, lead impedance of 859 ohms.  DEVICE SETTINGS: VVI 40 to 120.    • Arrhythmia    • ASTHMA     as a child   • Atrial fib/flutter, transient     when in cardiogenic shock   • Bronchitis    • CAD (coronary artery disease)    • Cardiogenic shock (HCC) 1/2012   • Cardiomyopathy, ischemic EF 35% 12/31/2011   • Congestive heart failure (HCC)    • Dyslipidemia    • Fatty liver    • Hypertension     ON NO MEDS, now on meds   • Influenza    • Myocardial infarct (HCC) 12/29/2011   • Myocardial infarction of anterolateral wall (HCC) 12/29/2011   • Pacemaker 04/23/2012   • Tuberculosis     20 years ago on meds for 6  mo       Past Surgical History:   Procedure Laterality Date   • RECOVERY  8/30/2013    Performed by Cath-Recovery Surgery at [facility]   • RECOVERY  4/23/2012    Performed by  SURGERY, CATH-RECOVERY at [facility]   • MULTIPLE CORONARY ARTERY BYPASS ENDO VEIN HARVEST  1/3/2012    Performed by PADMINI OCHOA at SURGERY [facility]   • UMBILICAL HERNIA REPAIR  2011    Performed by CHUY CHRISTENSEN at [facility]ERY [facility]   • COLONOSCOPY      normal   • PACEMAKER INSERTION         Family History   Problem Relation Age of Onset   • Cancer Mother         ovarian cancer   • Heart Disease Maternal Aunt 17        unclear but MI!   • Heart Disease Maternal Uncle 38       Social History     Socioeconomic History   • Marital status:      Spouse name: Not on file   • Number of children: Not on file   • Years of education: Not on file   • Highest education level: Not on file   Occupational History   • Not on file   Tobacco Use   • Smoking status: Former Smoker     Years: 10.00     Types: Cigarettes     Quit date: 3/1/1982     Years since quittin.1   • Smokeless tobacco: Never Used   • Tobacco comment: QUIT , per patient half a cigarette a day   Substance and Sexual Activity   • Alcohol use: No     Alcohol/week: 0.0 oz     Comment: occasionally ( 2 times a year)   • Drug use: No   • Sexual activity: Not Currently   Other Topics Concern   • Not on file   Social History Narrative   • Not on file     Social Determinants of Health     Financial Resource Strain:    • Difficulty of Paying Living Expenses:    Food Insecurity:    • Worried About Running Out of Food in the Last Year:    • Ran Out of Food in the Last Year:    Transportation Needs:    • Lack of Transportation (Medical):    • Lack of Transportation (Non-Medical):    Physical Activity:    • Days of Exercise per Week:    • Minutes of Exercise per Session:    Stress:    • Feeling of Stress :    Social Connections:    • Frequency of Communication with Friends and Family:    • Frequency of Social Gatherings with Friends and Family:    • Attends Jewish Services:    • Active Member of Clubs  "or Organizations:    • Attends Club or Organization Meetings:    • Marital Status:    Intimate Partner Violence:    • Fear of Current or Ex-Partner:    • Emotionally Abused:    • Physically Abused:    • Sexually Abused:        Allergies as of 04/22/2021 - Reviewed 01/26/2021   Allergen Reaction Noted   • Pcn [penicillins] Rash 09/21/2010        @Vital signs for this encounter:  Vitals:    04/22/21 1519   Height: 1.651 m (5' 5\")   Weight: 68.5 kg (151 lb)   Weight % change since last entry.: 0 %   BP: 120/80   Pulse: 60   BMI (Calculated): 25.13   Resp: 16       Current medications as of today   Current Outpatient Medications   Medication Sig Dispense Refill   • rosuvastatin (CRESTOR) 20 MG Tab TAKE 1 TABLET BY MOUTH EVERYDAY AT BEDTIME 90 Tab 2   • albuterol (PROVENTIL HFA) 108 (90 Base) MCG/ACT Aero Soln inhalation aerosol Inhale 2 Puffs every four hours as needed for Shortness of Breath (Wheezing). 1 Each 1   • methylPREDNISolone (MEDROL DOSEPAK) 4 MG Tablet Therapy Pack Take as directed. 21 Tab 0   • SYMBICORT 160-4.5 MCG/ACT Aerosol INHALE 2 PUFFS BY MOUTH 2 TIMES A DAY. USE SPACER. RINSE MOUTH AFTER EACH USE. 1 Each 2   • lisinopril (PRINIVIL) 10 MG Tab Take 1 Tab by mouth every day. 30 Tab 11   • metoprolol SR (TOPROL XL) 50 MG TABLET SR 24 HR Take 1 Tab by mouth every day. 30 Tab 11   • albuterol 108 (90 Base) MCG/ACT Aero Soln inhalation aerosol Inhale 2 Puffs by mouth every 6 hours as needed. 8.5 g 0   • aspirin EC (ECOTRIN) 81 MG TBEC Take 81 mg by mouth every day.     • Multiple Vitamin (MULTI-VITAMIN PO) Take  by mouth.       No current facility-administered medications for this visit.         Physical Exam:   Gen:           Alert and oriented, No apparent distress. Mood and affect appropriate, normal interaction with provider.  Eyes:          sclere white, conjunctive moist.  Hearing:     Grossly intact.  Dentition:    Fair dentition.  Oropharynx:   Tongue normal, posterior pharynx without erythema or " exudate.  Neck:        Supple, trachea midline, no masses.  Respiratory Effort: No intercostal retractions or use of accessory muscles.   Lung Auscultation:      Slightly decreased, clear to auscultation bilaterally; no rales, rhonchi or wheezing.  CV:            Regular rate and rhythm. No edema. No murmurs, rubs or gallops.  Digits, Nails, Ext: No clubbing, cyanosis, petechiae, or nodes.   Skin:        No rashes, lesions or ulcers noted on exposed skin surfaces.                     Assessment:  1. Mild persistent asthma without complication  budesonide-formoterol (SYMBICORT) 80-4.5 MCG/ACT Aerosol   2. History of COVID-19  DX-CHEST-LIMITED (1 VIEW)   3. Obstructive sleep apnea         Immunizations:    Flu: 9/21/2020  Pneumovax 23: 10/1/2012  Prevnar 13: Deferred    Plan:  65 y.o. year old male here today for follow-up on mild persistent asthma, history of COVID-19 infection 1/7/2021.  Last seen in clinic 1/22/2021.  Remote smoking history with reported quit date in 1985 and 10 pack years.    Pertinent past medical history includes mild GRANT using dental appliance, hypertension, MI, congestive heart failure, cardiomyopathy with AICD, EF 45% predicted followed by cardiology, bronchitis, lifelong asthma which has required intermittent Kenalog injections in the past.    GRANT: Using dental appliance with mild nocturnal hypoxia persisting based on overnight oximetry.      Reviewed in clinic vitals including /80, HR 60, O2 sat 94% on room air and BMI of 25.13 kg/m².    Reviewed home medication regimen including Symbicort, lisinopril-no persistent cough, albuterol which he has not been requiring.    Mild persistent asthma: Symptoms well managed on daily Symbicort.  Would like to consider stopping Symbicort.  Refill provided, decreased dose.  Updated pulmonary function test previously ordered.     Reviewed most recent imaging including chest x-ray obtained 2/1/2021 demonstrating bilateral pulmonary opacities  consistent with atypical pneumonitis/possible COVID-19 pneumonia.    History of COVID-19 pneumonia: Chest x-ray 2/1/2021 reviewed, follow-up chest x-ray ordered.    Patient has had Covid vaccine x1, second dose pending.  Continue COVID-19 precautions including wearing mask in public, social distancing and frequent handwashing, annual flu shot.    Follow-up in 3 months, sooner if needed.    This dictation was created using voice recognition software. The accuracy of the dictation is limited to the abilities of the software. I expect there may be some errors of grammar and possibly content.

## 2021-04-22 NOTE — PATIENT INSTRUCTIONS
1-had covid vaccine x 1  2-decrease dose of symbicort  3-follow up xray for resolution  4-follow up 3 months

## 2021-04-30 DIAGNOSIS — J45.30 MILD PERSISTENT ASTHMA WITHOUT COMPLICATION: ICD-10-CM

## 2021-04-30 RX ORDER — BUDESONIDE AND FORMOTEROL FUMARATE DIHYDRATE 80; 4.5 UG/1; UG/1
2 AEROSOL RESPIRATORY (INHALATION) 2 TIMES DAILY
Qty: 10.2 G | Refills: 3 | Status: SHIPPED | OUTPATIENT
Start: 2021-04-30 | End: 2022-04-20 | Stop reason: SDUPTHER

## 2021-04-30 NOTE — TELEPHONE ENCOUNTER
Caller: Abram    Phone Number: [phone] (home)     Message: Pt called and lm. RE: Allergy medication. Would like a call back.          04/30/21:  Called and spoke with pt. Pt said the rx, Symbicort, his insurance is not covering.  It's now >$400.  Told pt I will call his pharmacy to see what's going on with his rx. Pt understood.    Called and spoke with [facility] pharmacy. Pt's insurance will cover the Brand name only. But even with that pt would have to pay $295 for 1 inhaler and if pt paid cash for generic it would be $245 for 1 inhaler.

## 2021-05-03 ENCOUNTER — PHARMACY VISIT (OUTPATIENT)
Dept: PHARMACY | Facility: MEDICAL CENTER | Age: 66
End: 2021-05-03
Payer: COMMERCIAL

## 2021-05-03 PROCEDURE — RXMED WILLOW AMBULATORY MEDICATION CHARGE: Performed by: PHYSICIAN ASSISTANT

## 2021-05-03 NOTE — TELEPHONE ENCOUNTER
Called and spoke with [facility] to check on Symbicort rx.  It went through. For a $35 co-pay.    Called and spoke with pt. Notified pt of this.

## 2021-05-03 NOTE — TELEPHONE ENCOUNTER
You  Ольга Johns P.A.-C. 3 days ago     Pended rx to be sent to [facility] on [address]. This pharmacy is cheaper for [facility] employees. Notified pt of this and he was okay with this.

## 2021-05-25 ENCOUNTER — HOSPITAL ENCOUNTER (OUTPATIENT)
Dept: RADIOLOGY | Facility: MEDICAL CENTER | Age: 66
End: 2021-05-25
Attending: PHYSICIAN ASSISTANT
Payer: COMMERCIAL

## 2021-05-25 DIAGNOSIS — Z86.16 HISTORY OF COVID-19: ICD-10-CM

## 2021-05-25 PROCEDURE — 71045 X-RAY EXAM CHEST 1 VIEW: CPT

## 2021-06-01 ENCOUNTER — OFFICE VISIT (OUTPATIENT)
Dept: MEDICAL GROUP | Facility: MEDICAL CENTER | Age: 66
End: 2021-06-01
Payer: COMMERCIAL

## 2021-06-01 VITALS
DIASTOLIC BLOOD PRESSURE: 60 MMHG | WEIGHT: 154 LBS | HEIGHT: 65 IN | BODY MASS INDEX: 25.66 KG/M2 | SYSTOLIC BLOOD PRESSURE: 114 MMHG | OXYGEN SATURATION: 95 % | RESPIRATION RATE: 16 BRPM | HEART RATE: 61 BPM | TEMPERATURE: 98 F

## 2021-06-01 DIAGNOSIS — I25.84 CORONARY ARTERY DISEASE DUE TO CALCIFIED CORONARY LESION: ICD-10-CM

## 2021-06-01 DIAGNOSIS — I25.10 CORONARY ARTERY DISEASE DUE TO CALCIFIED CORONARY LESION: ICD-10-CM

## 2021-06-01 DIAGNOSIS — E78.5 DYSLIPIDEMIA: ICD-10-CM

## 2021-06-01 DIAGNOSIS — Z00.00 ROUTINE GENERAL MEDICAL EXAMINATION AT A HEALTH CARE FACILITY: ICD-10-CM

## 2021-06-01 DIAGNOSIS — Z00.00 LABORATORY EXAM ORDERED AS PART OF ROUTINE GENERAL MEDICAL EXAMINATION: ICD-10-CM

## 2021-06-01 DIAGNOSIS — I10 ESSENTIAL HYPERTENSION: ICD-10-CM

## 2021-06-01 PROCEDURE — 99396 PREV VISIT EST AGE 40-64: CPT | Performed by: FAMILY MEDICINE

## 2021-06-01 RX ORDER — LISINOPRIL 10 MG/1
10 TABLET ORAL
Qty: 90 TABLET | Refills: 3 | Status: SHIPPED | OUTPATIENT
Start: 2021-06-01 | End: 2022-03-21

## 2021-06-01 RX ORDER — ROSUVASTATIN CALCIUM 20 MG/1
20 TABLET, COATED ORAL EVERY EVENING
COMMUNITY
End: 2021-06-01

## 2021-06-01 RX ORDER — ROSUVASTATIN CALCIUM 20 MG/1
TABLET, COATED ORAL
Status: CANCELLED | OUTPATIENT
Start: 2021-06-01

## 2021-06-01 RX ORDER — METOPROLOL SUCCINATE 50 MG/1
50 TABLET, EXTENDED RELEASE ORAL
Qty: 90 TABLET | Refills: 3 | Status: SHIPPED | OUTPATIENT
Start: 2021-06-01 | End: 2022-05-25 | Stop reason: SDUPTHER

## 2021-06-01 RX ORDER — ROSUVASTATIN CALCIUM 20 MG/1
20 TABLET, COATED ORAL
Qty: 90 TABLET | Refills: 3 | Status: SHIPPED | OUTPATIENT
Start: 2021-06-01 | End: 2022-02-22

## 2021-06-01 ASSESSMENT — ENCOUNTER SYMPTOMS
HEADACHES: 0
ORTHOPNEA: 0
HALLUCINATIONS: 0
NECK PAIN: 0
DIZZINESS: 0
PALPITATIONS: 0
NAUSEA: 0
INSOMNIA: 0
FOCAL WEAKNESS: 0
BACK PAIN: 0
DOUBLE VISION: 0
MYALGIAS: 0
TINGLING: 0
BRUISES/BLEEDS EASILY: 0
FEVER: 0
CHILLS: 0
SENSORY CHANGE: 0
SPEECH CHANGE: 0
ABDOMINAL PAIN: 0
WEIGHT LOSS: 0
DIARRHEA: 0
NERVOUS/ANXIOUS: 0
SEIZURES: 0
SHORTNESS OF BREATH: 0
COUGH: 0
TREMORS: 0
VOMITING: 0
WHEEZING: 0
LOSS OF CONSCIOUSNESS: 0
WEAKNESS: 0
SPUTUM PRODUCTION: 0
BLOOD IN STOOL: 0
MEMORY LOSS: 0
HEARTBURN: 0
BLURRED VISION: 0
DIAPHORESIS: 0
SORE THROAT: 0
DEPRESSION: 0

## 2021-06-01 ASSESSMENT — LIFESTYLE VARIABLES: SUBSTANCE_ABUSE: 0

## 2021-06-01 ASSESSMENT — PATIENT HEALTH QUESTIONNAIRE - PHQ9: CLINICAL INTERPRETATION OF PHQ2 SCORE: 0

## 2021-06-01 NOTE — PROGRESS NOTES
Subjective:      Abram Barillas is a 65 y.o. male who presents with Annual Exam         He is here for his annual medical examination.    HPI     has a past medical history of Acute MI, anterior wall s/p bms to LAD 12/11 with -RCA and residual CX dz (stent failed) (12/31/2011), AICD (automatic cardioverter/defibrillator) present St Zach placed 4/23/12 (4/24/2012), Arrhythmia, ASTHMA, Atrial fib/flutter, transient, Bronchitis, CAD (coronary artery disease), Cardiogenic shock (HCC) (1/2012), Cardiomyopathy, ischemic EF 35% (12/31/2011), Congestive heart failure (HCC), Dyslipidemia, Fatty liver, Hypertension, Influenza, Myocardial infarct (HCC) (12/29/2011), Myocardial infarction of anterolateral wall (HCC) (12/29/2011), Pacemaker (04/23/2012), and Tuberculosis.   has a past surgical history that includes multiple coronary artery bypass endo vein harvest (1/3/2012); colonoscopy (2007); umbilical hernia repair (1/14/2011); recovery (4/23/2012); recovery (8/30/2013); and pacemaker insertion.  family history includes Cancer in his mother; Heart Disease (age of onset: 17) in his maternal aunt; Heart Disease (age of onset: 38) in his maternal uncle.   reports that he quit smoking about 39 years ago. His smoking use included cigarettes. He quit after 10.00 years of use. He has never used smokeless tobacco. He reports that he does not drink alcohol and does not use drugs.      Current Outpatient Medications:   •  budesonide-formoterol (SYMBICORT) 80-4.5 MCG/ACT Aerosol, Inhale 2 Puffs 2 times a day. Use with spacer.  Rinse mouth after each use., Disp: 10.2 g, Rfl: 3  •  rosuvastatin (CRESTOR) 20 MG Tab, TAKE 1 TABLET BY MOUTH EVERYDAY AT BEDTIME, Disp: 90 Tab, Rfl: 2  •  albuterol (PROVENTIL HFA) 108 (90 Base) MCG/ACT Aero Soln inhalation aerosol, Inhale 2 Puffs every four hours as needed for Shortness of Breath (Wheezing)., Disp: 1 Each, Rfl: 1  •  lisinopril (PRINIVIL) 10 MG Tab, Take 1 Tab by mouth every day.,  "Disp: 30 Tab, Rfl: 11  •  metoprolol SR (TOPROL XL) 50 MG TABLET SR 24 HR, Take 1 Tab by mouth every day., Disp: 30 Tab, Rfl: 11  •  albuterol 108 (90 Base) MCG/ACT Aero Soln inhalation aerosol, Inhale 2 Puffs by mouth every 6 hours as needed., Disp: 8.5 g, Rfl: 0  •  aspirin EC (ECOTRIN) 81 MG TBEC, Take 81 mg by mouth every day., Disp: , Rfl:   •  Multiple Vitamin (MULTI-VITAMIN PO), Take  by mouth., Disp: , Rfl:   is allergic to pcn [penicillins].    Review of Systems   Constitutional: Negative for chills, diaphoresis, fever, malaise/fatigue and weight loss.   HENT: Negative for congestion, hearing loss, sore throat and tinnitus.    Eyes: Negative for blurred vision and double vision.   Respiratory: Negative for cough, sputum production, shortness of breath and wheezing.    Cardiovascular: Negative for chest pain, palpitations, orthopnea and leg swelling.   Gastrointestinal: Negative for abdominal pain, blood in stool, diarrhea, heartburn, nausea and vomiting.   Genitourinary: Negative for dysuria, frequency, hematuria and urgency.   Musculoskeletal: Negative for back pain, joint pain, myalgias and neck pain.   Skin: Negative for rash.   Neurological: Negative for dizziness, tingling, tremors, sensory change, speech change, focal weakness, seizures, loss of consciousness, weakness and headaches.   Endo/Heme/Allergies: Negative for environmental allergies. Does not bruise/bleed easily.   Psychiatric/Behavioral: Negative for depression, hallucinations, memory loss, substance abuse and suicidal ideas. The patient is not nervous/anxious and does not have insomnia.           Objective:     /60   Pulse 61   Temp 36.7 °C (98 °F)   Resp 16   Ht 1.651 m (5' 5\")   Wt 69.9 kg (154 lb)   SpO2 95%   BMI 25.63 kg/m²      Physical Exam  Vitals reviewed.   Constitutional:       Appearance: He is well-developed.   HENT:      Head: Normocephalic and atraumatic.   Eyes:      General:         Left eye: No discharge.     "  Pupils: Pupils are equal, round, and reactive to light.   Neck:      Thyroid: No thyromegaly.      Vascular: No JVD.   Cardiovascular:      Rate and Rhythm: Normal rate and regular rhythm.      Heart sounds: Normal heart sounds. No murmur heard.     Pulmonary:      Effort: Pulmonary effort is normal. No respiratory distress.      Breath sounds: Normal breath sounds. No wheezing or rales.   Abdominal:      General: Bowel sounds are normal. There is no distension.      Palpations: Abdomen is soft. There is no mass.      Tenderness: There is no abdominal tenderness.   Musculoskeletal:         General: Normal range of motion.      Cervical back: Normal range of motion and neck supple.   Lymphadenopathy:      Cervical: No cervical adenopathy.   Skin:     General: Skin is warm and dry.      Findings: No erythema or rash.      Comments: Left upper chest pacemaker in place.  No erythema or edema appreciated.   Neurological:      Mental Status: He is alert and oriented to person, place, and time.      Coordination: Coordination normal.   Psychiatric:         Behavior: Behavior normal.          previous lab tests reviewed              Assessment/Plan:        1. Routine general medical examination at a health care facility  He is generally well and medically stable.    2. Laboratory exam ordered as part of routine general medical examination  Routine orders discussed and placed  - Comp Metabolic Panel; Future  - Lipid Profile; Future  - TSH; Future  - CBC WITHOUT DIFFERENTIAL; Future    Recheck one year, sooner as needed

## 2021-06-12 ENCOUNTER — HOSPITAL ENCOUNTER (OUTPATIENT)
Dept: LAB | Facility: MEDICAL CENTER | Age: 66
End: 2021-06-12
Attending: FAMILY MEDICINE
Payer: COMMERCIAL

## 2021-06-12 DIAGNOSIS — Z00.00 LABORATORY EXAM ORDERED AS PART OF ROUTINE GENERAL MEDICAL EXAMINATION: ICD-10-CM

## 2021-06-12 LAB
ALBUMIN SERPL BCP-MCNC: 4.5 G/DL (ref 3.2–4.9)
ALBUMIN/GLOB SERPL: 1.7 G/DL
ALP SERPL-CCNC: 95 U/L (ref 30–99)
ALT SERPL-CCNC: 49 U/L (ref 2–50)
ANION GAP SERPL CALC-SCNC: 8 MMOL/L (ref 7–16)
AST SERPL-CCNC: 42 U/L (ref 12–45)
BILIRUB SERPL-MCNC: 0.9 MG/DL (ref 0.1–1.5)
BUN SERPL-MCNC: 16 MG/DL (ref 8–22)
CALCIUM SERPL-MCNC: 9.3 MG/DL (ref 8.5–10.5)
CHLORIDE SERPL-SCNC: 107 MMOL/L (ref 96–112)
CHOLEST SERPL-MCNC: 118 MG/DL (ref 100–199)
CO2 SERPL-SCNC: 24 MMOL/L (ref 20–33)
CREAT SERPL-MCNC: 0.57 MG/DL (ref 0.5–1.4)
ERYTHROCYTE [DISTWIDTH] IN BLOOD BY AUTOMATED COUNT: 42.5 FL (ref 35.9–50)
GLOBULIN SER CALC-MCNC: 2.7 G/DL (ref 1.9–3.5)
GLUCOSE SERPL-MCNC: 115 MG/DL (ref 65–99)
HCT VFR BLD AUTO: 51 % (ref 42–52)
HDLC SERPL-MCNC: 34 MG/DL
HGB BLD-MCNC: 17.8 G/DL (ref 14–18)
LDLC SERPL CALC-MCNC: 66 MG/DL
MCH RBC QN AUTO: 32.1 PG (ref 27–33)
MCHC RBC AUTO-ENTMCNC: 34.9 G/DL (ref 33.7–35.3)
MCV RBC AUTO: 92.1 FL (ref 81.4–97.8)
PLATELET # BLD AUTO: 148 K/UL (ref 164–446)
PMV BLD AUTO: 10.4 FL (ref 9–12.9)
POTASSIUM SERPL-SCNC: 4.2 MMOL/L (ref 3.6–5.5)
PROT SERPL-MCNC: 7.2 G/DL (ref 6–8.2)
RBC # BLD AUTO: 5.54 M/UL (ref 4.7–6.1)
SODIUM SERPL-SCNC: 139 MMOL/L (ref 135–145)
TRIGL SERPL-MCNC: 92 MG/DL (ref 0–149)
TSH SERPL DL<=0.005 MIU/L-ACNC: 2.07 UIU/ML (ref 0.38–5.33)
WBC # BLD AUTO: 5.8 K/UL (ref 4.8–10.8)

## 2021-06-12 PROCEDURE — 36415 COLL VENOUS BLD VENIPUNCTURE: CPT

## 2021-06-12 PROCEDURE — 80061 LIPID PANEL: CPT

## 2021-06-12 PROCEDURE — 80053 COMPREHEN METABOLIC PANEL: CPT

## 2021-06-12 PROCEDURE — 84443 ASSAY THYROID STIM HORMONE: CPT

## 2021-06-12 PROCEDURE — 85027 COMPLETE CBC AUTOMATED: CPT

## 2021-06-15 ENCOUNTER — OFFICE VISIT (OUTPATIENT)
Dept: CARDIOLOGY | Facility: MEDICAL CENTER | Age: 66
End: 2021-06-15
Payer: COMMERCIAL

## 2021-06-15 ENCOUNTER — NON-PROVIDER VISIT (OUTPATIENT)
Dept: CARDIOLOGY | Facility: MEDICAL CENTER | Age: 66
End: 2021-06-15
Payer: COMMERCIAL

## 2021-06-15 VITALS
DIASTOLIC BLOOD PRESSURE: 78 MMHG | HEART RATE: 47 BPM | WEIGHT: 156 LBS | SYSTOLIC BLOOD PRESSURE: 116 MMHG | OXYGEN SATURATION: 95 % | BODY MASS INDEX: 25.99 KG/M2 | HEIGHT: 65 IN

## 2021-06-15 DIAGNOSIS — Z87.891 FORMER SMOKER: ICD-10-CM

## 2021-06-15 DIAGNOSIS — I10 ESSENTIAL HYPERTENSION: ICD-10-CM

## 2021-06-15 DIAGNOSIS — Z95.810 AICD (AUTOMATIC CARDIOVERTER/DEFIBRILLATOR) PRESENT: ICD-10-CM

## 2021-06-15 DIAGNOSIS — I25.5 CARDIOMYOPATHY, ISCHEMIC: ICD-10-CM

## 2021-06-15 DIAGNOSIS — E78.5 DYSLIPIDEMIA: ICD-10-CM

## 2021-06-15 DIAGNOSIS — I25.84 CORONARY ARTERY DISEASE DUE TO CALCIFIED CORONARY LESION: ICD-10-CM

## 2021-06-15 DIAGNOSIS — I50.9 CHRONIC CONGESTIVE HEART FAILURE, UNSPECIFIED HEART FAILURE TYPE (HCC): Chronic | ICD-10-CM

## 2021-06-15 DIAGNOSIS — I21.09 MYOCARDIAL INFARCTION OF ANTEROLATERAL WALL (HCC): Chronic | ICD-10-CM

## 2021-06-15 DIAGNOSIS — G47.33 OBSTRUCTIVE SLEEP APNEA: ICD-10-CM

## 2021-06-15 DIAGNOSIS — I21.09 ACUTE MI, ANTERIOR WALL, INITIAL EPISODE OF CARE (HCC): ICD-10-CM

## 2021-06-15 DIAGNOSIS — K76.0 FATTY LIVER: ICD-10-CM

## 2021-06-15 DIAGNOSIS — T82.110A AICD LEAD MALFUNCTION: ICD-10-CM

## 2021-06-15 DIAGNOSIS — I25.10 CORONARY ARTERY DISEASE DUE TO CALCIFIED CORONARY LESION: ICD-10-CM

## 2021-06-15 DIAGNOSIS — Z95.0 PACEMAKER: Chronic | ICD-10-CM

## 2021-06-15 PROCEDURE — 93282 PRGRMG EVAL IMPLANTABLE DFB: CPT | Performed by: INTERNAL MEDICINE

## 2021-06-15 PROCEDURE — 99214 OFFICE O/P EST MOD 30 MIN: CPT | Mod: 25 | Performed by: INTERNAL MEDICINE

## 2021-06-15 ASSESSMENT — ENCOUNTER SYMPTOMS
CLAUDICATION: 0
COUGH: 0
MUSCULOSKELETAL NEGATIVE: 1
CHILLS: 0
EYES NEGATIVE: 1
HEMOPTYSIS: 0
PALPITATIONS: 0
ORTHOPNEA: 0
CONSTITUTIONAL NEGATIVE: 1
DIZZINESS: 0
GASTROINTESTINAL NEGATIVE: 1
BRUISES/BLEEDS EASILY: 0
SORE THROAT: 0
PND: 0
NEUROLOGICAL NEGATIVE: 1
LOSS OF CONSCIOUSNESS: 0
RESPIRATORY NEGATIVE: 1
SPUTUM PRODUCTION: 0
CARDIOVASCULAR NEGATIVE: 1
FEVER: 0
SHORTNESS OF BREATH: 0
WHEEZING: 0
STRIDOR: 0
WEAKNESS: 0

## 2021-06-15 ASSESSMENT — FIBROSIS 4 INDEX: FIB4 SCORE: 2.59

## 2021-06-15 NOTE — PROGRESS NOTES
Chief Complaint   Patient presents with   • Congestive Heart Failure   • Dyslipidemia   • Atrial Fibrillation   • Atrial Flutter   • HTN (Controlled)   • MI (Non ST Segment Elevation MI)     & Acute MI, anterior wall s/p bms to LAD 12/11 with -RCA and residual CX dz (stent failed)       Subjective:   Abram Barillas is a 64 y.o. male who presents today as a follow-up for his history of CAD status post CABG with hypertension hyperlipidemia and heart failure with reduced ejection fraction with mostly normalization of his function.  He had bypass surgery in 2012.  Is followed by Dr. Miramontes.  Since he was last seen he has been active.  He works in the [employer] here at [employer].  He has no chest pain shortness of breath PND orthopnea.  He is otherwise doing well.    Past Medical History:   Diagnosis Date   • Acute MI, anterior wall s/p bms to LAD 12/11 with -RCA and residual CX dz (stent failed) 12/31/2011   • AICD (automatic cardioverter/defibrillator) present St Zach placed 4/23/12 4/24/2012    DEVICE DATA:  1. Pulse generator:  [employer] Zach, model #AO5017-47, serial  #[id number]  2. Right ventricular lead:  St. Zach, model #7120/60,  serial #[id number]  MEASURED INTRAOPERATIVE DATA: R-waves measured 11.7 mV, pacing  threshold 0.75 volts at 0.5 msec, lead impedance of 859 ohms.  DEVICE SETTINGS: VVI 40 to 120.    • Arrhythmia    • ASTHMA     as a child   • Atrial fib/flutter, transient     when in cardiogenic shock   • Bronchitis    • CAD (coronary artery disease)    • Cardiogenic shock (HCC) 1/2012   • Cardiomyopathy, ischemic EF 35% 12/31/2011   • Congestive heart failure (HCC)    • Dyslipidemia    • Fatty liver    • Hypertension     ON NO MEDS, now on meds   • Influenza    • Myocardial infarct (HCC) 12/29/2011   • Myocardial infarction of anterolateral wall (HCC) 12/29/2011   • Pacemaker 04/23/2012   • Tuberculosis     20 years ago on meds for 6  mo     Past Surgical History:   Procedure  Laterality Date   • RECOVERY  2013    [facility]   • RECOVERY  2012    Performed by SURGERY[facility]   • MULTIPLE CORONARY ARTERY BYPASS ENDO VEIN HARVEST  1/3/2012    Performed by PADMINI OCHOA at SURGERY [facility]   • UMBILICAL HERNIA REPAIR  2011    Performed by CHUY [facility]RI[facility]ENSEN [facility]   • COLONOSCOPY      normal   • PACEMAKER INSERTION       Family History   Problem Relation Age of Onset   • Cancer Mother         ovarian cancer   • Heart Disease Maternal Aunt 17        unclear but MI!   • Heart Disease Maternal Uncle 38     Social History     Socioeconomic History   • Marital status:      Spouse name: Not on file   • Number of children: Not on file   • Years of education: Not on file   • Highest education level: Not on file   Occupational History   • Not on file   Tobacco Use   • Smoking status: Former Smoker     Years: 10.00     Types: Cigarettes     Quit date: 3/1/1982     Years since quittin.3   • Smokeless tobacco: Never Used   • Tobacco comment: QUIT    Vaping Use   • Vaping Use: Never used   Substance and Sexual Activity   • Alcohol use: No     Alcohol/week: 0.0 oz     Comment: occasionally ( 2 times a year)   • Drug use: No   • Sexual activity: Not Currently   Other Topics Concern   • Not on file   Social History Narrative   • Not on file     Social Determinants of Health     Financial Resource Strain:    • Difficulty of Paying Living Expenses:    Food Insecurity:    • Worried About Running Out of Food in the Last Year:    • Ran Out of Food in the Last Year:    Transportation Needs:    • Lack of Transportation (Medical):    • Lack of Transportation (Non-Medical):    Physical Activity:    • Days of Exercise per Week:    • Minutes of Exercise per Session:    Stress:    • Feeling of Stress :    Social Connections:    • Frequency of  Communication with Friends and Family:    • Frequency of Social Gatherings with Friends and Family:    • Attends [employer] Services:    • Active Member of Clubs or Organizations:    • Attends Club or Organization Meetings:    • Marital Status:    Intimate Partner Violence:    • Fear of Current or Ex-Partner:    • Emotionally Abused:    • Physically Abused:    • Sexually Abused:      Allergies   Allergen Reactions   • Pcn [Penicillins] Rash     Rash     Outpatient Encounter Medications as of 6/15/2021   Medication Sig Dispense Refill   • rosuvastatin (CRESTOR) 20 MG Tab Take 1 tablet by mouth at bedtime. 90 tablet 3   • lisinopril (PRINIVIL) 10 MG Tab Take 1 tablet by mouth every day. 90 tablet 3   • metoprolol SR (TOPROL XL) 50 MG TABLET SR 24 HR Take 1 tablet by mouth every day. 90 tablet 3   • budesonide-formoterol (SYMBICORT) 80-4.5 MCG/ACT Aerosol Inhale 2 Puffs 2 times a day. Use with spacer.  Rinse mouth after each use. 10.2 g 3   • albuterol (PROVENTIL HFA) 108 (90 Base) MCG/ACT Aero Soln inhalation aerosol Inhale 2 Puffs every four hours as needed for Shortness of Breath (Wheezing). 1 Each 1   • albuterol 108 (90 Base) MCG/ACT Aero Soln inhalation aerosol Inhale 2 Puffs by mouth every 6 hours as needed. 8.5 g 0   • aspirin EC (ECOTRIN) 81 MG TBEC Take 81 mg by mouth every day.     • Multiple Vitamin (MULTI-VITAMIN PO) Take  by mouth.       No facility-administered encounter medications on file as of 6/15/2021.     Review of Systems   Constitutional: Negative.  Negative for chills, fever and malaise/fatigue.   HENT: Negative.  Negative for sore throat.    Eyes: Negative.    Respiratory: Negative.  Negative for cough, hemoptysis, sputum production, shortness of breath, wheezing and stridor.    Cardiovascular: Negative.  Negative for chest pain, palpitations, orthopnea, claudication, leg swelling and PND.   Gastrointestinal: Negative.    Genitourinary: Negative.    Musculoskeletal: Negative.    Skin: Negative.   "  Neurological: Negative.  Negative for dizziness, loss of consciousness and weakness.   Endo/Heme/Allergies: Negative.  Does not bruise/bleed easily.   All other systems reviewed and are negative.       Objective:   /78 (BP Location: Right arm, Patient Position: Sitting, BP Cuff Size: Adult)   Pulse (!) 47   Ht 1.651 m (5' 5\")   Wt 70.8 kg (156 lb)   SpO2 95%   BMI 25.96 kg/m²     Physical Exam   Constitutional: He appears well-developed. No distress.   HENT:   Head: Normocephalic and atraumatic.   Right Ear: External ear normal.   Left Ear: External ear normal.   Nose: Nose normal.   Mouth/Throat: No oropharyngeal exudate.   Eyes: Pupils are equal, round, and reactive to light. Conjunctivae are normal. Right eye exhibits no discharge. Left eye exhibits no discharge. No scleral icterus.   Neck: No JVD present.   Cardiovascular: Normal rate and regular rhythm. Exam reveals no gallop and no friction rub.   No murmur heard.  Pulmonary/Chest: Effort normal. No stridor. No respiratory distress. He has no wheezes. He has no rales. He exhibits no tenderness.   Abdominal: Soft. He exhibits no distension. There is no guarding.   Musculoskeletal:         General: No tenderness or deformity. Normal range of motion.      Cervical back: Neck supple.   Neurological: He is alert. He has normal reflexes. He displays normal reflexes. No cranial nerve deficit. He exhibits normal muscle tone. Coordination normal.   Skin: Skin is warm and dry. No rash noted. He is not diaphoretic. No erythema. No pallor.   Psychiatric: His behavior is normal. Judgment and thought content normal.   Nursing note and vitals reviewed.      Assessment:     1. AICD (automatic cardioverter/defibrillator) present St Zach placed 4/23/12     2. Acute MI, anterior wall s/p bms to LAD 12/11 with -RCA and residual CX dz (stent failed)     3. AICD lead malfunction     4. Cardiomyopathy, ischemic EF 35%     5. Chronic congestive heart failure, " unspecified heart failure type (HCC)     6. Coronary artery disease due to calcified coronary lesion:Acute MI, anterior wall s/p bms to LAD 12/11 with -RCA and residual CX dz (stent failed)     7. Dyslipidemia     8. Essential hypertension     9. Fatty liver     10. Myocardial infarction of anterolateral wall     11. Obstructive sleep apnea     12. Pacemaker     13. Former smoker         Medical Decision Making:  Today's Assessment / Status / Plan:     64-year-old male with CAD status post CABG hypertension hyperlipidemia and high risk medication usage.  He had his labs recently which I reviewed and are scanned into the computer.  His creatinine electrolytes were all normal.  He is doing well.  He does not require any refills which I recently did.  I will see him back in 1 year.

## 2021-07-08 PROCEDURE — RXMED WILLOW AMBULATORY MEDICATION CHARGE: Performed by: PHYSICIAN ASSISTANT

## 2021-07-12 ENCOUNTER — PHARMACY VISIT (OUTPATIENT)
Dept: PHARMACY | Facility: MEDICAL CENTER | Age: 66
End: 2021-07-12
Payer: COMMERCIAL

## 2021-08-26 ENCOUNTER — SLEEP CENTER VISIT (OUTPATIENT)
Dept: SLEEP MEDICINE | Facility: MEDICAL CENTER | Age: 66
End: 2021-08-26
Payer: COMMERCIAL

## 2021-08-26 VITALS
SYSTOLIC BLOOD PRESSURE: 110 MMHG | HEIGHT: 65 IN | RESPIRATION RATE: 16 BRPM | BODY MASS INDEX: 25.49 KG/M2 | WEIGHT: 153 LBS | HEART RATE: 70 BPM | DIASTOLIC BLOOD PRESSURE: 70 MMHG | OXYGEN SATURATION: 95 %

## 2021-08-26 DIAGNOSIS — Z87.891 FORMER SMOKER: ICD-10-CM

## 2021-08-26 DIAGNOSIS — G47.33 OBSTRUCTIVE SLEEP APNEA: ICD-10-CM

## 2021-08-26 DIAGNOSIS — J45.30 MILD PERSISTENT ASTHMA WITHOUT COMPLICATION: ICD-10-CM

## 2021-08-26 PROCEDURE — 99213 OFFICE O/P EST LOW 20 MIN: CPT | Performed by: PHYSICIAN ASSISTANT

## 2021-08-26 ASSESSMENT — ENCOUNTER SYMPTOMS
PALPITATIONS: 0
HEARTBURN: 0
SINUS PAIN: 0
SPUTUM PRODUCTION: 1
SORE THROAT: 0
FEVER: 0
COUGH: 0
WHEEZING: 0
TREMORS: 0
ORTHOPNEA: 0
ROS GI COMMENTS: NO DENTURES, NO DIFFICULTY SWALLOWING
SHORTNESS OF BREATH: 0
WEIGHT LOSS: 0
CHILLS: 0
HEADACHES: 0
DIZZINESS: 0

## 2021-08-26 ASSESSMENT — FIBROSIS 4 INDEX: FIB4 SCORE: 2.64

## 2021-08-26 NOTE — PROGRESS NOTES
CC: Follow-up    HPI:  Abram Barillas is a 66 y.o. year old male here today for follow-up on persistent asthma and COVID-19 infection 1/7/2021.  Last seen in clinic 4/22/2021.  Remote smoking history with reported 10-pack-year history and quit date 1985.    Pertinent past medical history includes mild GRANT using dental device, hypertension, MI, CHF, cardiomyopathy with AICD, ejection fraction 45% predicted, bronchitis, reports lifelong asthma that has required intermittent Kenalog injections in the past.    Reviewed in clinic vitals including /70, HR 70, O2 sat 95% and BMI of 25.46 kg/m².    Reviewed home medication regimen including Symbicort, albuterol which he reports needing once per day at present, lisinopril-no persistent cough.    Reviewed most recent imaging including chest x-ray obtained 5/25/2021 demonstrating clear lungs, postsurgical changes sternum and mediastinum with dual-lead pacer ICD in place.  No acute cardiopulmonary disease.     Chest x-ray obtained 2/1/2021 demonstrating bilateral pulmonary opacities consistent with atypical pneumonitis/possible COVID-19 pneumonia.     Echocardiogram obtained 12/3/2019 demonstrated normal left ventricular chamber size, wall thickness.  LVEF estimated at 45%.  Normal right ventricular size with reduced right ventricular systolic function, estimated RVSP of 20 mmHg, hypokinesis of the inferior septal wall and apex, no significant change since 2016 study.     Overnight home sleep study obtained 2/4/2019 demonstrated mild GRANT supine position with overall AHI of 11/h, low O2 sat of 86%, 14 minutes below 90%.  Patient using oral appliance.  Refused CPAP.     Most recent pulmonary function testing obtained 8/3/2017 demonstrating FEV1 of 2.46 L or 84% predicted, 9% improvement postbronchodilator, FVC 3.61 L or 94% predicted, FEV1/FVC ratio 68, residual volume 157% predicted, total lung capacity 118% predicted, DLCO 156% predicted.  Per pulmonologist  interpretation mild obstructive lung disease with partial reversibility component.      Review of Systems   Constitutional: Negative for chills, fever, malaise/fatigue and weight loss.   HENT: Positive for congestion (smoke). Negative for hearing loss, nosebleeds, sinus pain, sore throat and tinnitus.    Eyes:        Presc glasses   Respiratory: Positive for sputum production (some white due to smoke). Negative for cough, shortness of breath and wheezing.    Cardiovascular: Negative for chest pain, palpitations, orthopnea and leg swelling.   Gastrointestinal: Negative for heartburn.        No dentures, no difficulty swallowing    Neurological: Negative for dizziness, tremors and headaches.       Past Medical History:   Diagnosis Date   • Acute MI, anterior wall s/p bms to LAD 12/11 with -RCA and residual CX dz (stent failed) 12/31/2011   • AICD (automatic cardioverter/defibrillator) present St Zach placed 4/23/12 4/24/2012    DEVICE DATA:  1. Pulse generator:  [employer]. Zach, model #GC7313-65, serial  #[id number]  2. Right ventricular lead:  St. Zach, model #7120/60,  serial #[id number]  MEASURED INTRAOPERATIVE DATA: R-waves measured 11.7 mV, pacing  threshold 0.75 volts at 0.5 msec, lead impedance of 859 ohms.  DEVICE SETTINGS: VVI 40 to 120.    • Arrhythmia    • ASTHMA     as a child   • Atrial fib/flutter, transient     when in cardiogenic shock   • Bronchitis    • CAD (coronary artery disease)    • Cardiogenic shock (HCC) 1/2012   • Cardiomyopathy, ischemic EF 35% 12/31/2011   • Congestive heart failure (HCC)    • Dyslipidemia    • Fatty liver    • Hypertension     ON NO MEDS, now on meds   • Influenza    • Myocardial infarct (HCC) 12/29/2011   • Myocardial infarction of anterolateral wall (HCC) 12/29/2011   • Pacemaker 04/23/2012   • Tuberculosis     20 years ago on meds for 6  mo       Past Surgical History:   Procedure Laterality Date   • RECOVERY  8/30/2013    Performed by Cath-Recovery  Surgery at SURGERY SAME DAY [facility]   • RECOVERY  2012    Performed by SURGERY, CATH-RECOVERY at SURGERY SAME DAY [facility]   • MULTIPLE CORONARY ARTERY BYPASS ENDO VEIN HARVEST  1/3/2012    Performed by PADMINI OCHOA at [facility]   • UMBILICAL HERNIA REPAIR  2011    Performed by CHUY CHRISTENSEN at SURGERY SAME DAY [facility]   • COLONOSCOPY      normal   • PACEMAKER INSERTION         Family History   Problem Relation Age of Onset   • Cancer Mother         ovarian cancer   • Heart Disease Maternal Aunt 17        unclear but MI!   • Heart Disease Maternal Uncle 38       Social History     Socioeconomic History   • Marital status:      Spouse name: Not on file   • Number of children: Not on file   • Years of education: Not on file   • Highest education level: Not on file   Occupational History   • Not on file   Tobacco Use   • Smoking status: Former Smoker     Years: 10.00     Types: Cigarettes     Quit date: 3/1/1982     Years since quittin.5   • Smokeless tobacco: Never Used   • Tobacco comment: QUIT    Vaping Use   • Vaping Use: Never used   Substance and Sexual Activity   • Alcohol use: No     Alcohol/week: 0.0 oz     Comment: occasionally ( 2 times a year)   • Drug use: No   • Sexual activity: Not Currently   Other Topics Concern   • Not on file   Social History Narrative   • Not on file     Social Determinants of Health     Financial Resource Strain:    • Difficulty of Paying Living Expenses:    Food Insecurity:    • Worried About Running Out of Food in the Last Year:    • Ran Out of Food in the Last Year:    Transportation Needs:    • Lack of Transportation (Medical):    • Lack of Transportation (Non-Medical):    Physical Activity:    • Days of Exercise per Week:    • Minutes of Exercise per Session:    Stress:    • Feeling of Stress :    Social Connections:    • Frequency of Communication with Friends and Family:    • Frequency of Social Gatherings  "with Friends and Family:    • Attends [employer] Services:    • Active Member of Clubs or Organizations:    • Attends Club or Organization Meetings:    • Marital Status:    Intimate Partner Violence:    • Fear of Current or Ex-Partner:    • Emotionally Abused:    • Physically Abused:    • Sexually Abused:        Allergies as of 08/26/2021 - Reviewed 08/26/2021   Allergen Reaction Noted   • Pcn [penicillins] Rash 09/21/2010        @Vital signs for this encounter:  Vitals:    08/26/21 0914   Height: 1.651 m (5' 5\")   Weight: 69.4 kg (153 lb)   Weight % change since last entry.: 0 %   BP: 110/70   Pulse: 70   BMI (Calculated): 25.46   Resp: 16       Current medications as of today   Current Outpatient Medications   Medication Sig Dispense Refill   • rosuvastatin (CRESTOR) 20 MG Tab Take 1 tablet by mouth at bedtime. 90 tablet 3   • lisinopril (PRINIVIL) 10 MG Tab Take 1 tablet by mouth every day. 90 tablet 3   • metoprolol SR (TOPROL XL) 50 MG TABLET SR 24 HR Take 1 tablet by mouth every day. 90 tablet 3   • budesonide-formoterol (SYMBICORT) 80-4.5 MCG/ACT Aerosol Inhale 2 Puffs 2 times a day. Use with spacer.  Rinse mouth after each use. 10.2 g 3   • albuterol (PROVENTIL HFA) 108 (90 Base) MCG/ACT Aero Soln inhalation aerosol Inhale 2 Puffs every four hours as needed for Shortness of Breath (Wheezing). 1 Each 1   • albuterol 108 (90 Base) MCG/ACT Aero Soln inhalation aerosol Inhale 2 Puffs by mouth every 6 hours as needed. 8.5 g 0   • aspirin EC (ECOTRIN) 81 MG TBEC Take 81 mg by mouth every day.     • Multiple Vitamin (MULTI-VITAMIN PO) Take  by mouth.       No current facility-administered medications for this visit.         Physical Exam:   Gen:           Alert and oriented, No apparent distress. Mood and affect appropriate, normal interaction with provider.  Eyes:          sclere white, conjunctive moist.  Hearing:     Grossly intact.  Dentition:    Good dentition.  Oropharynx:   Tongue normal, posterior pharynx " without erythema or exudate.  Neck:        Supple, trachea midline, no masses.  Respiratory Effort: No intercostal retractions or use of accessory muscles.   Lung Auscultation:      Decreased; no rales, rhonchi or wheezing.  CV:            Regular rate and rhythm. No edema. No murmurs, rubs or gallops.  Digits, Nails, Ext: No clubbing, cyanosis, petechiae, or nodes.   Skin:        No rashes, lesions or ulcers noted on exposed skin surfaces.                     Assessment:  1. Mild persistent asthma without complication  PULMONARY FUNCTION TESTS -Test requested: Complete Pulmonary Function Test   2. Former smoker     3. Obstructive sleep apnea         Immunizations:    Flu: 9/21/2020  Pneumovax 23: 10/1/2012  Prevnar 13: Deferred    Plan:    66 y.o. year old male here today for follow-up on persistent asthma and COVID-19 infection 1/7/2021.  Last seen in clinic 4/22/2021.  Remote smoking history with reported 10-pack-year history and quit date 1985.    Remote smoking history: Remains abstinent of tobacco    Pertinent past medical history includes mild GRANT using dental device, hypertension, MI, CHF, cardiomyopathy with AICD, ejection fraction 45% predicted, bronchitis, reports lifelong asthma that has required intermittent Kenalog injections in the past.    Reviewed in clinic vitals including /70, HR 70, O2 sat 95% and BMI of 25.46 kg/m².    Reviewed home medication regimen including Symbicort, albuterol which he reports needing once per day at present, lisinopril-no persistent cough.    Mild persistent asthma: Patient reports tolerating lower dose of Symbicort, okay to use 1 puff morning and evening of the 160 dosage you have on hand.  Requiring albuterol once per day in the morning and sometimes in the afternoon with smoky air conditions.    GRNAT: Remains on dental device with stated benefit.    Had Covid vaccine.  Follow-up in 6 months.    This dictation was created using voice recognition software. The  accuracy of the dictation is limited to the abilities of the software. I expect there may be some errors of grammar and possibly content.

## 2021-08-26 NOTE — PATIENT INSTRUCTIONS
1-had covid vaccine  2-doing well on lower dose of Symbicort   3-okay to use one puff am and pm of 160 mcg dose you have on hand   4-using albuterol once per day in the am and sometimes in the afternoon  5-follow up in 6 months with PFT

## 2021-08-30 PROCEDURE — RXMED WILLOW AMBULATORY MEDICATION CHARGE: Performed by: PHYSICIAN ASSISTANT

## 2021-08-31 ENCOUNTER — PHARMACY VISIT (OUTPATIENT)
Dept: PHARMACY | Facility: MEDICAL CENTER | Age: 66
End: 2021-08-31
Payer: COMMERCIAL

## 2021-09-01 NOTE — PROGRESS NOTES
Chief Complaint   Patient presents with   • Follow-Up     Last seen on 11/26/2018         HPI: This patient is a 63 y.o. male, who presents for home sleep study results.      Past medical history includes ischemic cardiomyopathy with AICD, EF 45% of predicted. Followed by cardiology.     PSG shows poor sleep efficiency but does confirm moderate sleep apnea with AHI of 17, REM index 35, minimum saturation 88%.  Patient has adamantly declined CPAP therapy despite review of potential cardiovascular risks.  Dental appliance was discussed at 2 previous visits.  The importance of treating his apnea was stressed to him.    He obtained a dental appliance.  Repeat home testing shows mild GRANT with an AHI of 11.  He says his dentist is making a few more adjustments.  He does feel he is sleeping more soundly.  He denies EDS a.m. gasps or snoring.  He reports cardiac status has been stable.  Denies chest pain, pressure or palpitations.    He is a history of lifelong asthma.    He is a former smoker but quit smoking in 1982.  His treatment has consisted of albuterol and occasional Kenalog injections. allergy panel showed response to various grasses, trees and pollens. PFTs indicating FEV1 of 2.46 L 84% predicted, FEV1 FVC ratio of 68, DLCO 156% of predicted. Partial reversibility with albuterol. He was started on Symbicort with subjective benefit.  Influenza and pneumococcal vaccine is up-to-date.  He reports he uses his albuterol 1 time per day.  He denies cough, wheeze or dyspnea.  He denies URI since he was seen.    Past Medical History:   Diagnosis Date   • Acute MI, anterior wall s/p bms to LAD 12/11 with -RCA and residual CX dz (stent failed) 12/31/2011   • AICD (automatic cardioverter/defibrillator) present [employer] Zach placed 4/23/12 4/24/2012    DEVICE DATA:  1. Pulse generator:  [employer], model #OG1855-46, serial  #[id number]  2. Right ventricular lead:  [employer], model #7120/60,  serial #[id number]   MEASURED INTRAOPERATIVE DATA: R-waves measured 11.7 mV, pacing  threshold 0.75 volts at 0.5 msec, lead impedance of 859 ohms.  DEVICE SETTINGS: VVI 40 to 120.    • Arrhythmia    • ASTHMA     as a child   • Atrial fib/flutter, transient     when in cardiogenic shock   • Bronchitis    • CAD (coronary artery disease)    • Cardiogenic shock (HCC) 1/2012   • Cardiomyopathy, ischemic EF 35% 12/31/2011   • Congestive heart failure (HCC)    • Dyslipidemia    • Fatty liver    • Hypertension     ON NO MEDS, now on meds   • Influenza    • Myocardial infarct (HCC) 12/29/2011   • Myocardial infarction of anterolateral wall (HCC) 12/29/2011   • Pacemaker 04/23/2012   • Tuberculosis     20 years ago on meds for 6  mo       Social History   Substance Use Topics   • Smoking status: Former Smoker     Types: Cigarettes     Quit date: 3/1/1982   • Smokeless tobacco: Never Used      Comment: QUIT 1985   • Alcohol use No      Comment: occasionally ( 2 times a year)       Family History   Problem Relation Age of Onset   • Cancer Mother         ovarian cancer   • Heart Disease Maternal Aunt 17        unclear but MI!   • Heart Disease Maternal Uncle 38       Immunization History   Administered Date(s) Administered   • Hepatitis A Vaccine, Adult 12/27/2016, 06/28/2017   • Influenza (IM) Preservative Free 10/10/2011   • Influenza Seasonal Injectable 10/11/2013   • Influenza TIV (IM) 12/01/2011, 10/01/2012, 10/11/2013   • Influenza Vaccine Quad Inj (Pf) 10/08/2014, 10/15/2015, 10/03/2016, 10/12/2017, 09/21/2018, 11/26/2018   • Pneumococcal Vaccine (UF)Historical Data 01/01/2007   • Pneumococcal polysaccharide vaccine (PPSV-23) 10/01/2012   • Tdap Vaccine 01/18/2017   • Zoster Vaccine Live (ZVL) (Zostavax) 01/18/2017       Current medications as of today   Current Outpatient Prescriptions   Medication Sig Dispense Refill   • rosuvastatin (CRESTOR) 20 MG Tab TAKE 1 TABLET BY MOUTH EVERY DAY AT BEDTIME 90 Tab 1   • clopidogrel (PLAVIX) 75 MG Tab  "TAKE 1 TABLET BY MOUTH EVERY DAY 90 Tab 3   • SYMBICORT 160-4.5 MCG/ACT Aerosol INHALE 2 PUFFS BY MOUTH 2 TIMES A DAY. USE SPACER. RINSE MOUTH AFTER EACH USE. 1 Inhaler 5   • metoprolol SR (TOPROL XL) 50 MG TABLET SR 24 HR TAKE 1 TABLET BY MOUTH EVERY DAY 30 Tab 11   • lisinopril (PRINIVIL) 10 MG Tab TAKE 1 TABLET BY MOUTH EVERY DAY 30 Tab 11   • aspirin EC (ECOTRIN) 81 MG TBEC Take 81 mg by mouth every day.     • Multiple Vitamin (MULTI-VITAMIN PO) Take  by mouth.     • albuterol 108 (90 Base) MCG/ACT Aero Soln inhalation aerosol Inhale 2 Puffs by mouth every 6 hours as needed. 8.5 g 0     No current facility-administered medications for this visit.        Allergies: Pcn [penicillins]    Blood pressure 124/68, pulse (!) 58, resp. rate 16, height 1.651 m (5' 5\"), weight 69.4 kg (153 lb), SpO2 92 %.      Review of Systems   Constitutional: Negative.    HENT: Negative.    Eyes: Negative for pain and discharge.   Respiratory: Negative.    Cardiovascular: Negative.    Gastrointestinal: Negative.    Musculoskeletal: Negative.    Skin: Negative.    Neurological: Negative.    Endo/Heme/Allergies: Negative for environmental allergies. Does not bruise/bleed easily.   Psychiatric/Behavioral: Negative.        Physical Exam   Constitutional: He is oriented to person, place, and time and well-developed, well-nourished, and in no distress.   HENT:   Head: Normocephalic and atraumatic.   Eyes: Pupils are equal, round, and reactive to light.   Neck: Normal range of motion. Neck supple. No tracheal deviation present.   Cardiovascular: Normal rate, regular rhythm and normal heart sounds.    Pulmonary/Chest: Effort normal and breath sounds normal.   Musculoskeletal: Normal range of motion.   Neurological: He is alert and oriented to person, place, and time.   Skin: Skin is warm and dry.   Psychiatric: Mood, memory, affect and judgment normal.   Vitals reviewed.      Diagnoses/Plan:    1. Mild intermittent asthma without " 2nd to COVID PNA, superimposed bacterial PNA, & now ptx   -S/p RRT 8/3 and 8/4 for hypoxia  -Tx to 5ICU 8/10 for further management, tx to 4M   -S/p RRT x2 8/23 for R sided chest pain, pain appears pleuritic in nature  -CTA chest 8/25 with no clear PE  -Tx back to MICU 8/26, now on 5monti (off COVID isolation)   -Hypoxia slowly improving, seen on HFNC 40L/40% with O2 sats 92-93%   -Keep sats >90% with supplemental O2 complication  Stable, continue current bronchodilators    2. Obstructive sleep apnea  Patient has adamantly declined CPAP therapy for treatment of GRANT.  He has opted for dental appliance.  Appliance shows improved apnea although still mild with an AHI of 11.  Minimal nocturnal desaturation, 14 minutes of the night with an SPO2 of 90%.  He will continue using his dental appliance nightly.  Follow-up with his dentist for adjustment as scheduled.    3. Cardiomyopathy, ischemic EF 45%  Symptoms are stable, he denies cardiac complaints or lower extremity edema.  He will follows with cardiology every 6 months .    He will follow-up at the pulmonary clinic in 6 months time, sooner if needed,            This dictation was created using voice recognition software. The accuracy of the dictation is limited to the abilities of the software. I expect there may be some errors of grammar and possibly content.      2nd to COVID PNA, superimposed bacterial PNA, & now ptx   -S/p RRT 8/3 and 8/4 for hypoxia  -Tx to 5ICU 8/10 for further management, tx to 4M   -S/p RRT x2 8/23 for R sided chest pain, pain appears pleuritic in nature  -CTA chest 8/25 with no clear PE  -Tx back to MICU 8/26, now on 5monti (off COVID isolation)   -Hypoxia slowly improving, seen on HFNC 40L/40% with O2 sats 92-93%  -Lower to HFNC 30L/40% now with goal to transition to 6LNC  -Keep sats >90% with supplemental O2

## 2021-11-18 ENCOUNTER — TELEPHONE (OUTPATIENT)
Dept: HEALTH INFORMATION MANAGEMENT | Facility: OTHER | Age: 66
End: 2021-11-18

## 2021-11-18 NOTE — TELEPHONE ENCOUNTER
Outcome: Left Message for RFANCIS.     Please transfer to Patient Outreach Team at [phone] when patient returns call.    Attempt # 1

## 2021-11-23 ENCOUNTER — TELEPHONE (OUTPATIENT)
Dept: MEDICAL GROUP | Facility: MEDICAL CENTER | Age: 66
End: 2021-11-23

## 2021-11-23 NOTE — TELEPHONE ENCOUNTER
ESTABLISHED PATIENT PRE-VISIT PLANNING     Annual Wellness Visit Over Due    Patient was NOT contacted to complete PVP.     Note: Patient will not be contacted if there is no indication to call.     1.  Reviewed notes from the last few office visits within the medical group: Yes    2.  If any orders were placed at last visit or intended to be done for this visit (i.e. 6 mos follow-up), do we have Results/Consult Notes?         •  Labs - Labs ordered, completed on 06/12/2021 and results are in chart.  Note: If patient appointment is for lab review and patient did not complete labs, check with provider if OK to reschedule patient until labs completed.       •  Imaging - Imaging was not ordered at last office visit.       •  Referrals - No referrals were ordered at last office visit.    3. Is this appointment scheduled as a Hospital Follow-Up? No    4.  Immunizations were updated in Epic using Reconcile Outside Information activity? Yes    5.  Patient is due for the following Health Maintenance Topics:   Health Maintenance Due   Topic Date Due   • Annual Wellness Visit  Never done   • IMM PNEUMOCOCCAL VACCINE: 65+ Years (2 of 2 - PPSV23) 08/10/2021     6.  AHA (Pulse8) form printed for Provider? No, patient does not have any open alerts

## 2021-11-24 ENCOUNTER — HOSPITAL ENCOUNTER (OUTPATIENT)
Dept: LAB | Facility: MEDICAL CENTER | Age: 66
End: 2021-11-24
Attending: FAMILY MEDICINE
Payer: MEDICARE

## 2021-11-24 ENCOUNTER — OFFICE VISIT (OUTPATIENT)
Dept: MEDICAL GROUP | Facility: MEDICAL CENTER | Age: 66
End: 2021-11-24
Payer: MEDICARE

## 2021-11-24 ENCOUNTER — HOSPITAL ENCOUNTER (OUTPATIENT)
Facility: MEDICAL CENTER | Age: 66
End: 2021-11-24
Attending: FAMILY MEDICINE
Payer: MEDICARE

## 2021-11-24 VITALS
OXYGEN SATURATION: 98 % | HEART RATE: 61 BPM | HEIGHT: 65 IN | SYSTOLIC BLOOD PRESSURE: 114 MMHG | WEIGHT: 156.75 LBS | DIASTOLIC BLOOD PRESSURE: 66 MMHG | TEMPERATURE: 98 F | BODY MASS INDEX: 26.12 KG/M2

## 2021-11-24 DIAGNOSIS — R31.0 GROSS HEMATURIA: ICD-10-CM

## 2021-11-24 DIAGNOSIS — I25.2 HISTORY OF ACUTE MYOCARDIAL INFARCTION: ICD-10-CM

## 2021-11-24 DIAGNOSIS — Z23 NEED FOR 23-POLYVALENT PNEUMOCOCCAL POLYSACCHARIDE VACCINE: ICD-10-CM

## 2021-11-24 LAB
ALBUMIN SERPL BCP-MCNC: 4.8 G/DL (ref 3.2–4.9)
ALBUMIN/GLOB SERPL: 1.8 G/DL
ALP SERPL-CCNC: 85 U/L (ref 30–99)
ALT SERPL-CCNC: 67 U/L (ref 2–50)
ANION GAP SERPL CALC-SCNC: 11 MMOL/L (ref 7–16)
APPEARANCE UR: NORMAL
AST SERPL-CCNC: 48 U/L (ref 12–45)
BASOPHILS # BLD AUTO: 0.4 % (ref 0–1.8)
BASOPHILS # BLD: 0.03 K/UL (ref 0–0.12)
BILIRUB SERPL-MCNC: 0.9 MG/DL (ref 0.1–1.5)
BILIRUB UR STRIP-MCNC: NEGATIVE MG/DL
BUN SERPL-MCNC: 11 MG/DL (ref 8–22)
CALCIUM SERPL-MCNC: 9.8 MG/DL (ref 8.5–10.5)
CHLORIDE SERPL-SCNC: 108 MMOL/L (ref 96–112)
CO2 SERPL-SCNC: 24 MMOL/L (ref 20–33)
COLOR UR AUTO: NORMAL
CREAT SERPL-MCNC: 0.56 MG/DL (ref 0.5–1.4)
EOSINOPHIL # BLD AUTO: 0.09 K/UL (ref 0–0.51)
EOSINOPHIL NFR BLD: 1.3 % (ref 0–6.9)
ERYTHROCYTE [DISTWIDTH] IN BLOOD BY AUTOMATED COUNT: 42.4 FL (ref 35.9–50)
GLOBULIN SER CALC-MCNC: 2.6 G/DL (ref 1.9–3.5)
GLUCOSE SERPL-MCNC: 124 MG/DL (ref 65–99)
GLUCOSE UR STRIP.AUTO-MCNC: NEGATIVE MG/DL
HCT VFR BLD AUTO: 51.8 % (ref 42–52)
HGB BLD-MCNC: 18.1 G/DL (ref 14–18)
IMM GRANULOCYTES # BLD AUTO: 0.02 K/UL (ref 0–0.11)
IMM GRANULOCYTES NFR BLD AUTO: 0.3 % (ref 0–0.9)
KETONES UR STRIP.AUTO-MCNC: NEGATIVE MG/DL
LEUKOCYTE ESTERASE UR QL STRIP.AUTO: NEGATIVE
LYMPHOCYTES # BLD AUTO: 1.83 K/UL (ref 1–4.8)
LYMPHOCYTES NFR BLD: 26.3 % (ref 22–41)
MCH RBC QN AUTO: 32.4 PG (ref 27–33)
MCHC RBC AUTO-ENTMCNC: 34.9 G/DL (ref 33.7–35.3)
MCV RBC AUTO: 92.8 FL (ref 81.4–97.8)
MONOCYTES # BLD AUTO: 0.75 K/UL (ref 0–0.85)
MONOCYTES NFR BLD AUTO: 10.8 % (ref 0–13.4)
NEUTROPHILS # BLD AUTO: 4.24 K/UL (ref 1.82–7.42)
NEUTROPHILS NFR BLD: 60.9 % (ref 44–72)
NITRITE UR QL STRIP.AUTO: NEGATIVE
NRBC # BLD AUTO: 0 K/UL
NRBC BLD-RTO: 0 /100 WBC
PH UR STRIP.AUTO: 7 [PH] (ref 5–8)
PLATELET # BLD AUTO: 159 K/UL (ref 164–446)
PMV BLD AUTO: 10.3 FL (ref 9–12.9)
POTASSIUM SERPL-SCNC: 4.1 MMOL/L (ref 3.6–5.5)
PROT SERPL-MCNC: 7.4 G/DL (ref 6–8.2)
PROT UR QL STRIP: NEGATIVE MG/DL
RBC # BLD AUTO: 5.58 M/UL (ref 4.7–6.1)
RBC UR QL AUTO: NEGATIVE
SODIUM SERPL-SCNC: 143 MMOL/L (ref 135–145)
SP GR UR STRIP.AUTO: 1.02
UROBILINOGEN UR STRIP-MCNC: 2 MG/DL
WBC # BLD AUTO: 7 K/UL (ref 4.8–10.8)

## 2021-11-24 PROCEDURE — 80053 COMPREHEN METABOLIC PANEL: CPT

## 2021-11-24 PROCEDURE — G0009 ADMIN PNEUMOCOCCAL VACCINE: HCPCS | Performed by: FAMILY MEDICINE

## 2021-11-24 PROCEDURE — 90732 PPSV23 VACC 2 YRS+ SUBQ/IM: CPT | Performed by: FAMILY MEDICINE

## 2021-11-24 PROCEDURE — 99213 OFFICE O/P EST LOW 20 MIN: CPT | Mod: 25 | Performed by: FAMILY MEDICINE

## 2021-11-24 PROCEDURE — 81002 URINALYSIS NONAUTO W/O SCOPE: CPT | Performed by: FAMILY MEDICINE

## 2021-11-24 PROCEDURE — 36415 COLL VENOUS BLD VENIPUNCTURE: CPT

## 2021-11-24 PROCEDURE — 87086 URINE CULTURE/COLONY COUNT: CPT

## 2021-11-24 PROCEDURE — 85025 COMPLETE CBC W/AUTO DIFF WBC: CPT

## 2021-11-24 RX ORDER — ALBUTEROL SULFATE 90 UG/1
AEROSOL, METERED RESPIRATORY (INHALATION)
COMMUNITY
End: 2021-11-24

## 2021-11-24 RX ORDER — SILDENAFIL CITRATE 20 MG/1
TABLET ORAL
COMMUNITY
End: 2024-01-10

## 2021-11-24 RX ORDER — SILDENAFIL CITRATE 20 MG/1
TABLET ORAL
COMMUNITY
End: 2021-11-24

## 2021-11-24 RX ORDER — SILDENAFIL CITRATE 20 MG/1
TABLET ORAL
COMMUNITY
Start: 2021-11-02 | End: 2021-11-24

## 2021-11-24 RX ORDER — CLINDAMYCIN HYDROCHLORIDE 150 MG/1
CAPSULE ORAL
COMMUNITY
Start: 2021-11-23 | End: 2022-01-13

## 2021-11-24 RX ORDER — BUDESONIDE AND FORMOTEROL FUMARATE DIHYDRATE 160; 4.5 UG/1; UG/1
AEROSOL RESPIRATORY (INHALATION)
COMMUNITY
End: 2021-11-24

## 2021-11-24 RX ORDER — METHYLPREDNISOLONE 4 MG/1
TABLET ORAL
COMMUNITY
End: 2021-11-24

## 2021-11-24 RX ORDER — IBUPROFEN 800 MG/1
TABLET ORAL
COMMUNITY
Start: 2021-11-23 | End: 2021-11-24

## 2021-11-24 RX ORDER — BUDESONIDE AND FORMOTEROL FUMARATE DIHYDRATE 80; 4.5 UG/1; UG/1
AEROSOL RESPIRATORY (INHALATION)
COMMUNITY
End: 2021-11-24

## 2021-11-24 ASSESSMENT — FIBROSIS 4 INDEX: FIB4 SCORE: 2.64

## 2021-11-24 NOTE — PROGRESS NOTES
Chief Complaint   Patient presents with   • Blood in Urine       Subjective:     HPI:   Abram Barillas presents today with the followin. Gross hematuria  Began around a month ago, intermittent visible blood in the urine twice usually as streaks and granules.  One episode today.  Denies any pain.  Denies fever or mid back pain.  Some lower back stiffness. No history of blood in the urine or kidney stones. Has upcoming urology appointment for impotence.  I have sent an additional referral with today's diagnosis.  Renal US order discussed and placed.  CMP and CBC orders placed.  POC urinalysis is negative except for increased urobilinogen.  Urine culture sent.     2. History of acute myocardial infarction  Very severe in .  He responded to heroic measures very well.  He has being taking care of himself.  He has an AICD and is followed carefully by cardiology.    3. Need for 23-polyvalent pneumococcal polysaccharide vaccine  Administered today        Patient Active Problem List    Diagnosis Date Noted   • History of acute myocardial infarction 2021   • Former smoker 2019   • Moderate persistent asthma with exacerbation 2019   • Nocturnal hypoxia 2018   • Obstructive sleep apnea 2017   • Impotence of organic origin 2016   • Essential hypertension    • Congestive heart failure    • Atrial fib/flutter, transient    • AICD lead malfunction 2013   • Coronary artery disease due to calcified coronary lesion:Acute MI, anterior wall s/p bms to LAD  with -RCA and residual CX dz (stent failed)    • AICD (automatic cardioverter/defibrillator) present [employer] placed 2012   • Pacemaker 2012   • Cardiomyopathy, ischemic EF 35% 2011   • Dyslipidemia    • Fatty liver        Current medicines (including changes today)  Current Outpatient Medications   Medication Sig Dispense Refill   • clindamycin (CLEOCIN) 150 MG Cap      • sildenafil (REVATIO)  "20 MG tablet sildenafil (pulmonary hypertension) 20 mg tablet   TAKE 1-5 TABLETS 30 MINUTES PRIOR TO ACTIVITY **MAX 5 TABS. MINIMUM 24 HOURS BETWEEN DOSES**     • Multiple Vitamin (MULTI-VITAMIN DAILY PO) One A Day Vitamin     • rosuvastatin (CRESTOR) 20 MG Tab Take 1 tablet by mouth at bedtime. 90 tablet 3   • lisinopril (PRINIVIL) 10 MG Tab Take 1 tablet by mouth every day. 90 tablet 3   • metoprolol SR (TOPROL XL) 50 MG TABLET SR 24 HR Take 1 tablet by mouth every day. 90 tablet 3   • budesonide-formoterol (SYMBICORT) 80-4.5 MCG/ACT Aerosol Inhale 2 Puffs 2 times a day. Use with spacer.  Rinse mouth after each use. 10.2 g 3   • albuterol 108 (90 Base) MCG/ACT Aero Soln inhalation aerosol Inhale 2 Puffs by mouth every 6 hours as needed. 8.5 g 0   • aspirin EC (ECOTRIN) 81 MG TBEC Take 81 mg by mouth every day.     • Metoprolol-Hydrochlorothiazide 50-12.5 MG TABLET SR 24 HR metoprolol succ 50 mg-hydrochlorothiazide 12.5 mg tablet,ext.rel 24 hr   Take 1 tablet every day by oral route.       No current facility-administered medications for this visit.       Allergies   Allergen Reactions   • Pcn [Penicillins] Rash     Rash   • Budesonide-Formoterol Fumarate        ROS: As per HPI       Objective:     /66 (BP Location: Right arm, Patient Position: Sitting, BP Cuff Size: Adult)   Pulse 61   Temp 36.7 °C (98 °F) (Temporal)   Ht 1.651 m (5' 5\")   Wt 71.1 kg (156 lb 12 oz)   SpO2 98%  Body mass index is 26.08 kg/m².    Physical Exam:  Constitutional: Well-developed and well-nourished. Not diaphoretic. No distress. Lucid and fluent.  Patient, physician and staff all wearing masks.  Skin: Skin is warm and dry. No rash noted.  Head: Atraumatic without lesions.  Eyes: Conjunctivae and extraocular motions are normal. Pupils are equal, round, and reactive to light. No scleral icterus.   Ears:  External ears unremarkable.   Neck: Supple, trachea midline. No thyromegaly present. No cervical or supraclavicular " lymphadenopathy. No JVD or carotid bruits appreciated  Cardiovascular: Regular rate and rhythm.  Normal S1, S2 without murmur appreciated.  Chest: Effort normal. Clear to auscultation throughout. No adventitious sounds.  No CVAT.   Abdomen: Soft, non tender, and without distention. Active bowel sounds in all four quadrants. No rebound, guarding, masses or hepatosplenomegaly.  Extremities: No cyanosis, clubbing, erythema, nor edema.   Neurological: Alert and oriented x 3. No tremor appreciated.  Psychiatric:  Behavior, mood, and affect are appropriate.    Lab Results   Component Value Date/Time    POCCOLOR SAURAV 11/24/2021 10:09 AM    POCAPPEAR SLIGHTLY CLOUDY 11/24/2021 10:09 AM    POCLEUKEST NEGATIVE 11/24/2021 10:09 AM    POCNITRITE NEGATIVE 11/24/2021 10:09 AM    POCUROBILIGE 2.0 11/24/2021 10:09 AM    POCPROTEIN NEGATIVE 11/24/2021 10:09 AM    POCURPH 7.0 11/24/2021 10:09 AM    POCBLOOD NEGATIVE 11/24/2021 10:09 AM    POCSPGRV 1.020 11/24/2021 10:09 AM    POCKETONES NEGATIVE 11/24/2021 10:09 AM    POCBILIRUBIN NEGATIVE 11/24/2021 10:09 AM    POCGLUCUA NEGATIVE 11/24/2021 10:09 AM           Assessment and Plan:     66 y.o. male with the following issues:    1. Gross hematuria  POCT Urinalysis    URINE CULTURE(NEW)    Referral to Urology    CBC WITH DIFFERENTIAL    Comp Metabolic Panel    US-RENAL   2. History of acute myocardial infarction     3. Need for 23-polyvalent pneumococcal polysaccharide vaccine  Pneumococal Polysaccharide Vaccine 23-Valent =>3YO SQ/IM         Followup: Return in about 6 weeks (around 1/5/2022), or if symptoms worsen or fail to improve.

## 2021-11-27 LAB
BACTERIA UR CULT: NORMAL
SIGNIFICANT IND 70042: NORMAL
SITE SITE: NORMAL
SOURCE SOURCE: NORMAL

## 2021-12-01 ENCOUNTER — HOSPITAL ENCOUNTER (OUTPATIENT)
Dept: RADIOLOGY | Facility: MEDICAL CENTER | Age: 66
End: 2021-12-01
Attending: FAMILY MEDICINE
Payer: MEDICARE

## 2021-12-01 DIAGNOSIS — R31.0 GROSS HEMATURIA: ICD-10-CM

## 2021-12-01 PROCEDURE — 76775 US EXAM ABDO BACK WALL LIM: CPT

## 2021-12-21 ENCOUNTER — NON-PROVIDER VISIT (OUTPATIENT)
Dept: CARDIOLOGY | Facility: MEDICAL CENTER | Age: 66
End: 2021-12-21
Payer: MEDICARE

## 2021-12-21 PROCEDURE — 99999 PR NO CHARGE: CPT | Performed by: INTERNAL MEDICINE

## 2021-12-23 ENCOUNTER — TELEPHONE (OUTPATIENT)
Dept: MEDICAL GROUP | Facility: MEDICAL CENTER | Age: 66
End: 2021-12-23

## 2021-12-23 NOTE — TELEPHONE ENCOUNTER
Phone Number Called: [phone]    Call outcome: Spoke to patient regarding message below.    Message: Called to follow up with patient regarding his voicemail about his (L) kidney pain. LVM requesting call back.

## 2021-12-27 ENCOUNTER — HOSPITAL ENCOUNTER (OUTPATIENT)
Dept: LAB | Facility: MEDICAL CENTER | Age: 66
End: 2021-12-27
Attending: PHYSICIAN ASSISTANT
Payer: MEDICARE

## 2021-12-27 LAB — PSA SERPL-MCNC: 1.47 NG/ML (ref 0–4)

## 2021-12-27 PROCEDURE — 84153 ASSAY OF PSA TOTAL: CPT

## 2021-12-27 PROCEDURE — 36415 COLL VENOUS BLD VENIPUNCTURE: CPT

## 2022-01-03 ENCOUNTER — HOSPITAL ENCOUNTER (OUTPATIENT)
Dept: RADIOLOGY | Facility: MEDICAL CENTER | Age: 67
End: 2022-01-03
Attending: PHYSICIAN ASSISTANT
Payer: MEDICARE

## 2022-01-03 DIAGNOSIS — N20.0 CALCULUS OF KIDNEY: ICD-10-CM

## 2022-01-03 PROCEDURE — 74176 CT ABD & PELVIS W/O CONTRAST: CPT | Mod: MH

## 2022-01-13 ENCOUNTER — ANESTHESIA EVENT (OUTPATIENT)
Dept: SURGERY | Facility: MEDICAL CENTER | Age: 67
End: 2022-01-13
Payer: MEDICARE

## 2022-01-13 ENCOUNTER — PRE-ADMISSION TESTING (OUTPATIENT)
Dept: ADMISSIONS | Facility: MEDICAL CENTER | Age: 67
End: 2022-01-13
Attending: UROLOGY
Payer: MEDICARE

## 2022-01-13 DIAGNOSIS — Z01.812 PRE-OPERATIVE LABORATORY EXAMINATION: ICD-10-CM

## 2022-01-13 DIAGNOSIS — Z01.810 PRE-OPERATIVE CARDIOVASCULAR EXAMINATION: ICD-10-CM

## 2022-01-13 LAB
ALBUMIN SERPL BCP-MCNC: 4.4 G/DL (ref 3.2–4.9)
ALBUMIN/GLOB SERPL: 1.5 G/DL
ALP SERPL-CCNC: 100 U/L (ref 30–99)
ALT SERPL-CCNC: 55 U/L (ref 2–50)
ANION GAP SERPL CALC-SCNC: 10 MMOL/L (ref 7–16)
AST SERPL-CCNC: 50 U/L (ref 12–45)
BILIRUB SERPL-MCNC: 0.6 MG/DL (ref 0.1–1.5)
BUN SERPL-MCNC: 15 MG/DL (ref 8–22)
CALCIUM SERPL-MCNC: 9.7 MG/DL (ref 8.4–10.2)
CHLORIDE SERPL-SCNC: 105 MMOL/L (ref 96–112)
CO2 SERPL-SCNC: 24 MMOL/L (ref 20–33)
CREAT SERPL-MCNC: 0.68 MG/DL (ref 0.5–1.4)
EKG IMPRESSION: NORMAL
ERYTHROCYTE [DISTWIDTH] IN BLOOD BY AUTOMATED COUNT: 40.4 FL (ref 35.9–50)
GLOBULIN SER CALC-MCNC: 3 G/DL (ref 1.9–3.5)
GLUCOSE SERPL-MCNC: 143 MG/DL (ref 65–99)
HCT VFR BLD AUTO: 50.6 % (ref 42–52)
HGB BLD-MCNC: 17.6 G/DL (ref 14–18)
MCH RBC QN AUTO: 31.9 PG (ref 27–33)
MCHC RBC AUTO-ENTMCNC: 34.8 G/DL (ref 33.7–35.3)
MCV RBC AUTO: 91.8 FL (ref 81.4–97.8)
PLATELET # BLD AUTO: 182 K/UL (ref 164–446)
PMV BLD AUTO: 10.7 FL (ref 9–12.9)
POTASSIUM SERPL-SCNC: 4.2 MMOL/L (ref 3.6–5.5)
PROT SERPL-MCNC: 7.4 G/DL (ref 6–8.2)
RBC # BLD AUTO: 5.51 M/UL (ref 4.7–6.1)
SARS-COV+SARS-COV-2 AG RESP QL IA.RAPID: NOTDETECTED
SODIUM SERPL-SCNC: 139 MMOL/L (ref 135–145)
SPECIMEN SOURCE: NORMAL
WBC # BLD AUTO: 7.1 K/UL (ref 4.8–10.8)

## 2022-01-13 PROCEDURE — 80053 COMPREHEN METABOLIC PANEL: CPT

## 2022-01-13 PROCEDURE — 85027 COMPLETE CBC AUTOMATED: CPT

## 2022-01-13 PROCEDURE — 36415 COLL VENOUS BLD VENIPUNCTURE: CPT

## 2022-01-13 PROCEDURE — 93005 ELECTROCARDIOGRAM TRACING: CPT

## 2022-01-13 PROCEDURE — 93010 ELECTROCARDIOGRAM REPORT: CPT | Performed by: INTERNAL MEDICINE

## 2022-01-13 PROCEDURE — 87426 SARSCOV CORONAVIRUS AG IA: CPT

## 2022-01-13 ASSESSMENT — FIBROSIS 4 INDEX: FIB4 SCORE: 2.4

## 2022-01-13 NOTE — PREPROCEDURE INSTRUCTIONS
"Pre-admit appointment completed. \"Preparing for your procedure\" sheet given to pt along with verbal and written instructions. Pt instructed to continue regularly prescribed medications through the day before surgery. Pt instructed to take the following medications the day of surgery with a sip of water, per anesthesia protocol; metoprolol, and if needed-albuterol MDI. Pt to bring MDI DOS.    MET's=5.     Pacemaker/AICD request for surgery was faxed to Dr Yi.   "

## 2022-01-13 NOTE — OR NURSING
Good morning,    This device is an AICD and not a pacemaker and therefore the request for information is a bit different. We will need to know whether a device rep needs to be present to turn this off for this particular procedure and re-interrogate the device post procedure given the malfunction history. Additionally, please ask Dr. Blakely  or his medical staff whether this procedure is time sensitive. If it is, then we need to proceed. However, if this can wait, then we will need some cardiology input regarding patient’s severe cardiomyopathy with severely depressed LV function and how optimized the patient may be. Thank you.   Anjum Barrow M.D.  [facility]   The above Dr Barrow's request called to Dr. Blakely's office and message given to Janis and she will put an urgent message to the MA and have them call us

## 2022-01-14 ENCOUNTER — APPOINTMENT (OUTPATIENT)
Dept: RADIOLOGY | Facility: MEDICAL CENTER | Age: 67
End: 2022-01-14
Attending: UROLOGY
Payer: MEDICARE

## 2022-01-14 ENCOUNTER — ANESTHESIA (OUTPATIENT)
Dept: SURGERY | Facility: MEDICAL CENTER | Age: 67
End: 2022-01-14
Payer: MEDICARE

## 2022-01-14 ENCOUNTER — HOSPITAL ENCOUNTER (OUTPATIENT)
Facility: MEDICAL CENTER | Age: 67
End: 2022-01-14
Attending: UROLOGY | Admitting: UROLOGY
Payer: MEDICARE

## 2022-01-14 VITALS
WEIGHT: 153 LBS | OXYGEN SATURATION: 93 % | HEIGHT: 65 IN | HEART RATE: 53 BPM | BODY MASS INDEX: 25.49 KG/M2 | DIASTOLIC BLOOD PRESSURE: 86 MMHG | TEMPERATURE: 96.1 F | SYSTOLIC BLOOD PRESSURE: 154 MMHG | RESPIRATION RATE: 16 BRPM

## 2022-01-14 DIAGNOSIS — N20.0 KIDNEY STONE: ICD-10-CM

## 2022-01-14 LAB
INR PPP: 1.08 (ref 0.87–1.13)
PATHOLOGY CONSULT NOTE: NORMAL
PROTHROMBIN TIME: 13.2 SEC (ref 12–14.6)

## 2022-01-14 PROCEDURE — C1758 CATHETER, URETERAL: HCPCS | Performed by: UROLOGY

## 2022-01-14 PROCEDURE — 82365 CALCULUS SPECTROSCOPY: CPT

## 2022-01-14 PROCEDURE — 700101 HCHG RX REV CODE 250: Performed by: UROLOGY

## 2022-01-14 PROCEDURE — C2617 STENT, NON-COR, TEM W/O DEL: HCPCS | Performed by: UROLOGY

## 2022-01-14 PROCEDURE — 160025 RECOVERY II MINUTES (STATS): Performed by: UROLOGY

## 2022-01-14 PROCEDURE — 85610 PROTHROMBIN TIME: CPT

## 2022-01-14 PROCEDURE — 500879 HCHG PACK, CYSTO: Performed by: UROLOGY

## 2022-01-14 PROCEDURE — 160035 HCHG PACU - 1ST 60 MINS PHASE I: Performed by: UROLOGY

## 2022-01-14 PROCEDURE — 700111 HCHG RX REV CODE 636 W/ 250 OVERRIDE (IP): Performed by: ANESTHESIOLOGY

## 2022-01-14 PROCEDURE — 160048 HCHG OR STATISTICAL LEVEL 1-5: Performed by: UROLOGY

## 2022-01-14 PROCEDURE — 160002 HCHG RECOVERY MINUTES (STAT): Performed by: UROLOGY

## 2022-01-14 PROCEDURE — 160039 HCHG SURGERY MINUTES - EA ADDL 1 MIN LEVEL 3: Performed by: UROLOGY

## 2022-01-14 PROCEDURE — 88300 SURGICAL PATH GROSS: CPT

## 2022-01-14 PROCEDURE — 160046 HCHG PACU - 1ST 60 MINS PHASE II: Performed by: UROLOGY

## 2022-01-14 PROCEDURE — C1769 GUIDE WIRE: HCPCS | Performed by: UROLOGY

## 2022-01-14 PROCEDURE — 700117 HCHG RX CONTRAST REV CODE 255: Performed by: UROLOGY

## 2022-01-14 PROCEDURE — 501329 HCHG SET, CYSTO IRRIG Y TUR: Performed by: UROLOGY

## 2022-01-14 PROCEDURE — 700105 HCHG RX REV CODE 258: Performed by: UROLOGY

## 2022-01-14 PROCEDURE — 160028 HCHG SURGERY MINUTES - 1ST 30 MINS LEVEL 3: Performed by: UROLOGY

## 2022-01-14 PROCEDURE — 160036 HCHG PACU - EA ADDL 30 MINS PHASE I: Performed by: UROLOGY

## 2022-01-14 PROCEDURE — 160009 HCHG ANES TIME/MIN: Performed by: UROLOGY

## 2022-01-14 PROCEDURE — 501838 HCHG SUTURE GENERAL: Performed by: UROLOGY

## 2022-01-14 DEVICE — STENT UROLOGICAL POLARIS 6X26  ULTRA: Type: IMPLANTABLE DEVICE | Site: URETER | Status: FUNCTIONAL

## 2022-01-14 RX ORDER — DEXAMETHASONE SODIUM PHOSPHATE 4 MG/ML
INJECTION, SOLUTION INTRA-ARTICULAR; INTRALESIONAL; INTRAMUSCULAR; INTRAVENOUS; SOFT TISSUE PRN
Status: DISCONTINUED | OUTPATIENT
Start: 2022-01-14 | End: 2022-01-14 | Stop reason: SURG

## 2022-01-14 RX ORDER — CEFAZOLIN SODIUM 1 G/3ML
INJECTION, POWDER, FOR SOLUTION INTRAMUSCULAR; INTRAVENOUS PRN
Status: DISCONTINUED | OUTPATIENT
Start: 2022-01-14 | End: 2022-01-14 | Stop reason: SURG

## 2022-01-14 RX ORDER — ONDANSETRON 2 MG/ML
4 INJECTION INTRAMUSCULAR; INTRAVENOUS
Status: DISCONTINUED | OUTPATIENT
Start: 2022-01-14 | End: 2022-01-14 | Stop reason: HOSPADM

## 2022-01-14 RX ORDER — OXYCODONE HYDROCHLORIDE AND ACETAMINOPHEN 5; 325 MG/1; MG/1
2 TABLET ORAL
Status: DISCONTINUED | OUTPATIENT
Start: 2022-01-14 | End: 2022-01-14 | Stop reason: HOSPADM

## 2022-01-14 RX ORDER — ETOMIDATE 2 MG/ML
INJECTION INTRAVENOUS PRN
Status: DISCONTINUED | OUTPATIENT
Start: 2022-01-14 | End: 2022-01-14 | Stop reason: SURG

## 2022-01-14 RX ORDER — SODIUM CHLORIDE, SODIUM LACTATE, POTASSIUM CHLORIDE, CALCIUM CHLORIDE 600; 310; 30; 20 MG/100ML; MG/100ML; MG/100ML; MG/100ML
INJECTION, SOLUTION INTRAVENOUS CONTINUOUS
Status: ACTIVE | OUTPATIENT
Start: 2022-01-14 | End: 2022-01-14

## 2022-01-14 RX ORDER — HYDROMORPHONE HYDROCHLORIDE 1 MG/ML
0.1 INJECTION, SOLUTION INTRAMUSCULAR; INTRAVENOUS; SUBCUTANEOUS
Status: DISCONTINUED | OUTPATIENT
Start: 2022-01-14 | End: 2022-01-14 | Stop reason: HOSPADM

## 2022-01-14 RX ORDER — MIDAZOLAM HYDROCHLORIDE 1 MG/ML
INJECTION INTRAMUSCULAR; INTRAVENOUS PRN
Status: DISCONTINUED | OUTPATIENT
Start: 2022-01-14 | End: 2022-01-14 | Stop reason: SURG

## 2022-01-14 RX ORDER — DIPHENHYDRAMINE HYDROCHLORIDE 50 MG/ML
12.5 INJECTION INTRAMUSCULAR; INTRAVENOUS
Status: DISCONTINUED | OUTPATIENT
Start: 2022-01-14 | End: 2022-01-14 | Stop reason: HOSPADM

## 2022-01-14 RX ORDER — HYDROMORPHONE HYDROCHLORIDE 1 MG/ML
0.4 INJECTION, SOLUTION INTRAMUSCULAR; INTRAVENOUS; SUBCUTANEOUS
Status: DISCONTINUED | OUTPATIENT
Start: 2022-01-14 | End: 2022-01-14 | Stop reason: HOSPADM

## 2022-01-14 RX ORDER — HALOPERIDOL 5 MG/ML
1 INJECTION INTRAMUSCULAR
Status: DISCONTINUED | OUTPATIENT
Start: 2022-01-14 | End: 2022-01-14 | Stop reason: HOSPADM

## 2022-01-14 RX ORDER — OXYCODONE HYDROCHLORIDE AND ACETAMINOPHEN 5; 325 MG/1; MG/1
1 TABLET ORAL EVERY 4 HOURS PRN
Qty: 15 TABLET | Refills: 0 | Status: SHIPPED | OUTPATIENT
Start: 2022-01-14 | End: 2022-01-17

## 2022-01-14 RX ORDER — OXYCODONE HYDROCHLORIDE AND ACETAMINOPHEN 5; 325 MG/1; MG/1
1 TABLET ORAL
Status: DISCONTINUED | OUTPATIENT
Start: 2022-01-14 | End: 2022-01-14 | Stop reason: HOSPADM

## 2022-01-14 RX ORDER — HYDROMORPHONE HYDROCHLORIDE 1 MG/ML
0.2 INJECTION, SOLUTION INTRAMUSCULAR; INTRAVENOUS; SUBCUTANEOUS
Status: DISCONTINUED | OUTPATIENT
Start: 2022-01-14 | End: 2022-01-14 | Stop reason: HOSPADM

## 2022-01-14 RX ORDER — LORAZEPAM 2 MG/ML
0.5 INJECTION INTRAMUSCULAR
Status: DISCONTINUED | OUTPATIENT
Start: 2022-01-14 | End: 2022-01-14 | Stop reason: HOSPADM

## 2022-01-14 RX ADMIN — DEXAMETHASONE SODIUM PHOSPHATE 4 MG: 4 INJECTION, SOLUTION INTRAMUSCULAR; INTRAVENOUS at 12:39

## 2022-01-14 RX ADMIN — LIDOCAINE HYDROCHLORIDE 0.5 ML: 10 INJECTION, SOLUTION EPIDURAL; INFILTRATION; INTRACAUDAL; PERINEURAL at 11:46

## 2022-01-14 RX ADMIN — FENTANYL CITRATE 50 MCG: 50 INJECTION, SOLUTION INTRAMUSCULAR; INTRAVENOUS at 13:10

## 2022-01-14 RX ADMIN — ETOMIDATE 20 MG: 2 INJECTION INTRAVENOUS at 12:36

## 2022-01-14 RX ADMIN — SODIUM CHLORIDE, POTASSIUM CHLORIDE, SODIUM LACTATE AND CALCIUM CHLORIDE: 600; 310; 30; 20 INJECTION, SOLUTION INTRAVENOUS at 11:46

## 2022-01-14 RX ADMIN — FENTANYL CITRATE 50 MCG: 50 INJECTION, SOLUTION INTRAMUSCULAR; INTRAVENOUS at 12:39

## 2022-01-14 RX ADMIN — CEFAZOLIN 2 G: 330 INJECTION, POWDER, FOR SOLUTION INTRAMUSCULAR; INTRAVENOUS at 12:39

## 2022-01-14 RX ADMIN — MIDAZOLAM HYDROCHLORIDE 2 MG: 1 INJECTION, SOLUTION INTRAMUSCULAR; INTRAVENOUS at 12:33

## 2022-01-14 ASSESSMENT — FIBROSIS 4 INDEX: FIB4 SCORE: 2.41

## 2022-01-14 NOTE — OP REPORT
OPERATIVE NOTE:      Date: 1/14/2022    Patient ID:   Name:             Abram Barillas   YOB: 1955  Age:                 66 y.o.  male   MRN:               [id number]                                                                         PRE-OP DIAGNOSIS: left ureteral stone        POST-OP DIAGNOSIS: left renal stone            PROCEDURE: Procedure(s) and Anesthesia Type:     * CYSTOSCOPY, WITH URETERAL STENT INSERTION - General     * URETEROSCOPY - General     * LITHOTRIPSY, USING LASER - General            SURGEON: Surgeon(s) and Role:     * Duncan Blakely M.D. - Primary            ANESTHESIA: get            ESTIMATED BLOOD LOSS: none            DRAINS: 26cm 6F stent                  SPECIMENS: stone               COMPLICATIONS: none                   CONDITION: stable            TECHNIQUE:   The patient was brought to the operating room. Given general anesthesia. Prepped and draped in the usual sterile fashion. Cystoscopy was carried out. The ureteral orifices identified. Sensor wire passed through the orifice to the kidney. The stone is seen at proximal ureter. Initial attempt is make with semirigid scope. Was able to reach stone, but then fell back into kidney. Ureteral access sheath is passed after first dilating with its inner obturator using the 11/13 size.. A flexible ureteroscope is passed up to the kidney. The stone is identified.  200mJ at 20Hz is used as the initial setting, but found had to go to 400mJ. . At this point stone is not able to be seen on fluoroscopy. Visually all fragments appeared to be no more than 2mm in size. Several fragments are retrieved with zero tip basked and sent.   The collecting system is then opacified. No extravasation is seen. Wire is replaced. The scope with sheath are then withdrawn. Ureter is visualized on way out and found to be free of injury. Over the wire a 26cm 6F stent is placed and left on suture for easy retrieval. Cystoscopy  was then  performed assuring distal coil in bladder and then emptied the bladder of irrigant and any fragments. Patient sent to recovery in stable condition.

## 2022-01-14 NOTE — ANESTHESIA TIME REPORT
Anesthesia Start and Stop Event Times     Date Time Event    1/14/2022 1225 Ready for Procedure     1231 Anesthesia Start     1336 Anesthesia Stop        Responsible Staff  01/14/22    Name Role Begin End    Monisha Collier M.D. Anesth 1231 1336        Preop Diagnosis (Free Text):  Pre-op Diagnosis     URETERIC STONE        Preop Diagnosis (Codes):    Premium Reason  Non-Premium    Comments:

## 2022-01-14 NOTE — ANESTHESIA POSTPROCEDURE EVALUATION
[facility] and agreed that AICD did not need to be interrogated post-op.      Patient: Abram Barillas    Procedure Summary     Date: 01/14/22 Room / Location:  OR 02 / SURGERY [facility]    Anesthesia Start: 1231 Anesthesia Stop: 1336    Procedures:       CYSTOSCOPY, WITH URETERAL STENT INSERTION (Left Ureter)      URETEROSCOPY (Left Ureter)      LITHOTRIPSY, USING LASER (Left Ureter) Diagnosis: (URETERIC STONE)    Surgeons: Duncan Blakely M.D. Responsible Provider: Monisha Collier M.D.    Anesthesia Type: general ASA Status: 3          Final Anesthesia Type: general  Last vitals  BP   Blood Pressure : 131/79    Temp   36.2 °C (97.2 °F)    Pulse   (!) 50   Resp   16    SpO2   100 %      Anesthesia Post Evaluation    Patient location during evaluation: PACU  Patient participation: waiting for patient participation  Level of consciousness: responsive to physical stimuli    Airway patency: patent  Anesthetic complications: no  Cardiovascular status: adequate  Respiratory status: acceptable, unassisted and spontaneous ventilation  Hydration status: acceptable              No complications documented.

## 2022-01-14 NOTE — DISCHARGE INSTRUCTIONS
ACTIVITY: Rest and take it easy for the first 24 hours.  A responsible adult is recommended to remain with you during that time.  It is normal to feel sleepy.  We encourage you to not do anything that requires balance, judgment or coordination.    MILD FLU-LIKE SYMPTOMS ARE NORMAL. YOU MAY EXPERIENCE GENERALIZED MUSCLE ACHES, THROAT IRRITATION, HEADACHE AND/OR SOME NAUSEA.    FOR 24 HOURS DO NOT:  Drive, operate machinery or run household appliances.  Drink beer or alcoholic beverages.   Make important decisions or sign legal documents.    SPECIAL INSTRUCTIONS:   Implante de stent ureteral, cuidados posteriores  Ureteral Stent Implantation, Care After  Esta hoja le vicenta información sobre cómo cuidarse después del procedimiento. El médico también podrá darle indicaciones más específicas. Comuníquese con el médico si tiene problemas o preguntas.  ¿Qué puedo esperar después del procedimiento?  Después del procedimiento, es común tener los siguientes síntomas:  · Náuseas.  · Dolor leve al orinar. Es posible que lo sienta en la parte inferior de la espalda o el abdomen. El dolor debe ceder unos minutos después de orinar. [place] puede durar 1 semana.  · Delisa pequeña cantidad de song en la orina riki varios días.  Siga estas indicaciones en chapman casa:  Medicamentos  · [place] los medicamentos de venta yvonne y los recetados solamente lori se lo haya indicado el médico.  · Si le recetaron un antibiótico, tómelo lori se lo haya indicado el médico. No deje de maddy el antibiótico aunque comience a sentirse mejor.  · No conduzca riki 24 horas si le administraron un sedante riki el procedimiento.  · Pregúntele al médico si el medicamento recetado le impide conducir o usar maquinaria pesada.  Actividad  · [place] reposo lori se lo haya indicado el médico.  · Evite estar sentado riki largos períodos sin moverse. Levánt[place] y camine un poco cada 1 a 2 horas. [place] es importante para mejorar el flujo sanguíneo y la respiración.  Pida ayuda si se siente débil o inestable.  · Retome quiana actividades normales según lo indicado por el médico. Pregúntele al médico qué actividades son seguras para usted.  Indicaciones generales    · Observe si hay song en la orina. Si la cantidad de song en la orina aumenta, llame al médico.  · Si tiene un catéter:  ? Siga las indicaciones del médico acerca del cuidado del catéter y la bolsa de recolección.  ? No tome beata de inmersión, no nade ni use el jacuzzi hasta que el médico lo autorice. Pregúntele al médico si puede ducharse. James vez solo le permitan darse beata de esponja.  · Miladis suficiente líquido lori para mantener la orina de color amarillo pálido.  · No consuma ningún producto que contenga nicotina o tabaco, lori cigarrillos, cigarrillos electrónicos y tabaco de mascar. Estos pueden retrasar la cicatrización después de la cirugía. Si necesita ayuda para dejar de consumir, consulte al médico.  · Concurra a todas las visitas de control lori se lo haya indicado el médico. [place] es importante.  Comuníquese con un médico si:  · Tiene un dolor que empeora o que no mejora con los medicamentos, especialmente al orinar.  · Tiene dificultad para orinar.  · Siente náuseas o tiene vómitos de forma repetida riki más de 2 días después del procedimiento.  Solicite ayuda inmediatamente si:  · La orina es de color minor oscuro o tiene coágulos.  · Tiene pérdidas de orina (incontinencia).  · El extremo del stent se sale de la uretra.  · No puede orinar.  · Siente un dolor intenso, caitlyn o repentino en el abdomen o la parte inferior de la espalda.  · Tiene fiebre.  · Presenta dolor o hinchazón en las piernas.  · Tiene dificultad para respirar.  Resumen  · Después del procedimiento, es común tener un dolor leve al orinar que desaparece unos minutos después de terminar. [place] puede durar 1 semana.  · Observe si hay song en la orina. Si la cantidad de song en la orina aumenta, llame al médico.  · [place] los  medicamentos de venta yvonne y los recetados solamente lori se lo haya indicado el médico.  · Miladis suficiente líquido lori para mantener la orina de color amarillo pálido.      DIET: To avoid nausea, slowly advance diet as tolerated, avoiding spicy or greasy foods for the first day.  Add more substantial food to your diet according to your physician's instructions.  INCREASE FLUIDS AND FIBER TO AVOID CONSTIPATION.    SURGICAL DRESSING/BATHING: To office next week to remove stent on string    FOLLOW-UP APPOINTMENT:  A follow-up appointment should be arranged with your doctor; call to schedule.    You should CALL YOUR PHYSICIAN if you develop:  Fever greater than 101 degrees F.  Pain not relieved by medication, or persistent nausea or vomiting.  Excessive bleeding (blood soaking through dressing) or unexpected drainage from the wound.  Extreme redness or swelling around the incision site, drainage of pus or foul smelling drainage.  Inability to urinate or empty your bladder within 8 hours.  Problems with breathing or chest pain.    You should call 911 if you develop problems with breathing or chest pain.  If you are unable to contact your doctor or surgical center, you should go to the nearest emergency room or urgent care center.  Physician's telephone #: Dr. Blakely [phone]    If any questions arise, call your doctor.  If your doctor is not available, please feel free to call the Surgical Center at [phone].     A registered nurse may call you a few days after your surgery to see how you are doing after your procedure.    MEDICATIONS: Resume taking daily medication.  Take prescribed pain medication with food.  If no medication is prescribed, you may take non-aspirin pain medication if needed.  PAIN MEDICATION CAN BE VERY CONSTIPATING.  Take a stool softener or laxative such as senokot, pericolace, or milk of magnesia if needed.    Prescription E-prescribed to pharmacy on record (see page 1 of these  instructions). Last pain medication given at ________________________.    If your physician has prescribed pain medication that includes Acetaminophen (Tylenol), do not take additional Acetaminophen (Tylenol) while taking the prescribed medication.    Depression / Suicide Risk    As you are discharged from this [facility] facility, it is important to learn how to keep safe from harming yourself.    Recognize the warning signs:  · Abrupt changes in personality, positive or negative- including increase in energy   · Giving away possessions  · Change in eating patterns- significant weight changes-  positive or negative  · Change in sleeping patterns- unable to sleep or sleeping all the time   · Unwillingness or inability to communicate  · Depression  · Unusual sadness, discouragement and loneliness  · Talk of wanting to die  · Neglect of personal appearance   · Rebelliousness- reckless behavior  · Withdrawal from people/activities they love  · Confusion- inability to concentrate       If you or a loved one observes any of these behaviors or has concerns about self-harm, here's what you can do:  · Talk about it- your feelings and reasons for harming yourself  · Remove any means that you might use to hurt yourself (examples: pills, rope, extension cords, firearm)  · Get professional help from the community (Mental Health, Substance Abuse, psychological counseling)  · Do not be alone:Call your Safe Contact- someone whom you trust who will be there for you.  · Call your local CRISIS HOTLINE 203-3266 or 628-692-0389  · Call your local Children's Mobile Crisis Response Team [place] (971) 933-6953 or www.[employer]  · Call the toll free National Suicide Prevention Hotlines   · National Suicide Prevention Lifeline 399-526-YOWU (1169)  National Hope Line Network 800-SUICIDE (344-7546)    Discharge Education for patients on GRANT (Obstructive Sleep Apnea) Protocol    Prior to receiving sedation or anesthesia, we  screen all patients for Obstructive Sleep Apnea.  During your screening, you were identified as having suspected, but not confirmed Obstructive Sleep Apnea(GRANT).    What is Obstructive Sleep Apnea?  Sleep apnea (AP-ne-ah) is a common disorder which involves breathing pauses that occur during sleep.  These can last from 10 seconds to a minute or longer.  Normal breathing resumes often with a loud snort or choking sound.    Sleep apnea occurs in all age groups and both genders but is more common in men and people over 40 years of age.  It has been estimated that as many as 18 million Americans have sleep apnea.  Most people who have sleep apnea don’t know they have it because it only occurs during sleep.  A family member and/or bed partner may first notice the signs of sleep apnea.  Sleep apnea is a chronic (ongoing) condition that disrupts the quality and quantity of your sleep repeatedly throughout the night.  This often results in excessive daytime sleepiness or fatigue during the day.  It may also contribute to high blood pressure, heart problems, and complications following medications used for surgery and procedures.    To establish a definitive diagnosis, further testing from a specialist would be needed.  We recommend that you follow up with your primary care physician.    We recommend that you should be with an adult observer for at least 24 hours after your sedation/anesthesia.  If you have a CPAP machine, you should wear it during any sleep period (day or night) for the week following your procedure.  We encourage you to sleep on your side or in a sitting position, even with napping.  Lying flat on your back increases the risk of apnea and airway obstruction during your post procedure recovery period.    It is important to prevent over-sedation that could increase your risk for apnea.  Please take all pain medication as directed by your physician.  If you are not getting pain relief, please contact your  physician to discuss possible approaches to relieving pain while minimizing medications that can affect your breathing and oxygen levels.      ·

## 2022-01-14 NOTE — ANESTHESIA PROCEDURE NOTES
Airway    Date/Time: 1/14/2022 12:37 PM  Performed by: Monisha Collier M.D.  Authorized by: Monisha Collier M.D.     Location:  OR  Urgency:  Elective  Indications for Airway Management:  Anesthesia      Spontaneous Ventilation: absent    Sedation Level:  Deep  Preoxygenated: Yes    Mask Difficulty Assessment:  0 - not attempted  Final Airway Type:  Supraglottic airway  Final Supraglottic Airway:  Standard LMA    SGA Size:  5  Number of Attempts at Approach:  1

## 2022-01-14 NOTE — ANESTHESIA PREPROCEDURE EVALUATION
Case: [id number] Date/Time: 01/14/22 1315    Procedures:       CYSTOSCOPY, WITH URETERAL STENT INSERTION (Left )      URETEROSCOPY      LITHOTRIPSY, USING LASER    Pre-op diagnosis: URETERIC STONE    Location: [facility] OR 02 / SURGERY [facility]    Surgeons: Duncan Blakely M.D.        Pt reports he is able to care for self without dyspnea, can walk up a flight of steps. Wife agrees. Denies leg swelling, new fatigue, difficulty breathing, or other concerning changes in health. Denies recent cough or cold. Seems optimized. Dr. Yi's note from June 2021 reported the same and the plan was to see pt again in 1 year. Very unlikely that he would make any changes prior to this procedure given pt's current status.    Pt and wife understand elevated risk of severe or catastrophic complications from anesthesia given pt's cardiomyopathy and would like to proceed.    Dr. Blakely has no plans to use electrocautery or anything that would interfere with AICD. No need to turn it off for procedure and since no changes will be made no need to interrogate postoperatively.    Relevant Problems   ANESTHESIA   (positive) Obstructive sleep apnea      PULMONARY   (positive) Moderate persistent asthma with exacerbation      NEURO   (positive) History of acute myocardial infarction      CARDIAC   (positive) Atrial fib/flutter, transient   (positive) Congestive heart failure   (positive) Coronary artery disease due to calcified coronary lesion:Acute MI, anterior wall s/p bms to LAD 12/11 with -RCA and residual CX dz (stent failed)   (positive) Essential hypertension   (positive) Pacemaker         (positive) Fatty liver       Physical Exam    Airway   Mallampati: II  TM distance: >3 FB  Neck ROM: full       Cardiovascular - normal exam  Rhythm: regular  Rate: normal  (-) murmur     Dental - normal exam           Pulmonary - normal exam  Breath sounds clear to auscultation     Abdominal    Neurological - normal exam                  Anesthesia Plan    ASA 3   ASA physical status 3 criteria: moderate reduction of ejection fraction    Plan - general       Airway plan will be LMA        Plan Factors:   Patient did not smoke on day of procedure.      Induction: intravenous    Postoperative Plan: Postoperative administration of opioids is intended.    Pertinent diagnostic labs and testing reviewed    Informed Consent:    Anesthetic plan and risks discussed with patient and spouse.

## 2022-01-14 NOTE — OR NURSING
1350: Report from Polina RN. Pt sleeping.  1400: pt rouses to voice, denies pain and nausea.  1415: Pt states he needs to urinate, given urinal, successful voiding. Denies pain and nausea.  1430: Pt has feeling he needs to urinate, placed urinal, minimal urine out.  Denies pain and nausea.  1445: No change. Meets criteria to transfer to Stage 2. Report called to Lauren BRANHAM.

## 2022-01-14 NOTE — OR NURSING
Patient allergies and NPO status verified, home medication reconciliation completed and belongings secured. Surgical site verified with patient. Patient verbalizes understanding of pain scale, expected course of stay and plan of care; patient and family state verbal understanding at this time. IV access established. Sequentials in place as ordered.    Pt drawn and walked by this RN to lab 7099

## 2022-01-14 NOTE — OR NURSING
Called Dr Boss's office and spoke with office answering service to reach MA. He tried to reach an MA but reports they must all be busy, and left another message regarding Dr Barrow request as to whether procedure time sensitive and need to proceed or can wait and obtain some cardiology input.    Email sent to Dr Barrow to inform we have not spoken with office yet.     Dr Reinoso replied back:     There is nothing further that I can ask of you to do. We will leave this up to the assigned anesthesiologist to decide with Dr. Blakely at the time of surgery whether to proceed or not. Thank you again.  Anjum Barrow M.D.  [facility]

## 2022-01-14 NOTE — OR NURSING
1333: To PACU from OR. 6lpm oxygen via mask. Sleeping. Respirations spontaneous and unlabored.  1350: pt sleeping. Handoff to YONAS Eden

## 2022-01-19 PROBLEM — N20.0 CALCIUM OXALATE CALCULUS OF KIDNEY: Status: ACTIVE | Noted: 2022-01-19

## 2022-01-19 LAB
APPEARANCE STONE: NORMAL
COMPN STONE: NORMAL
SPECIMEN WT: 13 MG

## 2022-01-29 PROBLEM — E83.59 CALCIUM OXALATE CALCULUS: Status: ACTIVE | Noted: 2022-01-29

## 2022-02-15 ENCOUNTER — PATIENT MESSAGE (OUTPATIENT)
Dept: HEALTH INFORMATION MANAGEMENT | Facility: OTHER | Age: 67
End: 2022-02-15
Payer: MEDICARE

## 2022-02-22 ENCOUNTER — TELEPHONE (OUTPATIENT)
Dept: MEDICAL GROUP | Facility: MEDICAL CENTER | Age: 67
End: 2022-02-22
Payer: MEDICARE

## 2022-02-22 DIAGNOSIS — I25.10 CORONARY ARTERY DISEASE DUE TO CALCIFIED CORONARY LESION: ICD-10-CM

## 2022-02-22 DIAGNOSIS — I25.84 CORONARY ARTERY DISEASE DUE TO CALCIFIED CORONARY LESION: ICD-10-CM

## 2022-02-22 DIAGNOSIS — E78.5 DYSLIPIDEMIA: ICD-10-CM

## 2022-02-22 RX ORDER — ROSUVASTATIN CALCIUM 20 MG/1
20 TABLET, COATED ORAL
Qty: 100 TABLET | Refills: 3 | Status: SHIPPED | OUTPATIENT
Start: 2022-02-22 | End: 2022-11-16 | Stop reason: SDUPTHER

## 2022-02-24 ENCOUNTER — PHARMACY VISIT (OUTPATIENT)
Dept: PHARMACY | Facility: MEDICAL CENTER | Age: 67
End: 2022-02-24
Payer: COMMERCIAL

## 2022-02-24 PROCEDURE — RXMED WILLOW AMBULATORY MEDICATION CHARGE: Performed by: PHYSICIAN ASSISTANT

## 2022-03-21 DIAGNOSIS — I10 ESSENTIAL HYPERTENSION: ICD-10-CM

## 2022-03-21 RX ORDER — LISINOPRIL 10 MG/1
10 TABLET ORAL
Qty: 90 TABLET | Refills: 3 | Status: SHIPPED | OUTPATIENT
Start: 2022-03-21 | End: 2022-11-16 | Stop reason: SDUPTHER

## 2022-03-21 NOTE — TELEPHONE ENCOUNTER
Name from pharmacy: LISINOPRIL 10 MG TABLET         Will file in chart as: lisinopril (PRINIVIL) 10 MG Tab    Sig: Take 1 tablet by mouth every day.    Original sig: TAKE 1 TABLET BY MOUTH EVERY DAY    Disp:  90 Tablet    Refills:  3    Start: 3/21/2022    Class: Normal    Non-formulary For: Essential hypertension    Last ordered: 9 months ago by David Cervantes M.D.     Rx #: [id number]    Pharmacy comment: Alternative Requested:PLAN MANDATES A 100 DAY SUPPLY PLS SEND NEW RX.    ACE Inhibitors Protocol Passed 03/21/2022 10:05 AM   Protocol Details  No active pregnancy on record    Normal serum creatinine in past 12 months    Normal serum potassium in past 12 months    Blood pressure on record    No positive pregnancy test in past 12 months    Recent or future visit with authorizing provider      This request has changes from the previous prescription.   To be filled at: [facility]/pharmacy #[id number] - [place] - [address] in [place]

## 2022-04-07 ENCOUNTER — NON-PROVIDER VISIT (OUTPATIENT)
Dept: SLEEP MEDICINE | Facility: MEDICAL CENTER | Age: 67
End: 2022-04-07
Attending: PHYSICIAN ASSISTANT
Payer: MEDICARE

## 2022-04-07 VITALS — HEIGHT: 65 IN | BODY MASS INDEX: 24.16 KG/M2 | WEIGHT: 145 LBS

## 2022-04-07 DIAGNOSIS — J45.30 MILD PERSISTENT ASTHMA WITHOUT COMPLICATION: ICD-10-CM

## 2022-04-07 PROCEDURE — 94060 EVALUATION OF WHEEZING: CPT | Performed by: INTERNAL MEDICINE

## 2022-04-07 PROCEDURE — 94729 DIFFUSING CAPACITY: CPT | Performed by: INTERNAL MEDICINE

## 2022-04-07 PROCEDURE — 94726 PLETHYSMOGRAPHY LUNG VOLUMES: CPT | Performed by: INTERNAL MEDICINE

## 2022-04-07 ASSESSMENT — PULMONARY FUNCTION TESTS
FEV1_LLN: 2.37
FEV1/FVC_PERCENT_PREDICTED: 78
FEV1: 2.78
FEV1/FVC: 68
FEV1: 2.81
FVC_LLN: 3.05
FEV1/FVC_PERCENT_PREDICTED: 88
FEV1/FVC_PERCENT_CHANGE: -5
FEV1/FVC_PERCENT_LLN: 65
FEV1/FVC_PERCENT_PREDICTED: 87
FEV1_PREDICTED: 2.84
FEV1/FVC_PERCENT_CHANGE: -20
FEV1/FVC: 67.8
FVC_PERCENT_PREDICTED: 106
FEV1/FVC_PERCENT_PREDICTED: 92
FVC_PREDICTED: 3.65
FEV1/FVC: 72
FVC_PERCENT_PREDICTED: 112
FEV1/FVC: 72
FEV1/FVC_PERCENT_PREDICTED: 93
FEV1_PERCENT_PREDICTED: 98
FVC: 3.9
FEV1_PERCENT_CHANGE: 5
FVC: 4.1
FEV1_PERCENT_CHANGE: -1
FEV1/FVC_PREDICTED: 78
FEV1_PERCENT_PREDICTED: 99

## 2022-04-07 ASSESSMENT — FIBROSIS 4 INDEX: FIB4 SCORE: 2.41

## 2022-04-07 NOTE — PROCEDURES
Technician: Coby Haynes RRT, CPFT  Good patient effort & cooperation.  The results of this test meet the ATS/ERS standards for acceptability & reproducibility.  Test was performed on the "[employer] Body Plethysmograph-Elite DX system.  Predicted equations for Spirometry are GLI-2012, ITS for lung volumes, and GLI-2017 for DLCO.  The DLCO was uncorrected for Hgb.  A bronchodilator of Ventolin HFA -2puffs via spacer administered.  DLCO performed during dilation period.    Pulmonary function test  Ordering provider Sarah Johns  Interpreting physician: Jaqueline Thomas  Reason for study: Mild asthma    Results:  Spirometry:  FVC is 3.90 which is 106% of predicted without a significant change postbronchodilator  FEV1 is 2.81 which is 99% of predicted without a significant change postbronchodilator  FEV1/FVC is 72    Lung volumes:  TLC is 6.55 which is 110% of predicted  RV is 2.82 which is 138% of predicted    Diffusion capacity:  DLCO is 29.55 which is 130% of predicted  DL/VA is 5 which is 126% of predicted    Interpretation:  1.  Spirometry is normal  2.  There is no significant change postbronchodilator  3.  The flow volume loop is consistent with the spirometry data  4.  Lung volumes are indicative of air trapping without hyperinflation  5.  Diffusion capacity is mildly elevated which can be seen in obesity, left to right heart shunt as well as asthma and hyperinflation.  6.  In comparison to prior study from 8/3/2017 the patient spirometry has improved, air trapping is reduced and the diffusion capacity is slightly lower.    Jaqueline Thomas MD RD  Pulmonary and Critical Care    Available on [facility]

## 2022-04-15 ENCOUNTER — TELEPHONE (OUTPATIENT)
Dept: CARDIOLOGY | Facility: MEDICAL CENTER | Age: 67
End: 2022-04-15
Payer: MEDICARE

## 2022-04-15 NOTE — TELEPHONE ENCOUNTER
LEXI--received alert transmission via home monitor.  Patient had noted noise on 4/13 with aborted shock.  Patient confirmed he was having dental procedure at that time. Patient to advise dental office of event and to be more aware while using dental tools.

## 2022-04-20 ENCOUNTER — OFFICE VISIT (OUTPATIENT)
Dept: SLEEP MEDICINE | Facility: MEDICAL CENTER | Age: 67
End: 2022-04-20
Payer: MEDICARE

## 2022-04-20 VITALS
HEIGHT: 64 IN | RESPIRATION RATE: 16 BRPM | SYSTOLIC BLOOD PRESSURE: 100 MMHG | OXYGEN SATURATION: 96 % | DIASTOLIC BLOOD PRESSURE: 60 MMHG | BODY MASS INDEX: 23.9 KG/M2 | HEART RATE: 62 BPM | WEIGHT: 140 LBS

## 2022-04-20 DIAGNOSIS — Z87.891 FORMER SMOKER: ICD-10-CM

## 2022-04-20 DIAGNOSIS — Z86.16 HISTORY OF COVID-19: ICD-10-CM

## 2022-04-20 DIAGNOSIS — G47.33 OBSTRUCTIVE SLEEP APNEA: ICD-10-CM

## 2022-04-20 DIAGNOSIS — J45.30 MILD PERSISTENT ASTHMA WITHOUT COMPLICATION: ICD-10-CM

## 2022-04-20 PROCEDURE — 99214 OFFICE O/P EST MOD 30 MIN: CPT | Performed by: PHYSICIAN ASSISTANT

## 2022-04-20 RX ORDER — BUDESONIDE AND FORMOTEROL FUMARATE DIHYDRATE 80; 4.5 UG/1; UG/1
2 AEROSOL RESPIRATORY (INHALATION) 2 TIMES DAILY
Qty: 10.2 G | Refills: 3 | Status: SHIPPED | OUTPATIENT
Start: 2022-04-20 | End: 2023-05-17 | Stop reason: SDUPTHER

## 2022-04-20 ASSESSMENT — ENCOUNTER SYMPTOMS
SHORTNESS OF BREATH: 0
COUGH: 1
CHILLS: 0
PALPITATIONS: 0
WHEEZING: 1
ORTHOPNEA: 0
SORE THROAT: 1
WEIGHT LOSS: 1
HEARTBURN: 0
SPUTUM PRODUCTION: 0
FEVER: 0
SINUS PAIN: 0

## 2022-04-20 ASSESSMENT — FIBROSIS 4 INDEX: FIB4 SCORE: 2.41

## 2022-04-20 NOTE — PROGRESS NOTES
CC: Follow-up asthma    HPI:  Abram Barillas is a 66 y.o. year old male here today for follow-up on mild asthma and COVID-19 infection in January 2021. Last seen in clinic 8/26/2021.  Patient has a remote smoking history of 10 pack years reported and quit date 1985.    Pertinent past medical history includes asthma that has required Kenalog injections in the past, mild GRANT, and hypertension, MI, CHF, cardiomyopathy with AICD and LVEF estimated at 45% predicted, bronchitis.  He has not felt the need for a Kenalog injection as he has had in the past since being on Symbicort.  He reports recently having uretal stent placement and removal kidney stone.    Reviewed in clinic vitals including /60, HR 62, O2 saturation of 96% on room air and BMI of 24.03 kg/m².    Reviewed home medication regimen including Symbicort 80, lisinopril- denies dry persistent cough, albuterol. He reports using albuterol once every couple of months.    Reviewed most recent imaging including chest x-ray obtained 5/25/2021 demonstrating clear lungs, surgical changes sternum and mediastinum with dual-lead pacer ICD in place.  No acute cardiopulmonary process.  Previous chest x-ray obtained 2/1/2021 demonstrated bilateral pulmonary opacities consistent with atypical pneumonitis/COVID-19 pneumonia.    Echocardiogram obtained 12/3/2019 demonstrated normal left ventricular chamber size, wall thickness, LVEF estimated 45%, normal right ventricular size with reduced right ventricular systolic function, estimated RVSP of 20mmHg, hypokinesis inferior septal wall and apex, no significant change since 2016 study.    Pulmonary function testing obtained 4/7/2022 was reviewed demonstrating an FEV1 of 2.81 L or 99% predicted, FVC of 3.9 L or 106% predicted, FEV1/FVC ratio of 72, residual volume 138% predicted, % predicted, DLCO 107 predicted.  Per pulmonologist interpretation no significant change in spirometry post bronchodilator, PFT improved  with increased spirometry, decreased air trapping and diffusion capacity slightly lower.    Polysomnogram was obtained 8/29/2017 demonstrating an overall AHI of 17.0 increased with REM and supine sleep, arousal index 34.5.  Minimum O2 sat of 88%.  Testing consistent with moderate obstructive sleep apnea hypopnea syndrome.  CPAP was titrated to a maximum pressure of 12 cm with an AHI improved to 4.8, mean O2 sat of 94% and minimum O2 sat of 92%.  Patient opted for a sleep appliance rather than pursue CPAP therapy.      Follow-up overnight home sleep study demonstrated mild GRANT with an overall AHI of 11/h, low O2 sat of 86% with 14 minutes below 90% O2 saturation.  Patient reports sleep apnea is not significant due to his normal weight and denies benefit or use of the oral appliance.  He does report occasional daytime sleepiness for which he will sometimes take a 20-minute nap after noontime meal.    Review of Systems   Constitutional: Positive for weight loss (perhaps 8-9 pounds). Negative for chills, fever and malaise/fatigue.   HENT: Positive for congestion (allergies mild ) and sore throat (morning ). Negative for hearing loss, nosebleeds, sinus pain and tinnitus.    Eyes:        Presc glasses    Respiratory: Positive for cough (occasional ) and wheezing (sometimes ). Negative for sputum production and shortness of breath.    Cardiovascular: Negative for chest pain, palpitations, orthopnea and leg swelling.   Gastrointestinal: Negative for heartburn.       Past Medical History:   Diagnosis Date   • Acute MI, anterior wall s/p bms to LAD 12/11 with -RCA and residual CX dz (stent failed) 12/31/2011   • AICD (automatic cardioverter/defibrillator) present [employer] placed 4/23/12 4/24/2012    DEVICE DATA:  1. Pulse generator:  [employer], model #GM7211-66, serial  #[id number]  2. Right ventricular lead:  [employer], model #7120/60,  serial #[id number]  MEASURED INTRAOPERATIVE DATA: R-waves measured  11.7 mV, pacing  threshold 0.75 volts at 0.5 msec, lead impedance of 859 ohms.  DEVICE SETTINGS: VVI 40 to 120.    • Arrhythmia    • ASTHMA     as a child. 1/13/22-PRN use of inhaler for allergies   • Atrial fib/flutter, transient     when in cardiogenic shock   • Bronchitis as child   • CAD (coronary artery disease)    • Calcium oxalate calculus 1/29/2022   • Calcium oxalate calculus of kidney 1/19/2022   • Cardiogenic shock (HCC) 1/2012   • Cardiomyopathy, ischemic EF 35% 12/31/2011   • Congestive heart failure (HCC)     Follow with Dr Yi   • Dyslipidemia    • Fatty liver    • High cholesterol    • History of acute myocardial infarction 11/24/2021 2011   • Hypertension     ON NO MEDS, now on meds   • Influenza    • Left flank pain 01/13/2022    comes and goes-severe when present   • Myocardial infarct ([facility]) 12/29/2011   • Myocardial infarction of anterolateral wall ([facility]) 12/29/2011    Follow with Dr Yi   • Pacemaker 04/23/2012    St Zach. Follows with Dr Yi with [facility] cardiology   • Renal disorder     stones   • Sleep apnea     Had study but was never prescribed CPAP.    • Tuberculosis     20 years ago on meds for 6  mo       Past Surgical History:   Procedure Laterality Date   • IN CYSTOSCOPY,INSERT URETERAL STENT Left 1/14/2022    Procedure: CYSTOSCOPY, WITH URETERAL STENT INSERTION;  Surgeon: Duncan Blakely M.D.;  Location: [facility];  Service: Urology   • IN CYSTO/URETERO/PYELOSCOPY, DX Left 1/14/2022    Procedure: URETEROSCOPY;  Surgeon: Duncan Blakely M.D.;  Location: [facility];  Service: Urology   • LASERTRIPSY Left 1/14/2022    Procedure: LITHOTRIPSY, USING LASER;  Surgeon: Duncan Blakely M.D.;  Location: [facility];  Service: Urology   • RECOVERY  8/30/2013    Performed by Cath[facility]Recovery Surgery at [facility]   • RECOVERY  4/23/2012    Performed by SURGERY, CATH-RECOVERY at [facility]   • MULTIPLE CORONARY  "ARTERY BYPASS ENDO VEIN HARVEST  1/3/2012    Performed by PADMINI OCHOA at SURGERY [facility] ORS   • UMBILICAL HERNIA REPAIR  2011    Performed by CHUY CHRISTENSEN at [facility]ERY [facility] DAY [facility]S   • COLONOSCOPY      normal   • PACEMAKER INSERTION         Family History   Problem Relation Age of Onset   • Cancer Mother         ovarian cancer   • Heart Disease Maternal Aunt 17        unclear but MI!   • Heart Disease Maternal Uncle 38       Social History     Socioeconomic History   • Marital status:      Spouse name: Not on file   • Number of children: Not on file   • Years of education: Not on file   • Highest education level: Not on file   Occupational History     Comment: retired   Tobacco Use   • Smoking status: Former Smoker     Years: 10.00     Types: Cigarettes     Quit date: 3/1/1982     Years since quittin.1   • Smokeless tobacco: Never Used   • Tobacco comment: QUIT    Vaping Use   • Vaping Use: Never used   Substance and Sexual Activity   • Alcohol use: No     Alcohol/week: 0.0 oz     Comment: occasionally ( 2 times a year)   • Drug use: No   • Sexual activity: Not Currently   Other Topics Concern   • Not on file   Social History Narrative   • Not on file     Social Determinants of Health     Financial Resource Strain: Not on file   Food Insecurity: Not on file   Transportation Needs: Not on file   Physical Activity: Not on file   Stress: Not on file   Social Connections: Not on file   Intimate Partner Violence: Not on file   Housing Stability: Not on file       Allergies as of 2022 - Reviewed 2022   Allergen Reaction Noted   • Pcn [penicillins] Rash 2010        @Vital signs for this encounter:  /60   Pulse 62   Resp 16   Ht 1.626 m (5' 4\")   Wt 63.5 kg (140 lb)   SpO2 96%     Current medications as of today   Current Outpatient Medications   Medication Sig Dispense Refill   • lisinopril (PRINIVIL) 10 MG Tab TAKE 1 TABLET BY MOUTH EVERY DAY 90 " Tablet 3   • rosuvastatin (CRESTOR) 20 MG Tab Take 1 Tablet by mouth at bedtime. 100 Tablet 3   • sildenafil (REVATIO) 20 MG tablet sildenafil (pulmonary hypertension) 20 mg tablet   TAKE 1-5 TABLETS 30 MINUTES PRIOR TO ACTIVITY **MAX 5 TABS. MINIMUM 24 HOURS BETWEEN DOSES**     • Multiple Vitamin (MULTI-VITAMIN DAILY PO) One A Day Vitamin     • metoprolol SR (TOPROL XL) 50 MG TABLET SR 24 HR Take 1 tablet by mouth every day. 90 tablet 3   • budesonide-formoterol (SYMBICORT) 80-4.5 MCG/ACT Aerosol Inhale 2 Puffs 2 times a day. Use with spacer.  Rinse mouth after each use. 10.2 g 3   • albuterol 108 (90 Base) MCG/ACT Aero Soln inhalation aerosol Inhale 2 Puffs by mouth every 6 hours as needed. 8.5 g 0   • aspirin EC (ECOTRIN) 81 MG TBEC Take 81 mg by mouth every day.       No current facility-administered medications for this visit.         Physical Exam:   Gen:           Alert and oriented, No apparent distress. Mood and affect appropriate, normal interaction with provider.  Eyes:          sclere white, conjunctive moist.  Hearing:     Grossly intact.  Dentition:    Fair dentition.  Oropharynx:   Tongue normal, posterior pharynx without erythema or exudate.  Neck:        Supple, trachea midline, no masses.  Respiratory Effort: No intercostal retractions or use of accessory muscles.   Lung Auscultation:      Clear to auscultation bilaterally; no rales, rhonchi or wheezing.  CV:            Regular rate and rhythm. No edema. No murmurs, rubs or gallops.  Digits, Nails, Ext: No clubbing, cyanosis, petechiae, or nodes.   Skin:        No rashes, lesions or ulcers noted on exposed skin surfaces.                     Assessment:  1. Mild persistent asthma without complication  budesonide-formoterol (SYMBICORT) 80-4.5 MCG/ACT Aerosol   2. Obstructive sleep apnea     3. History of COVID-19     4. Former smoker         Immunizations:    Flu: 9/19/2021  Pneumovax 23: 11/24/2021, 10/1/2012  Pneumococcal vaccine: 1/1/2007  Radiation Monitoring Devices  SARS-CoV-2 vaccine: 11/12/2021, 2/10/2021    Plan:   66 y.o. year old male here today for follow-up on mild asthma and COVID-19 infection in January 2021.     Former smoker: Remains abstinent of tobacco.    Mild persistent asthma: Stable on current regimen although he does report some irregularity with Symbicort use.  Also reports some increased seasonal congestion. Refill Symbicort, patient to use 1 puff Symbicort morning and evening.  Denies need for albuterol inhaler.  Reviewed updated pulmonary function testing.  Copy pulmonary function testing provided to patient.    History of COVID-19 pneumonia: Denies taste and smell alterations, back to baseline pulmonary status.    GRANT: Currently untreated, mild to moderate.Patient reports sleep apnea is not significant due to his normal weight and denies benefit or use of the oral appliance.  He does report occasional daytime sleepiness for which he will sometimes take a 20-minute nap after noontime meal.  We did discuss potential implications of undertreated sleep apnea.    Follow-up in 6 months, sooner if needed.      This dictation was created using voice recognition software. The accuracy of the dictation is limited to the abilities of the software. I expect there may be some errors of grammar and possibly content.

## 2022-04-20 NOTE — PATIENT INSTRUCTIONS
1-irregular use of symbicort  2-some increase congestion with pollens  3-use one puff symbicort morning and evening  4-not needing albuterol, let us know  5-reviewed lung function test and copy to patient   6-follow up in 6 months

## 2022-05-05 ENCOUNTER — TELEPHONE (OUTPATIENT)
Dept: HEALTH INFORMATION MANAGEMENT | Facility: OTHER | Age: 67
End: 2022-05-05

## 2022-05-19 ENCOUNTER — NON-PROVIDER VISIT (OUTPATIENT)
Dept: CARDIOLOGY | Facility: MEDICAL CENTER | Age: 67
End: 2022-05-19
Payer: MEDICARE

## 2022-05-19 PROCEDURE — 93295 DEV INTERROG REMOTE 1/2/MLT: CPT | Performed by: INTERNAL MEDICINE

## 2022-05-23 ENCOUNTER — TELEPHONE (OUTPATIENT)
Dept: MEDICAL GROUP | Facility: MEDICAL CENTER | Age: 67
End: 2022-05-23
Payer: MEDICARE

## 2022-05-23 NOTE — TELEPHONE ENCOUNTER
ESTABLISHED PATIENT PRE-VISIT PLANNING     Annual Wellness Visit Never Done    Patient was NOT contacted to complete PVP.     Note: Patient will not be contacted if there is no indication to call.     1.  Reviewed notes from the last few office visits within the medical group: Yes    2.  If any orders were placed at last visit or intended to be done for this visit (i.e. 6 mos follow-up), do we have Results/Consult Notes?         •  Labs - Labs ordered, completed on 11/27/2022 and results are in chart.  Note: If patient appointment is for lab review and patient did not complete labs, check with provider if OK to reschedule patient until labs completed.       •  Imaging - Imaging ordered, completed and results are in chart.        •  Referrals -    -Called [facility] and received there answering service. Office unavailable at time of call. Request for medical records sent to Fax: [phone], provided by their answering service.    3. Is this appointment scheduled as a Hospital Follow-Up? No    4.  Immunizations were updated in Epic using Reconcile Outside Information activity? Yes    5.  Patient is due for the following Health Maintenance Topics:   Health Maintenance Due   Topic Date Due   • Annual Wellness Visit  Never done       6.  AHA (Pulse8) form printed for Provider? No, patient does not have any open alerts, Compliant.

## 2022-05-23 NOTE — LETTER
[employer]  David Cervantes M.D.  [address]  [place] [address]  Fax: [phone]   Authorization for Release/Disclosure of   Protected Health Information   Name: IVONNE MCCOY : 1955 SSN: [id number]   [place] [address] [facility] [address] Phone:    [phone] (home) [phone] (work)   I authorize the entity listed below to release/disclose the PHI below to:   [employer]/David Cervantes M.D. and David Cervantes M.D.   Provider or Entity Name:  [facility]     Address   City, State, Zip  [address]  [place] [address] Phone:  [phone]    Fax:  [phone]   Reason for request: continuity of care   Information to be released:    [  ] LAST COLONOSCOPY,  including any PATH REPORT and follow-up  [  ] LAST FIT/COLOGUARD RESULT [  ] LAST DEXA  [  ] LAST MAMMOGRAM  [  ] LAST PAP  [  ] LAST LABS [  ] RETINA EXAM REPORT  [  ] IMMUNIZATION RECORDS  [ XXXX ] Release all info      [  ] Check here and initial the line next to each item to release ALL health information INCLUDING  _____ Care and treatment for drug and / or alcohol abuse  _____ HIV testing, infection status, or AIDS  _____ Genetic Testing    DATES OF SERVICE OR TIME PERIOD TO BE DISCLOSED: _____________  I understand and acknowledge that:  * This Authorization may be revoked at any time by you in writing, except if your health information has already been used or disclosed.  * Your health information that will be used or disclosed as a result of you signing this authorization could be re-disclosed by the recipient. If this occurs, your re-disclosed health information may no longer be protected by State or Federal laws.  * You may refuse to sign this Authorization. Your refusal will not affect your ability to obtain treatment.  * This Authorization becomes effective upon signing and will  on (date) __________.      If no date is indicated, this Authorization will  one (1) year from the  signature date.    Name: Abram Barillas    Signature: Continuity of Care   Date:     5/23/2022       PLEASE FAX REQUESTED RECORDS BACK TO: [phone]

## 2022-05-23 NOTE — CARDIAC REMOTE MONITOR - SCAN
Device transmission reviewed. Device demonstrated appropriate function.       Electronically Signed by: Guanako Hensley M.D.    5/23/2022  5:51 PM     independent

## 2022-05-25 ENCOUNTER — OFFICE VISIT (OUTPATIENT)
Dept: MEDICAL GROUP | Facility: MEDICAL CENTER | Age: 67
End: 2022-05-25
Payer: MEDICARE

## 2022-05-25 ENCOUNTER — HOSPITAL ENCOUNTER (OUTPATIENT)
Dept: LAB | Facility: MEDICAL CENTER | Age: 67
End: 2022-05-25
Attending: FAMILY MEDICINE
Payer: MEDICARE

## 2022-05-25 VITALS
WEIGHT: 135.8 LBS | OXYGEN SATURATION: 98 % | DIASTOLIC BLOOD PRESSURE: 44 MMHG | BODY MASS INDEX: 23.18 KG/M2 | TEMPERATURE: 97 F | HEIGHT: 64 IN | SYSTOLIC BLOOD PRESSURE: 84 MMHG | HEART RATE: 62 BPM

## 2022-05-25 DIAGNOSIS — Z95.810 AICD (AUTOMATIC CARDIOVERTER/DEFIBRILLATOR) PRESENT: ICD-10-CM

## 2022-05-25 DIAGNOSIS — R35.1 NOCTURIA MORE THAN TWICE PER NIGHT: ICD-10-CM

## 2022-05-25 DIAGNOSIS — Z86.79 HISTORY OF ATRIAL FIBRILLATION: ICD-10-CM

## 2022-05-25 DIAGNOSIS — I10 ESSENTIAL HYPERTENSION: ICD-10-CM

## 2022-05-25 DIAGNOSIS — Z86.16 HISTORY OF 2019 NOVEL CORONAVIRUS DISEASE (COVID-19): ICD-10-CM

## 2022-05-25 DIAGNOSIS — K76.0 FATTY LIVER: ICD-10-CM

## 2022-05-25 DIAGNOSIS — E11.65 UNCONTROLLED TYPE 2 DIABETES MELLITUS WITH HYPERGLYCEMIA (HCC): ICD-10-CM

## 2022-05-25 DIAGNOSIS — R73.09 ELEVATED GLUCOSE LEVEL: ICD-10-CM

## 2022-05-25 DIAGNOSIS — I25.2 HISTORY OF ACUTE MYOCARDIAL INFARCTION: ICD-10-CM

## 2022-05-25 DIAGNOSIS — I25.84 CORONARY ATHEROSCLEROSIS DUE TO CALCIFIED CORONARY LESION (CODE): ICD-10-CM

## 2022-05-25 DIAGNOSIS — I25.10 CORONARY ARTERY DISEASE DUE TO CALCIFIED CORONARY LESION: ICD-10-CM

## 2022-05-25 DIAGNOSIS — R94.30 CARDIAC LV EJECTION FRACTION OF 40-49%: ICD-10-CM

## 2022-05-25 DIAGNOSIS — I50.22 CHRONIC SYSTOLIC (CONGESTIVE) HEART FAILURE (HCC): ICD-10-CM

## 2022-05-25 DIAGNOSIS — J45.40 MODERATE PERSISTENT ASTHMA WITHOUT COMPLICATION: ICD-10-CM

## 2022-05-25 DIAGNOSIS — N52.9 IMPOTENCE OF ORGANIC ORIGIN: ICD-10-CM

## 2022-05-25 DIAGNOSIS — I25.84 CORONARY ARTERY DISEASE DUE TO CALCIFIED CORONARY LESION: ICD-10-CM

## 2022-05-25 DIAGNOSIS — Z00.00 MEDICARE ANNUAL WELLNESS VISIT, SUBSEQUENT: ICD-10-CM

## 2022-05-25 DIAGNOSIS — E78.5 DYSLIPIDEMIA: ICD-10-CM

## 2022-05-25 DIAGNOSIS — Z95.810 PRESENCE OF COMBINATION INTERNAL CARDIAC DEFIBRILLATOR (ICD) AND PACEMAKER: ICD-10-CM

## 2022-05-25 PROBLEM — G47.34 NOCTURNAL HYPOXIA: Status: RESOLVED | Noted: 2018-07-16 | Resolved: 2022-05-25

## 2022-05-25 PROBLEM — N20.0 CALCIUM OXALATE CALCULUS OF KIDNEY: Status: RESOLVED | Noted: 2022-01-19 | Resolved: 2022-05-25

## 2022-05-25 PROBLEM — Z87.891 FORMER SMOKER: Status: RESOLVED | Noted: 2019-08-27 | Resolved: 2022-05-25

## 2022-05-25 PROBLEM — G47.33 OBSTRUCTIVE SLEEP APNEA: Status: RESOLVED | Noted: 2017-03-30 | Resolved: 2022-05-25

## 2022-05-25 PROBLEM — E83.59 CALCIUM OXALATE CALCULUS: Status: RESOLVED | Noted: 2022-01-29 | Resolved: 2022-05-25

## 2022-05-25 PROBLEM — J45.41 MODERATE PERSISTENT ASTHMA WITH EXACERBATION: Status: RESOLVED | Noted: 2019-05-31 | Resolved: 2022-05-25

## 2022-05-25 LAB
APPEARANCE UR: CLEAR
BACTERIA #/AREA URNS HPF: NEGATIVE /HPF
BASOPHILS # BLD AUTO: 0.4 % (ref 0–1.8)
BASOPHILS # BLD: 0.03 K/UL (ref 0–0.12)
BILIRUB UR QL STRIP.AUTO: NEGATIVE
COLOR UR: ABNORMAL
EOSINOPHIL # BLD AUTO: 0.04 K/UL (ref 0–0.51)
EOSINOPHIL NFR BLD: 0.6 % (ref 0–6.9)
EPI CELLS #/AREA URNS HPF: ABNORMAL /HPF
ERYTHROCYTE [DISTWIDTH] IN BLOOD BY AUTOMATED COUNT: 38.5 FL (ref 35.9–50)
GLUCOSE UR STRIP.AUTO-MCNC: >=1000 MG/DL
HBA1C MFR BLD: 14.3 % (ref 0–5.6)
HCT VFR BLD AUTO: 48.7 % (ref 42–52)
HGB BLD-MCNC: 17.4 G/DL (ref 14–18)
HYALINE CASTS #/AREA URNS LPF: ABNORMAL /LPF
IMM GRANULOCYTES # BLD AUTO: 0.01 K/UL (ref 0–0.11)
IMM GRANULOCYTES NFR BLD AUTO: 0.1 % (ref 0–0.9)
INT CON NEG: NEGATIVE
INT CON POS: POSITIVE
KETONES UR STRIP.AUTO-MCNC: 15 MG/DL
LEUKOCYTE ESTERASE UR QL STRIP.AUTO: NEGATIVE
LYMPHOCYTES # BLD AUTO: 2.32 K/UL (ref 1–4.8)
LYMPHOCYTES NFR BLD: 33.8 % (ref 22–41)
MCH RBC QN AUTO: 31.5 PG (ref 27–33)
MCHC RBC AUTO-ENTMCNC: 35.7 G/DL (ref 33.7–35.3)
MCV RBC AUTO: 88.2 FL (ref 81.4–97.8)
MICRO URNS: ABNORMAL
MONOCYTES # BLD AUTO: 0.58 K/UL (ref 0–0.85)
MONOCYTES NFR BLD AUTO: 8.5 % (ref 0–13.4)
MUCOUS THREADS #/AREA URNS HPF: ABNORMAL /HPF
NEUTROPHILS # BLD AUTO: 3.88 K/UL (ref 1.82–7.42)
NEUTROPHILS NFR BLD: 56.6 % (ref 44–72)
NITRITE UR QL STRIP.AUTO: NEGATIVE
NRBC # BLD AUTO: 0 K/UL
NRBC BLD-RTO: 0 /100 WBC
PH UR STRIP.AUTO: 6.5 [PH] (ref 5–8)
PLATELET # BLD AUTO: 156 K/UL (ref 164–446)
PMV BLD AUTO: 10.9 FL (ref 9–12.9)
PROT UR QL STRIP: 100 MG/DL
RBC # BLD AUTO: 5.52 M/UL (ref 4.7–6.1)
RBC # URNS HPF: ABNORMAL /HPF
RBC UR QL AUTO: NEGATIVE
SP GR UR STRIP.AUTO: >=1.045
UROBILINOGEN UR STRIP.AUTO-MCNC: 1 MG/DL
WBC # BLD AUTO: 6.9 K/UL (ref 4.8–10.8)
WBC #/AREA URNS HPF: ABNORMAL /HPF

## 2022-05-25 PROCEDURE — G0402 INITIAL PREVENTIVE EXAM: HCPCS | Performed by: FAMILY MEDICINE

## 2022-05-25 PROCEDURE — 81001 URINALYSIS AUTO W/SCOPE: CPT

## 2022-05-25 PROCEDURE — 80061 LIPID PANEL: CPT

## 2022-05-25 PROCEDURE — 85025 COMPLETE CBC W/AUTO DIFF WBC: CPT

## 2022-05-25 PROCEDURE — 82105 ALPHA-FETOPROTEIN SERUM: CPT

## 2022-05-25 PROCEDURE — 80053 COMPREHEN METABOLIC PANEL: CPT

## 2022-05-25 PROCEDURE — 83036 HEMOGLOBIN GLYCOSYLATED A1C: CPT | Performed by: FAMILY MEDICINE

## 2022-05-25 PROCEDURE — 84443 ASSAY THYROID STIM HORMONE: CPT

## 2022-05-25 PROCEDURE — 36415 COLL VENOUS BLD VENIPUNCTURE: CPT

## 2022-05-25 RX ORDER — INSULIN GLARGINE 100 [IU]/ML
25 INJECTION, SOLUTION SUBCUTANEOUS EVERY EVENING
Qty: 15 ML | Refills: 6 | Status: SHIPPED | OUTPATIENT
Start: 2022-05-25 | End: 2022-07-26

## 2022-05-25 RX ORDER — LANCETS 30 GAUGE
EACH MISCELLANEOUS
Qty: 100 EACH | Refills: 11 | Status: SHIPPED | OUTPATIENT
Start: 2022-05-25 | End: 2022-11-17

## 2022-05-25 RX ORDER — SYRING-NEEDL,DISP,INSUL,0.3 ML 30 GX5/16"
1 SYRINGE, EMPTY DISPOSABLE MISCELLANEOUS 4 TIMES DAILY
Qty: 1 EACH | Refills: 6 | Status: SHIPPED | OUTPATIENT
Start: 2022-05-25 | End: 2022-11-17

## 2022-05-25 RX ORDER — PEN NEEDLE, DIABETIC 31 GX5/16"
1 NEEDLE, DISPOSABLE MISCELLANEOUS
Qty: 50 EACH | Refills: 6 | Status: SHIPPED | OUTPATIENT
Start: 2022-05-25 | End: 2022-07-26

## 2022-05-25 RX ORDER — METOPROLOL SUCCINATE 50 MG/1
50 TABLET, EXTENDED RELEASE ORAL
Qty: 90 TABLET | Refills: 3 | Status: SHIPPED | OUTPATIENT
Start: 2022-05-25 | End: 2022-11-16 | Stop reason: SDUPTHER

## 2022-05-25 ASSESSMENT — ENCOUNTER SYMPTOMS: GENERAL WELL-BEING: GOOD

## 2022-05-25 ASSESSMENT — ACTIVITIES OF DAILY LIVING (ADL): BATHING_REQUIRES_ASSISTANCE: 0

## 2022-05-25 ASSESSMENT — FIBROSIS 4 INDEX: FIB4 SCORE: 2.41

## 2022-05-25 ASSESSMENT — PATIENT HEALTH QUESTIONNAIRE - PHQ9: CLINICAL INTERPRETATION OF PHQ2 SCORE: 0

## 2022-05-25 NOTE — PROGRESS NOTES
Chief Complaint   Patient presents with   • Weight Loss     Weight loss after kidney procedure.    • Polydipsia   • Annual Wellness Visit       HPI:  Abram Barillas is a 66 y.o. here for Medicare Annual Wellness Visit     Patient Active Problem List    Diagnosis Date Noted   • History of 2019 novel coronavirus disease (COVID-19) 05/25/2022   • Chronic systolic (congestive) heart failure (HCC) 05/25/2022   • Cardiac LV ejection fraction of 40-49% 05/25/2022   • Moderate persistent asthma without complication 05/25/2022   • History of atrial fibrillation 05/25/2022   • Nocturia more than twice per night 05/25/2022   • Elevated glucose level 05/25/2022   • Uncontrolled type 2 diabetes mellitus with hyperglycemia (HCC) 05/25/2022   • History of acute myocardial infarction 11/24/2021   • Impotence of organic origin 02/22/2016   • Essential hypertension    • Coronary artery disease due to calcified coronary lesion:Acute MI, anterior wall s/p bms to LAD 12/11 with -RCA and residual CX dz (stent failed)    • AICD (automatic cardioverter/defibrillator) present [facility] placed 4/23/12 04/24/2012   • Presence of combination internal cardiac defibrillator (ICD) and pacemaker 04/23/2012   • Dyslipidemia    • Fatty liver        Current Outpatient Medications   Medication Sig Dispense Refill   • metoprolol SR (TOPROL XL) 50 MG TABLET SR 24 HR Take 1 Tablet by mouth every day. 90 Tablet 3   • insulin glargine (LANTUS SOLOSTAR) 100 UNIT/ML Solution Pen-injector injection Inject 25 Units under the skin every evening. 15 mL 6   • Blood Glucose Monitoring Suppl Device Meter: Dispense Device of Insurance Preference. Sig. Use ac just before meals and hs 1 Each 0   • Blood Glucose Test Strips Test strips order: Test strips for insurance preference meter. Sig: use qid before meals and at bedtime 100 Strip 4   • metFORMIN (GLUCOPHAGE) 500 MG Tab Take 1 Tablet by mouth 2 times a day with meals. 60 Tablet 4   • Lancets Lancets order:  Lancets for insurance preference meter. Sig: use qid 100 Each 11   • Lancet Device Misc 1 Each 4 times a day. 1 Each 6   • Insulin Pen Needle (B-D ULTRAFINE III SHORT PEN) 1 Each at bedtime. For use hs 50 Each 6   • budesonide-formoterol (SYMBICORT) 80-4.5 MCG/ACT Aerosol Inhale 2 Puffs 2 times a day. Use with spacer.  Rinse mouth after each use. 10.2 g 3   • lisinopril (PRINIVIL) 10 MG Tab TAKE 1 TABLET BY MOUTH EVERY DAY 90 Tablet 3   • rosuvastatin (CRESTOR) 20 MG Tab Take 1 Tablet by mouth at bedtime. 100 Tablet 3   • sildenafil (REVATIO) 20 MG tablet sildenafil (pulmonary hypertension) 20 mg tablet   TAKE 1-5 TABLETS 30 MINUTES PRIOR TO ACTIVITY **MAX 5 TABS. MINIMUM 24 HOURS BETWEEN DOSES**     • Multiple Vitamin (MULTI-VITAMIN DAILY PO) One A Day Vitamin     • albuterol 108 (90 Base) MCG/ACT Aero Soln inhalation aerosol Inhale 2 Puffs by mouth every 6 hours as needed. 8.5 g 0   • aspirin EC (ECOTRIN) 81 MG TBEC Take 81 mg by mouth every day.       No current facility-administered medications for this visit.          Current supplements as per medication list.     Allergies: Pcn [penicillins]    Current social contact/activities: He continues to walk regularly, he has retired and he and his wife are staying active with numerous projects around the house.    He  reports that he quit smoking about 40 years ago. His smoking use included cigarettes. He quit after 10.00 years of use. He has never used smokeless tobacco. He reports that he does not drink alcohol and does not use drugs.  Counseling given: Not Answered  Comment: QUIT 1985      DPA/Advanced Directive:  Patient does not have an Advanced Directive.  A packet and workshop information was given on Advanced Directives.    ROS:    Gait: Uses no assistive device  Ostomy: No  Other tubes: No  Amputations: No  Chronic oxygen use: No  Last eye exam: Unclear, thinks it was about 3 months ago.  We will not do this now as his diabetes is highly uncontrolled and his  results will be abnormal.  Wears hearing aids: No   : Denies any urinary leakage during the last 6 months however, he is having severe urinary frequency as discussed    Screening:  Discussed, he will schedule an appointment with ophthalmology in a few months    Depression Screening  Little interest or pleasure in doing things?  0 - not at all  Feeling down, depressed , or hopeless? 0 - not at all  Patient Health Questionnaire Score: 0     If depressive symptoms identified deferred to follow up visit unless specifically addressed in assessment and plan.    Interpretation of PHQ-9 Total Score   Score Severity   1-4 No Depression   5-9 Mild Depression   10-14 Moderate Depression   15-19 Moderately Severe Depression   20-27 Severe Depression    Screening for Cognitive Impairment  Three Minute Recall (daughter, timi, carli)  /3    Evert clock face with all 12 numbers and set the hands to show 10 past 11.  No    Cognitive concerns identified deferred for follow up unless specifically addressed in assessment and plan.    Fall Risk Assessment  Has the patient had two or more falls in the last year or any fall with injury in the last year?  No    Safety Assessment  Throw rugs on floor.  Yes  Handrails on all stairs.  Yes  Good lighting in all hallways.  Yes  Difficulty hearing.  No  Patient counseled about all safety risks that were identified.    Functional Assessment ADLs  Are there any barriers preventing you from cooking for yourself or meeting nutritional needs?  No.    Are there any barriers preventing you from driving safely or obtaining transportation?  No.    Are there any barriers preventing you from using a telephone or calling for help?  No.    Are there any barriers preventing you from shopping?  No.    Are there any barriers preventing you from taking care of your own finances?  No.    Are there any barriers preventing you from managing your medications?  No.    Are there any barriers preventing you from  showering, bathing or dressing yourself?  No.    Are you currently engaging in any exercise or physical activity?  Yes.  Walks everyday.  What is your perception of your health?  Good.      Health Maintenance Summary          Overdue - URINE ACR / MICROALBUMIN (Yearly) Overdue - never done    No completion history exists for this topic.          Overdue - DIABETES MONOFILAMENT / LE EXAM (Yearly) Overdue - never done    No completion history exists for this topic.          Overdue - RETINAL SCREENING (Yearly) Overdue - never done    No completion history exists for this topic.          Ordered - FASTING LIPID PROFILE (Yearly) Ordered on 5/25/2022 06/12/2021  Lipid Profile    08/22/2020  [facility] BLOOD TESTS    09/28/2019  [facility]LOOD TEST[facility]    12/08/2018  LIPID PROFILE    08/04/2018  [facility] BLOOD TEST[facility]    Only the first 5 history entries have been loaded, but more history exists.          Postponed - IMM ZOSTER VACCINES (2 of 3) Postponed until 8/1/2022 01/18/2017  Imm Admin: Zoster Vaccine Live (ZVL) (Zostavax) - HISTORICAL DATA          IMM PNEUMOCOCCAL VACCINE: 65+ Years (2 - PCV) Next due on 11/24/2022 11/24/2021  Imm Admin: Pneumococcal polysaccharide vaccine (PPSV-23)    10/01/2012  Imm Admin: Pneumococcal polysaccharide vaccine (PPSV-23)    01/01/2007  Imm Admin: Pneumococcal Vaccine (UF) - HISTORICAL DATA          A1C SCREENING (Every 6 Months) Next due on 11/25/2022 05/25/2022  POCT  A1C    12/30/2011  HEMOGLOBIN A1C          Ordered - SERUM CREATININE (Yearly) Ordered on 5/25/2022 01/13/2022  Comp Metabolic Panel    11/24/2021  Comp Metabolic Panel    06/12/2021  Comp Metabolic Panel    12/08/2018  COMP METABOLIC PANEL    07/13/2018  BASIC METABOLIC PANEL    Only the first 5 history entries have been loaded, but more history exists.          IMM DTaP/Tdap/Td Vaccine (2 - Td or Tdap) Next due on 1/18/2027 01/18/2017  Imm Admin: Tdap Vaccine          COLORECTAL CANCER  SCREENING (COLONOSCOPY - Every 10 Years) Next due on 2018  REFERRAL TO GI FOR COLONOSCOPY          HEPATITIS C SCREENING  Completed    2019  HEP C VIRUS ANTIBODY          IMM INFLUENZA (Series Information) Completed    2021  Imm Admin: Influenza Vaccine Quad Inj (Pf)    2020  Imm Admin: Influenza Vaccine Quad Inj (Pf)    2019  Imm Admin: Influenza Vaccine Quad Inj (Pf)    2018  Imm Admin: Influenza Vaccine Quad Inj (Pf)    2018  Imm Admin: Influenza Vaccine Quad Inj (Pf)    Only the first 5 history entries have been loaded, but more history exists.          COVID-19 Vaccine (Series Information) Completed    2021  Imm Admin: E[place]i SARS-CoV-2 Vaccine    02/10/2021  Imm Admin: Eboni SARS-CoV-2 Vaccine          Annual Wellness Visit  Completed    2022  Visit Dx: Medicare annual wellness visit, subsequent          IMM HEP B VACCINE (Series Information) Aged Out    No completion history exists for this topic.          IMM MENINGOCOCCAL VACCINE (MCV4) (Series Information) Aged Out    No completion history exists for this topic.                Patient Care Team:  David Cervantes M.D. as PCP - General  Joe iY M.D. as Attending Team Physician (Cardiovascular Disease (Cardiology))  Ольга Johns P.A.-C. as Attending Team Physician (Sleep Medicine)        Social History     Tobacco Use   • Smoking status: Former Smoker     Years: 10.00     Types: Cigarettes     Quit date: 3/1/1982     Years since quittin.2   • Smokeless tobacco: Never Used   • Tobacco comment: QUIT    Vaping Use   • Vaping Use: Never used   Substance Use Topics   • Alcohol use: No     Alcohol/week: 0.0 oz     Comment: occasionally ( 2 times a year)   • Drug use: No     Family History   Problem Relation Age of Onset   • Cancer Mother         ovarian cancer   • Heart Disease Maternal Aunt 17        unclear but MI!   • Heart Disease Maternal Uncle 38     He  has a past  medical history of Acute MI, anterior wall s/p bms to LAD 12/11 with -RCA and residual CX dz (stent failed) (12/31/2011), AICD (automatic cardioverter/defibrillator) present [facility] placed 4/23/12 (4/24/2012), AICD lead malfunction (8/23/2013), Arrhythmia, ASTHMA, Atrial fib/flutter, transient, Bronchitis (as child), CAD (coronary artery disease), Calcium oxalate calculus (1/29/2022), Calcium oxalate calculus of kidney (1/19/2022), Cardiogenic shock (HCC) (1/2012), Cardiomyopathy, ischemic EF 35% (12/31/2011), Congestive heart failure (HCC), Dyslipidemia, Fatty liver, Former smoker (8/27/2019), High cholesterol, History of 2019 novel coronavirus disease (COVID-19) (5/25/2022), History of acute myocardial infarction (11/24/2021), History of atrial fibrillation (5/25/2022), Hypertension, Influenza, Left flank pain (01/13/2022), Moderate persistent asthma without complication (5/25/2022), Myocardial infarct (HCC) (12/29/2011), Myocardial infarction of anterolateral wall (HCC) (12/29/2011), Nocturnal hypoxia (7/16/2018), Obstructive sleep apnea (3/30/2017), Pacemaker (04/23/2012), Renal disorder, Sleep apnea, Tuberculosis, and Uncontrolled type 2 diabetes mellitus with hyperglycemia ([facility]) (5/25/2022).   Past Surgical History:   Procedure Laterality Date   • NH CYSTOSCOPY,INSERT URETERAL STENT Left 1/14/2022    Procedure: CYSTOSCOPY, WITH URETERAL STENT INSERTION;  Surgeon: Duncan Blakely M.D.;  Location: [facility];  Service: Urology   • NH CYSTO/URETERO/PYELOSCOPY, DX Left 1/14/2022    Procedure: URETEROSCOPY;  Surgeon: Duncan Blakely M.D.;  Location: [facility];  Service: Urology   • LASERTRIPSY Left 1/14/2022    Procedure: LITHOTRIPSY, USING LASER;  Surgeon: Duncan Blakely M.D.;  Location: [facility];  Service: Urology   • RECOVERY  8/30/2013    Performed by Cath-Recovery Surgery at [facility]ERY SAME DAY [facility]   • RECOVERY  4/23/2012    Performed by SURGERY,  "CATH-RECOVERY at [facility]   • MULTIPLE CORONARY ARTERY BYPASS ENDO VEIN HARVEST  1/3/2012    Performed by PADMINI OCHOA at SURGERY [facility]   • UMBILICAL HERNIA REPAIR  1/14/2011    Performed by CHUY CHRISTENSEN at [facility]ERY [facility]   • COLONOSCOPY  2007    normal   • PACEMAKER INSERTION         Exam:   BP (!) 84/44 (BP Location: Right arm, Patient Position: Sitting, BP Cuff Size: Adult)   Pulse 62   Temp 36.1 °C (97 °F) (Temporal)   Ht 1.626 m (5' 4\")   Wt 61.6 kg (135 lb 12.8 oz)   SpO2 98%  Body mass index is 23.31 kg/m².    Hearing good.    Dentition not examined as patient, physician and staff all wearing masks.  Alert, oriented in no acute distress.  Eye contact is good, speech goal directed, affect calm  He is slim, alert.  Movements are symmetric, no tremor.  Heart RRR, normal S1, S2 without murmur  Lungs CTA A and P, no rales  Abdomen soft, non tender, no HSM or mass    POC A1c today 14.3%.    Assessment and Plan. The following treatment and monitoring plan is recommended:      1. Medicare annual wellness visit, subsequent  His medical problems are generally stable.    2. Essential hypertension  HTN - Chronic condition stable. Currently taking all meds as directed.   He is taking baby aspirin daily.   He is not monitoring BP at home. Is low here today.  Occasionally dizzy.  Denies symptoms low BP: light-headed, tunnel-vision, unusual fatigue.   Denies symptoms high BP:pounding headache, visual changes, palpitations, flushed face.   Denies medicine side effects: unusual fatigue, slow heartbeat, foot/leg swelling, cough.  Follow-up lab orders discussed and placed  - Lipid Profile; Future  - Comp Metabolic Panel; Future  - TSH; Future  - metoprolol SR (TOPROL XL) 50 MG TABLET SR 24 HR; Take 1 Tablet by mouth every day.  Dispense: 90 Tablet; Refill: 3    3. Dyslipidemia  Patient denies chest pain, chest pressure, palpitations or exertional shortness of breath. " Patient denies muscle aches or muscle weakness from the rosuvastatin medication. Patient is a former smoker. Patient takes 81 mg aspirin daily. Patient has past history of severe myocardial infarction.  Denies stroke.  Lab orders discussed and placed.  The problem is clinically stable.  - Lipid Profile; Future  - Comp Metabolic Panel; Future  - TSH; Future    4. Chronic systolic (congestive) heart failure (HCC)/Cardiac LV ejection fraction of 40-49%  Patient does have chronic congestive heart failure followed carefully by cardiology.  Most recent echocardiogram in December 2019 was at 45% ejection fraction.  Patient has been trying to walk regularly though he is more tired lately.  He will continue careful cardiology follow-up.  Clinically stable overall.  - TSH; Future  - CBC WITH DIFFERENTIAL; Future    5.  History of acute myocardial infarction/History of atrial fibrillation  Patient had a very severe acute MI several years ago.  He received superb care and managed to survive it and has made a good recovery.  During that severe MRI he did go into A. fib but as far as I know has not since.  He has a pacemaker/AICD.  He follows carefully with cardiology.  Stable problem.    6. Presence of combination internal cardiac defibrillator (ICD) and pacemaker/AICD (automatic cardioverter/defibrillator) present [facility] Zach placed 4/23/12  Follows carefully with cardiology, stable problem    7. Coronary artery disease due to calcified coronary lesion:Acute MI, anterior wall s/p bms to LAD 12/11 with -RCA and residual CX dz (stent failed)/Coronary atherosclerosis due to calcified coronary lesion (CODE)   Follows carefully with cardiology, stable problem.  - AFP SERUM TUMOR MARKER; Future    8. Moderate persistent asthma without complication  Stable. Currently using inhalers as prescribed.  He states he uses the Symbicort regularly and has not had to use the albuterol for rescue in many months.  He denies side effects.  Denies  recent or current exacerbation.   Denies current shortness of breath, chest pain, or cough.  He is not on oxygen therapy.  - CBC WITH DIFFERENTIAL; Future    9. Impotence of organic origin  Patient does have multifactorial impotence, this is most likely largely vascular but the diabetes I am sure has an impact.  Stable problem.    10. Uncontrolled type 2 diabetes mellitus with hyperglycemia (HCC)/Elevated glucose level/Nocturia more than twice per night  Patient has had borderline or mildly low diabetes or prediabetes for quite a while.  A1c in the past was normal but that was several years ago.  This began in 2016 but has escalated over the last year.  He now needs more treatment.  Diabetes educator referral has been placed and he has an appointment in 2 weeks.  Have started him on basic medication.  Orders for monitoring devices sent to his pharmacy.  Stable problem.    - insulin glargine (LANTUS SOLOSTAR) 100 UNIT/ML Solution Pen-injector injection; Inject 25 Units under the skin every evening.  Dispense: 15 mL; Refill: 6  - Blood Glucose Monitoring Suppl Device; Meter: Dispense Device of Insurance Preference. Sig. Use ac just before meals and hs  Dispense: 1 Each; Refill: 0  - Blood Glucose Test Strips; Test strips order: Test strips for insurance preference meter. Sig: use qid before meals and at bedtime  Dispense: 100 Strip; Refill: 4  - metFORMIN (GLUCOPHAGE) 500 MG Tab; Take 1 Tablet by mouth 2 times a day with meals.  Dispense: 60 Tablet; Refill: 4  - Lancets; Lancets order: Lancets for insurance preference meter. Sig: use qid  Dispense: 100 Each; Refill: 11  - Lancet Device Misc; 1 Each 4 times a day.  Dispense: 1 Each; Refill: 6  - Insulin Pen Needle (B-D ULTRAFINE III SHORT PEN); 1 Each at bedtime. For use hs  Dispense: 50 Each; Refill: 6  - POCT  A1C  - URINALYSIS,CULTURE IF INDICATED; Future    11. Fatty liver  Patient does have fatty liver.  Follow-up lab orders discussed and placed.  No history of  true transaminitis.  - Lipid Profile; Future  - Comp Metabolic Panel; Future    12. History of 2019 novel coronavirus disease (COVID-19)  Patient started feeling much worse after his COVID infection.  It is possible that his elevated blood sugar stems from that time.  Patient feels he has recovered overall though cardiology did notify him that his monitoring device showed no pathologic arrhythmias during the time that he had COVID.  Currently recovered, stable problem.    Having nocturia nigtly 6 or more times, constantly thirsty      Services suggested: diabetes nurse [facility] Care Screening: Age-appropriate preventive services recommended by USPTF and ACIP covered by Medicare were discussed today. Services ordered if indicated and agreed upon by the patient.  Referrals offered: Community-based lifestyle interventions to reduce health risks and promote self-management and wellness, fall prevention, nutrition, physical activity, tobacco-use cessation, weight loss, and mental health services as per orders if indicated.    Discussion today about general wellness and lifestyle habits: discussed   · Prevent falls and reduce trip hazards; Cautioned about securing or removing rugs.  · Have a working fire alarm and carbon monoxide detector; has  · Engage in regular physical activity and social activities     Follow-up: Return in about 4 months (around 9/25/2022), or if symptoms worsen or fail to improve.   Sooner to see diabetic nurse

## 2022-05-25 NOTE — PROGRESS NOTES
Annual Health Assessment Questions:    1.  Are you currently engaging in any exercise or physical activity? Yes    2.  How would you describe your mood or emotional well-being today? fair    3.  Have you had any falls in the last year? No    4.  Have you noticed any problems with your balance or had difficulty walking? No    5.  In the last six months have you experienced any leakage of urine? Yes    6. DPA/Advanced Directive: Patient does not have an Advanced Directive.  A packet and workshop information was given on Advanced Directives.

## 2022-05-26 LAB
ALBUMIN SERPL BCP-MCNC: 4.7 G/DL (ref 3.2–4.9)
ALBUMIN/GLOB SERPL: 1.8 G/DL
ALP SERPL-CCNC: 140 U/L (ref 30–99)
ALT SERPL-CCNC: 38 U/L (ref 2–50)
ANION GAP SERPL CALC-SCNC: 12 MMOL/L (ref 7–16)
AST SERPL-CCNC: 37 U/L (ref 12–45)
BILIRUB SERPL-MCNC: 1 MG/DL (ref 0.1–1.5)
BUN SERPL-MCNC: 14 MG/DL (ref 8–22)
CALCIUM SERPL-MCNC: 9.3 MG/DL (ref 8.5–10.5)
CHLORIDE SERPL-SCNC: 96 MMOL/L (ref 96–112)
CHOLEST SERPL-MCNC: 100 MG/DL (ref 100–199)
CO2 SERPL-SCNC: 25 MMOL/L (ref 20–33)
CREAT SERPL-MCNC: 0.6 MG/DL (ref 0.5–1.4)
GFR SERPLBLD CREATININE-BSD FMLA CKD-EPI: 107 ML/MIN/1.73 M 2
GLOBULIN SER CALC-MCNC: 2.6 G/DL (ref 1.9–3.5)
GLUCOSE SERPL-MCNC: 302 MG/DL (ref 65–99)
HDLC SERPL-MCNC: 29 MG/DL
LDLC SERPL CALC-MCNC: 44 MG/DL
POTASSIUM SERPL-SCNC: 4.3 MMOL/L (ref 3.6–5.5)
PROT SERPL-MCNC: 7.3 G/DL (ref 6–8.2)
SODIUM SERPL-SCNC: 133 MMOL/L (ref 135–145)
TRIGL SERPL-MCNC: 137 MG/DL (ref 0–149)
TSH SERPL DL<=0.005 MIU/L-ACNC: 1.66 UIU/ML (ref 0.38–5.33)

## 2022-05-27 LAB — AFP-TM SERPL-MCNC: 2 NG/ML (ref 0–9)

## 2022-06-08 ENCOUNTER — HOSPITAL ENCOUNTER (OUTPATIENT)
Dept: LAB | Facility: MEDICAL CENTER | Age: 67
End: 2022-06-08
Attending: FAMILY MEDICINE
Payer: MEDICARE

## 2022-06-08 ENCOUNTER — NON-PROVIDER VISIT (OUTPATIENT)
Dept: MEDICAL GROUP | Facility: MEDICAL CENTER | Age: 67
End: 2022-06-08
Payer: MEDICARE

## 2022-06-08 VITALS
WEIGHT: 138 LBS | BODY MASS INDEX: 23.56 KG/M2 | HEART RATE: 64 BPM | SYSTOLIC BLOOD PRESSURE: 106 MMHG | OXYGEN SATURATION: 97 % | DIASTOLIC BLOOD PRESSURE: 60 MMHG | HEIGHT: 64 IN

## 2022-06-08 DIAGNOSIS — E11.65 UNCONTROLLED TYPE 2 DIABETES MELLITUS WITH HYPERGLYCEMIA (HCC): ICD-10-CM

## 2022-06-08 LAB
CREAT UR-MCNC: 140.29 MG/DL
MICROALBUMIN UR-MCNC: 2.4 MG/DL
MICROALBUMIN/CREAT UR: 17 MG/G (ref 0–30)

## 2022-06-08 PROCEDURE — 84681 ASSAY OF C-PEPTIDE: CPT

## 2022-06-08 PROCEDURE — RXMED WILLOW AMBULATORY MEDICATION CHARGE: Performed by: FAMILY MEDICINE

## 2022-06-08 PROCEDURE — 82043 UR ALBUMIN QUANTITATIVE: CPT

## 2022-06-08 PROCEDURE — G0109 DIAB MANAGE TRN IND/GROUP: HCPCS | Performed by: FAMILY MEDICINE

## 2022-06-08 PROCEDURE — 82570 ASSAY OF URINE CREATININE: CPT

## 2022-06-08 PROCEDURE — 86341 ISLET CELL ANTIBODY: CPT

## 2022-06-08 PROCEDURE — 36415 COLL VENOUS BLD VENIPUNCTURE: CPT

## 2022-06-08 RX ORDER — DAPAGLIFLOZIN 10 MG/1
10 TABLET, FILM COATED ORAL DAILY
Qty: 90 TABLET | Refills: 3 | Status: SHIPPED | OUTPATIENT
Start: 2022-06-08 | End: 2022-11-17

## 2022-06-08 ASSESSMENT — FIBROSIS 4 INDEX: FIB4 SCORE: 2.5

## 2022-06-08 NOTE — PROGRESS NOTES
"RN Visit    Subjective:     Reason for visit: Diabetes Education,  He has changed his diet and is walking.  His blood sugars have come down into the  range.  He is currently taking Lantus 25 units daily and Metformin 500 mg BID.  He would like to have a c-peptide and ADELINA ordered to see how much insulin he is making.  He would like to get off the insulin based on what the lab results show.      Exercise: Walking  Diet: He is following the plate method  Patient's body mass index is 23.69 kg/m². Exercise and nutrition counseling were performed at this visit.      Glucose monitoring frequency: Testing twice daily  Fastin-90 and Bed: 112-140  Hypoglycemic episodes: yes - has had some in the 60's in the early morning.    Objective:     /60   Pulse 64   Ht 1.626 m (5' 4\")   Wt 62.6 kg (138 lb)   SpO2 97%   BMI 23.69 kg/m²   Monofilament: done   Monofilament testing with a 10 gram force: sensation intact: intact bilaterally  Visual Inspection: Feet without maceration, ulcers, fissures.  Pedal pulses: intact bilaterally    Plan:     Discussed and educated on:   - All medications, side effects and compliance (discussed carefully)  - Annual eye examinations at Ophthalmology  - Home glucose monitoring emphasized  - Weight control and daily exercise    Recommended medication changes: He will decrease his Lantus to 22 units daily and Metformin 500 mg BID.  He would benefit adding a SGLT-2 Inhibitor such as Farxiga.  If he is making insulin then we can get him off the insulin.  He will need an eye exam once his blood sugars are in good control.  He will have his C-peptide, ADELINA, and micro alb/creatine ratio checked today.  He was given my cell phone number and he will call with any questions or problems.    Follow-up: 3 months for He has an appointment with Shireen Cervantes in September.  "

## 2022-06-10 ENCOUNTER — PHARMACY VISIT (OUTPATIENT)
Dept: PHARMACY | Facility: MEDICAL CENTER | Age: 67
End: 2022-06-10
Payer: COMMERCIAL

## 2022-06-10 LAB — C PEPTIDE SERPL-MCNC: 1.9 NG/ML (ref 0.5–3.3)

## 2022-06-11 LAB — GAD65 AB SER IA-ACNC: <5 IU/ML (ref 0–5)

## 2022-06-27 ENCOUNTER — TELEPHONE (OUTPATIENT)
Dept: MEDICAL GROUP | Facility: MEDICAL CENTER | Age: 67
End: 2022-06-27
Payer: MEDICARE

## 2022-06-27 NOTE — TELEPHONE ENCOUNTER
Pt called stating he has been taking his insulin injector, and it has been taking his sugars down too far. Pt will wake up following morning after insulin has been administered and feel nervous and shaky. Pt would like to know how to proceed, as he is concerned that he should not be taking the insulin.

## 2022-06-28 NOTE — TELEPHONE ENCOUNTER
Called Abram to inform him of the change. Pt stated he reached out to his Diabetic Care Team yesterday and was told to lower Insulin Units to 22. Pt stated his blood sugar was at 89 this morning after lowering from 25 to 22.

## 2022-06-28 NOTE — TELEPHONE ENCOUNTER
I believe he has been taking 25 units at nighttime.  I would like him to reduce to 10 units and let us know what his blood sugars are the following morning.

## 2022-06-30 NOTE — TELEPHONE ENCOUNTER
Pt's sugars were at 80 this morning before breakfast after using 20 Units of insulin last night. Pt scheduled fv appt for end of July.

## 2022-07-25 ENCOUNTER — TELEPHONE (OUTPATIENT)
Dept: MEDICAL GROUP | Facility: MEDICAL CENTER | Age: 67
End: 2022-07-25

## 2022-07-25 NOTE — LETTER
[employer]  David Cervantes M.D.  [address] [place] [address]  [place] [address]  Fax: [phone]   Authorization for Release/Disclosure of   Protected Health Information   Name: ABRAM BARILLAS : 1955 SSN: [id number]   Address: [facility] [place] [address] Phone:    [phone] (home) [phone] (work)   I authorize the entity listed below to release/disclose the PHI below to:   [facility]David Cervantes M.D. and David Cervantes M.D.   Provider or Entity Name:  [facility]   Address   City, [facility], [facility]   Phone:      Fax:  [phone]   Reason for request: continuity of care   Information to be released:    [  ] LAST COLONOSCOPY,  including any PATH REPORT and follow-up  [  ] LAST FIT/COLOGUARD RESULT [  ] LAST DEXA  [  ] LAST MAMMOGRAM  [  ] LAST PAP  [  ] LAST LABS [  ] RETINA EXAM REPORT  [  ] IMMUNIZATION RECORDS  [ XXXX ] Release all info      [  ] Check here and initial the line next to each item to release ALL health information INCLUDING  _____ Care and treatment for drug and / or alcohol abuse  _____ HIV testing, infection status, or AIDS  _____ Genetic Testing    DATES OF SERVICE OR TIME PERIOD TO BE DISCLOSED: _____________  I understand and acknowledge that:  * This Authorization may be revoked at any time by you in writing, except if your health information has already been used or disclosed.  * Your health information that will be used or disclosed as a result of you signing this authorization could be re-disclosed by the recipient. If this occurs, your re-disclosed health information may no longer be protected by State or Federal laws.  * You may refuse to sign this Authorization. Your refusal will not affect your ability to obtain treatment.  * This Authorization becomes effective upon signing and will  on (date) __________.      If no date is indicated, this Authorization will  one (1) year from the signature date.    Name: Abram Barillas    Signature:  Continuity of Care   Date:     7/25/2022       PLEASE FAX REQUESTED RECORDS BACK TO: [phone]

## 2022-07-25 NOTE — TELEPHONE ENCOUNTER
ESTABLISHED PATIENT PRE-VISIT PLANNING     Annual Wellness Visit Never Done per Health Maintenance    Phone Number Called: [phone] (home) [phone] (work)  Call outcome: Spoke to patient regarding message below.  Message: Appointment scheduled with Provider Dr. Cervantes, Tuesday, 07/26/2022, check-in: 11:05 AM, [address], [place].      Patient was contacted to complete PVP.     Note: Patient will not be contacted if there is no indication to call.     1.  Reviewed notes from the last few office visits within the medical group: Yes    2.  If any orders were placed at last visit or intended to be done for this visit (i.e. 6 mos follow-up), do we have Results/Consult Notes?         •  Labs - Labs ordered, completed on 06/08/2022 and results are in chart.  Note: If patient appointment is for lab review and patient did not complete labs, check with provider if OK to reschedule patient until labs completed.       •  Imaging - Imaging was not ordered at last office visit.          •  Referrals - Referral ordered, patient was seen and consult notes were requested from [facility]. Care Teams updated  YES.      -Urology: During Pre-Visit planning, called and spoke to patient. Patient's spouse states [facility] [place] [facility]. Request for medical records sent to fax#:[phone] that is listed on the company's website. Referral Status Authorized    3. Is this appointment scheduled as a Hospital Follow-Up? No    4.  Immunizations were updated in Epic using Reconcile Outside Information activity? Yes    5.  Patient is due for the following Health Maintenance Topics:   Health Maintenance Due   Topic Date Due   • RETINAL SCREENING  Never done   • Annual Wellness Visit  Never done   • ABDOMINAL AORTIC ANEURYSM (AAA) SCREEN  08/10/2021   • COVID-19 Vaccine (3 - Booster for Eboni series) 03/12/2022       6.  AHA (Pulse8) form printed for Provider? No, patient does not have any open  alerts COMPLIANT

## 2022-07-26 ENCOUNTER — OFFICE VISIT (OUTPATIENT)
Dept: MEDICAL GROUP | Facility: MEDICAL CENTER | Age: 67
End: 2022-07-26
Payer: MEDICARE

## 2022-07-26 VITALS
HEIGHT: 64 IN | WEIGHT: 140.6 LBS | DIASTOLIC BLOOD PRESSURE: 64 MMHG | OXYGEN SATURATION: 98 % | TEMPERATURE: 97.8 F | RESPIRATION RATE: 16 BRPM | SYSTOLIC BLOOD PRESSURE: 118 MMHG | HEART RATE: 56 BPM | BODY MASS INDEX: 24.01 KG/M2

## 2022-07-26 DIAGNOSIS — E78.5 DYSLIPIDEMIA: ICD-10-CM

## 2022-07-26 DIAGNOSIS — Z87.891 FORMER SMOKER: ICD-10-CM

## 2022-07-26 DIAGNOSIS — I10 ESSENTIAL HYPERTENSION: ICD-10-CM

## 2022-07-26 DIAGNOSIS — E11.65 UNCONTROLLED TYPE 2 DIABETES MELLITUS WITH HYPERGLYCEMIA (HCC): ICD-10-CM

## 2022-07-26 PROBLEM — R35.1 NOCTURIA MORE THAN TWICE PER NIGHT: Status: RESOLVED | Noted: 2022-05-25 | Resolved: 2022-07-26

## 2022-07-26 PROBLEM — R73.09 ELEVATED GLUCOSE LEVEL: Status: RESOLVED | Noted: 2022-05-25 | Resolved: 2022-07-26

## 2022-07-26 PROCEDURE — 99214 OFFICE O/P EST MOD 30 MIN: CPT | Performed by: FAMILY MEDICINE

## 2022-07-26 PROCEDURE — 92250 FUNDUS PHOTOGRAPHY W/I&R: CPT | Mod: TC | Performed by: FAMILY MEDICINE

## 2022-07-26 RX ORDER — BLOOD SUGAR DIAGNOSTIC
STRIP MISCELLANEOUS
COMMUNITY
Start: 2022-05-25 | End: 2024-01-10

## 2022-07-26 RX ORDER — BLOOD-GLUCOSE METER
EACH MISCELLANEOUS
COMMUNITY
Start: 2022-05-25 | End: 2022-11-17

## 2022-07-26 ASSESSMENT — FIBROSIS 4 INDEX: FIB4 SCORE: 2.5

## 2022-07-26 NOTE — PROGRESS NOTES
Diabetes Focused Exam:    Chief Complaint   Patient presents with   • Diabetes Mellitus   • Dyslipidemia   • Hypertension      Subjective:   HPI  Abram Barillas is a 66 y.o. male who presents for follow up of chronic conditions of diabetes mellitus, hypertension and hyperlipidemia. He indicates that he is feeling well and denies any symptoms referable to the above diagnoses. Specifically denies chest pain, palpitations, dyspnea, orthopnea, PND or peripheral edema. Also denies polyuria, polydipsia, urinary complaints, abdominal complaints, myalgias, numbness, weakness or other related symptoms.     The patient is taking ASA every day 81 mg and taking all other medications as prescribed. Patient denies any side effects of medication.  DM: A1c goal <7  Glucose monitoring frequency: 2-3 times per day   Fasting sugars: 56-98, rarely over 140, usually much better. Post-prandial sugars: 142 after pasta.  Hypoglycemic episodes had many with the insulin, had to stop.  Diabetic complications: none  ACR Albumin/Creatinine Ratio goal <30  HTN: Blood pressure goal <140/<80. Currently Rx ACE/ARB: Yes  Hyperlipidemia:Cholesterol goal LDL <100, total/HDL <5. Currently Rx Statin: Yes  Last eye exam retinal screen done here today.    Denies visual blurring, double vision, eye pain and floaters  Last monofilament foot exam: 6/2022  Denies foot pain, numbness, calluses, ulcers      See medications and orders placed in encounter report.  Past medical history, family history, social history reviewed and updated as documented in medical record.    Current medications including changes today:  Current Outpatient Medications   Medication Sig Dispense Refill   • ONETOUCH ULTRA strip TEST STRIPS ORDER: USE 4 TIMES A DAY BEFORE MEALS AND AT BEDTIME     • dapagliflozin propanediol (FARXIGA) 10 MG Tab Take 1 Tablet by mouth every day. 90 Tablet 3   • metoprolol SR (TOPROL XL) 50 MG TABLET SR 24 HR Take 1 Tablet by mouth every day. 90  Tablet 3   • Blood Glucose Monitoring Suppl Device Meter: Dispense Device of Insurance Preference. Sig. Use ac just before meals and hs 1 Each 0   • Blood Glucose Test Strips Test strips order: Test strips for insurance preference meter. Sig: use qid before meals and at bedtime 100 Strip 4   • metFORMIN (GLUCOPHAGE) 500 MG Tab Take 1 Tablet by mouth 2 times a day with meals. 60 Tablet 4   • Lancets Lancets order: Lancets for insurance preference meter. Sig: use qid 100 Each 11   • Lancet Device Misc 1 Each 4 times a day. 1 Each 6   • budesonide-formoterol (SYMBICORT) 80-4.5 MCG/ACT Aerosol Inhale 2 Puffs 2 times a day. Use with spacer.  Rinse mouth after each use. 10.2 g 3   • lisinopril (PRINIVIL) 10 MG Tab TAKE 1 TABLET BY MOUTH EVERY DAY 90 Tablet 3   • rosuvastatin (CRESTOR) 20 MG Tab Take 1 Tablet by mouth at bedtime. 100 Tablet 3   • sildenafil (REVATIO) 20 MG tablet sildenafil (pulmonary hypertension) 20 mg tablet   TAKE 1-5 TABLETS 30 MINUTES PRIOR TO ACTIVITY **MAX 5 TABS. MINIMUM 24 HOURS BETWEEN DOSES**     • Multiple Vitamin (MULTI-VITAMIN DAILY PO) One A Day Vitamin     • albuterol 108 (90 Base) MCG/ACT Aero Soln inhalation aerosol Inhale 2 Puffs by mouth every 6 hours as needed. 8.5 g 0   • aspirin EC (ECOTRIN) 81 MG TBEC Take 81 mg by mouth every day.     • Blood Glucose Monitoring Suppl (ONE TOUCH ULTRA 2) w/Device Kit METER:USE BEFORE MEALS JUST BEFORE MEALS AND AT BEDTIME       No current facility-administered medications for this visit.     Allergies:   Allergies   Allergen Reactions   • Pcn [Penicillins] Rash     Rash      Social History     Tobacco Use   • Smoking status: Former Smoker     Packs/day: 0.25     Years: 10.00     Pack years: 2.50     Types: Cigarettes     Quit date: 3/1/1982     Years since quittin.4   • Smokeless tobacco: Never Used   • Tobacco comment: QUIT    Vaping Use   • Vaping Use: Never used   Substance Use Topics   • Alcohol use: No     Alcohol/week: 0.0 oz      "Comment: occasionally ( 2 times a year)   • Drug use: No     Exercise: he is walking 4 miltes every day.    Health Maintenance/Immunizations: discussed, needs a COVID booster.    ROS  Pertinent  ROS findings as above. Denies chest pain or shortness of breath     Objective:     OBJECTIVE:  /64 (BP Location: Right arm, Patient Position: Sitting, BP Cuff Size: Adult)   Pulse (!) 56   Temp 36.6 °C (97.8 °F) (Temporal)   Resp 16   Ht 1.626 m (5' 4\")   Wt 63.8 kg (140 lb 9.6 oz)   SpO2 98%   BMI 24.13 kg/m²  Body mass index is 24.13 kg/m². BMI: not obese  General: No apparent distress, conversant, cooperative and pleasant with the examination.  Psych: Alert and oriented x4, judgment and insight normal  Neck: No JVD or bruits, no adenopathy, supple  Thyroid: normal to inspection and palpation  Lungs: negative findings: normal respiratory rate and rhythm, lungs clear to auscultation  Heart: negative findings: regular rate and rhythm, S1 normal, S2 normal, no murmurs, clicks, or gallops  Abdomen: Soft, nontender, no hepatosplenomegaly or masses, normal bowel sounds  Skin: No rashes, no cyanosis, no lesions or ulcers  Extremities: No cyanosis clubbing or edema.  Some healing abrasions.       POC labs:   Lab Results   Component Value Date/Time    POCGLUCOSE 109 (H) 01/11/2012 11:31 AM          Last labs    Lab Results   Component Value Date/Time    CHOLSTRLTOT 100 05/25/2022 11:49 AM    LDL 44 05/25/2022 11:49 AM    HDL 29 (A) 05/25/2022 11:49 AM    TRIGLYCERIDE 137 05/25/2022 11:49 AM       Lab Results   Component Value Date/Time    SODIUM 133 (L) 05/25/2022 11:49 AM    POTASSIUM 4.3 05/25/2022 11:49 AM    GLUCOSE 302 (H) 05/25/2022 11:49 AM    BUNCREATRAT 15 12/08/2010 07:11 AM    GLOMRATE >59 12/08/2010 07:11 AM     Lab Results   Component Value Date/Time    HBA1C 14.3 (A) 05/25/2022 09:30 AM    HBA1C 5.1 12/30/2011 10:30 PM     Lab Results   Component Value Date/Time    [facility]/08[facility]31 [facility]    " MICROALBUR 2.4 06/08/2022 10:31 AM          Assessment/Plan:   Medications, refills, and referrals per orders.   1. Uncontrolled type 2 diabetes mellitus with hyperglycemia (HCC)  POCT Retinal Eye Exam    HEMOGLOBIN A1C    MICROALBUMIN CREAT RATIO URINE    Comp Metabolic Panel    Lipid Profile    TSH    CBC WITHOUT DIFFERENTIAL    US-AORTA/ILIACS DUPLEX COMPLETE   2. Dyslipidemia  Comp Metabolic Panel    Lipid Profile    TSH    CBC WITHOUT DIFFERENTIAL   3. Essential hypertension  Comp Metabolic Panel    Lipid Profile    TSH    CBC WITHOUT DIFFERENTIAL   4. Former smoker  US-AORTA/ILIACS DUPLEX COMPLETE     DM2 A1c is not at goal  However his numbers have improved substantially.  Patient to monitor sugars: bid frequency  Discussed diet, exercise, disease management and nutritional goals..   Education and advise provided today:All medications, side effects and compliance (discussed carefully)  Annual eye examinations at Ophthalmology  Diabetic diet discussed in detail, written exchange diet given  Foot care discussed and Podiatry visits  Glycohemoglobin and other lab monitoring  Home glucose monitoring emphasized  Labs immediately prior to next visit  Long term diabetic complications  Low cholesterol diet, weight control and daily exercise    Followup:   Do labs and RTC 4 months, sooner should new symptoms or problems arise.

## 2022-08-18 ENCOUNTER — NON-PROVIDER VISIT (OUTPATIENT)
Dept: CARDIOLOGY | Facility: MEDICAL CENTER | Age: 67
End: 2022-08-18
Payer: MEDICARE

## 2022-08-18 PROCEDURE — 93295 DEV INTERROG REMOTE 1/2/MLT: CPT | Performed by: INTERNAL MEDICINE

## 2022-08-18 NOTE — CARDIAC REMOTE MONITOR - SCAN
Device transmission reviewed. Device demonstrated appropriate function.       Electronically Signed by: Remington Rivera M.D.    8/30/2022  10:38 AM

## 2022-10-20 ENCOUNTER — OFFICE VISIT (OUTPATIENT)
Dept: SLEEP MEDICINE | Facility: MEDICAL CENTER | Age: 67
End: 2022-10-20
Payer: MEDICARE

## 2022-10-20 VITALS
BODY MASS INDEX: 24.41 KG/M2 | WEIGHT: 143 LBS | RESPIRATION RATE: 16 BRPM | OXYGEN SATURATION: 96 % | HEART RATE: 61 BPM | HEIGHT: 64 IN | SYSTOLIC BLOOD PRESSURE: 110 MMHG | DIASTOLIC BLOOD PRESSURE: 70 MMHG

## 2022-10-20 DIAGNOSIS — Z87.891 FORMER SMOKER: ICD-10-CM

## 2022-10-20 DIAGNOSIS — Z86.16 HISTORY OF COVID-19: ICD-10-CM

## 2022-10-20 DIAGNOSIS — J45.30 MILD PERSISTENT ASTHMA WITHOUT COMPLICATION: ICD-10-CM

## 2022-10-20 PROCEDURE — 99213 OFFICE O/P EST LOW 20 MIN: CPT | Performed by: PHYSICIAN ASSISTANT

## 2022-10-20 ASSESSMENT — ENCOUNTER SYMPTOMS
SORE THROAT: 0
COUGH: 0
SINUS PAIN: 0
SPUTUM PRODUCTION: 0
SHORTNESS OF BREATH: 0
ROS GI COMMENTS: NO DENTURES, NO TROUBLE SWALLOWING
DIZZINESS: 0
WEIGHT LOSS: 0
CHILLS: 0
TREMORS: 0
HEADACHES: 0
PALPITATIONS: 0
ORTHOPNEA: 0
WHEEZING: 0
INSOMNIA: 0
FEVER: 0
HEARTBURN: 1

## 2022-10-20 ASSESSMENT — FIBROSIS 4 INDEX: FIB4 SCORE: 2.54

## 2022-10-20 NOTE — PROGRESS NOTES
CC    HPI:  Abram Barillas is a 67 y.o. year old male here today for follow-up on moderate persistent asthma, history of COVID-19 in January 2021.  Patient has a remote smoking history of 10 pack years with quit date 1985.  Last seen in clinic by Ольга Johns PA-C on 4/20/2022    Pertinent past medical history includes asthma requiring Kenalog injections in the past, mild GRANT, hypertension, acute MI, CHF, cardiomyopathy with AICD and LVEF estimated at 45% predicted, bronchitis and type 2 diabetes.  He has not felt he has needed Kenalog since being on Symbicort.    Reviewed in clinic vitals including /70, HR 61, O2 sat 96% on room air and BMI 24.55 kg/m².    Reviewed home medication regimen including Symbicort 80 mcg, albuterol, lisinopril-denies dry persistent cough, he does have albuterol which he has not been requiring lately.    Reviewed most recent imaging including chest x-ray obtained 5/25/2021 demonstrating clear lungs, surgical changes sternum and mediastinum with dual-lead pacer ICD in place.  No acute cardiopulmonary process.  Previous chest x-ray obtained 2/1/2021 demonstrated bilateral pulmonary opacities consistent with atypical pneumonitis/COVID-19 pneumonia.     Echocardiogram obtained 12/3/2019 demonstrated normal left ventricular chamber size, wall thickness, LVEF estimated 45%, normal right ventricular size with reduced right ventricular systolic function, estimated RVSP of 20mmHg, hypokinesis inferior septal wall and apex, no significant change since 2016 study.     Pulmonary function testing obtained 4/7/2022 was reviewed demonstrating an FEV1 of 2.81 L or 99% predicted, FVC of 3.9 L or 106% predicted, FEV1/FVC ratio of 72, residual volume 138% predicted, % predicted, DLCO 107 predicted.  Per pulmonologist interpretation no significant change in spirometry post bronchodilator, PFT improved with increased spirometry, decreased air trapping and diffusion capacity slightly  lower.     Polysomnogram was obtained 8/29/2017 demonstrating an overall AHI of 17.0 increased with REM and supine sleep, arousal index 34.5.  Minimum O2 sat of 88%.  Testing consistent with moderate obstructive sleep apnea hypopnea syndrome.  CPAP was titrated to a maximum pressure of 12 cm with an AHI improved to 4.8, mean O2 sat of 94% and minimum O2 sat of 92%.  Patient opted for a sleep appliance rather than pursue CPAP therapy.       Follow-up overnight home sleep study obtained 2/4/2019 demonstrated mild GRANT with an overall AHI of 11/h, low O2 sat of 86% with 14 minutes below 90% O2 saturation.  Low O2 sat of 86%.  Patient reports sleep apnea is not significant due to his normal weight and denies benefit or use of the oral appliance.  He does report occasional daytime sleepiness for which he will sometimes take a 20-minute nap after noontime meal.      Review of Systems   Constitutional:  Negative for chills, fever, malaise/fatigue and weight loss.   HENT:  Negative for congestion, hearing loss, nosebleeds, sinus pain, sore throat and tinnitus.    Eyes:         Presc glasses   Respiratory:  Negative for cough, sputum production, shortness of breath and wheezing.    Cardiovascular:  Negative for chest pain, palpitations, orthopnea and leg swelling.   Gastrointestinal:  Positive for heartburn (occasional).        No dentures, no trouble swallowing    Neurological:  Negative for dizziness, tremors and headaches.   Psychiatric/Behavioral:  The patient does not have insomnia.      Past Medical History:   Diagnosis Date    Acute MI, anterior wall s/p bms to LAD 12/11 with -RCA and residual CX dz (stent failed) 12/31/2011    AICD (automatic cardioverter/defibrillator) present St Zach placed 4/23/12 4/24/2012    DEVICE DATA:  1. Pulse generator:  [employer]. Zach, model #LD6391-66, serial  #[id number]  2. Right ventricular lead:  [employer]. Zach, model #7120/60,  serial #[id number]  MEASURED INTRAOPERATIVE  DATA: R-waves measured 11.7 mV, pacing  threshold 0.75 volts at 0.5 msec, lead impedance of 859 ohms.  DEVICE SETTINGS: VVI 40 to 120.     AICD lead malfunction 8/23/2013    Arrhythmia     ASTHMA     as a child. 1/13/22-PRN use of inhaler for allergies    Atrial fib/flutter, transient     when in cardiogenic shock    Bronchitis as child    CAD (coronary artery disease)     Calcium oxalate calculus 1/29/2022    Calcium oxalate calculus of kidney 1/19/2022    Cardiogenic shock (HCC) 1/2012    Cardiomyopathy, ischemic EF 35% 12/31/2011    Congestive heart failure (HCC)     Follow with Dr Yi    Dyslipidemia     Fatty liver     Former smoker 8/27/2019    High cholesterol     History of 2019 novel coronavirus disease (COVID-19) 5/25/2022 1/2022    History of acute myocardial infarction 11/24/2021 2011    History of atrial fibrillation 5/25/2022    When in cardiogenic shock    Hypertension     ON NO MEDS, now on meds    Influenza     Left flank pain 01/13/2022    comes and goes-severe when present    Moderate persistent asthma without complication 5/25/2022    Myocardial infarct (HCC) 12/29/2011    Myocardial infarction of anterolateral wall ([facility]) 12/29/2011    Follow with Dr Yi    Nocturnal hypoxia 7/16/2018    Obstructive sleep apnea 3/30/2017    Pacemaker 04/23/2012    St Zach. Follows with Dr Yi with [facility] cardiology    Renal disorder     stones    Sleep apnea     Had study but was never prescribed CPAP.     Tuberculosis     20 years ago on meds for 6  mo    Uncontrolled type 2 diabetes mellitus with hyperglycemia ([facility]) 5/25/2022       Past Surgical History:   Procedure Laterality Date    FL CYSTOSCOPY,INSERT URETERAL STENT Left 1/14/2022    Procedure: CYSTOSCOPY, WITH URETERAL STENT INSERTION;  Surgeon: Duncan Blakely M.D.;  Location: SURGERY [facility];  Service: Urology    FL CYSTO/URETERO/PYELOSCOPY, DX Left 1/14/2022    Procedure: URETEROSCOPY;  Surgeon: Duncan Blakely M.D.;  Location:  SURGERY [facility];  Service: Urology    LASERTRIPSY Left 2022    Procedure: LITHOTRIPSY, USING LASER;  Surgeon: Duncan Blakely M.D.;  Location: SURGERY [facility];  Service: Urology    RECOVERY  2013    Per[facility]    RECOVERY  2012    Performed by SURGERY, CATH[facility]RECOVERY [facility]    MULTIPLE CORONARY ARTERY BYPASS ENDO VEIN HARVEST  1/3/2012    [facility]    UMBILICAL HERNIA REPAIR  2011    Performed by CHUY CHRISTENSEN [facility]    COLONOSCOPY  2007    normal    PACEMAKER INSERTION         Family History   Problem Relation Age of Onset    Cancer Mother         ovarian cancer    Diabetes Sister         prediabetes    Heart Disease Maternal Aunt 17        unclear but MI!    Heart Disease Maternal Uncle 38    Diabetes Maternal Grandmother        Social History     Socioeconomic History    Marital status:      Spouse name: Not on file    Number of children: Not on file    Years of education: Not on file    Highest education level: Not on file   Occupational History     Comment: retired   Tobacco Use    Smoking status: Former     Packs/day: 0.25     Years: 10.00     Pack years: 2.50     Types: Cigarettes     Quit date: 3/1/1982     Years since quittin.6    Smokeless tobacco: Never    Tobacco comments:     QUIT 1985   Vaping Use    Vaping Use: Never used   Substance and Sexual Activity    Alcohol use: No     Alcohol/week: 0.0 oz     Comment: occasionally ( 2 times a year)    Drug use: No    Sexual activity: Not Currently   Other Topics Concern    Not on file   Social History Narrative    Not on file     Social Determinants of Health     Financial Resource Strain: Not on file   Food Insecurity: Not on file   Transportation Needs: Not on file   Physical Activity: Not on file   Stress: Not on file   Social Connections: Not on file  "  Intimate Partner Violence: Not on file   Housing Stability: Not on file       Allergies as of 10/20/2022 - Reviewed 10/20/2022   Allergen Reaction Noted    Pcn [penicillins] Rash 09/21/2010        @Vital signs for this encounter:  /70   Pulse 61   Resp 16   Ht 1.626 m (5' 4\")   Wt 64.9 kg (143 lb)   SpO2 96%     Current medications as of today   Current Outpatient Medications   Medication Sig Dispense Refill    ONETOUCH ULTRA strip TEST STRIPS ORDER: USE 4 TIMES A DAY BEFORE MEALS AND AT BEDTIME      metoprolol SR (TOPROL XL) 50 MG TABLET SR 24 HR Take 1 Tablet by mouth every day. 90 Tablet 3    metFORMIN (GLUCOPHAGE) 500 MG Tab Take 1 Tablet by mouth 2 times a day with meals. 60 Tablet 4    budesonide-formoterol (SYMBICORT) 80-4.5 MCG/ACT Aerosol Inhale 2 Puffs 2 times a day. Use with spacer.  Rinse mouth after each use. 10.2 g 3    lisinopril (PRINIVIL) 10 MG Tab TAKE 1 TABLET BY MOUTH EVERY DAY 90 Tablet 3    rosuvastatin (CRESTOR) 20 MG Tab Take 1 Tablet by mouth at bedtime. 100 Tablet 3    sildenafil (REVATIO) 20 MG tablet sildenafil (pulmonary hypertension) 20 mg tablet   TAKE 1-5 TABLETS 30 MINUTES PRIOR TO ACTIVITY **MAX 5 TABS. MINIMUM 24 HOURS BETWEEN DOSES**      Multiple Vitamin (MULTI-VITAMIN DAILY PO) One A Day Vitamin      aspirin EC (ECOTRIN) 81 MG TBEC Take 81 mg by mouth every day.      Blood Glucose Monitoring Suppl (ONE TOUCH ULTRA 2) w/Device Kit METER:USE BEFORE MEALS JUST BEFORE MEALS AND AT BEDTIME      dapagliflozin propanediol (FARXIGA) 10 MG Tab Take 1 Tablet by mouth every day. 90 Tablet 3    Blood Glucose Monitoring Suppl Device Meter: Dispense Device of Insurance Preference. Sig. Use ac just before meals and hs 1 Each 0    Blood Glucose Test Strips Test strips order: Test strips for insurance preference meter. Sig: use qid before meals and at bedtime 100 Strip 4    Lancets Lancets order: Lancets for insurance preference meter. Sig: use qid 100 Each 11    Lancet Device Misc " 1 Each 4 times a day. 1 Each 6    albuterol 108 (90 Base) MCG/ACT Aero Soln inhalation aerosol Inhale 2 Puffs by mouth every 6 hours as needed. 8.5 g 0     No current facility-administered medications for this visit.         Physical Exam:   Gen:           Alert and oriented, No apparent distress. Mood and affect appropriate, normal interaction with provider.  Eyes:          sclere white, conjunctive moist.  Hearing:     Grossly intact.  Dentition:    Fair dentition.  Oropharynx:   Tongue normal, posterior pharynx without erythema or exudate.  Neck:        Supple, trachea midline, no masses.  Respiratory Effort: No intercostal retractions or use of accessory muscles.   Lung Auscultation:      Clear to auscultation bilaterally; no rales, rhonchi or wheezing.  CV:            Regular rate and rhythm. No edema. No murmurs, rubs or gallops.  Digits, Nails, Ext: No clubbing, cyanosis, petechiae, or nodes.   Skin:        No rashes, lesions or ulcers noted on exposed skin surfaces.                     Assessment:  1. Mild persistent asthma without complication        2. History of COVID-19        3. Former smoker            Immunizations:    Flu: 9/19/2021  Pneumovax 23: 11/24/2021, 10/1/2012  Prevnar 13: 1/1/2007  [employer] SARS-CoV-2 vaccine: 11/12/2021, 2/10/2021    Plan:  66-year-old male here to follow-up on mild persistent asthma, post history of COVID-19 infection in January 2021.      History of COVID-19: Patient denies any residual symptoms of COVID-19.    Former smoker: Remote 10-pack-year history with quit date in 1985.  Complete and/or continued abstinence advised.       Mild persistent asthma: Managing well on current regimen of Symbicort 80 mcg, has not required Kenalog or recent albuterol.  We did discuss current cost of medications, [employer] discount card information was provided.  Most recent pulmonary function testing stable to improved.    Patient will follow-up with primary for continued management, can  return to clinic as needed up to 3 years.    This dictation was created using voice recognition software. The accuracy of the dictation is limited to the abilities of the software. I expect there may be some errors of grammar and possibly content.

## 2022-10-20 NOTE — PATIENT INSTRUCTIONS
1-discussion regarding cost of medicines  2-discount card info provided  3-minimal dosing on symbicort  4-not requiring rescue inhaler  5-denies covid residual  6-follow up with primary  7-return to clinic as needed

## 2022-11-04 ENCOUNTER — PATIENT MESSAGE (OUTPATIENT)
Dept: HEALTH INFORMATION MANAGEMENT | Facility: OTHER | Age: 67
End: 2022-11-04

## 2022-11-14 ENCOUNTER — HOSPITAL ENCOUNTER (OUTPATIENT)
Dept: LAB | Facility: MEDICAL CENTER | Age: 67
End: 2022-11-14
Attending: FAMILY MEDICINE
Payer: MEDICARE

## 2022-11-14 DIAGNOSIS — I10 ESSENTIAL HYPERTENSION: ICD-10-CM

## 2022-11-14 DIAGNOSIS — E11.65 UNCONTROLLED TYPE 2 DIABETES MELLITUS WITH HYPERGLYCEMIA (HCC): ICD-10-CM

## 2022-11-14 DIAGNOSIS — E78.5 DYSLIPIDEMIA: ICD-10-CM

## 2022-11-14 LAB
ALBUMIN SERPL BCP-MCNC: 4.6 G/DL (ref 3.2–4.9)
ALBUMIN/GLOB SERPL: 1.8 G/DL
ALP SERPL-CCNC: 83 U/L (ref 30–99)
ALT SERPL-CCNC: 35 U/L (ref 2–50)
ANION GAP SERPL CALC-SCNC: 9 MMOL/L (ref 7–16)
AST SERPL-CCNC: 37 U/L (ref 12–45)
BILIRUB SERPL-MCNC: 0.9 MG/DL (ref 0.1–1.5)
BUN SERPL-MCNC: 11 MG/DL (ref 8–22)
CALCIUM SERPL-MCNC: 9.3 MG/DL (ref 8.5–10.5)
CHLORIDE SERPL-SCNC: 105 MMOL/L (ref 96–112)
CHOLEST SERPL-MCNC: 115 MG/DL (ref 100–199)
CO2 SERPL-SCNC: 26 MMOL/L (ref 20–33)
CREAT SERPL-MCNC: 0.66 MG/DL (ref 0.5–1.4)
CREAT UR-MCNC: 112.22 MG/DL
ERYTHROCYTE [DISTWIDTH] IN BLOOD BY AUTOMATED COUNT: 44 FL (ref 35.9–50)
EST. AVERAGE GLUCOSE BLD GHB EST-MCNC: 123 MG/DL
FASTING STATUS PATIENT QL REPORTED: NORMAL
GFR SERPLBLD CREATININE-BSD FMLA CKD-EPI: 103 ML/MIN/1.73 M 2
GLOBULIN SER CALC-MCNC: 2.6 G/DL (ref 1.9–3.5)
GLUCOSE SERPL-MCNC: 114 MG/DL (ref 65–99)
HBA1C MFR BLD: 5.9 % (ref 4–5.6)
HCT VFR BLD AUTO: 50.6 % (ref 42–52)
HDLC SERPL-MCNC: 31 MG/DL
HGB BLD-MCNC: 17.7 G/DL (ref 14–18)
LDLC SERPL CALC-MCNC: 56 MG/DL
MCH RBC QN AUTO: 32.8 PG (ref 27–33)
MCHC RBC AUTO-ENTMCNC: 35 G/DL (ref 33.7–35.3)
MCV RBC AUTO: 93.9 FL (ref 81.4–97.8)
MICROALBUMIN UR-MCNC: 2 MG/DL
MICROALBUMIN/CREAT UR: 18 MG/G (ref 0–30)
PLATELET # BLD AUTO: 149 K/UL (ref 164–446)
PMV BLD AUTO: 10.3 FL (ref 9–12.9)
POTASSIUM SERPL-SCNC: 4 MMOL/L (ref 3.6–5.5)
PROT SERPL-MCNC: 7.2 G/DL (ref 6–8.2)
RBC # BLD AUTO: 5.39 M/UL (ref 4.7–6.1)
SODIUM SERPL-SCNC: 140 MMOL/L (ref 135–145)
TRIGL SERPL-MCNC: 142 MG/DL (ref 0–149)
TSH SERPL DL<=0.005 MIU/L-ACNC: 3.08 UIU/ML (ref 0.38–5.33)
WBC # BLD AUTO: 6.9 K/UL (ref 4.8–10.8)

## 2022-11-14 PROCEDURE — 82570 ASSAY OF URINE CREATININE: CPT

## 2022-11-14 PROCEDURE — 83036 HEMOGLOBIN GLYCOSYLATED A1C: CPT

## 2022-11-14 PROCEDURE — 84443 ASSAY THYROID STIM HORMONE: CPT

## 2022-11-14 PROCEDURE — 82043 UR ALBUMIN QUANTITATIVE: CPT

## 2022-11-14 PROCEDURE — 80053 COMPREHEN METABOLIC PANEL: CPT

## 2022-11-14 PROCEDURE — 85027 COMPLETE CBC AUTOMATED: CPT

## 2022-11-14 PROCEDURE — 80061 LIPID PANEL: CPT

## 2022-11-14 PROCEDURE — 36415 COLL VENOUS BLD VENIPUNCTURE: CPT

## 2022-11-16 ENCOUNTER — OFFICE VISIT (OUTPATIENT)
Dept: CARDIOLOGY | Facility: MEDICAL CENTER | Age: 67
End: 2022-11-16
Payer: MEDICARE

## 2022-11-16 ENCOUNTER — NON-PROVIDER VISIT (OUTPATIENT)
Dept: CARDIOLOGY | Facility: MEDICAL CENTER | Age: 67
End: 2022-11-16
Payer: MEDICARE

## 2022-11-16 VITALS
HEIGHT: 64 IN | RESPIRATION RATE: 16 BRPM | OXYGEN SATURATION: 96 % | HEART RATE: 55 BPM | SYSTOLIC BLOOD PRESSURE: 112 MMHG | DIASTOLIC BLOOD PRESSURE: 62 MMHG | WEIGHT: 147 LBS | BODY MASS INDEX: 25.1 KG/M2

## 2022-11-16 DIAGNOSIS — I10 ESSENTIAL HYPERTENSION: ICD-10-CM

## 2022-11-16 DIAGNOSIS — Z86.16 HISTORY OF 2019 NOVEL CORONAVIRUS DISEASE (COVID-19): ICD-10-CM

## 2022-11-16 DIAGNOSIS — Z95.810 PRESENCE OF COMBINATION INTERNAL CARDIAC DEFIBRILLATOR (ICD) AND PACEMAKER: ICD-10-CM

## 2022-11-16 DIAGNOSIS — I25.2 HISTORY OF ACUTE MYOCARDIAL INFARCTION: ICD-10-CM

## 2022-11-16 DIAGNOSIS — I50.22 CHRONIC SYSTOLIC (CONGESTIVE) HEART FAILURE (HCC): ICD-10-CM

## 2022-11-16 DIAGNOSIS — J45.40 MODERATE PERSISTENT ASTHMA WITHOUT COMPLICATION: ICD-10-CM

## 2022-11-16 DIAGNOSIS — E78.5 DYSLIPIDEMIA: ICD-10-CM

## 2022-11-16 DIAGNOSIS — Z86.79 HISTORY OF ATRIAL FIBRILLATION: ICD-10-CM

## 2022-11-16 DIAGNOSIS — I25.10 CORONARY ARTERY DISEASE DUE TO CALCIFIED CORONARY LESION: ICD-10-CM

## 2022-11-16 DIAGNOSIS — K76.0 FATTY LIVER: ICD-10-CM

## 2022-11-16 DIAGNOSIS — Z95.810 AICD (AUTOMATIC CARDIOVERTER/DEFIBRILLATOR) PRESENT: ICD-10-CM

## 2022-11-16 DIAGNOSIS — I25.84 CORONARY ARTERY DISEASE DUE TO CALCIFIED CORONARY LESION: ICD-10-CM

## 2022-11-16 DIAGNOSIS — E11.65 UNCONTROLLED TYPE 2 DIABETES MELLITUS WITH HYPERGLYCEMIA (HCC): ICD-10-CM

## 2022-11-16 DIAGNOSIS — R94.30 CARDIAC LV EJECTION FRACTION OF 40-49%: ICD-10-CM

## 2022-11-16 PROCEDURE — 93282 PRGRMG EVAL IMPLANTABLE DFB: CPT | Performed by: INTERNAL MEDICINE

## 2022-11-16 PROCEDURE — 99214 OFFICE O/P EST MOD 30 MIN: CPT | Performed by: INTERNAL MEDICINE

## 2022-11-16 RX ORDER — LISINOPRIL 10 MG/1
10 TABLET ORAL
Qty: 90 TABLET | Refills: 3 | Status: SHIPPED | OUTPATIENT
Start: 2022-11-16 | End: 2023-01-16

## 2022-11-16 RX ORDER — ROSUVASTATIN CALCIUM 20 MG/1
20 TABLET, COATED ORAL
Qty: 100 TABLET | Refills: 3 | Status: SHIPPED | OUTPATIENT
Start: 2022-11-16 | End: 2024-01-10 | Stop reason: SDUPTHER

## 2022-11-16 RX ORDER — METOPROLOL SUCCINATE 50 MG/1
50 TABLET, EXTENDED RELEASE ORAL
Qty: 90 TABLET | Refills: 3 | Status: SHIPPED | OUTPATIENT
Start: 2022-11-16 | End: 2023-12-18 | Stop reason: SDUPTHER

## 2022-11-16 ASSESSMENT — FIBROSIS 4 INDEX: FIB4 SCORE: 2.77

## 2022-11-16 ASSESSMENT — ENCOUNTER SYMPTOMS
NEUROLOGICAL NEGATIVE: 1
SHORTNESS OF BREATH: 0
EYES NEGATIVE: 1
LOSS OF CONSCIOUSNESS: 0
HEMOPTYSIS: 0
STRIDOR: 0
CLAUDICATION: 0
GASTROINTESTINAL NEGATIVE: 1
WEAKNESS: 0
RESPIRATORY NEGATIVE: 1
CARDIOVASCULAR NEGATIVE: 1
DIZZINESS: 0
CHILLS: 0
ORTHOPNEA: 0
COUGH: 0
PND: 0
WHEEZING: 0
CONSTITUTIONAL NEGATIVE: 1
SORE THROAT: 0
BRUISES/BLEEDS EASILY: 0
MUSCULOSKELETAL NEGATIVE: 1
PALPITATIONS: 0
SPUTUM PRODUCTION: 0
FEVER: 0

## 2022-11-16 NOTE — PROGRESS NOTES
Chief Complaint   Patient presents with    Coronary Artery Disease     F/v dx: Coronary artery disease due to calcified coronary lesion:Acute MI, anterior wall s/p bms to LAD 12/11 with -RCA and residual CX dz (stent failed)    Hypertension    Dyslipidemia       Subjective:   Abram Barillas is a 67 y.o. male who presents today as a follow-up for his history of CAD status post CABG with hypertension hyperlipidemia and heart failure with reduced ejection fraction with mostly normalization of his function.  He had bypass surgery in 2012.  Is followed by Dr. Miramontes.    Since he was last seen he retired and is now walking 6 miles at a time with no functional imitations.  His most recent labs look good.  He said no changes to his medications.  His blood pressures been controlled.  He has been feeling very good and he is busy remodeling a house.    Past Medical History:   Diagnosis Date    Acute MI, anterior wall s/p bms to LAD 12/11 with -RCA and residual CX dz (stent failed) 12/31/2011    AICD (automatic cardioverter/defibrillator) present St Zach placed 4/23/12 4/24/2012    DEVICE DATA:  1. Pulse generator:  [employer], model #FX8354-92, serial  #[id number]  2. Right ventricular lead:  [employer], model #7120/60,  serial #[id number]  MEASURED INTRAOPERATIVE DATA: R-waves measured 11.7 mV, pacing  threshold 0.75 volts at 0.5 msec, lead impedance of 859 ohms.  DEVICE SETTINGS: VVI 40 to 120.     AICD lead malfunction 8/23/2013    Arrhythmia     ASTHMA     as a child. 1/13/22-PRN use of inhaler for allergies    Atrial fib/flutter, transient     when in cardiogenic shock    Bronchitis as child    CAD (coronary artery disease)     Calcium oxalate calculus 1/29/2022    Calcium oxalate calculus of kidney 1/19/2022    Cardiogenic shock (HCC) 1/2012    Cardiomyopathy, ischemic EF 35% 12/31/2011    Congestive heart failure (HCC)     Follow with Dr Yi    Dyslipidemia     Fatty liver     Former smoker  8/27/2019    High cholesterol     History of 2019 novel coronavirus disease (COVID-19) 5/25/2022 1/2022    History of acute myocardial infarction 11/24/2021 2011    History of atrial fibrillation 5/25/2022    When in cardiogenic shock    Hypertension     ON NO MEDS, now on meds    Influenza     Left flank pain 01/13/2022    comes and goes-severe when present    Moderate persistent asthma without complication 5/25/2022    Myocardial infarct (HCC) 12/29/2011    Myocardial infarction of anterolateral wall (HCC) 12/29/2011    Follow with Dr Yi    Nocturnal hypoxia 7/16/2018    Obstructive sleep apnea 3/30/2017    Pacemaker 04/23/2012    St Zach. Follows with Dr Yi with [facility]    Renal disorder     stones    Sleep apnea     Had study but was never prescribed CPAP.     Tuberculosis     20 years ago on meds for 6  mo    Uncontrolled type 2 diabetes mellitus with hyperglycemia ([facility]) 5/25/2022     Past Surgical History:   Procedure Laterality Date    KY CYSTOSCOPY,INSERT URETERAL STENT Left 1/14/2022    Procedure: CYSTOSCOPY, WITH URETERAL STENT INSERTION;  Surgeon: Duncan Blakely M.D.;  Location: [facility];  Service: Urology    KY CYSTO/URETERO/PYELOSCOPY, DX Left 1/14/2022    Procedure: URETEROSCOPY;  Surgeon: Duncan Blakely M.D.;  Location: [facility];  Service: Urology    LASERTRIPSY Left 1/14/2022    Procedure: LITHOTRIPSY, USING LASER;  Surgeon: Duncan Blakely M.D.;  Location: [facility];  Service: Urology    RECOVERY  8/30/2013    Performed by Cath-Recovery Surgery at SURGERY SAME DAY [facility]    RECOVERY  4/23/2012    Performed by SURGERY, CATH-RECOVERY at SURGERY SAME DAY [facility]    MULTIPLE CORONARY ARTERY BYPASS ENDO VEIN HARVEST  1/3/2012    Performed by PADMINI OCHOA at [facility]    UMBILICAL HERNIA REPAIR  1/14/2011    Performed by CHUY CHRISTENSEN at SURGERY SAME DAY [facility]    COLONOSCOPY  2007    normal     PACEMAKER INSERTION       Family History   Problem Relation Age of Onset    Cancer Mother         ovarian cancer    Diabetes Sister         prediabetes    Heart Disease Maternal Aunt 17        unclear but MI!    Heart Disease Maternal Uncle 38    Diabetes Maternal Grandmother      Social History     Socioeconomic History    Marital status:      Spouse name: Not on file    Number of children: Not on file    Years of education: Not on file    Highest education level: Not on file   Occupational History     Comment: retired   Tobacco Use    Smoking status: Former     Packs/day: 0.25     Years: 10.00     Pack years: 2.50     Types: Cigarettes     Quit date: 3/1/1982     Years since quittin.7    Smokeless tobacco: Never    Tobacco comments:     QUIT    Vaping Use    Vaping Use: Never used   Substance and Sexual Activity    Alcohol use: No     Alcohol/week: 0.0 oz     Comment: occasionally ( 2 times a year)    Drug use: No    Sexual activity: Not Currently   Other Topics Concern    Not on file   Social History Narrative    Not on file     Social Determinants of Health     Financial Resource Strain: Not on file   Food Insecurity: Not on file   Transportation Needs: Not on file   Physical Activity: Not on file   Stress: Not on file   Social Connections: Not on file   Intimate Partner Violence: Not on file   Housing Stability: Not on file     Allergies   Allergen Reactions    Pcn [Penicillins] Rash     Rash     Outpatient Encounter Medications as of 2022   Medication Sig Dispense Refill    rosuvastatin (CRESTOR) 20 MG Tab Take 1 Tablet by mouth at bedtime. 100 Tablet 3    metoprolol SR (TOPROL XL) 50 MG TABLET SR 24 HR Take 1 Tablet by mouth every day. 90 Tablet 3    lisinopril (PRINIVIL) 10 MG Tab Take 1 Tablet by mouth every day. 90 Tablet 3    ONETOUCH ULTRA strip TEST STRIPS ORDER: USE 4 TIMES A DAY BEFORE MEALS AND AT BEDTIME      Blood Glucose Monitoring Suppl (ONE TOUCH ULTRA 2) w/Device Kit  METER:USE BEFORE MEALS JUST BEFORE MEALS AND AT BEDTIME      dapagliflozin propanediol (FARXIGA) 10 MG Tab Take 1 Tablet by mouth every day. 90 Tablet 3    Blood Glucose Monitoring Suppl Device Meter: Dispense Device of Insurance Preference. Sig. Use ac just before meals and hs 1 Each 0    Blood Glucose Test Strips Test strips order: Test strips for insurance preference meter. Sig: use qid before meals and at bedtime 100 Strip 4    metFORMIN (GLUCOPHAGE) 500 MG Tab Take 1 Tablet by mouth 2 times a day with meals. 60 Tablet 4    Lancets Lancets order: Lancets for insurance preference meter. Sig: use qid 100 Each 11    Lancet Device Misc 1 Each 4 times a day. 1 Each 6    budesonide-formoterol (SYMBICORT) 80-4.5 MCG/ACT Aerosol Inhale 2 Puffs 2 times a day. Use with spacer.  Rinse mouth after each use. 10.2 g 3    sildenafil (REVATIO) 20 MG tablet sildenafil (pulmonary hypertension) 20 mg tablet   TAKE 1-5 TABLETS 30 MINUTES PRIOR TO ACTIVITY **MAX 5 TABS. MINIMUM 24 HOURS BETWEEN DOSES**      Multiple Vitamin (MULTI-VITAMIN DAILY PO) One A Day Vitamin      albuterol 108 (90 Base) MCG/ACT Aero Soln inhalation aerosol Inhale 2 Puffs by mouth every 6 hours as needed. 8.5 g 0    aspirin EC (ECOTRIN) 81 MG TBEC Take 1 Tablet by mouth every day.      [DISCONTINUED] metoprolol SR (TOPROL XL) 50 MG TABLET SR 24 HR Take 1 Tablet by mouth every day. 90 Tablet 3    [DISCONTINUED] lisinopril (PRINIVIL) 10 MG Tab TAKE 1 TABLET BY MOUTH EVERY DAY 90 Tablet 3    [DISCONTINUED] rosuvastatin (CRESTOR) 20 MG Tab Take 1 Tablet by mouth at bedtime. 100 Tablet 3     No facility-administered encounter medications on file as of 11/16/2022.     Review of Systems   Constitutional: Negative.  Negative for chills, fever and malaise/fatigue.   HENT: Negative.  Negative for sore throat.    Eyes: Negative.    Respiratory: Negative.  Negative for cough, hemoptysis, sputum production, shortness of breath, wheezing and stridor.    Cardiovascular:  "Negative.  Negative for chest pain, palpitations, orthopnea, claudication, leg swelling and PND.   Gastrointestinal: Negative.    Genitourinary: Negative.    Musculoskeletal: Negative.    Skin: Negative.    Neurological: Negative.  Negative for dizziness, loss of consciousness and weakness.   Endo/Heme/Allergies: Negative.  Does not bruise/bleed easily.   All other systems reviewed and are negative.     Objective:   /62 (BP Location: Left arm, Patient Position: Sitting, BP Cuff Size: Adult)   Pulse (!) 55   Resp 16   Ht 1.626 m (5' 4\")   Wt 66.7 kg (147 lb)   SpO2 96%   BMI 25.23 kg/m²     Physical Exam  Vitals and nursing note reviewed.   Constitutional:       General: He is not in acute distress.     Appearance: He is well-developed. He is not diaphoretic.   HENT:      Head: Normocephalic and atraumatic.      Right Ear: External ear normal.      Left Ear: External ear normal.      Nose: Nose normal.      Mouth/Throat:      Pharynx: No oropharyngeal exudate.   Eyes:      General: No scleral icterus.        Right eye: No discharge.         Left eye: No discharge.      Conjunctiva/sclera: Conjunctivae normal.      Pupils: Pupils are equal, round, and reactive to light.   Neck:      Vascular: No JVD.   Cardiovascular:      Rate and Rhythm: Normal rate and regular rhythm.      Heart sounds: No murmur heard.    No friction rub. No gallop.   Pulmonary:      Effort: Pulmonary effort is normal. No respiratory distress.      Breath sounds: No stridor. No wheezing or rales.   Chest:      Chest wall: No tenderness.   Abdominal:      General: There is no distension.      Palpations: Abdomen is soft.      Tenderness: There is no guarding.   Musculoskeletal:         General: No tenderness or deformity. Normal range of motion.      Cervical back: Neck supple.   Skin:     General: Skin is warm and dry.      Coloration: Skin is not pale.      Findings: No erythema or rash.   Neurological:      Mental Status: He is " alert.      Cranial Nerves: No cranial nerve deficit.      Motor: No abnormal muscle tone.      Coordination: Coordination normal.      Deep Tendon Reflexes: Reflexes are normal and symmetric. Reflexes normal.   Psychiatric:         Behavior: Behavior normal.         Thought Content: Thought content normal.         Judgment: Judgment normal.       Assessment:     1. Dyslipidemia  rosuvastatin (CRESTOR) 20 MG Tab      2. Fatty liver        3. AICD (automatic cardioverter/defibrillator) present St Zach placed 4/23/12        4. Coronary artery disease due to calcified coronary lesion:Acute MI, anterior wall s/p bms to LAD 12/11 with -RCA and residual CX dz (stent failed)  rosuvastatin (CRESTOR) 20 MG Tab      5. Essential hypertension  metoprolol SR (TOPROL XL) 50 MG TABLET SR 24 HR    lisinopril (PRINIVIL) 10 MG Tab      6. Presence of combination internal cardiac defibrillator (ICD) and pacemaker        7. History of acute myocardial infarction        8. History of 2019 novel coronavirus disease (COVID-19)        9. Chronic systolic (congestive) heart failure (HCC)        10. Cardiac LV ejection fraction of 40-49%        11. Moderate persistent asthma without complication        12. History of atrial fibrillation        13. Uncontrolled type 2 diabetes mellitus with hyperglycemia (HCC)            Medical Decision Making:  Today's Assessment / Status / Plan:     67-year-old male with CAD status post CABG hypertension hyperlipidemia and high risk medication usage.  H encouraged him to stay active by walking.  He is doing very well.  I refilled his medications and I will see him back in 1 year.

## 2022-11-17 ENCOUNTER — OFFICE VISIT (OUTPATIENT)
Dept: MEDICAL GROUP | Facility: MEDICAL CENTER | Age: 67
End: 2022-11-17
Payer: MEDICARE

## 2022-11-17 ENCOUNTER — NON-PROVIDER VISIT (OUTPATIENT)
Dept: CARDIOLOGY | Facility: MEDICAL CENTER | Age: 67
End: 2022-11-17

## 2022-11-17 VITALS
DIASTOLIC BLOOD PRESSURE: 66 MMHG | RESPIRATION RATE: 14 BRPM | TEMPERATURE: 97.5 F | OXYGEN SATURATION: 98 % | WEIGHT: 147.27 LBS | BODY MASS INDEX: 25.14 KG/M2 | SYSTOLIC BLOOD PRESSURE: 108 MMHG | HEART RATE: 56 BPM | HEIGHT: 64 IN

## 2022-11-17 DIAGNOSIS — I10 ESSENTIAL HYPERTENSION: ICD-10-CM

## 2022-11-17 DIAGNOSIS — E11.8 CONTROLLED TYPE 2 DIABETES MELLITUS WITH COMPLICATION, WITHOUT LONG-TERM CURRENT USE OF INSULIN (HCC): ICD-10-CM

## 2022-11-17 DIAGNOSIS — E78.5 DYSLIPIDEMIA: ICD-10-CM

## 2022-11-17 DIAGNOSIS — Z23 NEED FOR VACCINATION: ICD-10-CM

## 2022-11-17 DIAGNOSIS — K76.0 FATTY LIVER: ICD-10-CM

## 2022-11-17 PROCEDURE — 93295 DEV INTERROG REMOTE 1/2/MLT: CPT | Performed by: INTERNAL MEDICINE

## 2022-11-17 PROCEDURE — G0008 ADMIN INFLUENZA VIRUS VAC: HCPCS | Performed by: FAMILY MEDICINE

## 2022-11-17 PROCEDURE — 90662 IIV NO PRSV INCREASED AG IM: CPT | Performed by: FAMILY MEDICINE

## 2022-11-17 PROCEDURE — 99214 OFFICE O/P EST MOD 30 MIN: CPT | Mod: 25 | Performed by: FAMILY MEDICINE

## 2022-11-17 ASSESSMENT — FIBROSIS 4 INDEX: FIB4 SCORE: 2.77

## 2022-11-17 NOTE — PROGRESS NOTES
Diabetes Focused Exam:    Chief Complaint   Patient presents with    Diabetes Follow-up    Hypertension    Dyslipidemia      Subjective:   MARLENE Barillas is a 67 y.o. male who presents for follow up of chronic conditions of diabetes mellitus, hypertension and hyperlipidemia. He indicates that he is feeling well and denies any symptoms referable to the above diagnoses. Specifically denies chest pain, palpitations, dyspnea, orthopnea, PND or peripheral edema. Also denies polyuria, polydipsia, urinary complaints, abdominal complaints, myalgias, numbness, weakness or other related symptoms.     The patient is taking ASA every day 81 mg and taking all other medications as prescribed.  However he is unable to afford farxiga and has stopped. Patient denies any side effects of medication.  DM: A1c goal <7  Glucose monitoring frequency: checking twice daily   Fasting sugars: 82-98 generally. Post-prandial sugars: not checking  Hypoglycemic episodes none  Diabetic complications: cardiovascular disease  ACR Albumin/Creatinine Ratio goal <30   HTN: Blood pressure goal <140/<80. Currently Rx ACE/ARB: Yes  Hyperlipidemia:Cholesterol goal LDL <100, total/HDL <5. Currently Rx Statin: yes  Last eye exam 7/2022    Denies visual blurring, double vision, eye pain and floaters  Last monofilament foot exam: 6/2022   Denies foot pain, numbness, calluses, ulcers      See medications and orders placed in encounter report.  Past medical history, family history, social history reviewed and updated as documented in medical record.    Current medications including changes today:  Current Outpatient Medications   Medication Sig Dispense Refill    Blood Glucose Test Strips He is getting a relion meter and test strips 100 Strip 4    rosuvastatin (CRESTOR) 20 MG Tab Take 1 Tablet by mouth at bedtime. 100 Tablet 3    metoprolol SR (TOPROL XL) 50 MG TABLET SR 24 HR Take 1 Tablet by mouth every day. 90 Tablet 3    lisinopril (PRINIVIL) 10  "MG Tab Take 1 Tablet by mouth every day. 90 Tablet 3    ONETOUCH ULTRA strip TEST STRIPS ORDER: USE 4 TIMES A DAY BEFORE MEALS AND AT BEDTIME      metFORMIN (GLUCOPHAGE) 500 MG Tab Take 1 Tablet by mouth 2 times a day with meals. 60 Tablet 4    budesonide-formoterol (SYMBICORT) 80-4.5 MCG/ACT Aerosol Inhale 2 Puffs 2 times a day. Use with spacer.  Rinse mouth after each use. 10.2 g 3    sildenafil (REVATIO) 20 MG tablet sildenafil (pulmonary hypertension) 20 mg tablet   TAKE 1-5 TABLETS 30 MINUTES PRIOR TO ACTIVITY **MAX 5 TABS. MINIMUM 24 HOURS BETWEEN DOSES**      Multiple Vitamin (MULTI-VITAMIN DAILY PO) One A Day Vitamin      albuterol 108 (90 Base) MCG/ACT Aero Soln inhalation aerosol Inhale 2 Puffs by mouth every 6 hours as needed. 8.5 g 0    aspirin EC (ECOTRIN) 81 MG TBEC Take 1 Tablet by mouth every day.       No current facility-administered medications for this visit.     Allergies:   Allergies   Allergen Reactions    Pcn [Penicillins] Rash     Rash      Social History     Tobacco Use    Smoking status: Former     Packs/day: 0.25     Years: 10.00     Pack years: 2.50     Types: Cigarettes     Quit date: 3/1/1982     Years since quittin.7    Smokeless tobacco: Never    Tobacco comments:     QUIT    Vaping Use    Vaping Use: Never used   Substance Use Topics    Alcohol use: No     Alcohol/week: 0.0 oz     Comment: occasionally ( 2 times a year)    Drug use: No     Exercise: he is walking 3-4 miles per day.  Does even in bad weather.    Health Maintenance/Immunizations: discussed.  Flu vaccine administered today.  Recommended COVID bivalent booster.  He will make an appointment to do that.    ROS  Pertinent  ROS findings as above.  Denies exertional shortness of breath or chest pressure     Objective:     OBJECTIVE:  /66 (BP Location: Right arm, Patient Position: Sitting, BP Cuff Size: Small adult)   Pulse (!) 56   Temp 36.4 °C (97.5 °F) (Temporal)   Resp 14   Ht 1.626 m (5' 4\")   Wt 66.8 " kg (147 lb 4.3 oz)   SpO2 98%   BMI 25.28 kg/m²  Body mass index is 25.28 kg/m². BMI: not obese  General: No apparent distress, conversant, cooperative and pleasant with the examination.  Psych: Alert and oriented x4, judgment and insight normal  Neck: No JVD or bruits, no adenopathy, supple  Thyroid: normal to inspection and palpation  Lungs: negative findings: normal respiratory rate and rhythm, lungs clear to auscultation  Heart: negative findings: regular rate and rhythm, S1 normal, S2 normal, no murmurs, clicks, or gallops  Abdomen: Soft, nontender, no hepatosplenomegaly or masses, normal bowel sounds  Skin: No rashes, no cyanosis, no lesions or ulcers  Extremities: No cyanosis clubbing or edema.     POC labs:   Lab Results   Component Value Date/Time    POCGLUCOSE 109 (H) 01/11/2012 11:31 AM          Last labs    Lab Results   Component Value Date/Time    CHOLSTRLTOT 115 11/14/2022 07:23 AM    LDL 56 11/14/2022 07:23 AM    HDL 31 (A) 11/14/2022 07:23 AM    TRIGLYCERIDE 142 11/14/2022 07:23 AM       Lab Results   Component Value Date/Time    SODIUM 140 11/14/2022 07:23 AM    POTASSIUM 4.0 11/14/2022 07:23 AM    GLUCOSE 114 (H) 11/14/2022 07:23 AM    BUNCREATRAT 15 12/08/2010 07:11 AM    GLOMRATE >59 12/08/2010 07:11 AM     Lab Results   Component Value Date/Time    HBA1C 5.9 (H) 11/14/2022 07:23 AM    HBA1C 14.3 (A) 05/25/2022 09:30 AM    HBA1C 5.1 12/30/2011 10:30 PM     Lab Results   Component Value Date/Time    MALBCRT 18 11/14/2022 07:23 AM    MICROALBUR 2.0 11/14/2022 07:23 AM          Assessment/Plan:   Medications, refills, and referrals per orders.   1. Controlled type 2 diabetes mellitus with complication, without long-term current use of insulin (HCC)  Blood Glucose Test Strips    HEMOGLOBIN A1C    Comp Metabolic Panel    Lipid Profile    CBC WITHOUT DIFFERENTIAL      2. Dyslipidemia  Comp Metabolic Panel    Lipid Profile      3. Essential hypertension  Comp Metabolic Panel    Lipid Profile      4.  Fatty liver  Comp Metabolic Panel    CBC WITHOUT DIFFERENTIAL      5. Need for vaccination  Influenza Vaccine, High Dose (65+ Only)        DM2 A1c is at goal   Patient to monitor sugars: daily frequency  Discussed diet, exercise, disease management and diet changes.   Education and advise provided today:All medications, side effects and compliance (discussed carefully)  Annual eye examinations at Ophthalmology  Diabetic diet discussed in detail, written exchange diet given  Foot care discussed and Podiatry visits  Glycohemoglobin and other lab monitoring  Home glucose monitoring emphasized  Labs immediately prior to next visit  Long term diabetic complications  Low cholesterol diet, weight control and daily exercise    Followup:   Do labs and RTC 6 months, sooner should new symptoms or problems arise.

## 2022-11-17 NOTE — CARDIAC REMOTE MONITOR - SCAN
Device transmission reviewed. Device demonstrated appropriate function.       Electronically Signed by: Vladislav Dumont M.D.    11/17/2022  12:02 PM

## 2022-11-23 PROCEDURE — RXMED WILLOW AMBULATORY MEDICATION CHARGE: Performed by: INTERNAL MEDICINE

## 2022-12-02 PROCEDURE — RXMED WILLOW AMBULATORY MEDICATION CHARGE: Performed by: INTERNAL MEDICINE

## 2022-12-19 ENCOUNTER — PHARMACY VISIT (OUTPATIENT)
Dept: PHARMACY | Facility: MEDICAL CENTER | Age: 67
End: 2022-12-19
Payer: COMMERCIAL

## 2022-12-19 DIAGNOSIS — E11.65 UNCONTROLLED TYPE 2 DIABETES MELLITUS WITH HYPERGLYCEMIA (HCC): ICD-10-CM

## 2022-12-19 PROCEDURE — RXMED WILLOW AMBULATORY MEDICATION CHARGE: Performed by: INTERNAL MEDICINE

## 2022-12-19 PROCEDURE — RXMED WILLOW AMBULATORY MEDICATION CHARGE: Performed by: FAMILY MEDICINE

## 2022-12-27 ENCOUNTER — PHARMACY VISIT (OUTPATIENT)
Dept: PHARMACY | Facility: MEDICAL CENTER | Age: 67
End: 2022-12-27
Payer: COMMERCIAL

## 2023-01-14 DIAGNOSIS — I10 ESSENTIAL HYPERTENSION: ICD-10-CM

## 2023-01-16 RX ORDER — LISINOPRIL 10 MG/1
10 TABLET ORAL
Qty: 100 TABLET | Refills: 3 | Status: SHIPPED | OUTPATIENT
Start: 2023-01-16 | End: 2023-05-17 | Stop reason: SDUPTHER

## 2023-01-16 NOTE — TELEPHONE ENCOUNTER
Received request via: Pharmacy    Was the patient seen in the last year in this department? Yes    Does the patient have an active prescription (recently filled or refills available) for medication(s) requested? No    Does the patient have residential Plus and need 100 day supply (blood pressure, diabetes and cholesterol meds only)? Yes, quantity updated to 100 days

## 2023-01-19 PROCEDURE — RXMED WILLOW AMBULATORY MEDICATION CHARGE: Performed by: FAMILY MEDICINE

## 2023-01-26 ENCOUNTER — PHARMACY VISIT (OUTPATIENT)
Dept: PHARMACY | Facility: MEDICAL CENTER | Age: 68
End: 2023-01-26
Payer: COMMERCIAL

## 2023-02-06 ENCOUNTER — PHARMACY VISIT (OUTPATIENT)
Dept: PHARMACY | Facility: MEDICAL CENTER | Age: 68
End: 2023-02-06
Payer: COMMERCIAL

## 2023-02-06 PROCEDURE — RXMED WILLOW AMBULATORY MEDICATION CHARGE: Performed by: INTERNAL MEDICINE

## 2023-02-16 ENCOUNTER — NON-PROVIDER VISIT (OUTPATIENT)
Dept: CARDIOLOGY | Facility: MEDICAL CENTER | Age: 68
End: 2023-02-16
Payer: MEDICARE

## 2023-02-16 PROCEDURE — 93295 DEV INTERROG REMOTE 1/2/MLT: CPT | Performed by: INTERNAL MEDICINE

## 2023-02-16 NOTE — CARDIAC REMOTE MONITOR - SCAN
Device transmission reviewed. Device demonstrated appropriate function.       Electronically Signed by: Guanako Hensley M.D.    2/16/2023  2:14 PM

## 2023-02-20 PROCEDURE — RXMED WILLOW AMBULATORY MEDICATION CHARGE: Performed by: FAMILY MEDICINE

## 2023-02-28 ENCOUNTER — PHARMACY VISIT (OUTPATIENT)
Dept: PHARMACY | Facility: MEDICAL CENTER | Age: 68
End: 2023-02-28
Payer: COMMERCIAL

## 2023-03-13 PROCEDURE — RXMED WILLOW AMBULATORY MEDICATION CHARGE: Performed by: INTERNAL MEDICINE

## 2023-03-20 ENCOUNTER — PHARMACY VISIT (OUTPATIENT)
Dept: PHARMACY | Facility: MEDICAL CENTER | Age: 68
End: 2023-03-20
Payer: COMMERCIAL

## 2023-03-20 PROCEDURE — RXMED WILLOW AMBULATORY MEDICATION CHARGE: Performed by: FAMILY MEDICINE

## 2023-03-28 ENCOUNTER — NON-PROVIDER VISIT (OUTPATIENT)
Dept: CARDIOLOGY | Facility: MEDICAL CENTER | Age: 68
End: 2023-03-28
Payer: MEDICARE

## 2023-03-28 ENCOUNTER — NON-PROVIDER VISIT (OUTPATIENT)
Dept: CARDIOLOGY | Facility: MEDICAL CENTER | Age: 68
End: 2023-03-28

## 2023-03-28 DIAGNOSIS — I50.22 CHRONIC SYSTOLIC (CONGESTIVE) HEART FAILURE (HCC): ICD-10-CM

## 2023-03-28 DIAGNOSIS — Z95.810 AICD (AUTOMATIC CARDIOVERTER/DEFIBRILLATOR) PRESENT: ICD-10-CM

## 2023-03-28 PROCEDURE — 93282 PRGRMG EVAL IMPLANTABLE DFB: CPT | Performed by: INTERNAL MEDICINE

## 2023-04-01 NOTE — CARDIAC REMOTE MONITOR - SCAN
In office interrogation reviewed. Device demonstrated appropriate function.       Electronically Signed by: Vladislav Dumont M.D.    4/8/2023  9:08 AM

## 2023-04-11 PROCEDURE — RXMED WILLOW AMBULATORY MEDICATION CHARGE: Performed by: INTERNAL MEDICINE

## 2023-04-24 PROCEDURE — RXMED WILLOW AMBULATORY MEDICATION CHARGE: Performed by: FAMILY MEDICINE

## 2023-04-25 ENCOUNTER — PHARMACY VISIT (OUTPATIENT)
Dept: PHARMACY | Facility: MEDICAL CENTER | Age: 68
End: 2023-04-25
Payer: COMMERCIAL

## 2023-05-11 DIAGNOSIS — I10 ESSENTIAL HYPERTENSION: ICD-10-CM

## 2023-05-11 PROCEDURE — 1126F AMNT PAIN NOTED NONE PRSNT: CPT | Performed by: FAMILY MEDICINE

## 2023-05-12 PROCEDURE — RXMED WILLOW AMBULATORY MEDICATION CHARGE: Performed by: FAMILY MEDICINE

## 2023-05-12 RX ORDER — LISINOPRIL 10 MG/1
10 TABLET ORAL
Qty: 90 TABLET | Refills: 3 | Status: SHIPPED | OUTPATIENT
Start: 2023-05-12 | End: 2023-05-17

## 2023-05-13 ENCOUNTER — HOSPITAL ENCOUNTER (OUTPATIENT)
Dept: LAB | Facility: MEDICAL CENTER | Age: 68
End: 2023-05-13
Attending: FAMILY MEDICINE
Payer: MEDICARE

## 2023-05-13 ENCOUNTER — HOSPITAL ENCOUNTER (OUTPATIENT)
Dept: LAB | Facility: MEDICAL CENTER | Age: 68
End: 2023-05-13
Attending: PHYSICIAN ASSISTANT
Payer: MEDICARE

## 2023-05-13 DIAGNOSIS — E11.8 CONTROLLED TYPE 2 DIABETES MELLITUS WITH COMPLICATION, WITHOUT LONG-TERM CURRENT USE OF INSULIN (HCC): ICD-10-CM

## 2023-05-13 DIAGNOSIS — K76.0 FATTY LIVER: ICD-10-CM

## 2023-05-13 DIAGNOSIS — E78.5 DYSLIPIDEMIA: ICD-10-CM

## 2023-05-13 DIAGNOSIS — I10 ESSENTIAL HYPERTENSION: ICD-10-CM

## 2023-05-13 PROBLEM — N13.8 BENIGN PROSTATIC HYPERPLASIA WITH URINARY OBSTRUCTION: Status: ACTIVE | Noted: 2023-05-11

## 2023-05-13 PROBLEM — N40.1 BENIGN PROSTATIC HYPERPLASIA WITH URINARY OBSTRUCTION: Status: ACTIVE | Noted: 2023-05-11

## 2023-05-13 PROBLEM — N52.01 ERECTILE DYSFUNCTION DUE TO ARTERIAL INSUFFICIENCY: Status: ACTIVE | Noted: 2023-05-11

## 2023-05-13 LAB
ALBUMIN SERPL BCP-MCNC: 4.9 G/DL (ref 3.2–4.9)
ALBUMIN/GLOB SERPL: 1.8 G/DL
ALP SERPL-CCNC: 75 U/L (ref 30–99)
ALT SERPL-CCNC: 78 U/L (ref 2–50)
ANION GAP SERPL CALC-SCNC: 13 MMOL/L (ref 7–16)
AST SERPL-CCNC: 64 U/L (ref 12–45)
BILIRUB SERPL-MCNC: 1.2 MG/DL (ref 0.1–1.5)
BUN SERPL-MCNC: 15 MG/DL (ref 8–22)
CALCIUM ALBUM COR SERPL-MCNC: 9.2 MG/DL (ref 8.5–10.5)
CALCIUM SERPL-MCNC: 9.9 MG/DL (ref 8.5–10.5)
CHLORIDE SERPL-SCNC: 102 MMOL/L (ref 96–112)
CHOLEST SERPL-MCNC: 105 MG/DL (ref 100–199)
CO2 SERPL-SCNC: 24 MMOL/L (ref 20–33)
CREAT SERPL-MCNC: 0.62 MG/DL (ref 0.5–1.4)
ERYTHROCYTE [DISTWIDTH] IN BLOOD BY AUTOMATED COUNT: 43.2 FL (ref 35.9–50)
EST. AVERAGE GLUCOSE BLD GHB EST-MCNC: 137 MG/DL
EST. AVERAGE GLUCOSE BLD GHB EST-MCNC: 137 MG/DL
GFR SERPLBLD CREATININE-BSD FMLA CKD-EPI: 105 ML/MIN/1.73 M 2
GLOBULIN SER CALC-MCNC: 2.7 G/DL (ref 1.9–3.5)
GLUCOSE SERPL-MCNC: 127 MG/DL (ref 65–99)
HBA1C MFR BLD: 6.4 % (ref 4–5.6)
HBA1C MFR BLD: 6.4 % (ref 4–5.6)
HCT VFR BLD AUTO: 53.4 % (ref 42–52)
HDLC SERPL-MCNC: 29 MG/DL
HGB BLD-MCNC: 18.6 G/DL (ref 14–18)
LDLC SERPL CALC-MCNC: 46 MG/DL
MCH RBC QN AUTO: 32.5 PG (ref 27–33)
MCHC RBC AUTO-ENTMCNC: 34.8 G/DL (ref 33.7–35.3)
MCV RBC AUTO: 93.4 FL (ref 81.4–97.8)
PLATELET # BLD AUTO: 167 K/UL (ref 164–446)
PMV BLD AUTO: 10.6 FL (ref 9–12.9)
POTASSIUM SERPL-SCNC: 4.5 MMOL/L (ref 3.6–5.5)
PROT SERPL-MCNC: 7.6 G/DL (ref 6–8.2)
PSA SERPL-MCNC: 1.17 NG/ML (ref 0–4)
RBC # BLD AUTO: 5.72 M/UL (ref 4.7–6.1)
SODIUM SERPL-SCNC: 139 MMOL/L (ref 135–145)
TRIGL SERPL-MCNC: 148 MG/DL (ref 0–149)
WBC # BLD AUTO: 6.7 K/UL (ref 4.8–10.8)

## 2023-05-13 PROCEDURE — 84153 ASSAY OF PSA TOTAL: CPT

## 2023-05-13 PROCEDURE — 36415 COLL VENOUS BLD VENIPUNCTURE: CPT

## 2023-05-13 PROCEDURE — 83036 HEMOGLOBIN GLYCOSYLATED A1C: CPT | Mod: 91

## 2023-05-13 PROCEDURE — 80053 COMPREHEN METABOLIC PANEL: CPT

## 2023-05-13 PROCEDURE — 80061 LIPID PANEL: CPT

## 2023-05-13 PROCEDURE — 83036 HEMOGLOBIN GLYCOSYLATED A1C: CPT

## 2023-05-13 PROCEDURE — 85027 COMPLETE CBC AUTOMATED: CPT

## 2023-05-13 RX ORDER — BLOOD SUGAR DIAGNOSTIC
1 STRIP MISCELLANEOUS
COMMUNITY

## 2023-05-15 ENCOUNTER — PHARMACY VISIT (OUTPATIENT)
Dept: PHARMACY | Facility: MEDICAL CENTER | Age: 68
End: 2023-05-15
Payer: COMMERCIAL

## 2023-05-17 ENCOUNTER — OFFICE VISIT (OUTPATIENT)
Dept: MEDICAL GROUP | Facility: MEDICAL CENTER | Age: 68
End: 2023-05-17
Payer: MEDICARE

## 2023-05-17 VITALS
HEART RATE: 53 BPM | BODY MASS INDEX: 26.05 KG/M2 | SYSTOLIC BLOOD PRESSURE: 118 MMHG | TEMPERATURE: 98 F | WEIGHT: 152.6 LBS | OXYGEN SATURATION: 96 % | DIASTOLIC BLOOD PRESSURE: 60 MMHG | HEIGHT: 64 IN

## 2023-05-17 DIAGNOSIS — I25.84 CORONARY ARTERY DISEASE DUE TO CALCIFIED CORONARY LESION: ICD-10-CM

## 2023-05-17 DIAGNOSIS — J45.30 MILD PERSISTENT ASTHMA WITHOUT COMPLICATION: ICD-10-CM

## 2023-05-17 DIAGNOSIS — I10 ESSENTIAL HYPERTENSION: ICD-10-CM

## 2023-05-17 DIAGNOSIS — E11.9 CONTROLLED TYPE 2 DIABETES MELLITUS WITHOUT COMPLICATION, WITHOUT LONG-TERM CURRENT USE OF INSULIN (HCC): ICD-10-CM

## 2023-05-17 DIAGNOSIS — E78.5 DYSLIPIDEMIA: ICD-10-CM

## 2023-05-17 DIAGNOSIS — R74.01 ELEVATED TRANSAMINASE LEVEL: ICD-10-CM

## 2023-05-17 DIAGNOSIS — I25.2 HISTORY OF ACUTE MYOCARDIAL INFARCTION: ICD-10-CM

## 2023-05-17 DIAGNOSIS — I25.10 CORONARY ARTERY DISEASE DUE TO CALCIFIED CORONARY LESION: ICD-10-CM

## 2023-05-17 PROBLEM — E11.65 UNCONTROLLED TYPE 2 DIABETES MELLITUS WITH HYPERGLYCEMIA (HCC): Status: RESOLVED | Noted: 2022-05-25 | Resolved: 2023-05-17

## 2023-05-17 PROCEDURE — 3074F SYST BP LT 130 MM HG: CPT | Performed by: FAMILY MEDICINE

## 2023-05-17 PROCEDURE — 99214 OFFICE O/P EST MOD 30 MIN: CPT | Performed by: FAMILY MEDICINE

## 2023-05-17 PROCEDURE — 3078F DIAST BP <80 MM HG: CPT | Performed by: FAMILY MEDICINE

## 2023-05-17 RX ORDER — BUDESONIDE AND FORMOTEROL FUMARATE DIHYDRATE 80; 4.5 UG/1; UG/1
2 AEROSOL RESPIRATORY (INHALATION) 2 TIMES DAILY
Qty: 10.2 G | Refills: 3 | Status: SHIPPED | OUTPATIENT
Start: 2023-05-17

## 2023-05-17 RX ORDER — LISINOPRIL 10 MG/1
10 TABLET ORAL
Qty: 100 TABLET | Refills: 3 | Status: SHIPPED | OUTPATIENT
Start: 2023-05-17

## 2023-05-17 ASSESSMENT — PATIENT HEALTH QUESTIONNAIRE - PHQ9: CLINICAL INTERPRETATION OF PHQ2 SCORE: 0

## 2023-05-17 ASSESSMENT — FIBROSIS 4 INDEX: FIB4 SCORE: 2.86

## 2023-05-17 NOTE — PROGRESS NOTES
Diabetes Focused Exam:    Chief Complaint   Patient presents with    Diabetes Follow-up     6m fv      Subjective:   MARLENE Barillas is a 67 y.o. male who presents for follow up of chronic conditions of diabetes mellitus, hypertension and hyperlipidemia. He indicates that he is feeling well and denies any symptoms referable to the above diagnoses. Specifically denies chest pain, palpitations, dyspnea, orthopnea, PND or peripheral edema. Also denies polyuria, polydipsia, urinary complaints, abdominal complaints, myalgias, numbness, weakness or other related symptoms.     Transaminase elevation present.  Will reduce metformin to one per day and recheck in 6 weeks. Order discussed and placed.    The patient is taking ASA every day 81 mg and taking all other medications as prescribed. Patient denies any side effects of medication.  DM: A1c goal <7  Glucose monitoring frequency: daily   Fasting sugars: 113-133, average 122. Post-prandial sugars: none  Hypoglycemic episodes none  Diabetic complications: none and does have cardiovascular history but predates by several years.  ACR Albumin/Creatinine Ratio goal <30   HTN: Blood pressure goal <140/<80. Currently Rx ACE/ARB: Yes  Hyperlipidemia:Cholesterol goal LDL <100, total/HDL <5. Currently Rx Statin: Yes  Last eye exam 7/2022  Denies visual blurring, double vision, eye pain and floaters  Last monofilament foot exam: today Denies foot pain, numbness, calluses, ulcers      See medications and orders placed in encounter report.  Past medical history, family history, social history reviewed and updated as documented in medical record.  Current medications including changes today:  Current Outpatient Medications   Medication Sig Dispense Refill    lisinopril (PRINIVIL) 10 MG Tab Take 1 tablet by mouth every day. 100 Tablet 3    metFORMIN (GLUCOPHAGE) 500 MG Tab Take 1 tablet by mouth every evening. 100 Tablet 3    budesonide-formoterol (SYMBICORT) 80-4.5 MCG/ACT  [Change in mental status noted] : No change in mental status noted Aerosol Inhale 2 puffs 2 times a day. Use with spacer. Rinse mouth after each use. 10.2 g 3    glucose blood (EXACTECH TEST) strip 1 Each 4 Times a Day,Before Meals and at Bedtime.      Glucose Blood (ONETOUCH ULTRA VI) 4 Times a Day,Before Meals and at Bedtime. Meter to be used before meals and at bedtime.      Blood Glucose Test Strips He is getting a relion meter and test strips 100 Strip 4    rosuvastatin (CRESTOR) 20 MG Tab Take 1 tablet by mouth daily at bedtime. 100 Tablet 3    metoprolol SR (TOPROL XL) 50 MG TABLET SR 24 HR Take 1 Tablet by mouth every day. 90 Tablet 3    ONETOUCH ULTRA strip TEST STRIPS ORDER: USE 4 TIMES A DAY BEFORE MEALS AND AT BEDTIME      sildenafil (REVATIO) 20 MG tablet sildenafil (pulmonary hypertension) 20 mg tablet   TAKE 1-5 TABLETS 30 MINUTES PRIOR TO ACTIVITY **MAX 5 TABS. MINIMUM 24 HOURS BETWEEN DOSES**      Multiple Vitamin (MULTI-VITAMIN DAILY PO) One A Day Vitamin      aspirin EC (ECOTRIN) 81 MG TBEC Take 1 Tablet by mouth every day.       No current facility-administered medications for this visit.     Allergies:   Allergies   Allergen Reactions    Pcn [Penicillins] Rash     Rash      Social History     Tobacco Use    Smoking status: Former     Packs/day: 0.25     Years: 10.00     Pack years: 2.50     Types: Cigarettes     Quit date: 3/1/1982     Years since quittin.2    Smokeless tobacco: Never    Tobacco comments:     QUIT    Vaping Use    Vaping Use: Never used   Substance Use Topics    Alcohol use: No     Alcohol/week: 0.0 oz     Comment: occasionally ( 2 times a year)    Drug use: No     Exercise: He continues to be active and is exercising on a regular basis.  He is also doing a lot of work around the house.  He feels he is more active and doing well now that he is retired.  Health Maintenance/Immunizations: His immunizations are up-to-date.    ROS  Pertinent  ROS findings as above.      Objective:     OBJECTIVE:  /60 (BP Location: Right arm, Patient  [None] : None "Position: Sitting, BP Cuff Size: Small adult)   Pulse (!) 53   Temp 36.7 °C (98 °F) (Temporal)   Ht 1.626 m (5' 4\")   Wt 69.2 kg (152 lb 9.6 oz)   SpO2 96%   BMI 26.19 kg/m²  Body mass index is 26.19 kg/m². BMI: not obese  General: No apparent distress, conversant, cooperative and pleasant with the examination.  Psych: Alert and oriented x4, judgment and insight normal  Neck: No JVD or bruits, no adenopathy, supple  Thyroid: normal to inspection and palpation  Lungs: negative findings: normal respiratory rate and rhythm, lungs clear to auscultation  Heart: negative findings: regular rate and rhythm, S1 normal, S2 normal, no murmurs, clicks, or gallops  Abdomen: Soft, nontender, no hepatosplenomegaly or masses, normal bowel sounds  Skin: No rashes, no cyanosis, no lesions or ulcers  Extremities: No cyanosis clubbing or edema.   Feet are examined  Monofilament testing with a 10 gram force: sensation intact: intact bilaterally  Visual Inspection: Feet without maceration, ulcers, fissures.  Pedal pulses: intact bilaterally    POC labs:   Lab Results   Component Value Date/Time    POCGLUCOSE 109 (H) 01/11/2012 11:31 AM          Last labs    Lab Results   Component Value Date/Time    CHOLSTRLTOT 105 05/13/2023 07:33 AM    LDL 46 05/13/2023 07:33 AM    HDL 29 (A) 05/13/2023 07:33 AM    TRIGLYCERIDE 148 05/13/2023 07:33 AM       Lab Results   Component Value Date/Time    SODIUM 139 05/13/2023 07:33 AM    POTASSIUM 4.5 05/13/2023 07:33 AM    GLUCOSE 127 (H) 05/13/2023 07:33 AM    BUNCREATRAT 15 12/08/2010 07:11 AM    GLOMRATE >59 12/08/2010 07:11 AM     Lab Results   Component Value Date/Time    HBA1C 6.4 (H) 05/13/2023 07:33 AM    HBA1C 6.4 (H) 05/13/2023 07:33 AM    HBA1C 5.9 (H) 11/14/2022 07:23 AM     Lab Results   Component Value Date/Time    MALBCRT 18 11/14/2022 07:23 AM    MICROALBUR 2.0 11/14/2022 07:23 AM          Assessment/Plan:   Medications, refills, and referrals per orders.   1. Controlled type 2 " [Student] : student [Alone] : lives alone diabetes mellitus without complication, without long-term current use of insulin (HCC)  Diabetic Monofilament LE Exam    metFORMIN (GLUCOPHAGE) 500 MG Tab      2. Essential hypertension  lisinopril (PRINIVIL) 10 MG Tab      3. Dyslipidemia        4. Coronary artery disease due to calcified coronary lesion:Acute MI, anterior wall s/p bms to LAD 12/11 with -RCA and residual CX dz (stent failed)        5. History of acute myocardial infarction        6. Mild persistent asthma without complication  budesonide-formoterol (SYMBICORT) 80-4.5 MCG/ACT Aerosol      7. Elevated transaminase level  HEPATIC FUNCTION PANEL        DM2 A1c is at goal   Patient to monitor sugars: daily frequency  Discussed diet, exercise, disease management and general goals..   Education and advise provided today:All medications, side effects and compliance (discussed carefully)  Annual eye examinations at Ophthal[facility]  Diabetic diet discussed in detail, written exchange diet given  Foot care discussed and Podiatry visits  Glycohemoglobin and other lab monitoring  Home glucose monitoring emphasized  Labs immediately prior to next visit  Long term diabetic complications  Low cholesterol diet, weight control and daily exercise    Followup:   Do labs and RTC 6 months, sooner should new symptoms or problems arise.           [Single] : single [College] : College [High Risk Behavior] : no high risk behavior [Sexually Active] : not sexually active [Feels Safe at Home] : Feels safe at home [Fully functional (bathing, dressing, toileting, transferring, walking, feeding)] : Fully functional (bathing, dressing, toileting, transferring, walking, feeding) [Fully functional (using the telephone, shopping, preparing meals, housekeeping, doing laundry, using] : Fully functional and needs no help or supervision to perform IADLs (using the telephone, shopping, preparing meals, housekeeping, doing laundry, using transportation, managing medications and managing finances) [Reports changes in hearing] : Reports no changes in hearing [Reports normal functional visual acuity (ie: able to read med bottle)] : Reports normal functional visual acuity [Reports changes in vision] : Reports no changes in vision [Reports changes in dental health] : Reports no changes in dental health [Smoke Detector] : smoke detector [Guns at Home] : no guns at home [Carbon Monoxide Detector] : carbon monoxide detector [Seat Belt] :  uses seat belt [Sunscreen] : uses sunscreen

## 2023-05-19 ENCOUNTER — NON-PROVIDER VISIT (OUTPATIENT)
Dept: CARDIOLOGY | Facility: MEDICAL CENTER | Age: 68
End: 2023-05-19
Payer: MEDICARE

## 2023-05-19 PROCEDURE — 93295 DEV INTERROG REMOTE 1/2/MLT: CPT | Performed by: INTERNAL MEDICINE

## 2023-05-19 NOTE — CARDIAC REMOTE MONITOR - SCAN
Device transmission reviewed. Device demonstrated appropriate function.       Electronically Signed by: Remington Rivera M.D.    5/24/2023  2:32 PM

## 2023-06-05 ENCOUNTER — PHARMACY VISIT (OUTPATIENT)
Dept: PHARMACY | Facility: MEDICAL CENTER | Age: 68
End: 2023-06-05
Payer: COMMERCIAL

## 2023-06-05 PROCEDURE — RXMED WILLOW AMBULATORY MEDICATION CHARGE: Performed by: INTERNAL MEDICINE

## 2023-06-05 PROCEDURE — RXMED WILLOW AMBULATORY MEDICATION CHARGE: Performed by: FAMILY MEDICINE

## 2023-06-12 ENCOUNTER — DOCUMENTATION (OUTPATIENT)
Dept: HEALTH INFORMATION MANAGEMENT | Facility: OTHER | Age: 68
End: 2023-06-12
Payer: MEDICARE

## 2023-06-12 ENCOUNTER — PATIENT MESSAGE (OUTPATIENT)
Dept: HEALTH INFORMATION MANAGEMENT | Facility: OTHER | Age: 68
End: 2023-06-12

## 2023-06-26 PROCEDURE — RXMED WILLOW AMBULATORY MEDICATION CHARGE: Performed by: FAMILY MEDICINE

## 2023-06-28 ENCOUNTER — PHARMACY VISIT (OUTPATIENT)
Dept: PHARMACY | Facility: MEDICAL CENTER | Age: 68
End: 2023-06-28
Payer: COMMERCIAL

## 2023-07-03 ENCOUNTER — HOSPITAL ENCOUNTER (OUTPATIENT)
Dept: LAB | Facility: MEDICAL CENTER | Age: 68
End: 2023-07-03
Attending: FAMILY MEDICINE
Payer: MEDICARE

## 2023-07-03 DIAGNOSIS — R74.01 ELEVATED TRANSAMINASE LEVEL: ICD-10-CM

## 2023-07-03 LAB
ALBUMIN SERPL BCP-MCNC: 4.5 G/DL (ref 3.2–4.9)
ALP SERPL-CCNC: 94 U/L (ref 30–99)
ALT SERPL-CCNC: 64 U/L (ref 2–50)
AST SERPL-CCNC: 49 U/L (ref 12–45)
BILIRUB CONJ SERPL-MCNC: <0.2 MG/DL (ref 0.1–0.5)
BILIRUB INDIRECT SERPL-MCNC: ABNORMAL MG/DL (ref 0–1)
BILIRUB SERPL-MCNC: 0.7 MG/DL (ref 0.1–1.5)
PROT SERPL-MCNC: 6.9 G/DL (ref 6–8.2)

## 2023-07-03 PROCEDURE — 36415 COLL VENOUS BLD VENIPUNCTURE: CPT

## 2023-07-03 PROCEDURE — 80076 HEPATIC FUNCTION PANEL: CPT

## 2023-07-05 DIAGNOSIS — R74.01 ELEVATED TRANSAMINASE LEVEL: ICD-10-CM

## 2023-07-05 DIAGNOSIS — K76.0 FATTY LIVER: ICD-10-CM

## 2023-07-08 ENCOUNTER — HOSPITAL ENCOUNTER (OUTPATIENT)
Dept: LAB | Facility: MEDICAL CENTER | Age: 68
End: 2023-07-08
Attending: FAMILY MEDICINE
Payer: MEDICARE

## 2023-07-08 DIAGNOSIS — K76.0 FATTY LIVER: ICD-10-CM

## 2023-07-08 DIAGNOSIS — R74.01 ELEVATED TRANSAMINASE LEVEL: ICD-10-CM

## 2023-07-08 LAB
HAV IGM SERPL QL IA: NORMAL
HBV CORE IGM SER QL: NORMAL
HBV SURFACE AG SER QL: NORMAL
HCV AB SER QL: NORMAL

## 2023-07-08 PROCEDURE — 80074 ACUTE HEPATITIS PANEL: CPT

## 2023-07-08 PROCEDURE — 36415 COLL VENOUS BLD VENIPUNCTURE: CPT

## 2023-07-12 ENCOUNTER — TELEPHONE (OUTPATIENT)
Dept: HEALTH INFORMATION MANAGEMENT | Facility: OTHER | Age: 68
End: 2023-07-12
Payer: MEDICARE

## 2023-07-14 DIAGNOSIS — Z87.891 FORMER SMOKER: ICD-10-CM

## 2023-07-14 DIAGNOSIS — I70.90 ATHEROSCLEROTIC VASCULAR DISEASE: ICD-10-CM

## 2023-07-14 DIAGNOSIS — E11.65 UNCONTROLLED TYPE 2 DIABETES MELLITUS WITH HYPERGLYCEMIA (HCC): ICD-10-CM

## 2023-07-27 PROCEDURE — RXMED WILLOW AMBULATORY MEDICATION CHARGE: Performed by: INTERNAL MEDICINE

## 2023-08-01 ENCOUNTER — PHARMACY VISIT (OUTPATIENT)
Dept: PHARMACY | Facility: MEDICAL CENTER | Age: 68
End: 2023-08-01
Payer: COMMERCIAL

## 2023-08-16 PROCEDURE — RXMED WILLOW AMBULATORY MEDICATION CHARGE: Performed by: FAMILY MEDICINE

## 2023-08-17 ENCOUNTER — NON-PROVIDER VISIT (OUTPATIENT)
Dept: CARDIOLOGY | Facility: MEDICAL CENTER | Age: 68
End: 2023-08-17
Payer: MEDICARE

## 2023-08-17 PROCEDURE — 93295 DEV INTERROG REMOTE 1/2/MLT: CPT | Performed by: INTERNAL MEDICINE

## 2023-08-17 NOTE — CARDIAC REMOTE MONITOR - SCAN
Device transmission reviewed. Device demonstrated appropriate function.       Electronically Signed by: Vladislav Dumont M.D.    8/17/2023  12:57 PM

## 2023-08-23 ENCOUNTER — PHARMACY VISIT (OUTPATIENT)
Dept: PHARMACY | Facility: MEDICAL CENTER | Age: 68
End: 2023-08-23
Payer: COMMERCIAL

## 2023-08-23 ENCOUNTER — HOSPITAL ENCOUNTER (OUTPATIENT)
Dept: RADIOLOGY | Facility: MEDICAL CENTER | Age: 68
End: 2023-08-23
Attending: FAMILY MEDICINE
Payer: MEDICARE

## 2023-08-23 DIAGNOSIS — K76.0 FATTY LIVER: ICD-10-CM

## 2023-08-23 DIAGNOSIS — R74.01 ELEVATED TRANSAMINASE LEVEL: ICD-10-CM

## 2023-08-23 PROCEDURE — RXMED WILLOW AMBULATORY MEDICATION CHARGE: Performed by: FAMILY MEDICINE

## 2023-08-23 PROCEDURE — 76705 ECHO EXAM OF ABDOMEN: CPT

## 2023-08-30 ENCOUNTER — PHARMACY VISIT (OUTPATIENT)
Dept: PHARMACY | Facility: MEDICAL CENTER | Age: 68
End: 2023-08-30
Payer: COMMERCIAL

## 2023-09-04 ENCOUNTER — HOSPITAL ENCOUNTER (OUTPATIENT)
Dept: RADIOLOGY | Facility: MEDICAL CENTER | Age: 68
End: 2023-09-04
Attending: FAMILY MEDICINE
Payer: MEDICARE

## 2023-09-04 DIAGNOSIS — Z87.891 FORMER SMOKER: ICD-10-CM

## 2023-09-04 DIAGNOSIS — I70.90 ATHEROSCLEROTIC VASCULAR DISEASE: ICD-10-CM

## 2023-09-04 DIAGNOSIS — E11.65 UNCONTROLLED TYPE 2 DIABETES MELLITUS WITH HYPERGLYCEMIA (HCC): ICD-10-CM

## 2023-09-04 PROCEDURE — 93978 VASCULAR STUDY: CPT | Mod: 26 | Performed by: INTERNAL MEDICINE

## 2023-09-04 PROCEDURE — 93978 VASCULAR STUDY: CPT

## 2023-09-11 PROCEDURE — RXMED WILLOW AMBULATORY MEDICATION CHARGE: Performed by: INTERNAL MEDICINE

## 2023-09-22 ENCOUNTER — PHARMACY VISIT (OUTPATIENT)
Dept: PHARMACY | Facility: MEDICAL CENTER | Age: 68
End: 2023-09-22
Payer: COMMERCIAL

## 2023-11-01 PROCEDURE — RXMED WILLOW AMBULATORY MEDICATION CHARGE: Performed by: FAMILY MEDICINE

## 2023-11-06 PROCEDURE — RXMED WILLOW AMBULATORY MEDICATION CHARGE: Performed by: INTERNAL MEDICINE

## 2023-11-07 ENCOUNTER — PHARMACY VISIT (OUTPATIENT)
Dept: PHARMACY | Facility: MEDICAL CENTER | Age: 68
End: 2023-11-07
Payer: COMMERCIAL

## 2023-11-16 ENCOUNTER — NON-PROVIDER VISIT (OUTPATIENT)
Dept: CARDIOLOGY | Facility: MEDICAL CENTER | Age: 68
End: 2023-11-16
Payer: MEDICARE

## 2023-11-16 PROCEDURE — 93295 DEV INTERROG REMOTE 1/2/MLT: CPT | Performed by: INTERNAL MEDICINE

## 2023-11-16 NOTE — CARDIAC REMOTE MONITOR - SCAN
Device transmission reviewed. Device demonstrated appropriate function.       Electronically Signed by: Guanako Hensley M.D.    11/20/2023  7:42 AM

## 2023-12-12 PROCEDURE — RXMED WILLOW AMBULATORY MEDICATION CHARGE: Performed by: FAMILY MEDICINE

## 2023-12-18 ENCOUNTER — PHARMACY VISIT (OUTPATIENT)
Dept: PHARMACY | Facility: MEDICAL CENTER | Age: 68
End: 2023-12-18
Payer: COMMERCIAL

## 2023-12-18 DIAGNOSIS — I10 ESSENTIAL HYPERTENSION: ICD-10-CM

## 2023-12-19 PROCEDURE — RXMED WILLOW AMBULATORY MEDICATION CHARGE: Performed by: INTERNAL MEDICINE

## 2023-12-19 RX ORDER — METOPROLOL SUCCINATE 50 MG/1
50 TABLET, EXTENDED RELEASE ORAL
Qty: 100 TABLET | Refills: 0 | Status: SHIPPED | OUTPATIENT
Start: 2023-12-19

## 2023-12-19 NOTE — TELEPHONE ENCOUNTER
Is the patient due for a refill? Yes    Was the patient seen the past year? No    Date of last office visit: 1/16/2022    Does the patient have an upcoming appointment?  No      Provider to refill:ADD-AL    Does the patients insurance require a 100 day supply?  Yes

## 2023-12-26 ENCOUNTER — PHARMACY VISIT (OUTPATIENT)
Dept: PHARMACY | Facility: MEDICAL CENTER | Age: 68
End: 2023-12-26
Payer: COMMERCIAL

## 2023-12-27 ENCOUNTER — TELEPHONE (OUTPATIENT)
Dept: CARDIOLOGY | Facility: MEDICAL CENTER | Age: 68
End: 2023-12-27
Payer: MEDICARE

## 2023-12-27 DIAGNOSIS — Z95.810 AICD (AUTOMATIC CARDIOVERTER/DEFIBRILLATOR) PRESENT: ICD-10-CM

## 2023-12-27 DIAGNOSIS — I50.22 CHRONIC SYSTOLIC (CONGESTIVE) HEART FAILURE (HCC): ICD-10-CM

## 2023-12-27 DIAGNOSIS — I10 ESSENTIAL HYPERTENSION: ICD-10-CM

## 2023-12-27 NOTE — TELEPHONE ENCOUNTER
Patients device transmitted via home monitor--patient had VT episode with ATP delivered 12/26 @ 9:00 am--appropriate and successful.    Previous patient of Dr. Yi--tasked to ADD

## 2023-12-27 NOTE — TELEPHONE ENCOUNTER
Nolan Martinez M.D.  You34 minutes ago (12:14 PM)     ICD delivering appropriate therapy. I recommend updating CMP, CBC, BNP and obtaining a SPECT MPI. Follow up in clinic.    Jewelnay  ASHER     Phone Number Called: [phone]    Call outcome: Spoke to patient regarding message below.    Message: Spoke to patient about BE recommendations. Patient agreeable to plan at this time. Patient scheduled to see MT 1/10/24. Lab work and imagining ordered at this time. All questions answered at this time. Advised to call back with any further questions or concerns.

## 2024-01-02 ENCOUNTER — HOSPITAL ENCOUNTER (OUTPATIENT)
Dept: RADIOLOGY | Facility: MEDICAL CENTER | Age: 69
End: 2024-01-02
Attending: INTERNAL MEDICINE
Payer: MEDICARE

## 2024-01-02 DIAGNOSIS — I10 ESSENTIAL HYPERTENSION: ICD-10-CM

## 2024-01-02 DIAGNOSIS — Z95.810 AICD (AUTOMATIC CARDIOVERTER/DEFIBRILLATOR) PRESENT: ICD-10-CM

## 2024-01-02 DIAGNOSIS — I50.22 CHRONIC SYSTOLIC (CONGESTIVE) HEART FAILURE (HCC): ICD-10-CM

## 2024-01-02 PROCEDURE — A9502 TC99M TETROFOSMIN: HCPCS

## 2024-01-03 PROCEDURE — 93018 CV STRESS TEST I&R ONLY: CPT | Performed by: INTERNAL MEDICINE

## 2024-01-03 PROCEDURE — 78452 HT MUSCLE IMAGE SPECT MULT: CPT | Mod: 26 | Performed by: INTERNAL MEDICINE

## 2024-01-04 ENCOUNTER — HOSPITAL ENCOUNTER (OUTPATIENT)
Dept: LAB | Facility: MEDICAL CENTER | Age: 69
End: 2024-01-04
Attending: INTERNAL MEDICINE
Payer: MEDICARE

## 2024-01-04 DIAGNOSIS — I50.22 CHRONIC SYSTOLIC (CONGESTIVE) HEART FAILURE (HCC): ICD-10-CM

## 2024-01-04 DIAGNOSIS — Z95.810 AICD (AUTOMATIC CARDIOVERTER/DEFIBRILLATOR) PRESENT: ICD-10-CM

## 2024-01-04 DIAGNOSIS — I10 ESSENTIAL HYPERTENSION: ICD-10-CM

## 2024-01-04 LAB
ALBUMIN SERPL BCP-MCNC: 4.3 G/DL (ref 3.2–4.9)
ALBUMIN/GLOB SERPL: 1.7 G/DL
ALP SERPL-CCNC: 94 U/L (ref 30–99)
ALT SERPL-CCNC: 53 U/L (ref 2–50)
ANION GAP SERPL CALC-SCNC: 12 MMOL/L (ref 7–16)
AST SERPL-CCNC: 43 U/L (ref 12–45)
BILIRUB SERPL-MCNC: 0.6 MG/DL (ref 0.1–1.5)
BUN SERPL-MCNC: 13 MG/DL (ref 8–22)
CALCIUM ALBUM COR SERPL-MCNC: 8.9 MG/DL (ref 8.5–10.5)
CALCIUM SERPL-MCNC: 9.1 MG/DL (ref 8.5–10.5)
CHLORIDE SERPL-SCNC: 108 MMOL/L (ref 96–112)
CO2 SERPL-SCNC: 24 MMOL/L (ref 20–33)
CREAT SERPL-MCNC: 0.58 MG/DL (ref 0.5–1.4)
ERYTHROCYTE [DISTWIDTH] IN BLOOD BY AUTOMATED COUNT: 44.1 FL (ref 35.9–50)
GFR SERPLBLD CREATININE-BSD FMLA CKD-EPI: 107 ML/MIN/1.73 M 2
GLOBULIN SER CALC-MCNC: 2.6 G/DL (ref 1.9–3.5)
GLUCOSE SERPL-MCNC: 125 MG/DL (ref 65–99)
HCT VFR BLD AUTO: 49 % (ref 42–52)
HGB BLD-MCNC: 16.4 G/DL (ref 14–18)
MCH RBC QN AUTO: 31.4 PG (ref 27–33)
MCHC RBC AUTO-ENTMCNC: 33.5 G/DL (ref 32.3–36.5)
MCV RBC AUTO: 93.9 FL (ref 81.4–97.8)
NT-PROBNP SERPL IA-MCNC: 93 PG/ML (ref 0–125)
PLATELET # BLD AUTO: 150 K/UL (ref 164–446)
PMV BLD AUTO: 10.5 FL (ref 9–12.9)
POTASSIUM SERPL-SCNC: 3.7 MMOL/L (ref 3.6–5.5)
PROT SERPL-MCNC: 6.9 G/DL (ref 6–8.2)
RBC # BLD AUTO: 5.22 M/UL (ref 4.7–6.1)
SODIUM SERPL-SCNC: 144 MMOL/L (ref 135–145)
WBC # BLD AUTO: 5.5 K/UL (ref 4.8–10.8)

## 2024-01-04 PROCEDURE — 85027 COMPLETE CBC AUTOMATED: CPT

## 2024-01-04 PROCEDURE — 36415 COLL VENOUS BLD VENIPUNCTURE: CPT

## 2024-01-04 PROCEDURE — 80053 COMPREHEN METABOLIC PANEL: CPT

## 2024-01-04 PROCEDURE — 83880 ASSAY OF NATRIURETIC PEPTIDE: CPT

## 2024-01-10 ENCOUNTER — OFFICE VISIT (OUTPATIENT)
Dept: CARDIOLOGY | Facility: MEDICAL CENTER | Age: 69
End: 2024-01-10
Attending: NURSE PRACTITIONER
Payer: MEDICARE

## 2024-01-10 VITALS
OXYGEN SATURATION: 96 % | DIASTOLIC BLOOD PRESSURE: 62 MMHG | RESPIRATION RATE: 16 BRPM | HEIGHT: 65 IN | BODY MASS INDEX: 23.66 KG/M2 | HEART RATE: 60 BPM | WEIGHT: 142 LBS | SYSTOLIC BLOOD PRESSURE: 112 MMHG

## 2024-01-10 DIAGNOSIS — Z95.1 HX OF CABG: ICD-10-CM

## 2024-01-10 DIAGNOSIS — I47.20 VT (VENTRICULAR TACHYCARDIA) (HCC): ICD-10-CM

## 2024-01-10 DIAGNOSIS — I25.10 CORONARY ARTERY DISEASE DUE TO CALCIFIED CORONARY LESION: Primary | ICD-10-CM

## 2024-01-10 DIAGNOSIS — I50.22 CHRONIC SYSTOLIC (CONGESTIVE) HEART FAILURE (HCC): ICD-10-CM

## 2024-01-10 DIAGNOSIS — E78.5 DYSLIPIDEMIA: ICD-10-CM

## 2024-01-10 DIAGNOSIS — E11.9 CONTROLLED TYPE 2 DIABETES MELLITUS WITHOUT COMPLICATION, WITHOUT LONG-TERM CURRENT USE OF INSULIN (HCC): ICD-10-CM

## 2024-01-10 DIAGNOSIS — R94.39 ABNORMAL STRESS TEST: ICD-10-CM

## 2024-01-10 DIAGNOSIS — I10 ESSENTIAL HYPERTENSION: ICD-10-CM

## 2024-01-10 DIAGNOSIS — I25.84 CORONARY ARTERY DISEASE DUE TO CALCIFIED CORONARY LESION: Primary | ICD-10-CM

## 2024-01-10 DIAGNOSIS — Z95.810 AICD (AUTOMATIC CARDIOVERTER/DEFIBRILLATOR) PRESENT: ICD-10-CM

## 2024-01-10 PROBLEM — R94.30 CARDIAC LV EJECTION FRACTION OF 40-49%: Status: RESOLVED | Noted: 2022-05-25 | Resolved: 2024-01-10

## 2024-01-10 PROBLEM — Z86.79 HISTORY OF ATRIAL FIBRILLATION: Status: RESOLVED | Noted: 2022-05-25 | Resolved: 2024-01-10

## 2024-01-10 LAB — EKG IMPRESSION: NORMAL

## 2024-01-10 PROCEDURE — RXMED WILLOW AMBULATORY MEDICATION CHARGE: Performed by: NURSE PRACTITIONER

## 2024-01-10 PROCEDURE — 99212 OFFICE O/P EST SF 10 MIN: CPT | Performed by: NURSE PRACTITIONER

## 2024-01-10 PROCEDURE — 93282 PRGRMG EVAL IMPLANTABLE DFB: CPT | Performed by: NURSE PRACTITIONER

## 2024-01-10 PROCEDURE — 93005 ELECTROCARDIOGRAM TRACING: CPT | Performed by: NURSE PRACTITIONER

## 2024-01-10 PROCEDURE — 93282 PRGRMG EVAL IMPLANTABLE DFB: CPT | Mod: 26 | Performed by: NURSE PRACTITIONER

## 2024-01-10 PROCEDURE — 93010 ELECTROCARDIOGRAM REPORT: CPT | Performed by: INTERNAL MEDICINE

## 2024-01-10 PROCEDURE — 3078F DIAST BP <80 MM HG: CPT | Performed by: NURSE PRACTITIONER

## 2024-01-10 PROCEDURE — 3074F SYST BP LT 130 MM HG: CPT | Performed by: NURSE PRACTITIONER

## 2024-01-10 PROCEDURE — 99215 OFFICE O/P EST HI 40 MIN: CPT | Mod: 25 | Performed by: NURSE PRACTITIONER

## 2024-01-10 RX ORDER — ROSUVASTATIN CALCIUM 40 MG/1
40 TABLET, COATED ORAL
Qty: 100 TABLET | Refills: 3 | Status: SHIPPED | OUTPATIENT
Start: 2024-01-10

## 2024-01-10 ASSESSMENT — ENCOUNTER SYMPTOMS
GASTROINTESTINAL NEGATIVE: 1
DIZZINESS: 0
SHORTNESS OF BREATH: 0
MUSCULOSKELETAL NEGATIVE: 1
NEUROLOGICAL NEGATIVE: 1
DEPRESSION: 0
PND: 0
CONSTITUTIONAL NEGATIVE: 1
ORTHOPNEA: 0
NERVOUS/ANXIOUS: 0
BRUISES/BLEEDS EASILY: 0
PALPITATIONS: 0
WHEEZING: 0
SENSORY CHANGE: 0
HALLUCINATIONS: 0
CLAUDICATION: 0
NAUSEA: 0
EYES NEGATIVE: 1
RESPIRATORY NEGATIVE: 1

## 2024-01-10 ASSESSMENT — FIBROSIS 4 INDEX: FIB4 SCORE: 2.64

## 2024-01-10 NOTE — ASSESSMENT & PLAN NOTE
- LDL goal less than 55   - rosuvastatin 40mg daily   - triglycerides less than 150   - annual lipid panel

## 2024-01-10 NOTE — PROGRESS NOTES
Cardiology Follow up Note    DOS: 1/10/2024  [id number]  Abram Barillas    Chief complaint/Reason for consult: recent AICD therapy and abnormal test    HPI: Pt is a 68 y.o. male who presents to the clinic today in follow up for recent AICD therapy and abnormal test. Patient has a past medical history significant for but not limited to: CAD S/P CABG 2012; S/P stent to LAD and  of RCA, AICD in situ, chronic systolic heart failure LVEF 45% (2019) on recent stress 37%, recent AICD therapy, dyslipidemia, hypertension, asthma, type 2 diabetes. Patient recently had a therapy delivered (ATP) appropriately for VT on 12/26. He underwent stress test that was abnormal with large fixed anterior and apical deficit, per interpretation of test that is compatible with mild amount of ischemia. WE discussed angiogram in detail with interpretative services, Loki ID# [id number]. Patient would like to proceed. Blood pressure well controlled today. Increase rosuvastatin to 40mg nightly for lipid optimization with LDL less than 55.       Past Medical History:   Diagnosis Date    Acute MI, anterior wall s/p bms to LAD 12/11 with -RCA and residual CX dz (stent failed) 12/31/2011    AICD (automatic cardioverter/defibrillator) present St Zach placed 4/23/12 4/24/2012    DEVICE DATA:  1. Pulse generator:  [employer]. Zach, model #TB7787-71, serial  #[id number]  2. Right ventricular lead:  [employer]. Zach, model #7120/60,  serial #[id number]  MEASURED INTRAOPERATIVE DATA: R-waves measured 11.7 mV, pacing  threshold 0.75 volts at 0.5 msec, lead impedance of 859 ohms.  DEVICE SETTINGS: VVI 40 to 120.     AICD lead malfunction 8/23/2013    Arrhythmia     ASTHMA     as a child. 1/13/22-PRN use of inhaler for allergies    Atrial fib/flutter, transient     when in cardiogenic shock    Bronchitis as child    CAD (coronary artery disease)     Calcium oxalate calculus 1/29/2022    Calcium oxalate calculus of kidney 1/19/2022    Cardiogenic  shock (HCC) 1/2012    Cardiomyopathy, ischemic EF 35% 12/31/2011    Congestive heart failure (HCC)     Follow with Dr Yi    Dyslipidemia     Fatty liver     Former smoker 8/27/2019    High cholesterol     History of 2019 novel coronavirus disease (COVID-19) 5/25/2022 1/2022    History of acute myocardial infarction 11/24/2021 2011    History of atrial fibrillation 5/25/2022    When in cardiogenic shock    Hypertension     ON NO MEDS, now on meds    Influenza     Left flank pain 01/13/2022    comes and goes-severe when present    Moderate persistent asthma without complication 5/25/2022    Myocardial infarct (HCC) 12/29/2011    Myocardial infarction of anterolateral wall ([facility]) 12/29/2011    Follow with Dr Yi    Nocturnal hypoxia 7/16/2018    Obstructive sleep apnea 3/30/2017    Pacemaker 04/23/2012    St Zach. Follows with Dr Yi with [facility]    Renal disorder     stones    Sleep apnea     Had study but was never prescribed CPAP.     Tuberculosis     20 years ago on meds for 6  mo    Uncontrolled type 2 diabetes mellitus with hyperglycemia ([facility]) 5/25/2022       Past Surgical History:   Procedure Laterality Date    WA CYSTOSCOPY,INSERT URETERAL STENT Left 1/14/2022    Procedure: CYSTOSCOPY, WITH URETERAL STENT INSERTION;  Surgeon: Duncan Blakely M.D.;  Location: SURGERY [facility];  Service: Urology    WA CYSTO/URETERO/PYELOSCOPY, DX Left 1/14/2022    Procedure: URETEROSCOPY;  Surgeon: Duncan Blakely M.D.;  Location: SURGERY [facility];  Service: Urology    LASERTRIPSY Left 1/14/2022    Procedure: LITHOTRIPSY, USING LASER;  Surgeon: Duncan Blakely M.D.;  Location: SURGERY [facility];  Service: Urology    RECOVERY  8/30/2013    Performed by Cath-Recovery Surgery at SURGERY SAME DAY [facility] ORS    RECOVERY  4/23/2012    Performed by SURGERY, CATH-RECOVERY at SURGERY SAME DAY [facility]    MULTIPLE CORONARY ARTERY BYPASS ENDO VEIN HARVEST  1/3/2012    [facility]EL[facility]    UMBILICAL HERNIA REPAIR  2011    [facility]EN [facility]    COLONOSCOPY  2007    normal    PACEMAKER INSERTION         Social History     Socioeconomic History    Marital status:      Spouse name: Not on file    Number of children: Not on file    Years of education: Not on file    Highest education level: Not on file   Occupational History     Comment: retired   Tobacco Use    Smoking status: Former     Current packs/day: 0.00     Average packs/day: 0.3 packs/day for 10.0 years (2.5 ttl pk-yrs)     Types: Cigarettes     Start date: 3/1/1972     Quit date: 3/1/1982     Years since quittin.8    Smokeless tobacco: Never    Tobacco comments:     QUIT 1985   Vaping Use    Vaping Use: Never used   Substance and Sexual Activity    Alcohol use: No     Alcohol/week: 0.0 oz     Comment: occasionally ( 2 times a year)    Drug use: No    Sexual activity: Not Currently   Other Topics Concern    Not on file   Social History Narrative    Not on file     Social Determinants of Health     Financial Resource Strain: Not on file   Food Insecurity: Not on file   Transportation Needs: Not on file   Physical Activity: Not on file   Stress: Not on file   Social Connections: Not on file   Intimate Partner Violence: Not on file   Housing Stability: Not on file       Family History   Problem Relation Age of Onset    Cancer Mother         ovarian cancer    Diabetes Sister         prediabetes    Heart Disease Maternal Aunt 17        unclear but MI!    Heart Disease Maternal Uncle 38    Diabetes Maternal Grandmother        Allergies   Allergen Reactions    Pcn [Penicillins] Rash     Rash       Current Outpatient Medications   Medication Sig Dispense Refill    rosuvastatin (CRESTOR) 40 MG tablet Take 1 Tablet by mouth at bedtime. 100 Tablet 3    metoprolol SR (TOPROL XL) 50 MG TABLET SR 24 HR Take 1 Tablet by mouth every day. Need appt for future  refill ([phone]) 100 Tablet 0    lisinopril (PRINIVIL) 10 MG Tab Take 1 tablet by mouth every day. 100 Tablet 3    metFORMIN (GLUCOPHAGE) 500 MG Tab Take 1 tablet by mouth every evening. 100 Tablet 3    budesonide-formoterol (SYMBICORT) 80-4.5 MCG/ACT Aerosol Inhale 2 puffs by mouth 2 times a day. Use with spacer. Rinse mouth after each use. 10.2 g 3    glucose blood (EXACTECH TEST) strip 1 Each 4 Times a Day,Before Meals and at Bedtime.      Blood Glucose Test Strips He is getting a relion meter and test strips 100 Strip 4    Multiple Vitamin (MULTI-VITAMIN DAILY PO) One A Day Vitamin      aspirin EC (ECOTRIN) 81 MG TBEC Take 1 Tablet by mouth every day.       No current facility-administered medications for this visit.       Vitals:    01/10/24 0946   BP: 112/62   Pulse: 60   Resp: 16   SpO2: 96%         Review of Systems   Constitutional: Negative.  Negative for malaise/fatigue.   HENT: Negative.     Eyes: Negative.    Respiratory: Negative.  Negative for shortness of breath and wheezing.    Cardiovascular:  Negative for chest pain, palpitations, orthopnea, claudication, leg swelling and PND.   Gastrointestinal: Negative.  Negative for nausea.   Genitourinary: Negative.    Musculoskeletal: Negative.    Skin: Negative.    Neurological: Negative.  Negative for dizziness and sensory change.   Endo/Heme/Allergies: Negative.  Does not bruise/bleed easily.   Psychiatric/Behavioral:  Negative for depression and hallucinations. The patient is not nervous/anxious.         Prior echo results reviewed: LVEF 45% with hypokinesis of inferoseptal wall and apex; mild mitral regurg    Prior cath/stress results reviewed: fixed anterior and apical deficit compatible with infarct with mild amount of ischemia    EKG interpreted by me: Sinus bradycardia with 1st degree (259)    Physical Exam  Constitutional:       Appearance: Normal appearance.   HENT:      Head: Normocephalic.   Eyes:      Pupils: Pupils are equal, round, and  "reactive to light.   Neck:      Vascular: No JVD.   Cardiovascular:      Rate and Rhythm: Regular rhythm. Bradycardia present.      Pulses: Normal pulses.      Heart sounds: Normal heart sounds.   Pulmonary:      Effort: Pulmonary effort is normal.      Breath sounds: Normal breath sounds.   Abdominal:      General: Abdomen is flat.      Palpations: Abdomen is soft.   Musculoskeletal:      Cervical back: Normal range of motion.      Right lower leg: No edema.      Left lower leg: No edema.   Skin:     General: Skin is warm and dry.   Neurological:      Mental Status: He is alert and oriented to person, place, and time.   Psychiatric:         Mood and Affect: Mood normal.         Behavior: Behavior normal.          Data:  Lipids:   Lab Results   Component Value Date/Time    CHOLSTRLTOT 105 05/13/2023 07:33 AM    TRIGLYCERIDE 148 05/13/2023 07:33 AM    HDL 29 (A) 05/13/2023 07:33 AM    LDL 46 05/13/2023 07:33 AM        BMP:  Lab Results   Component Value Date/Time    SODIUM 144 01/04/2024 0616    POTASSIUM 3.7 01/04/2024 0616    CHLORIDE 108 01/04/2024 0616    CO2 24 01/04/2024 0616    GLUCOSE 125 (H) 01/04/2024 0616    BUN 13 01/04/2024 0616    CREATININE 0.58 01/04/2024 0616    CALCIUM 9.1 01/04/2024 0616    ANION 12.0 01/04/2024 0616       GFR:  Lab Results   Component Value Date/Time    IFAFRICA >60 01/13/2022 1349    IFNOTAFR >60 01/13/2022 1349        TSH:   Lab Results   Component Value Date/Time    TSHULTRASEN 3.080 11/14/2022 0723       MAGNESIUM:  Lab Results   Component Value Date/Time    MAGNESIUM 2.3 07/13/2018 1626    MAGNESIUM 1.9 01/01/2012 0215        THYROXINE (T4):   No results found for: \"FREEDIR\"     CBC:   Lab Results   Component Value Date/Time    WBC 5.5 01/04/2024 06:16 AM    WBC 7.7 12/08/2010 07:11 AM    RBC 5.22 01/04/2024 06:16 AM    RBC 5.58 12/08/2010 07:11 AM    HEMOGLOBIN 16.4 01/04/2024 06:16 AM    HEMATOCRIT 49.0 01/04/2024 06:16 AM    MCV 93.9 01/04/2024 06:16 AM    MCV 96 " 12/08/2010 07:11 AM    MCH 31.4 01/04/2024 06:16 AM    MCH 32.8 12/08/2010 07:11 AM    MCHC 33.5 01/04/2024 06:16 AM    RDW 44.1 01/04/2024 06:16 AM    RDW 14.0 12/08/2010 07:11 AM    PLATELETCT 150 (L) 01/04/2024 06:16 AM    MPV 10.5 01/04/2024 06:16 AM    NEUTSPOLYS 56.60 05/25/2022 11:49 AM    LYMPHOCYTES 33.80 05/25/2022 11:49 AM    MONOCYTES 8.50 05/25/2022 11:49 AM    EOSINOPHILS 0.60 05/25/2022 11:49 AM    BASOPHILS 0.40 05/25/2022 11:49 AM    IMMGRAN 0.10 05/25/2022 11:49 AM    IMMGRAN 0 12/08/2010 07:11 AM    NRBC 0.00 05/25/2022 11:49 AM    NEUTS 3.88 05/25/2022 11:49 AM    NEUTS 3.4 12/08/2010 07:11 AM    LYMPHS 2.32 05/25/2022 11:49 AM    LYMPHS 3.7 12/08/2010 07:11 AM    MONOS 0.58 05/25/2022 11:49 AM    MONOS 0.4 12/08/2010 07:11 AM    EOS 0.04 05/25/2022 11:49 AM    EOS 0.2 12/08/2010 07:11 AM    BASO 0.03 05/25/2022 11:49 AM    BASO 0.1 12/08/2010 07:11 AM    IMMGRANAB 0.01 05/25/2022 11:49 AM    IMMGRANAB 0.0 12/08/2010 07:11 AM    NRBCAB 0.00 05/25/2022 11:49 AM        CBC w/o DIFF  Lab Results   Component Value Date/Time    WBC 5.5 01/04/2024 06:16 AM    WBC 7.7 12/08/2010 07:11 AM    RBC 5.22 01/04/2024 06:16 AM    RBC 5.58 12/08/2010 07:11 AM    HEMOGLOBIN 16.4 01/04/2024 06:16 AM    MCV 93.9 01/04/2024 06:16 AM    MCV 96 12/08/2010 07:11 AM    MCH 31.4 01/04/2024 06:16 AM    MCH 32.8 12/08/2010 07:11 AM    MCHC 33.5 01/04/2024 06:16 AM    RDW 44.1 01/04/2024 06:16 AM    RDW 14.0 12/08/2010 07:11 AM    MPV 10.5 01/04/2024 06:16 AM       LIVER:  Lab Results   Component Value Date/Time    ALKPHOSPHAT 94 01/04/2024 06:16 AM    ASTSGOT 43 01/04/2024 06:16 AM    ALTSGPT 53 (H) 01/04/2024 06:16 AM    TBILIRUBIN 0.6 01/04/2024 06:16 AM       BNP:  Lab Results   Component Value Date/Time    BNPBTYPENAT 42 12/27/2016 07:15 AM       PT/INR:  Lab Results   Component Value Date/Time    PROTHROMBTM 13.2 01/14/2022 11:49 AM    PROTHROMBTM 13.6 08/27/2013 02:36 PM    PROTHROMBTM 13.8 08/22/2013 10:12 PM    INR  1.08 01/14/2022 11:49 AM    INR 1.02 08/27/2013 02:36 PM    INR 1.04 08/22/2013 10:12 PM             Impression/Plan:  AICD (automatic cardioverter/defibrillator) present [employer] Zach placed 4/23/12   - ATP therapy delivered for VT   - device check today was normal    - no episodes since Dec 26th    Dyslipidemia  - LDL goal less than 55   - rosuvastatin 40mg daily   - triglycerides less than 150   - annual lipid panel     Coronary artery disease due to calcified coronary lesion:Acute MI, anterior wall s/p bms to LAD 12/11 with -RCA and residual CX dz (stent failed)   - repeat angiogram in light of recent VT episode and history of CAD and CABG   - risks discussed in detail with patient   - Discussed risks, benefits, rationale, appropriateness and alternatives to coronary angiography with IV sedation in great detail with the patient.  Complications including but not limited to death, stroke, MI, urgent bypass surgery, contrast nephropathy, vascular complications, bleeding and infection were explained.  In addition, we discussed that 10% of patients will experience small to moderate bruising at the side of the arterial puncture.  Risks of major complications such as heart attack or stroke caused by the angiogram is less than 1%; the risk of death is approximately 1 in 1000.  The potential outcomes associated with the procedure (possible PCI, possible CABG, possible medical Rx only) were also discussed at length.  Patient voices understanding and with shared decision making, is in agreement to proceed.     Essential hypertension   - goal less than 130/80   - well controlled today in office   - continue lisinopril and metoprolol    Chronic systolic (congestive) heart failure (HCC)   - may need to consider post cath change in medications to optimize health   - systolic heart failure regimen would be optimized with inclusion of SGLT2 inhibitor and changing ACE to Entresto   - will discuss post results and long term medical  management needs   -     Controlled type 2 diabetes mellitus without complication, without long-term current use of insulin (HCC)   - close management with PCP   - diabetic control important in prevention of microvascular disease   -     VT (ventricular tachycardia) (HCC)   - one episode on 12/26 treated with ATP   - with abnormal stress test will first rule out further deterioration of CAD or possible ins tent stenosis or re-occlusion   - solo episode at this time   - may need to consider antiarrhythmic therapy should he have more episode without reversible cause             A total of 44 minutes of time was spent on day of encounter reviewing medical record, performing history and examination, counseling, ordering medication/test/consults, collaborating with referring service, and documentation.    Surinder Lerner AGACNP-EP  Cardiac Electrophysiology

## 2024-01-10 NOTE — ASSESSMENT & PLAN NOTE
- goal less than 130/80   - well controlled today in office   - continue lisinopril and metoprolol

## 2024-01-10 NOTE — ASSESSMENT & PLAN NOTE
- repeat angiogram in light of recent VT episode and history of CAD and CABG   - risks discussed in detail with patient   - Discussed risks, benefits, rationale, appropriateness and alternatives to coronary angiography with IV sedation in great detail with the patient.  Complications including but not limited to death, stroke, MI, urgent bypass surgery, contrast nephropathy, vascular complications, bleeding and infection were explained.  In addition, we discussed that 10% of patients will experience small to moderate bruising at the side of the arterial puncture.  Risks of major complications such as heart attack or stroke caused by the angiogram is less than 1%; the risk of death is approximately 1 in 1000.  The potential outcomes associated with the procedure (possible PCI, possible CABG, possible medical Rx only) were also discussed at length.  Patient voices understanding and with shared decision making, is in agreement to proceed.

## 2024-01-10 NOTE — ASSESSMENT & PLAN NOTE
- one episode on 12/26 treated with ATP   - with abnormal stress test will first rule out further deterioration of CAD or possible ins tent stenosis or re-occlusion   - solo episode at this time   - may need to consider antiarrhythmic therapy should he have more episode without reversible cause

## 2024-01-10 NOTE — ASSESSMENT & PLAN NOTE
- may need to consider post cath change in medications to optimize health   - systolic heart failure regimen would be optimized with inclusion of SGLT2 inhibitor and changing ACE to Entresto   - will discuss post results and long term medical management needs   -

## 2024-01-10 NOTE — ASSESSMENT & PLAN NOTE
- close management with PCP   - diabetic control important in prevention of microvascular disease   -

## 2024-01-11 ENCOUNTER — TELEPHONE (OUTPATIENT)
Dept: CARDIOLOGY | Facility: MEDICAL CENTER | Age: 69
End: 2024-01-11
Payer: MEDICARE

## 2024-01-11 NOTE — TELEPHONE ENCOUNTER
----- Message from SIOBHAN Alvarez sent at 1/10/2024 12:57 PM PST -----  Regarding: cath  Please schedule his follow ups with me. Left heart cath orders placed

## 2024-01-11 NOTE — TELEPHONE ENCOUNTER
Schedule C w/poss per JENNIFER Lerner. Emailed Dr. Valentine to [facility] to have the ADD Dr. Mason.

## 2024-01-12 NOTE — TELEPHONE ENCOUNTER
Patient is scheduled on 1-25-24 for a [facility] w/poss with . Patient was told to hold metfomin AM day of and 48hrs after. Patient to check in at 6:30 for an 8:30 procedure. H&P was done on 1-10-24 by JENNIFER Lerner. Pre admit to call patient.

## 2024-01-16 ENCOUNTER — PRE-ADMISSION TESTING (OUTPATIENT)
Dept: ADMISSIONS | Facility: MEDICAL CENTER | Age: 69
End: 2024-01-16
Attending: INTERNAL MEDICINE
Payer: MEDICARE

## 2024-01-19 ENCOUNTER — PHARMACY VISIT (OUTPATIENT)
Dept: PHARMACY | Facility: MEDICAL CENTER | Age: 69
End: 2024-01-19
Payer: COMMERCIAL

## 2024-01-19 ENCOUNTER — PRE-ADMISSION TESTING (OUTPATIENT)
Dept: ADMISSIONS | Facility: MEDICAL CENTER | Age: 69
End: 2024-01-19
Attending: NURSE PRACTITIONER
Payer: MEDICARE

## 2024-01-19 PROCEDURE — RXMED WILLOW AMBULATORY MEDICATION CHARGE: Performed by: FAMILY MEDICINE

## 2024-01-19 NOTE — OR NURSING
Attempted to call pt several times with  services, no answer.  LVM on last call for pt to call us to reschedule PAT. [facility] nurses notified.

## 2024-01-24 ENCOUNTER — APPOINTMENT (OUTPATIENT)
Dept: ADMISSIONS | Facility: MEDICAL CENTER | Age: 69
End: 2024-01-24
Payer: MEDICARE

## 2024-01-25 ENCOUNTER — APPOINTMENT (OUTPATIENT)
Dept: CARDIOLOGY | Facility: MEDICAL CENTER | Age: 69
End: 2024-01-25
Attending: NURSE PRACTITIONER
Payer: MEDICARE

## 2024-01-25 ENCOUNTER — HOSPITAL ENCOUNTER (OUTPATIENT)
Facility: MEDICAL CENTER | Age: 69
End: 2024-01-25
Attending: INTERNAL MEDICINE | Admitting: INTERNAL MEDICINE
Payer: MEDICARE

## 2024-01-25 VITALS
SYSTOLIC BLOOD PRESSURE: 104 MMHG | TEMPERATURE: 97 F | DIASTOLIC BLOOD PRESSURE: 69 MMHG | HEIGHT: 64 IN | WEIGHT: 144.18 LBS | BODY MASS INDEX: 24.62 KG/M2 | OXYGEN SATURATION: 93 % | HEART RATE: 51 BPM | RESPIRATION RATE: 16 BRPM

## 2024-01-25 DIAGNOSIS — I47.20 VT (VENTRICULAR TACHYCARDIA) (HCC): ICD-10-CM

## 2024-01-25 DIAGNOSIS — I25.84 CORONARY ARTERY DISEASE DUE TO CALCIFIED CORONARY LESION: ICD-10-CM

## 2024-01-25 DIAGNOSIS — R94.39 ABNORMAL STRESS TEST: ICD-10-CM

## 2024-01-25 DIAGNOSIS — I25.10 CORONARY ARTERY DISEASE DUE TO CALCIFIED CORONARY LESION: ICD-10-CM

## 2024-01-25 LAB
ALBUMIN SERPL BCP-MCNC: 4.3 G/DL (ref 3.2–4.9)
ALBUMIN/GLOB SERPL: 1.6 G/DL
ALP SERPL-CCNC: 103 U/L (ref 30–99)
ALT SERPL-CCNC: 38 U/L (ref 2–50)
ANION GAP SERPL CALC-SCNC: 10 MMOL/L (ref 7–16)
APTT PPP: 36.6 SEC (ref 24.7–36)
AST SERPL-CCNC: 40 U/L (ref 12–45)
BASOPHILS # BLD AUTO: 0.8 % (ref 0–1.8)
BASOPHILS # BLD: 0.05 K/UL (ref 0–0.12)
BILIRUB SERPL-MCNC: 0.6 MG/DL (ref 0.1–1.5)
BUN SERPL-MCNC: 13 MG/DL (ref 8–22)
CALCIUM ALBUM COR SERPL-MCNC: 8.8 MG/DL (ref 8.5–10.5)
CALCIUM SERPL-MCNC: 9 MG/DL (ref 8.5–10.5)
CHLORIDE SERPL-SCNC: 105 MMOL/L (ref 96–112)
CO2 SERPL-SCNC: 22 MMOL/L (ref 20–33)
CREAT SERPL-MCNC: 0.74 MG/DL (ref 0.5–1.4)
EKG IMPRESSION: NORMAL
EOSINOPHIL # BLD AUTO: 0.13 K/UL (ref 0–0.51)
EOSINOPHIL NFR BLD: 2 % (ref 0–6.9)
ERYTHROCYTE [DISTWIDTH] IN BLOOD BY AUTOMATED COUNT: 40.3 FL (ref 35.9–50)
GFR SERPLBLD CREATININE-BSD FMLA CKD-EPI: 99 ML/MIN/1.73 M 2
GLOBULIN SER CALC-MCNC: 2.7 G/DL (ref 1.9–3.5)
GLUCOSE SERPL-MCNC: 144 MG/DL (ref 65–99)
HCT VFR BLD AUTO: 49.1 % (ref 42–52)
HGB BLD-MCNC: 17.2 G/DL (ref 14–18)
IMM GRANULOCYTES # BLD AUTO: 0.02 K/UL (ref 0–0.11)
IMM GRANULOCYTES NFR BLD AUTO: 0.3 % (ref 0–0.9)
INR PPP: 1.15 (ref 0.87–1.13)
LYMPHOCYTES # BLD AUTO: 2.16 K/UL (ref 1–4.8)
LYMPHOCYTES NFR BLD: 33.5 % (ref 22–41)
MCH RBC QN AUTO: 31.9 PG (ref 27–33)
MCHC RBC AUTO-ENTMCNC: 35 G/DL (ref 32.3–36.5)
MCV RBC AUTO: 91.1 FL (ref 81.4–97.8)
MONOCYTES # BLD AUTO: 0.6 K/UL (ref 0–0.85)
MONOCYTES NFR BLD AUTO: 9.3 % (ref 0–13.4)
NEUTROPHILS # BLD AUTO: 3.48 K/UL (ref 1.82–7.42)
NEUTROPHILS NFR BLD: 54.1 % (ref 44–72)
NRBC # BLD AUTO: 0 K/UL
NRBC BLD-RTO: 0 /100 WBC (ref 0–0.2)
PLATELET # BLD AUTO: 152 K/UL (ref 164–446)
PMV BLD AUTO: 9.9 FL (ref 9–12.9)
POTASSIUM SERPL-SCNC: 3.8 MMOL/L (ref 3.6–5.5)
PROT SERPL-MCNC: 7 G/DL (ref 6–8.2)
PROTHROMBIN TIME: 14.8 SEC (ref 12–14.6)
RBC # BLD AUTO: 5.39 M/UL (ref 4.7–6.1)
SODIUM SERPL-SCNC: 137 MMOL/L (ref 135–145)
WBC # BLD AUTO: 6.4 K/UL (ref 4.8–10.8)

## 2024-01-25 PROCEDURE — 85610 PROTHROMBIN TIME: CPT

## 2024-01-25 PROCEDURE — 160036 HCHG PACU - EA ADDL 30 MINS PHASE I

## 2024-01-25 PROCEDURE — 85025 COMPLETE CBC W/AUTO DIFF WBC: CPT

## 2024-01-25 PROCEDURE — 80053 COMPREHEN METABOLIC PANEL: CPT

## 2024-01-25 PROCEDURE — 700105 HCHG RX REV CODE 258: Performed by: INTERNAL MEDICINE

## 2024-01-25 PROCEDURE — 160035 HCHG PACU - 1ST 60 MINS PHASE I

## 2024-01-25 PROCEDURE — 160047 HCHG PACU  - EA ADDL 30 MINS PHASE II

## 2024-01-25 PROCEDURE — 93005 ELECTROCARDIOGRAM TRACING: CPT | Performed by: INTERNAL MEDICINE

## 2024-01-25 PROCEDURE — 160002 HCHG RECOVERY MINUTES (STAT)

## 2024-01-25 PROCEDURE — 700111 HCHG RX REV CODE 636 W/ 250 OVERRIDE (IP)

## 2024-01-25 PROCEDURE — 700101 HCHG RX REV CODE 250

## 2024-01-25 PROCEDURE — 700117 HCHG RX CONTRAST REV CODE 255: Performed by: INTERNAL MEDICINE

## 2024-01-25 PROCEDURE — 99153 MOD SED SAME PHYS/QHP EA: CPT

## 2024-01-25 PROCEDURE — 160046 HCHG PACU - 1ST 60 MINS PHASE II

## 2024-01-25 PROCEDURE — 85730 THROMBOPLASTIN TIME PARTIAL: CPT

## 2024-01-25 PROCEDURE — 99152 MOD SED SAME PHYS/QHP 5/>YRS: CPT | Performed by: INTERNAL MEDICINE

## 2024-01-25 PROCEDURE — 93459 L HRT ART/GRFT ANGIO: CPT | Mod: 26 | Performed by: INTERNAL MEDICINE

## 2024-01-25 PROCEDURE — 93010 ELECTROCARDIOGRAM REPORT: CPT | Performed by: STUDENT IN AN ORGANIZED HEALTH CARE EDUCATION/TRAINING PROGRAM

## 2024-01-25 RX ORDER — HEPARIN SODIUM 200 [USP'U]/100ML
INJECTION, SOLUTION INTRAVENOUS
Status: COMPLETED
Start: 2024-01-25 | End: 2024-01-25

## 2024-01-25 RX ORDER — HEPARIN SODIUM 1000 [USP'U]/ML
INJECTION, SOLUTION INTRAVENOUS; SUBCUTANEOUS
Status: COMPLETED
Start: 2024-01-25 | End: 2024-01-25

## 2024-01-25 RX ORDER — VERAPAMIL HYDROCHLORIDE 2.5 MG/ML
INJECTION, SOLUTION INTRAVENOUS
Status: COMPLETED
Start: 2024-01-25 | End: 2024-01-25

## 2024-01-25 RX ORDER — MIDAZOLAM HYDROCHLORIDE 1 MG/ML
INJECTION INTRAMUSCULAR; INTRAVENOUS
Status: COMPLETED
Start: 2024-01-25 | End: 2024-01-25

## 2024-01-25 RX ORDER — SODIUM CHLORIDE 9 MG/ML
1000 INJECTION, SOLUTION INTRAVENOUS CONTINUOUS
Status: DISCONTINUED | OUTPATIENT
Start: 2024-01-25 | End: 2024-01-25 | Stop reason: HOSPADM

## 2024-01-25 RX ORDER — LIDOCAINE HYDROCHLORIDE 20 MG/ML
INJECTION, SOLUTION INFILTRATION; PERINEURAL
Status: COMPLETED
Start: 2024-01-25 | End: 2024-01-25

## 2024-01-25 RX ADMIN — LIDOCAINE HYDROCHLORIDE: 20 INJECTION, SOLUTION INFILTRATION; PERINEURAL at 08:30

## 2024-01-25 RX ADMIN — IOHEXOL 72 ML: 350 INJECTION, SOLUTION INTRAVENOUS at 09:01

## 2024-01-25 RX ADMIN — VERAPAMIL HYDROCHLORIDE 2.5 MG: 2.5 INJECTION, SOLUTION INTRAVENOUS at 08:31

## 2024-01-25 RX ADMIN — FENTANYL CITRATE 50 MCG: 50 INJECTION, SOLUTION INTRAMUSCULAR; INTRAVENOUS at 09:02

## 2024-01-25 RX ADMIN — MIDAZOLAM 1 MG: 1 INJECTION INTRAMUSCULAR; INTRAVENOUS at 09:02

## 2024-01-25 RX ADMIN — HEPARIN SODIUM 2000 UNITS: 200 INJECTION, SOLUTION INTRAVENOUS at 08:31

## 2024-01-25 RX ADMIN — NITROGLYCERIN 10 ML: 20 INJECTION INTRAVENOUS at 08:30

## 2024-01-25 RX ADMIN — SODIUM CHLORIDE 1000 ML: 9 INJECTION, SOLUTION INTRAVENOUS at 07:40

## 2024-01-25 RX ADMIN — HEPARIN SODIUM: 1000 INJECTION, SOLUTION INTRAVENOUS; SUBCUTANEOUS at 08:30

## 2024-01-25 ASSESSMENT — FIBROSIS 4 INDEX: FIB4 SCORE: 2.64

## 2024-01-25 NOTE — OR NURSING
Pt's VSS. Pt A&Ox4. Pt denies pain and nausea. Pt's right groin site dressing CDI, soft. Verbal and written discharge instructions given to pt's wife and pt. Pt discharged home.

## 2024-01-25 NOTE — OR NURSING
CHUCKY Muse notified of pt's SBP in the 70s. Orders to give 250 NS bolus. Will continue to monitor.

## 2024-01-25 NOTE — DISCHARGE INSTRUCTIONS
POST ANGIOGRAM  General Care Instructions  Maintain a bandage over the incision site for 24 hours.  It's normal to find a small bruise or dime-sized lump at the insertion site. This should disappear within a few weeks.  Do not apply lotions or powders to the site.  Do not immerse the catheter insertion site in water (bathtub/swimming) for five days. It is ok to shower 24 hours after the procedure.  You may resume your normal diet immediately; on the day of your procedure, drink 6-10 glasses of water to help flush the contrast liquid out of your system.  If the doctor inserted the catheter in through your groin:  Walking short distances on a flat surface is OK. Limit going up/down stairs for the first 2 days.  DO NOT do yard work, drive, squat, lift heavy objects, or play sports for 2 days; or until your health care provider tells you it is OK.  If the doctor inserted the catheter in your arm:  For 3 days, DO NOT lift anything heavier than 10 pounds (approximately a gallon of milk). DO NOT do any heavy pushing, pulling, or twisting.    Medications  If your current medications need to be changed, you will be provided with an updated list of your medications prior to discharge.  If you take warfarin (Coumadin), resume taking your usual dose the evening after the procedure.  DO NOT STOP taking prescribed blood thinning (anti-platelet) medications unless instructed by your cardiologist.  These medications include:  Aspirin, Clopidogrel (Plavix), Ticagrelor (Brilinta), or Prasugrel (Effient)   If you take one of the following anticoagulants, RESUME 24 HOURS after your procedure:  Apixiban (Eliquis), Rivaroxaban (Xarelto), Dabigatran (Pradaxa), Edoxaban (Savaysa)  If you take metformin (Glucophage), RESUME 48 HOURS after your procedure.    When to call your healthcare provider  Call your cardiologist right away at [phone] if you have any of the following:   Problems/Concerns taking any of your prescribed heart  medicines.   The insertion site has increasing pain, swelling, redness, bleeding, or drainage.   Your arm or leg below where the insertion site changes color, is cool, or is numb.   You have chest pain or shortness of breath that does not go away with rest.   Your pulse feels irregular -- very slow (less than 60 beats/minute) or very fast (over 100 beats/minute).   You have dizziness, fainting, or you are very tired.   You are coughing up blood or yellow or green mucus.   You have chills or a fever over 101°F (38.3°C).    If there is bleeding at the catheter insertion site, apply pressure for 10 minutes.  If bleeding persists, call 911, and continue to hold pressure until advanced medical support arrives.        Exercising Safely After Percutaneous Coronary Intervention (PCI)  After percutaneous coronary intervention (PCI), which involves angioplasty and often stenting, it's important to focus on your heart health. Exercise can help strengthen your heart. It can also help you feel good and improve your overall health. Talk with your health care provider or cardiac rehab team member about good options for you.  Start slowly. Work up to more vigorous exercise as you get stronger. Aim for at least 150 minutes of exercise each week.  Include aerobic activities. These make the heart beat faster. They work the heart and lungs, and improve the body's ability to use oxygen. Good choices include walking, swimming, and biking .  Always follow your doctor's recommendation for exercise.   You have been referred to cardiac rehabilitation, which is important for your recovery.  You may contact [facility]'s Intensive [facility] at [phone] to learn more and schedule a visit.        Lifestyle Management After Percutaneous Coronary Intervention (PCI)  Percutaneous coronary intervention (PCI)  involves angioplasty and often stenting. This procedure can open arteries and relieve symptoms. But, it doesn't cure coronary artery  disease. New blockages can still form. You need to take steps to prevent this by managing risk factors. Doing so will help make your heart and arteries healthier. Your doctor may prescribe cardiac rehabilitation to help with this lifelong process.  Understanding risk factors  Some risk factors for coronary artery disease can be controlled. These include smoking, high blood pressure, cholesterol, diabetes, and obesity. They can be managed with medication, diet, and exercise. Support and counseling can also play a role. The effort will pay off! Managing risk factors can help you be more active, feel better, and reduce the risk of heart attack.    If you smoke, quit!  If your doctor has been urging you to quit smoking, it's for good reasons. Smoking damages your heart, blood vessels, and lungs. The good news is that quitting can halt or even reverse the damage of smoking. To quit now:  Get medical help. Ask your doctor for advice on stop-smoking programs. Also ask about medications or nicotine replacement therapy products that may help you quit smoking.  Get support. Join a support group. Ask for help from your family and friends.  Don't give up. It often takes several tries to succeed in quitting smoking.  Avoid secondhand smoke. Ask family and friends not to smoke around you.

## 2024-01-25 NOTE — OR NURSING
Introduced self to patient/wife and discussed plan of care. Surgical consent signed. IV inserted without difficulty. Checked oxygen tank on gurBuras, 523 psi. Pre op complete.

## 2024-01-25 NOTE — PROCEDURES
Cardiac Catheterization Laboratory Procedure Note    DATE: 1/25/2024    : José Luis Blum MD    PROCEDURES PERFORMED:  Left heart catheterization  Coronary angiography  Moderate conscious sedation    INDICATIONS:  The patient is a 68-year-old gentleman with a history of prior three-vessel coronary artery bypass grafting referred for cardiac catheterization to evaluate an abnormal nuclear stress test result.    CONSENT:  The complete alternatives, risks, and benefits of the procedure were explained to the patient.  Signed informed consent was obtained and placed in the chart prior to the procedure.  A timeout was performed prior to beginning procedure.    MEDICATIONS:  Lidocaine  Fentanyl  Midazolam    MODERATE CONSCIOUS SEDATION:  I personally supervised the administration of moderate conscious sedation by the nursing staff for 25 minutes.  Sedation start time: 8:37 AM  Sedation end time: 9:02 AM    CONTRAST: Omnipaque 72 cc    ACCESS: 4-[place] sheath in the right femoral artery    ESTIMATED BLOOD LOSS: 20 cc    COMPLICATIONS: None    DESCRIPTION OF PROCEDURE:  The patient was brought to the cardiac catheterization laboratory in the fasting state.  The skin over the right groin was prepped and draped in the usual sterile fashion. Lidocaine infiltration was used to anesthetize the tissue over the right femoral artery.  Using the micropuncture technique and fluoroscopic guidance, a 4-[place] sheath was inserted in the right femoral artery.  A 4-[place] JR4 diagnostic catheter was then advanced over a standard J-wire into the left ventricular cavity where it was gently aspirated, flushed, and then withdrawn across the aortic valve with sequential pressures measured.  This catheter was then used to engage the ostium of the right coronary artery and cineangiograms were obtained for complete evaluation of the right coronary system.  This catheter was unable to engage either ostia of the saphenous vein graft.  It was  then used to engage the ostium of the left subclavian artery and was then exchanged for a 4-[place] SHAWNA diagnostic catheter.  This catheter was used to perform subselective cineangiograms of the left internal mammary artery to left anterior descending coronary graft for complete evaluation of the graft system.  This catheter was then exchanged for a 4-[place] MPA diagnostic catheter, which was used to engage the ostia of the saphenous vein graft to the distal right coronary artery and first obtuse marginal branch cineangiograms were obtained for complete evaluation of the graft systems.  This catheter was then exchanged for a 4-[place] JL4 diagnostic catheter, which was used to engage the ostium of the left main coronary artery and and cineangiograms were obtained in multiple projections for complete evaluation of the left coronary system.  Following completion of coronary angiography, all wires, catheters, and sheaths were removed.   Manual pressure was applied for hemostasis.    HEMODYNAMICS:   Aortic pressure: 104/57 mmHg  Pre A-wave pressure: 10 mmHg  No significant aortic gradient on pullback    CORONARY ANGIOGRAPHY:  The left main coronary artery has luminal disease and trifurcates into the left anterior descending, ramus intermedius, and left circumflex coronary arteries.  The left anterior descending coronary artery is chronically occluded in the proximal segment.  The ramus intermedius branch is a moderate vessel with ostial 30% disease and mid 50% disease.  The left circumflex coronary artery is a small, nondominant vessel with proximal 50% disease.  It supplies a moderate first obtuse marginal branch with proximal 90% disease prior to receiving competitive flow from the saphenous vein graft, and two small and insignificant distal obtuse marginal branches.  The right coronary artery is chronically occluded in the proximal segment.    BYPASS GRAFT ANGIOGRAPHY:  The left internal mammary artery to left anterior  descending coronary artery graft is widely patent and anastomosis at the very distal aspect of the LAD.  The antegrade aspect of the LAD is small and diffusely diseased.  The graft retrograde fills to the mid vessel supplying a moderate diagonal branch with diffuse mild disease.  The saphenous vein to first obtuse marginal branch graft is patent with diffuse mild disease.  The ongoing OM1 has minimal luminal irregularities.  The saphenous vein to distal right coronary artery graft is patent with diffuse mild disease.  The ongoing distal RCA has mild disease and bifurcates into a moderate posterior descending coronary artery and a small posterolateral branch with diffuse luminal irregularities.  The graft retrograde fills to the mid vessel supplying a moderate RV marginal branch.    IMPRESSION:  Severe native three-vessel coronary artery disease with proximal chronic total occlusions of the left anterior descending and right coronary arteries.  Widely patent left internal mammary artery to left anterior descending coronary artery graft.  Widely patent saphenous vein grafts to the first obtuse marginal branch and distal right coronary artery.    RECOMMENDATIONS:  Recover in post procedure unit.  Bedrest for 4 hours with routine groin precautions.  Continue optimal medical management of severe coronary artery disease and ischemic cardiomyopathy.    NOTIFICATION:  The patient's family was called and notified of the results of his cardiac catheterization.

## 2024-01-25 NOTE — OR NURSING
Pt arrived from cath lab. Pt's VSS stable. Pt denies pain and nausea. Pt's right groin site dressed with gauze and tegaderm. Dressing CDI, soft. Call light within reach.

## 2024-01-25 NOTE — OR NURSING
Pt's SBP in the 70s. CHUCKY Muse notified and aware. Orders to give 250 NS bolus. Will continue to monitor.

## 2024-02-21 ENCOUNTER — NON-PROVIDER VISIT (OUTPATIENT)
Dept: CARDIOLOGY | Facility: MEDICAL CENTER | Age: 69
End: 2024-02-21
Payer: MEDICARE

## 2024-02-22 PROCEDURE — 93295 DEV INTERROG REMOTE 1/2/MLT: CPT | Performed by: INTERNAL MEDICINE

## 2024-02-22 NOTE — CARDIAC REMOTE MONITOR - SCAN
Device transmission reviewed. Device demonstrated appropriate function.       Electronically Signed by: Guanako Hensley M.D.    2/23/2024  12:21 PM

## 2024-03-20 PROCEDURE — RXMED WILLOW AMBULATORY MEDICATION CHARGE: Performed by: FAMILY MEDICINE

## 2024-04-05 DIAGNOSIS — I10 ESSENTIAL HYPERTENSION: ICD-10-CM

## 2024-04-05 PROCEDURE — RXMED WILLOW AMBULATORY MEDICATION CHARGE: Performed by: NURSE PRACTITIONER

## 2024-04-05 RX ORDER — METOPROLOL SUCCINATE 50 MG/1
50 TABLET, EXTENDED RELEASE ORAL
Qty: 100 TABLET | Refills: 3 | Status: SHIPPED | OUTPATIENT
Start: 2024-04-05 | End: 2024-04-23 | Stop reason: SDUPTHER

## 2024-04-05 NOTE — TELEPHONE ENCOUNTER
Is the patient due for a refill? Yes    Was the patient seen the past year? Yes    Date of last office visit: 01/10/2024    Does the patient have an upcoming appointment?  No   If yes, When?     Provider to refill:MT    Does the patients insurance require a 100 day supply?  Yes

## 2024-04-08 ENCOUNTER — PHARMACY VISIT (OUTPATIENT)
Dept: PHARMACY | Facility: MEDICAL CENTER | Age: 69
End: 2024-04-08
Payer: COMMERCIAL

## 2024-04-08 DIAGNOSIS — E11.9 CONTROLLED TYPE 2 DIABETES MELLITUS WITHOUT COMPLICATION, WITHOUT LONG-TERM CURRENT USE OF INSULIN (HCC): ICD-10-CM

## 2024-04-09 PROCEDURE — RXMED WILLOW AMBULATORY MEDICATION CHARGE: Performed by: FAMILY MEDICINE

## 2024-04-23 ENCOUNTER — TELEPHONE (OUTPATIENT)
Dept: HEALTH INFORMATION MANAGEMENT | Facility: OTHER | Age: 69
End: 2024-04-23

## 2024-04-23 ENCOUNTER — PHARMACY VISIT (OUTPATIENT)
Dept: PHARMACY | Facility: MEDICAL CENTER | Age: 69
End: 2024-04-23
Payer: COMMERCIAL

## 2024-04-23 ENCOUNTER — OFFICE VISIT (OUTPATIENT)
Dept: MEDICAL GROUP | Facility: MEDICAL CENTER | Age: 69
End: 2024-04-23
Payer: MEDICARE

## 2024-04-23 VITALS
SYSTOLIC BLOOD PRESSURE: 122 MMHG | BODY MASS INDEX: 24.43 KG/M2 | WEIGHT: 143.08 LBS | TEMPERATURE: 96.9 F | OXYGEN SATURATION: 97 % | DIASTOLIC BLOOD PRESSURE: 68 MMHG | HEART RATE: 55 BPM | HEIGHT: 64 IN

## 2024-04-23 DIAGNOSIS — I10 ESSENTIAL HYPERTENSION: ICD-10-CM

## 2024-04-23 DIAGNOSIS — E11.8 TYPE 2 DIABETES MELLITUS WITH UNSPECIFIED COMPLICATIONS (HCC): ICD-10-CM

## 2024-04-23 DIAGNOSIS — E11.65 UNCONTROLLED TYPE 2 DIABETES MELLITUS WITH HYPERGLYCEMIA (HCC): ICD-10-CM

## 2024-04-23 DIAGNOSIS — E78.5 DYSLIPIDEMIA: ICD-10-CM

## 2024-04-23 PROBLEM — R97.20 HIGH PROSTATE SPECIFIC ANTIGEN (PSA): Status: ACTIVE | Noted: 2023-05-10

## 2024-04-23 LAB
HBA1C MFR BLD: 10 % (ref ?–5.8)
POCT INT CON NEG: NEGATIVE
POCT INT CON POS: POSITIVE

## 2024-04-23 PROCEDURE — RXMED WILLOW AMBULATORY MEDICATION CHARGE: Performed by: NURSE PRACTITIONER

## 2024-04-23 PROCEDURE — 99214 OFFICE O/P EST MOD 30 MIN: CPT | Performed by: FAMILY MEDICINE

## 2024-04-23 PROCEDURE — RXMED WILLOW AMBULATORY MEDICATION CHARGE: Performed by: FAMILY MEDICINE

## 2024-04-23 PROCEDURE — 3074F SYST BP LT 130 MM HG: CPT | Performed by: FAMILY MEDICINE

## 2024-04-23 PROCEDURE — 83036 HEMOGLOBIN GLYCOSYLATED A1C: CPT | Performed by: FAMILY MEDICINE

## 2024-04-23 PROCEDURE — 3078F DIAST BP <80 MM HG: CPT | Performed by: FAMILY MEDICINE

## 2024-04-23 RX ORDER — METOPROLOL SUCCINATE 50 MG/1
50 TABLET, EXTENDED RELEASE ORAL
Qty: 100 TABLET | Refills: 3 | Status: SHIPPED | OUTPATIENT
Start: 2024-04-23

## 2024-04-23 RX ORDER — GLIMEPIRIDE 1 MG/1
1 TABLET ORAL 2 TIMES DAILY WITH MEALS
Qty: 200 TABLET | Refills: 3 | Status: SHIPPED | OUTPATIENT
Start: 2024-04-23

## 2024-04-23 ASSESSMENT — FIBROSIS 4 INDEX: FIB4 SCORE: 2.86

## 2024-04-23 ASSESSMENT — PATIENT HEALTH QUESTIONNAIRE - PHQ9: CLINICAL INTERPRETATION OF PHQ2 SCORE: 0

## 2024-04-23 NOTE — PROGRESS NOTES
Diabetes Focused Exam:    Chief Complaint   Patient presents with    Diabetes Follow-up    Dyslipidemia    Urinary Frequency      Subjective:   MARLENE Barillas is a 68 y.o. male who presents for follow up of chronic conditions of diabetes mellitus, hypertension and hyperlipidemia. He indicates that he is having some increased symptoms. He denies chest pain, palpitations, dyspnea, orthopnea, PND or peripheral edema. However he is having dry mouth, polyuria, polydipsia, urinary complaints, denies abdominal complaints, myalgias, weakness or other related symptoms.  Some numbness at times hands and lower legs.  Notes that urine is foamy.   Notes he is urinating more frequently.    His metformin was reduced by cardiology because his liver enzymes were high.  Added glimepiride today.  Keep metformin once a day.    The patient is taking ASA every day 81 mg and taking all other medications as prescribed. Patient denies any side effects of medication.  DM: A1c goal <7  Glucose monitoring frequency: bid   Fasting sugars: 150s. Post-prandial sugars: up to 250s now.  Hypoglycemic episodes none  Diabetic complications: cardiovascular disease and may have proteinuria  ACR Albumin/Creatinine Ratio goal <30  HTN: Blood pressure goal <140/<80. Currently Rx ACE/ARB: Yes  Hyperlipidemia:Cholesterol goal LDL <100, total/HDL <5. Currently Rx Statin: Yes  Last eye exam overdue, referral placed  Denies visual blurring, double vision, eye pain and floaters  Last monofilament foot exam: 5/2023  Denies foot pain, numbness, calluses, ulcers    See medications and orders placed in encounter report.  Past medical history, family history, social history reviewed and updated as documented in medical record.  Current medications including changes today:  Current Outpatient Medications   Medication Sig Dispense Refill    glimepiride (AMARYL) 1 MG tablet Take 1 Tablet by mouth 2 times a day with meals. 200 Tablet 3    metoprolol SR (TOPROL  "XL) 50 MG TABLET SR 24 HR Take 1 Tablet by mouth every day. Need appt for future refill ([phone]) 100 Tablet 3    metFORMIN (GLUCOPHAGE) 500 MG Tab Take 1 tablet by mouth every evening. 100 Tablet 3    rosuvastatin (CRESTOR) 40 MG tablet Take 1 Tablet by mouth at bedtime. 100 Tablet 3    lisinopril (PRINIVIL) 10 MG Tab Take 1 tablet by mouth every day. 100 Tablet 3    glucose blood (EXACTECH TEST) strip 1 Each 4 Times a Day,Before Meals and at Bedtime.      Blood Glucose Test Strips He is getting a relion meter and test strips 100 Strip 4    Multiple Vitamin (MULTI-VITAMIN DAILY PO) One A Day Vitamin      aspirin EC (ECOTRIN) 81 MG TBEC Take 1 Tablet by mouth every day.       No current facility-administered medications for this visit.     Allergies:   Allergies   Allergen Reactions    Pcn [Penicillins] Rash     Rash      Social History     Tobacco Use    Smoking status: Former     Current packs/day: 0.00     Average packs/day: 0.3 packs/day for 10.0 years (2.5 ttl pk-yrs)     Types: Cigarettes     Start date: 3/1/1972     Quit date: 3/1/1982     Years since quittin.1    Smokeless tobacco: Never    Tobacco comments:     QUIT    Vaping Use    Vaping Use: Never used   Substance Use Topics    Alcohol use: No     Alcohol/week: 0.0 oz     Comment: occasionally ( 2 times a year)    Drug use: No     Exercise: walks daily  Health Maintenance/Immunizations: discussed, decilnes further for now.    ROS  Pertinent  ROS findings as above. Denies chest pain or shortness of breath.     Objective:     OBJECTIVE:  /68 (BP Location: Left arm)   Pulse (!) 55   Temp 36.1 °C (96.9 °F)   Ht 1.626 m (5' 4\")   Wt 64.9 kg (143 lb 1.3 oz)   SpO2 97%   BMI 24.56 kg/m²  Body mass index is 24.56 kg/m². BMI: not obese  General: No apparent distress, conversant, cooperative and pleasant with the examination.  Psych: Alert and oriented x4, judgment and insight normal  Neck: No JVD or bruits, no adenopathy, " supple  Thyroid: normal to inspection and palpation  Lungs: negative findings: normal respiratory rate and rhythm, lungs clear to auscultation  Heart: negative findings: regular rate and rhythm, S1 normal, S2 normal, no murmurs, clicks, or gallops  Abdomen: Soft, nontender, no hepatosplenomegaly or masses, normal bowel sounds  Skin: No rashes, no cyanosis, no lesions or ulcers  Extremities: No cyanosis clubbing or edema.     POC labs today:   Lab Results   Component Value Date/Time    POCGLUCOSE 109 (H) 01/11/2012 11:31 AM          Last labs    Lab Results   Component Value Date/Time    CHOLSTRLTOT 105 05/13/2023 07:33 AM    LDL 46 05/13/2023 07:33 AM    HDL 29 (A) 05/13/2023 07:33 AM    TRIGLYCERIDE 148 05/13/2023 07:33 AM       Lab Results   Component Value Date/Time    SODIUM 137 01/25/2024 07:00 AM    POTASSIUM 3.8 01/25/2024 07:00 AM    GLUCOSE 144 (H) 01/25/2024 07:00 AM    BUNCREATRAT 15 12/08/2010 07:11 AM    GLOMRATE >59 12/08/2010 07:11 AM     Lab Results   Component Value Date/Time    HBA1C 10 (A) 04/23/2024 11:06 AM    HBA1C 6.4 (H) 05/13/2023 07:33 AM    HBA1C 6.4 (H) 05/13/2023 07:33 AM     Lab Results   Component Value Date/Time    MALBCRT 18 11/14/2022 07:23 AM    MICROALBUR 2.0 11/14/2022 07:23 AM          Assessment/Plan:   Medications, refills, and referrals per orders.   1. Uncontrolled type 2 diabetes mellitus with hyperglycemia (HCC)  POCT Hemoglobin A1C    Referral to Ophthalmology    glimepiride (AMARYL) 1 MG tablet    Comp Metabolic Panel    CBC WITHOUT DIFFERENTIAL    TSH    Lipid Profile    MICROALBUMIN CREAT RATIO URINE    HEMOGLOBIN A1C      2. Essential hypertension  metoprolol SR (TOPROL XL) 50 MG TABLET SR 24 HR    Comp Metabolic Panel    CBC WITHOUT DIFFERENTIAL    TSH      3. Dyslipidemia        4. Type 2 diabetes mellitus with unspecified complications (HCC)          DM2 A1c is not at goal A1c is 10.0% today.  Very high.    Patient to monitor sugars: bid frequency  Discussed diet,  exercise, disease management.   Education and advise provided today:All medications, side effects and compliance (discussed carefully)  Annual eye examinations at Ophthalmology  Diabetic diet discussed in detail, written exchange diet given  Foot care discussed and Podiatry visits  Glycohemoglobin and labs discussed  Home glucose monitoring emphasized  Referral to Diabetic Education Department    Followup:   Do labs and RTC 4 months, sooner should new symptoms or problems arise.

## 2024-04-24 ENCOUNTER — HOSPITAL ENCOUNTER (OUTPATIENT)
Facility: MEDICAL CENTER | Age: 69
End: 2024-04-24
Attending: PHYSICIAN ASSISTANT
Payer: MEDICARE

## 2024-04-24 LAB
EST. AVERAGE GLUCOSE BLD GHB EST-MCNC: 232 MG/DL
HBA1C MFR BLD: 9.7 % (ref 4–5.6)
PSA SERPL-MCNC: 1 NG/ML (ref 0–4)

## 2024-04-24 PROCEDURE — 83036 HEMOGLOBIN GLYCOSYLATED A1C: CPT

## 2024-04-24 PROCEDURE — 84153 ASSAY OF PSA TOTAL: CPT

## 2024-05-21 DIAGNOSIS — I25.84 CORONARY ARTERY DISEASE DUE TO CALCIFIED CORONARY LESION: ICD-10-CM

## 2024-05-21 DIAGNOSIS — I25.10 CORONARY ARTERY DISEASE DUE TO CALCIFIED CORONARY LESION: ICD-10-CM

## 2024-05-22 ENCOUNTER — NON-PROVIDER VISIT (OUTPATIENT)
Dept: CARDIOLOGY | Facility: MEDICAL CENTER | Age: 69
End: 2024-05-22
Payer: MEDICARE

## 2024-05-22 PROCEDURE — 93295 DEV INTERROG REMOTE 1/2/MLT: CPT | Performed by: INTERNAL MEDICINE

## 2024-06-18 PROBLEM — Z86.16 HISTORY OF 2019 NOVEL CORONAVIRUS DISEASE (COVID-19): Status: RESOLVED | Noted: 2022-05-25 | Resolved: 2024-06-18

## 2024-06-18 NOTE — ASSESSMENT & PLAN NOTE
Chronic, ongoing. Pt maintains on metoprolol 50mg daily, lisinopril 10mg daily, and ASA 81mg daily. Denies dyspnea, edema, chest pain.Follow up with cardiology per routine.    Echo 2019  Normal left ventricular chamber size. Normal left ventricular wall   thickness.  Left ventricular ejection fraction is visually estimated to   be 45%. Hypokinesis of inferoseptal wall and apex.  Compared to the report of the study done 6/2016- there has been no   significant change.

## 2024-06-18 NOTE — ASSESSMENT & PLAN NOTE
Chronic, stable. Maintains on statin therapy without issue. Most recent lipid panel from 5/2023 WNL. Continue rosuvastatin 40mg daily. Recommend Mediterranean diet and regular physical activity. Follow up with PCP at least annually for continued monitoring and management.   Lab Results   Component Value Date/Time    CHOLSTRLTOT 105 05/13/2023 07:33 AM    LDL 46 05/13/2023 07:33 AM    HDL 29 (A) 05/13/2023 07:33 AM    TRIGLYCERIDE 148 05/13/2023 07:33 AM

## 2024-06-18 NOTE — ASSESSMENT & PLAN NOTE
Chronic, ongoing, uncontrolled. Last A1c 9.7 as of 2024. Last monofilament foot exam: 2023, last urine microalb/creat: 2022, last retinal screenin2024. Maintains on metformin 500mg daily, glimepiride 1mg BID. Continue to advise low carbohydrate diet with regular physical activity. Continue current treatment regime. Follow up with PCP as scheduled for continued monitoring and management.

## 2024-06-18 NOTE — ASSESSMENT & PLAN NOTE
Pt with hx of VT once previously. He is being monitored for recurrence. He does not maintain on any antiarrhythmics. He has an AICD. Denies palpitations. Follow up with cardiology per routine.

## 2024-06-18 NOTE — ASSESSMENT & PLAN NOTE
Chronic, stable. BP today 118/80. Pt monitors BP at home occasionally and reports these have been WNL. Denies dizziness/lightheadedness, chest pain, dyspnea. Continue current treatment regime: metoprolol 50mg daily, lisinopril 10mg daily. Follow up with PCP annually for continued monitoring and management.

## 2024-06-19 ENCOUNTER — OFFICE VISIT (OUTPATIENT)
Dept: FAMILY PLANNING/WOMEN'S HEALTH CLINIC | Facility: PHYSICIAN GROUP | Age: 69
End: 2024-06-19
Payer: MEDICARE

## 2024-06-19 VITALS
HEIGHT: 65 IN | WEIGHT: 149 LBS | BODY MASS INDEX: 24.83 KG/M2 | DIASTOLIC BLOOD PRESSURE: 80 MMHG | SYSTOLIC BLOOD PRESSURE: 118 MMHG

## 2024-06-19 DIAGNOSIS — I47.20 VT (VENTRICULAR TACHYCARDIA) (HCC): ICD-10-CM

## 2024-06-19 DIAGNOSIS — Z95.810 AICD (AUTOMATIC CARDIOVERTER/DEFIBRILLATOR) PRESENT: ICD-10-CM

## 2024-06-19 DIAGNOSIS — E78.5 DYSLIPIDEMIA: ICD-10-CM

## 2024-06-19 DIAGNOSIS — I50.22 CHRONIC SYSTOLIC (CONGESTIVE) HEART FAILURE (HCC): ICD-10-CM

## 2024-06-19 DIAGNOSIS — E11.65 UNCONTROLLED TYPE 2 DIABETES MELLITUS WITH HYPERGLYCEMIA (HCC): ICD-10-CM

## 2024-06-19 DIAGNOSIS — I10 ESSENTIAL HYPERTENSION: ICD-10-CM

## 2024-06-19 DIAGNOSIS — I25.84 CORONARY ARTERY DISEASE DUE TO CALCIFIED CORONARY LESION: ICD-10-CM

## 2024-06-19 DIAGNOSIS — I25.10 CORONARY ARTERY DISEASE DUE TO CALCIFIED CORONARY LESION: ICD-10-CM

## 2024-06-19 DIAGNOSIS — I25.2 HISTORY OF ACUTE MYOCARDIAL INFARCTION: ICD-10-CM

## 2024-06-19 PROCEDURE — 3074F SYST BP LT 130 MM HG: CPT | Performed by: PHYSICIAN ASSISTANT

## 2024-06-19 PROCEDURE — G0439 PPPS, SUBSEQ VISIT: HCPCS | Performed by: PHYSICIAN ASSISTANT

## 2024-06-19 PROCEDURE — 1126F AMNT PAIN NOTED NONE PRSNT: CPT | Performed by: PHYSICIAN ASSISTANT

## 2024-06-19 PROCEDURE — 3079F DIAST BP 80-89 MM HG: CPT | Performed by: PHYSICIAN ASSISTANT

## 2024-06-19 SDOH — ECONOMIC STABILITY: FOOD INSECURITY: WITHIN THE PAST 12 MONTHS, THE FOOD YOU BOUGHT JUST DIDN'T LAST AND YOU DIDN'T HAVE MONEY TO GET MORE.: NEVER TRUE

## 2024-06-19 SDOH — ECONOMIC STABILITY: FOOD INSECURITY: WITHIN THE PAST 12 MONTHS, YOU WORRIED THAT YOUR FOOD WOULD RUN OUT BEFORE YOU GOT MONEY TO BUY MORE.: NEVER TRUE

## 2024-06-19 SDOH — ECONOMIC STABILITY: INCOME INSECURITY: HOW HARD IS IT FOR YOU TO PAY FOR THE VERY BASICS LIKE FOOD, HOUSING, MEDICAL CARE, AND HEATING?: NOT HARD AT ALL

## 2024-06-19 SDOH — ECONOMIC STABILITY: TRANSPORTATION INSECURITY
IN THE PAST 12 MONTHS, HAS LACK OF TRANSPORTATION KEPT YOU FROM MEETINGS, WORK, OR FROM GETTING THINGS NEEDED FOR DAILY LIVING?: NO

## 2024-06-19 SDOH — ECONOMIC STABILITY: TRANSPORTATION INSECURITY
IN THE PAST 12 MONTHS, HAS THE LACK OF TRANSPORTATION KEPT YOU FROM MEDICAL APPOINTMENTS OR FROM GETTING MEDICATIONS?: NO

## 2024-06-19 ASSESSMENT — FIBROSIS 4 INDEX: FIB4 SCORE: 2.86

## 2024-06-19 ASSESSMENT — PAIN SCALES - GENERAL: PAINLEVEL: NO PAIN

## 2024-06-19 ASSESSMENT — PATIENT HEALTH QUESTIONNAIRE - PHQ9: CLINICAL INTERPRETATION OF PHQ2 SCORE: 0

## 2024-06-19 ASSESSMENT — ENCOUNTER SYMPTOMS: GENERAL WELL-BEING: GOOD

## 2024-06-19 ASSESSMENT — ACTIVITIES OF DAILY LIVING (ADL): BATHING_REQUIRES_ASSISTANCE: 0

## 2024-06-19 NOTE — ASSESSMENT & PLAN NOTE
Chronic, ongoing. Pt with hx of CABG. Pt maintains on rosuvastatin 40mg daily and ASA 81mg daily with metoprolol 50mg daily and lisinopril 10mg daily. He denies CP, dyspnea. He tolerates exercise well. Follow up with cardiology per routine.

## 2024-06-19 NOTE — PROGRESS NOTES
Comprehensive Health Assessment Program     Abram Barillas is a 68 y.o. here for his comprehensive health assessment.    Patient Active Problem List    Diagnosis Date Noted    Hx of CABG 01/10/2024    VT (ventricular tachycardia) (HCC) 01/10/2024    BMI 25.0-25.9,adult 08/01/2023    Benign prostatic hyperplasia with urinary obstruction 05/11/2023    Erectile dysfunction due to arterial insufficiency 05/11/2023    High prostate specific antigen (PSA) 05/10/2023    Chronic systolic (congestive) heart failure (HCC) 05/25/2022    Moderate persistent asthma without complication 05/25/2022    Uncontrolled type 2 diabetes mellitus with hyperglycemia (HCC) 05/25/2022    History of acute myocardial infarction 11/24/2021    Impotence of organic origin 02/22/2016    Essential hypertension     Coronary artery disease due to calcified coronary lesion:Acute MI, anterior wall s/p bms to LAD 12/11 with -RCA and residual CX dz (stent failed)     AICD (automatic cardioverter/defibrillator) present [employer] placed 4/23/12 04/24/2012    Dyslipidemia     Fatty liver        Current Outpatient Medications   Medication Sig Dispense Refill    glimepiride (AMARYL) 1 MG tablet Take 1 Tablet by mouth 2 times a day with meals. 200 Tablet 3    metoprolol SR (TOPROL XL) 50 MG TABLET SR 24 HR Take 1 Tablet by mouth every day. Need appt for future refill ([phone]) 100 Tablet 3    metFORMIN (GLUCOPHAGE) 500 MG Tab Take 1 tablet by mouth every evening. 100 Tablet 3    rosuvastatin (CRESTOR) 40 MG tablet Take 1 Tablet by mouth at bedtime. 100 Tablet 3    lisinopril (PRINIVIL) 10 MG Tab Take 1 tablet by mouth every day. 100 Tablet 3    Multiple Vitamin (MULTI-VITAMIN DAILY PO) One A Day Vitamin      aspirin EC (ECOTRIN) 81 MG TBEC Take 1 Tablet by mouth every day.      glucose blood (EXACTECH TEST) strip 1 Each 4 Times a Day,Before Meals and at Bedtime.      Blood Glucose Test Strips He is getting a relion meter and test strips 100  Strip 4     No current facility-administered medications for this visit.          Current supplements as per medication list.     Allergies:   Pcn [penicillins]  Social History     Tobacco Use    Smoking status: Former     Current packs/day: 0.00     Average packs/day: 0.3 packs/day for 10.0 years (2.5 ttl pk-yrs)     Types: Cigarettes     Start date: 3/1/1972     Quit date: 3/1/1982     Years since quittin.3    Smokeless tobacco: Never    Tobacco comments:     QUIT    Vaping Use    Vaping status: Never Used   Substance Use Topics    Alcohol use: No     Alcohol/week: 0.0 oz     Comment: occasionally ( 2 times a year)    Drug use: No     Family History   Problem Relation Age of Onset    Cancer Mother         ovarian cancer    Diabetes Sister         prediabetes    Heart Disease Maternal Aunt 17        unclear but MI!    Heart Disease Maternal Uncle 38    Diabetes Maternal Grandmother      Abram  has a past medical history of Acute MI, anterior wall s/p bms to LAD  with -RCA and residual CX dz (stent failed) (2011), AICD (automatic cardioverter/defibrillator) present [employer] Zach placed 12 (2012), AICD lead malfunction (2013), Arrhythmia, ASTHMA, Atrial fib/flutter, transient (HCC), Bronchitis (as child), CAD (coronary artery disease), Calcium oxalate calculus (2022), Calcium oxalate calculus of kidney (2022), Cardiogenic shock (HCC) (2012), Cardiomyopathy, ischemic EF 35% (2011), Congestive heart failure (HCC), Dyslipidemia, Fatty liver, Former smoker (2019), High cholesterol, History of 2019 novel coronavirus disease (COVID-19) (2022), History of acute myocardial infarction (2021), History of atrial fibrillation (2022), Hypertension, Influenza, Left flank pain (2022), Moderate persistent asthma without complication (2022), Myocardial infarct (HCC) (2011), Myocardial infarction of anterolateral wall (HCC) (2011),  Nocturnal hypoxia (7/16/2018), Obstructive sleep apnea (3/30/2017), Pacemaker (04/23/2012), Renal disorder, Sleep apnea, Tuberculosis, and Uncontrolled type 2 diabetes mellitus with hyperglycemia (HCC) (5/25/2022).   Past Surgical History:   Procedure Laterality Date    TN CYSTOSCOPY,INSERT URETERAL STENT Left 1/14/2022    Procedure: CYSTOSCOPY, WITH URETERAL STENT INSERTION;  Surgeon: Duncan Blakely M.D.;  Location: SURGERY [facility];  Service: Urology    TN CYSTO/URETERO/PYELOSCOPY, DX Left 1/14/2022    Procedure: URETEROSCOPY;  Surgeon: Duncan Blakely M.D.;  Location: SURGERY [facility];  Service: Urology    LASERTRIPSY Left 1/14/2022    Procedure: LITHOTRIPSY, USING LASER;  Surgeon: Duncan Blakely M.D.;  Location: [facility];  Service: Urology    RECOVERY  8/30/2013    Performed by Cath-Recovery Surgery at SURGERY SAME DAY [facility]    RECOVERY  4/23/2012    Performed by SURGERY, CATH-RECOVERY at SURGERY SAME DAY [facility]    MULTIPLE CORONARY ARTERY BYPASS ENDO VEIN HARVEST  1/3/2012    Performed by PADMINI OCHOA at SURGERY [facility]    UMBILICAL HERNIA REPAIR  1/14/2011    Performed by CHUY CHRISTENSEN at SURGERY SAME DAY [facility]    COLONOSCOPY  2007    normal    PACEMAKER INSERTION         Screening:  In the last six months have you experienced any leakage of urine? No    Depression Screening  Little interest or pleasure in doing things?  0 - not at all  Feeling down, depressed , or hopeless? 0 - not at all  Patient Health Questionnaire Score: 0     If depressive symptoms identified deferred to follow up visit unless specifically addressed in assessment and plan.    Interpretation of PHQ-9 Total Score   Score Severity   1-4 No Depression   5-9 Mild Depression   10-14 Moderate Depression   15-19 Moderately Severe Depression   20-27 Severe Depression    Screening for Cognitive Impairment  Do you or any of your friends or family members have any concern about  your memory? No  Three Minute Recall (Leader, Season, Table) 2/3    Evert clock face with all 12 numbers and set the hands to show 10 minutes after 11.  Yes 5  Cognitive concerns identified deferred for follow up unless specifically addressed in assessment and plan.    Fall Risk Assessment  Has the patient had two or more falls in the last year or any fall with injury in the last year?  No    Safety Assessment  Do you always wear your seatbelt?  Yes  Any changes to home needed to function safely? No  Difficulty hearing.  No  Patient counseled about all safety risks that were identified.    Functional Assessment ADLs  Are there any barriers preventing you from cooking for yourself or meeting nutritional needs?  No.    Are there any barriers preventing you from driving safely or obtaining transportation?  No.    Are there any barriers preventing you from using a telephone or calling for help?  No    Are there any barriers preventing you from shopping?  No.    Are there any barriers preventing you from taking care of your own finances?  No    Are there any barriers preventing you from managing your medications?  No    Are there any barriers preventing you from showering, bathing or dressing yourself? No    Are there any barriers preventing you from doing housework or laundry? No  Are there any barriers preventing you from using the toilet?No  Are you currently engaging in any exercise or physical activity?  Yes. Walking 5-6 miles day     Self-Assessment of Health  What is your perception of your health? Good  Do you sleep more than six hours a night? Yes  In the past 7 days, how much did pain keep you from doing your normal work? None  Do you spend quality time with family or friends (virtually or in person)? Yes  Do you usually eat a heart healthy diet that constists of a variety of fruits, vegetables, whole grains and fiber? Yes  Do you eat foods high in fat and/or Fast Food more than three times per week?  No    Advance Care Planning  Do you have an Advance Directive, Living Will, Durable Power of , or POLST? Yes  Advance Directive Living Will     is on file      Health Maintenance Summary            Overdue - COVID-19 Vaccine (3 - 2023-24 season) Overdue since 9/1/2023 02/06/2023  Imm Admin: MODERNA BIVALENT BOOSTER SARS-COV-2 VACCINE (6+)    11/12/2021  Imm Admin: Eboni SARS-CoV-2 Vaccine    02/10/2021  Imm Admin: Eboni SARS-CoV-2 Vaccine              Ordered - Diabetes: Urine Protein Screening (Yearly) Ordered on 4/23/2024 11/14/2022  MICROALBUMIN CREAT RATIO URINE    06/08/2022  MICROALBUMIN CREAT RATIO URINE              Ordered - Fasting Lipid Profile (Yearly) Ordered on 5/21/2024 05/13/2023  Lipid Profile    11/14/2022  Lipid Profile    05/25/2022  Lipid Profile    06/12/2021  Lipid Profile    08/22/2020  HEALTHY TRACKS BLOOD TESTS    Only the first 5 history entries have been loaded, but more history exists.              Overdue - Diabetes: Monofilament / LE Exam (Yearly) Overdue since 5/17/2024 05/17/2023  SmartData: WORKFLOW - DIABETES - DIABETIC FOOT EXAM PERFORMED    05/17/2023  Diabetic Monofilament LE Exam    06/08/2022  SmartData: WORKFLOW - DIABETES - DIABETIC FOOT EXAM PERFORMED              Postponed - Pneumococcal Vaccine: 65+ Years (2 of 2 - PCV) Postponed until 9/1/2024 11/24/2021  Imm Admin: Pneumococcal polysaccharide vaccine (PPSV-23)    10/01/2012  Imm Admin: Pneumococcal polysaccharide vaccine (PPSV-23)    01/01/2007  Imm Admin: Pneumococcal Vaccine (UF) - HISTORICAL DATA              Postponed - Zoster (Shingles) Vaccines (2 of 3) Postponed until 9/23/2024 01/18/2017  Imm Admin: Zoster Vaccine Live (ZVL) (Zostavax) - HISTORICAL DATA              Influenza Vaccine (Season Ended) Next due on 9/1/2024 11/17/2022  Imm Admin: Influenza Vaccine Adult HD    09/19/2021  Imm Admin: Influenza Vaccine Quad Inj (Pf)    09/21/2020  Imm Admin: Influenza  Vaccine Quad Inj (Pf)    09/25/2019  Imm Admin: Influenza Vaccine Quad Inj (Pf)    11/26/2018  Imm Admin: Influenza Vaccine Quad Inj (Pf)    Only the first 5 history entries have been loaded, but more history exists.              A1c Screening (Every 6 Months) Tentatively due on 10/24/2024      04/24/2024  HEMOGLOBIN A1C    04/23/2024  POCT Hemoglobin A1C    05/13/2023  HEMOGLOBIN A1C    05/13/2023  HEMOGLOBIN A1C    11/14/2022  HEMOGLOBIN A1C    Only the first 5 history entries have been loaded, but more history exists.              Ordered - SERUM CREATININE (Yearly) Ordered on 4/23/2024 01/25/2024  Comp Metabolic Panel    01/04/2024  Comp Metabolic Panel    05/13/2023  Comp Metabolic Panel    11/14/2022  Comp Metabolic Panel    05/25/2022  Comp Metabolic Panel    Only the first 5 history entries have been loaded, but more history exists.              Diabetes: Retinopathy Screening (Yearly) Next due on 5/23/2025 05/23/2024  RETINAL SCREENING RESULTS    07/28/2022  POCT Retinal Eye Exam              Annual Wellness Visit (Yearly) Next due on 6/19/2025 06/19/2024  Level of Service: CA ANNUAL WELLNESS VISIT-INCLUDES PPPS SUBSEQUE*    08/01/2023  Level of Service: CA ANNUAL WELLNESS VISIT-INCLUDES PPPS SUBSEQUE*    05/25/2022  Visit Dx: Medicare annual wellness visit, subsequent              IMM DTaP/Tdap/Td Vaccine (2 - Td or Tdap) Next due on 1/18/2027 01/18/2017  Imm Admin: Tdap Vaccine              Colorectal Cancer Screening (Colonoscopy - Every 10 Years) Tentatively due on 8/6/2028 08/06/2018  REFERRAL TO GI FOR COLONOSCOPY              Hepatitis B Vaccine (Hep B) (Series Information) Completed      11/01/1996  Imm Admin: Hepatitis B Vaccine (Adol/Adult)    06/01/1996  Imm Admin: Hepatitis B Vaccine (Adol/Adult)    03/01/1996  Imm Admin: Hepatitis B Vaccine (Adol/Adult)              Hepatitis A Vaccine (Hep A) (Series Information) Aged Out      06/28/2017  Imm Admin: Hepatitis A  Vaccine, Adult    12/27/2016  Imm Admin: Hepatitis A Vaccine, Adult              Hepatitis C Screening  Tentatively Complete      07/08/2023  Hepatitis C Antibody component of HEPATITIS PANEL ACUTE(4 COMPONENTS)    06/21/2019  Hepatitis C Antibody component of HEP C VIRUS ANTIBODY    01/03/2012  Hepatitis C Antibody component of NEEDLESTICK SOURCE              Abdominal Aortic Aneurysm (AAA) Screening  Completed      09/04/2023  US-AORTA/ILIACS DUPLEX COMPLETE    01/03/2022  Done - CT scan of the abdomen and pelvis did not show any aortic aneurysm.    08/25/2012  US-ABDOMEN COMPLETE SURVEY    02/01/2012  US-AORTA    08/06/2008  US-ABDOMEN COMPLETE SURVEY    Only the first 5 history entries have been loaded, but more history exists.              HPV Vaccines (Series Information) Aged Out      No completion history exists for this topic.              Polio Vaccine (Inactivated Polio) (Series Information) Aged Out      No completion history exists for this topic.              Meningococcal Immunization (Series Information) Aged Out      No completion history exists for this topic.                    Patient Care Team:  David Cervantes M.D. as PCP - General  Joe Yi M.D. as Attending Team Physician (Cardiovascular Disease (Cardiology))  Ольга Johns P.A.-C. as Attending Team Physician (Sleep Medicine)      Financial Resource Strain: Low Risk  (6/19/2024)    Overall Financial Resource Strain (CARDIA)     Difficulty of Paying Living Expenses: Not hard at all      Transportation Needs: No Transportation Needs (6/19/2024)    PRAPARE - Transportation     Lack of Transportation (Medical): No     Lack of Transportation (Non-Medical): No      Food Insecurity: No Food Insecurity (6/19/2024)    Hunger Vital Sign     Worried About Running Out of Food in the Last Year: Never true     Ran Out of Food in the Last Year: Never true        Encounter Vitals  Blood Pressure : 118/80  Weight: 67.6 kg (149 lb)  Height: 165.1  "cm (5' 5\")  BMI (Calculated): 24.79  Pain Score: No pain     Alert, oriented in no acute distress.  Eye contact is good, speech goal directed, affect calm.    Assessment and Plan. The following treatment and monitoring plan is recommended:  Dyslipidemia  Chronic, stable. Maintains on statin therapy without issue. Most recent lipid panel from 2023 WNL. Continue rosuvastatin 40mg daily. Recommend Mediterranean diet and regular physical activity. Follow up with PCP at least annually for continued monitoring and management.   Lab Results   Component Value Date/Time    CHOLSTRLTOT 105 2023 07:33 AM    LDL 46 2023 07:33 AM    HDL 29 (A) 2023 07:33 AM    TRIGLYCERIDE 148 2023 07:33 AM       Essential hypertension  Chronic, stable. BP today 118/80. Pt monitors BP at home occasionally and reports these have been WNL. Denies dizziness/lightheadedness, chest pain, dyspnea. Continue current treatment regime: metoprolol 50mg daily, lisinopril 10mg daily. Follow up with PCP annually for continued monitoring and management.     Uncontrolled type 2 diabetes mellitus with hyperglycemia (HCC)  Chronic, ongoing, uncontrolled. Last A1c 9.7 as of 2024. Last monofilament foot exam: 2023, last urine microalb/creat: 2022, last retinal screenin2024. Maintains on metformin 500mg daily, glimepiride 1mg BID. Continue to advise low carbohydrate diet with regular physical activity. Continue current treatment regime. Discussed [facility] DM support/education group. Follow up with PCP as scheduled for continued monitoring and management.    Chronic systolic (congestive) heart failure (HCC)  AICD present  Chronic, ongoing. Pt maintains on metoprolol 50mg daily, lisinopril 10mg daily, and ASA 81mg daily. Denies dyspnea, edema, chest pain.Follow up with cardiology per routine.    Echo   Normal left ventricular chamber size. Normal left ventricular wall   thickness.  Left ventricular ejection " fraction is visually estimated to   be 45%. Hypokinesis of inferoseptal wall and apex.  Compared to the report of the study done 6/2016- there has been no   significant change.     VT (ventricular tachycardia) (HCC)  Pt with hx of VT once previously. He is being monitored for recurrence. He does not maintain on any antiarrhythmics. He has an AICD. Denies palpitations. Follow up with cardiology per routine.    Coronary artery disease due to calcified coronary lesion:Acute MI, anterior wall s/p bms to LAD 12/11 with -RCA and residual CX dz (stent failed)  Chronic, ongoing. Pt with hx of CABG. Pt maintains on rosuvastatin 40mg daily and ASA 81mg daily with metoprolol 50mg daily and lisinopril 10mg daily. He denies CP, dyspnea. He tolerates exercise well. Follow up with cardiology per routine.    Services suggested: No services needed at this time  Health Care Screening: Age-appropriate preventive services recommended by USPTF and ACIP covered by Medicare were discussed today. Services ordered if indicated and agreed upon by the patient.  Referrals offered: Community-based lifestyle interventions to reduce health risks and promote self-management and wellness, fall prevention, nutrition, physical activity, tobacco-use cessation, weight loss, and mental health services as per orders if indicated.    Discussion today about general wellness and lifestyle habits:    Prevent falls and reduce trip hazards; Cautioned about securing or removing rugs.  Have a working fire alarm and carbon monoxide detector.  Engage in regular physical activity and social activities.    Follow-up: Return for follow up visit with PCP as previously scheduled.

## 2024-07-10 DIAGNOSIS — I10 ESSENTIAL HYPERTENSION: ICD-10-CM

## 2024-07-10 PROCEDURE — RXMED WILLOW AMBULATORY MEDICATION CHARGE: Performed by: FAMILY MEDICINE

## 2024-07-10 RX ORDER — LISINOPRIL 10 MG/1
10 TABLET ORAL
Qty: 100 TABLET | Refills: 3 | Status: SHIPPED | OUTPATIENT
Start: 2024-07-10

## 2024-07-15 ENCOUNTER — PHARMACY VISIT (OUTPATIENT)
Dept: PHARMACY | Facility: MEDICAL CENTER | Age: 69
End: 2024-07-15
Payer: COMMERCIAL

## 2024-07-25 PROCEDURE — RXMED WILLOW AMBULATORY MEDICATION CHARGE: Performed by: FAMILY MEDICINE

## 2024-07-30 PROCEDURE — RXMED WILLOW AMBULATORY MEDICATION CHARGE: Performed by: NURSE PRACTITIONER

## 2024-08-03 PROCEDURE — RXMED WILLOW AMBULATORY MEDICATION CHARGE: Performed by: FAMILY MEDICINE

## 2024-08-05 ENCOUNTER — PHARMACY VISIT (OUTPATIENT)
Dept: PHARMACY | Facility: MEDICAL CENTER | Age: 69
End: 2024-08-05
Payer: COMMERCIAL

## 2024-08-08 ENCOUNTER — HOSPITAL ENCOUNTER (OUTPATIENT)
Dept: LAB | Facility: MEDICAL CENTER | Age: 69
End: 2024-08-08
Attending: FAMILY MEDICINE
Payer: MEDICARE

## 2024-08-08 ENCOUNTER — HOSPITAL ENCOUNTER (OUTPATIENT)
Dept: LAB | Facility: MEDICAL CENTER | Age: 69
End: 2024-08-08
Attending: UROLOGY
Payer: MEDICARE

## 2024-08-08 DIAGNOSIS — E11.65 UNCONTROLLED TYPE 2 DIABETES MELLITUS WITH HYPERGLYCEMIA (HCC): ICD-10-CM

## 2024-08-08 DIAGNOSIS — I10 ESSENTIAL HYPERTENSION: ICD-10-CM

## 2024-08-08 LAB
ALBUMIN SERPL BCP-MCNC: 4.3 G/DL (ref 3.2–4.9)
ALBUMIN/GLOB SERPL: 1.5 G/DL
ALP SERPL-CCNC: 90 U/L (ref 30–99)
ALT SERPL-CCNC: 28 U/L (ref 2–50)
ANION GAP SERPL CALC-SCNC: 14 MMOL/L (ref 7–16)
AST SERPL-CCNC: 33 U/L (ref 12–45)
BILIRUB SERPL-MCNC: 0.8 MG/DL (ref 0.1–1.5)
BUN SERPL-MCNC: 17 MG/DL (ref 8–22)
CALCIUM ALBUM COR SERPL-MCNC: 9.4 MG/DL (ref 8.5–10.5)
CALCIUM SERPL-MCNC: 9.6 MG/DL (ref 8.5–10.5)
CHLORIDE SERPL-SCNC: 103 MMOL/L (ref 96–112)
CHOLEST SERPL-MCNC: 91 MG/DL (ref 100–199)
CO2 SERPL-SCNC: 20 MMOL/L (ref 20–33)
CREAT SERPL-MCNC: 0.93 MG/DL (ref 0.5–1.4)
CREAT UR-MCNC: 101.81 MG/DL
ERYTHROCYTE [DISTWIDTH] IN BLOOD BY AUTOMATED COUNT: 43.2 FL (ref 35.9–50)
EST. AVERAGE GLUCOSE BLD GHB EST-MCNC: 114 MG/DL
EST. AVERAGE GLUCOSE BLD GHB EST-MCNC: 117 MG/DL
GFR SERPLBLD CREATININE-BSD FMLA CKD-EPI: 89 ML/MIN/1.73 M 2
GLOBULIN SER CALC-MCNC: 2.9 G/DL (ref 1.9–3.5)
GLUCOSE SERPL-MCNC: 90 MG/DL (ref 65–99)
HBA1C MFR BLD: 5.6 % (ref 4–5.6)
HBA1C MFR BLD: 5.7 % (ref 4–5.6)
HCT VFR BLD AUTO: 46.9 % (ref 42–52)
HDLC SERPL-MCNC: 26 MG/DL
HGB BLD-MCNC: 16 G/DL (ref 14–18)
LDLC SERPL CALC-MCNC: 37 MG/DL
MCH RBC QN AUTO: 31.8 PG (ref 27–33)
MCHC RBC AUTO-ENTMCNC: 34.1 G/DL (ref 32.3–36.5)
MCV RBC AUTO: 93.2 FL (ref 81.4–97.8)
MICROALBUMIN UR-MCNC: 34.9 MG/DL
MICROALBUMIN/CREAT UR: 343 MG/G (ref 0–30)
PLATELET # BLD AUTO: 136 K/UL (ref 164–446)
PMV BLD AUTO: 10.1 FL (ref 9–12.9)
POTASSIUM SERPL-SCNC: 3.7 MMOL/L (ref 3.6–5.5)
PROT SERPL-MCNC: 7.2 G/DL (ref 6–8.2)
RBC # BLD AUTO: 5.03 M/UL (ref 4.7–6.1)
SODIUM SERPL-SCNC: 137 MMOL/L (ref 135–145)
TRIGL SERPL-MCNC: 138 MG/DL (ref 0–149)
TSH SERPL-ACNC: 3.19 UIU/ML (ref 0.35–5.5)
WBC # BLD AUTO: 6.7 K/UL (ref 4.8–10.8)

## 2024-08-08 PROCEDURE — 85027 COMPLETE CBC AUTOMATED: CPT

## 2024-08-08 PROCEDURE — 82570 ASSAY OF URINE CREATININE: CPT

## 2024-08-08 PROCEDURE — 82043 UR ALBUMIN QUANTITATIVE: CPT

## 2024-08-08 PROCEDURE — 36415 COLL VENOUS BLD VENIPUNCTURE: CPT

## 2024-08-08 PROCEDURE — 83036 HEMOGLOBIN GLYCOSYLATED A1C: CPT | Mod: 91

## 2024-08-08 PROCEDURE — 80061 LIPID PANEL: CPT

## 2024-08-08 PROCEDURE — 83036 HEMOGLOBIN GLYCOSYLATED A1C: CPT

## 2024-08-08 PROCEDURE — 84443 ASSAY THYROID STIM HORMONE: CPT

## 2024-08-08 PROCEDURE — 80053 COMPREHEN METABOLIC PANEL: CPT

## 2024-08-23 ENCOUNTER — APPOINTMENT (OUTPATIENT)
Dept: MEDICAL GROUP | Facility: MEDICAL CENTER | Age: 69
End: 2024-08-23
Payer: MEDICARE

## 2024-08-24 ENCOUNTER — NON-PROVIDER VISIT (OUTPATIENT)
Dept: CARDIOLOGY | Facility: MEDICAL CENTER | Age: 69
End: 2024-08-24
Payer: MEDICARE

## 2024-08-26 NOTE — CARDIAC REMOTE MONITOR - SCAN
Device transmission reviewed. Device demonstrated appropriate function.       Electronically Signed by: Vladislav Dumont M.D.    8/26/2024  4:19 PM

## 2024-08-27 ENCOUNTER — APPOINTMENT (OUTPATIENT)
Dept: MEDICAL GROUP | Facility: MEDICAL CENTER | Age: 69
End: 2024-08-27
Payer: MEDICARE

## 2024-08-27 VITALS
TEMPERATURE: 98 F | HEART RATE: 53 BPM | DIASTOLIC BLOOD PRESSURE: 60 MMHG | HEIGHT: 64 IN | OXYGEN SATURATION: 97 % | WEIGHT: 152 LBS | SYSTOLIC BLOOD PRESSURE: 120 MMHG | RESPIRATION RATE: 18 BRPM | BODY MASS INDEX: 25.95 KG/M2

## 2024-08-27 DIAGNOSIS — K76.0 FATTY LIVER: ICD-10-CM

## 2024-08-27 DIAGNOSIS — E11.69 CONTROLLED TYPE 2 DIABETES MELLITUS WITH OTHER SPECIFIED COMPLICATION, WITHOUT LONG-TERM CURRENT USE OF INSULIN (HCC): ICD-10-CM

## 2024-08-27 DIAGNOSIS — I25.10 CORONARY ARTERY DISEASE DUE TO CALCIFIED CORONARY LESION: ICD-10-CM

## 2024-08-27 DIAGNOSIS — R80.9 MICROALBUMINURIA: ICD-10-CM

## 2024-08-27 DIAGNOSIS — I10 ESSENTIAL HYPERTENSION: ICD-10-CM

## 2024-08-27 DIAGNOSIS — I25.84 CORONARY ARTERY DISEASE DUE TO CALCIFIED CORONARY LESION: ICD-10-CM

## 2024-08-27 DIAGNOSIS — D69.6 THROMBOCYTOPENIA (HCC): ICD-10-CM

## 2024-08-27 DIAGNOSIS — E78.5 DYSLIPIDEMIA: ICD-10-CM

## 2024-08-27 PROBLEM — E11.65 UNCONTROLLED TYPE 2 DIABETES MELLITUS WITH HYPERGLYCEMIA (HCC): Status: RESOLVED | Noted: 2022-05-25 | Resolved: 2024-08-27

## 2024-08-27 PROCEDURE — 99214 OFFICE O/P EST MOD 30 MIN: CPT | Performed by: FAMILY MEDICINE

## 2024-08-27 PROCEDURE — 3078F DIAST BP <80 MM HG: CPT | Performed by: FAMILY MEDICINE

## 2024-08-27 PROCEDURE — 3074F SYST BP LT 130 MM HG: CPT | Performed by: FAMILY MEDICINE

## 2024-08-27 ASSESSMENT — FIBROSIS 4 INDEX: FIB4 SCORE: 3.12

## 2024-08-27 NOTE — PROGRESS NOTES
Diabetes Focused Exam:    Chief Complaint   Patient presents with    Diabetes Follow-up    Numbness    Leg Cramps      Subjective:   MARLENE Barillas is a 69 y.o. male who presents for follow up of chronic conditions of diabetes mellitus, hypertension and hyperlipidemia. He indicates that he is feeling well and denies any symptoms referable to the above diagnoses. Specifically denies chest pain, palpitations, dyspnea, orthopnea, PND or peripheral edema. Also denies polyuria, polydipsia, urinary complaints, abdominal complaints, myalgias, numbness, weakness or other related symptoms.     History of MI.  Recent coronary artery catheterization, diffuse disease.  Denies angina.  Needs to follow up with cardiology, referral placed.    Contemplating pump for erectile dysfunction.  Working with urology.    Stable thrombocytopenia.    Discussed cramps in calves nightly.  Magnesium 250 once nightly recommended.  Does get some numbness in his hands, not feet.    The patient is taking ASA every day 81 mg and taking all other medications as prescribed. Patient denies any side effects of medication.  DM: A1c goal <7  Glucose monitoring frequency: daily   Fasting sugars: . Post-prandial sugars: not checking.  Highest 126.  Hypoglycemic episodes none  Diabetic complications: cardiovascular disease and microalbuminuria  ACR Albumin/Creatinine Ratio goal <30  HTN: Blood pressure goal <140/<80. Currently Rx ACE/ARB: Yes  Hyperlipidemia:Cholesterol goal LDL <100, total/HDL <5. Currently Rx Statin: Yes  Last eye exam 5/2024  Denies visual blurring, double vision, eye pain and floaters  Last monofilament foot exam: today  Denies foot pain, numbness, calluses, ulcers    See medications and orders placed in encounter report.  Past medical history, family history, social history reviewed and updated as documented in medical record.  Current medications including changes today:  Current Outpatient Medications   Medication Sig  "Dispense Refill    lisinopril (PRINIVIL) 10 MG Tab Take 1 tablet by mouth every day. 100 Tablet 3    glimepiride (AMARYL) 1 MG tablet Take 1 Tablet by mouth 2 times a day with meals. 200 Tablet 3    metoprolol SR (TOPROL XL) 50 MG TABLET SR 24 HR Take 1 Tablet by mouth every day. Need appt for future refill ([phone]) 100 Tablet 3    metFORMIN (GLUCOPHAGE) 500 MG Tab Take 1 tablet by mouth every evening. 100 Tablet 3    rosuvastatin (CRESTOR) 40 MG tablet Take 1 Tablet by mouth at bedtime. 100 Tablet 3    glucose blood (EXACTECH TEST) strip 1 Each 4 Times a Day,Before Meals and at Bedtime.      Blood Glucose Test Strips He is getting a relion meter and test strips 100 Strip 4    Multiple Vitamin (MULTI-VITAMIN DAILY PO) One A Day Vitamin      aspirin EC (ECOTRIN) 81 MG TBEC Take 1 Tablet by mouth every day.       No current facility-administered medications for this visit.     Allergies:   Allergies   Allergen Reactions    Pcn [Penicillins] Rash     Rash      Social History     Tobacco Use    Smoking status: Former     Current packs/day: 0.00     Average packs/day: 0.3 packs/day for 10.0 years (2.5 ttl pk-yrs)     Types: Cigarettes     Start date: 3/1/1972     Quit date: 3/1/1982     Years since quittin.5    Smokeless tobacco: Never    Tobacco comments:     QUIT    Vaping Use    Vaping status: Never Used   Substance Use Topics    Alcohol use: No     Alcohol/week: 0.0 oz     Comment: occasionally ( 2 times a year)    Drug use: No     Exercise: walking 5-6 miles every day  Health Maintenance/Immunizations: stable, recommend COVID vaccine.  Monofilament done today.  Colonoscopy due .    ROS  Pertinent  ROS findings as above.  He denies chest pain or shortness of breath.     Objective:     OBJECTIVE:  /60   Pulse (!) 53   Temp 36.7 °C (98 °F)   Resp 18   Ht 1.626 m (5' 4\")   Wt 68.9 kg (152 lb)   SpO2 97%   BMI 26.09 kg/m²  Body mass index is 26.09 kg/m². BMI: not obese  General: No " apparent distress, conversant, cooperative and pleasant with the examination.  Psych: Alert and oriented x4, judgment and insight normal  Neck: No JVD or bruits, no adenopathy, supple  Thyroid: normal to inspection and palpation  Lungs: negative findings: normal respiratory rate and rhythm, lungs clear to auscultation  Heart: negative findings: regular rate and rhythm, S1 normal, S2 normal, no murmurs, clicks, or gallops  Abdomen: Soft, nontender, no hepatosplenomegaly or masses, normal bowel sounds  Skin: No rashes, no cyanosis, no lesions or ulcers  Extremities: No cyanosis clubbing or edema.   Feet are examined Monofilament testing with a 10 gram force: sensation intact: intact bilaterally  Visual Inspection: Feet without maceration, ulcers, fissures.  Pedal pulses: intact bilaterally    POC labs:   Lab Results   Component Value Date/Time    POCGLUCOSE 109 (H) 01/11/2012 11:31 AM          Last labs    Lab Results   Component Value Date/Time    CHOLSTRLTOT 91 (L) 08/08/2024 06:45 AM    LDL 37 08/08/2024 06:45 AM    HDL 26 (A) 08/08/2024 06:45 AM    TRIGLYCERIDE 138 08/08/2024 06:45 AM       Lab Results   Component Value Date/Time    SODIUM 137 08/08/2024 06:45 AM    POTASSIUM 3.7 08/08/2024 06:45 AM    GLUCOSE 90 08/08/2024 06:45 AM    BUNCREATRAT 15 12/08/2010 07:11 AM    GLOMRATE >59 12/08/2010 07:11 AM     Lab Results   Component Value Date/Time    HBA1C 5.6 08/08/2024 06:45 AM    HBA1C 5.7 (H) 08/08/2024 06:43 AM    HBA1C 9.7 (H) 04/24/2024 09:00 AM     Lab Results   Component Value Date/Time    MALBCRT 343 (H) 08/08/2024 06:45 AM    MICROALBUR 34.9 08/08/2024 06:45 AM          Assessment/Plan:   Medications, refills, and referrals per orders.   1. Controlled type 2 diabetes mellitus with other specified complication, without long-term current use of insulin (HCC)  Diabetic Monofilament Lower Extremity Exam    Comp Metabolic Panel    CBC WITHOUT DIFFERENTIAL    TSH    Lipid Profile    MICROALBUMIN CREAT RATIO  URINE    HEMOGLOBIN A1C      2. Essential hypertension  Comp Metabolic Panel    CBC WITHOUT DIFFERENTIAL    Lipid Profile      3. Dyslipidemia  Comp Metabolic Panel    CBC WITHOUT DIFFERENTIAL    TSH    VITAMIN D,25 HYDROXY (DEFICIENCY)      4. Fatty liver  Comp Metabolic Panel    CBC WITHOUT DIFFERENTIAL    VITAMIN D,25 HYDROXY (DEFICIENCY)      5. Coronary artery disease due to calcified coronary lesion:Acute MI, anterior wall s/p bms to LAD 12/11 with -RCA and residual CX dz (stent failed)  REFERRAL TO CARDIOLOGY      6. Microalbuminuria  MICROALBUMIN CREAT RATIO URINE      7. Thrombocytopenia (HCC)          DM2 A1c is at goal   Patient to monitor sugars: daily frequency  Discussed diet, exercise, disease management and weight loss goals.   Education and advise provided today:All medications, side effects and compliance (discussed carefully)  Annual eye examinations at Ophthalmology  Diabetic diet discussed in detail, written exchange diet given  Glucose meter dispensed to patient  Glycohemoglobin and other lab monitoring  Home glucose monitoring emphasized  Labs immediately prior to next visit  Long term diabetic complications  Low cholesterol diet, weight control and daily exercise    Followup:   Do labs and RTC 6 months, sooner should new symptoms or problems arise.

## 2024-08-28 ENCOUNTER — RESEARCH ENCOUNTER (OUTPATIENT)
Dept: RESEARCH | Facility: MEDICAL CENTER | Age: 69
End: 2024-08-28
Payer: MEDICARE

## 2024-09-04 ENCOUNTER — TELEPHONE (OUTPATIENT)
Dept: CARDIOLOGY | Facility: MEDICAL CENTER | Age: 69
End: 2024-09-04
Payer: MEDICARE

## 2024-09-04 NOTE — TELEPHONE ENCOUNTER
Last OV: 1/10/24  Proposed Surgery: Inflatable Penile Prosthesis  Surgery Date: 11/18/24  Requesting Office Name: Brea Langley  Fax Number: [phone]  Preference of Location (default is surgery center unless specified by Cardiologist or GISELLE)  Prior Clearance Addressed: No      Anticoags/Antiplatelets: Aspirin  Anticoags/Antiplatelet managed by Cardiology? YES    Outstanding Cardiac Imaging : No  Stent, Cardiac Devices, or Catheterization: Yes  Date : 1/25/24   Ablation, TAVR/Valve (including open heart), Cardioversion: No  Recent Cardiac Hospitalization: Yes  Date:  1/25/24            When: Greater than 3 months since hospitalization   History (cardiac history):   Past Medical History:   Diagnosis Date    Acute MI, anterior wall s/p bms to LAD 12/11 with -RCA and residual CX dz (stent failed) 12/31/2011    AICD (automatic cardioverter/defibrillator) present [employer] placed 4/23/12 4/24/2012    DEVICE DATA:  1. Pulse generator:  [employer], model #EJ4814-16, serial  #[id number]  2. Right ventricular lead:  [employer], model #7120/60,  serial #[id number]  MEASURED INTRAOPERATIVE DATA: R-waves measured 11.7 mV, pacing  threshold 0.75 volts at 0.5 msec, lead impedance of 859 ohms.  DEVICE SETTINGS: VVI 40 to 120.     AICD lead malfunction 8/23/2013    Arrhythmia     ASTHMA     as a child. 1/13/22-PRN use of inhaler for allergies    Atrial fib/flutter, transient (HCC)     when in cardiogenic shock    Bronchitis as child    CAD (coronary artery disease)     Calcium oxalate calculus 1/29/2022    Calcium oxalate calculus of kidney 1/19/2022    Cardiogenic shock (HCC) 1/2012    Cardiomyopathy, ischemic EF 35% 12/31/2011    Congestive heart failure (HCC)     Follow with Dr Yi    Dyslipidemia     Fatty liver     Former smoker 8/27/2019    High cholesterol     History of 2019 novel coronavirus disease (COVID-19) 5/25/2022 1/2022    History of acute myocardial infarction 11/24/2021 2011    History  of atrial fibrillation 5/25/2022    When in cardiogenic shock    Hypertension     ON NO MEDS, now on meds    Influenza     Left flank pain 01/13/2022    comes and goes-severe when present    Moderate persistent asthma without complication 5/25/2022    Myocardial infarct (HCC) 12/29/2011    Myocardial infarction of anterolateral wall (HCC) 12/29/2011    Follow with Dr Yi    Nocturnal hypoxia 7/16/2018    Obstructive sleep apnea 3/30/2017    Pacemaker 04/23/2012    [employer] Zach. Follows with Dr Yi with [facility] cardiology    Renal disorder     stones    Sleep apnea     Had study but was never prescribed CPAP.     Tuberculosis     20 years ago on meds for 6  mo    Uncontrolled type 2 diabetes mellitus with hyperglycemia ([facility]) 5/25/2022             Surgical Clearance Letter Sent: YES   **Scan clearance request letter into [facility].**

## 2024-09-04 NOTE — LETTER
PROCEDURE/SURGERY CLEARANCE FORM      Encounter Date: 9/4/2024    Patient: Abram Barillas  YOB: 1955    CARDIOLOGIST:  ANMOL Alvarez.    REFERRING DOCTOR:  No ref. provider found    The procedure/surgery: Inflatable Penile Prosthesis                                           PROCEDURE/SURGERY CLEARANCE FORM    Date: 9/4/2024   Patient Name: Abram Barillas    Dear Surgeon or Proceduralist,      Thank you for your request for cardiac stratification of our mutual patient Abram Barillas 1955. We have reviewed their [facility] records; and to the best of our understanding this patient has not had stenting, ablation, cardiothoracic surgery or hospitalization for cardiovascular reasons in the past 6 months.  Abram Barillas has been seen within the past 18 months and is considered to have non-modifiable cardiac risk for this low-risk procedure/surgery. They may proceed from a cardiovascular standpoint and may hold their antiplatelet/anticoagulation as briefly as possible. Please have patient resume this medication when hemodynamically stable to do so.     Aspirin or Prasugrel   - hold 7 days prior to procedure/surgery, resume when hemodynamically stable      Clopidrogrel or Ticagrelor  - hold 7 days for all neurological procedures, hold 5 days prior to all other procedure/surgery,  resume when hemodynamically stable     Warfarin - hold 7 days for all neurological procedures, hold 5 days prior to all other procedure/surgery and coordinate with [facility] ([phone]) INR testing and dose management.      Pradaxa/Xarelto/Eliquis/Savesya - hold 1 day prior to procedure for low bleeding risk procedure, 2 days for high bleeding risk procedure, or consider holding 3 days or longer for patients with reduced kidney function (CrCl <30mL/min) or spinal/cranial surgeries/procedures.      If they have a mechanical heart valve, please coordinate with [facility]  [facility]tico[facility] ([phone]) the proper management of their anticoagulant in the periprocedural or perioperative period.      Some patients have higher risk for cardiovascular complications or holding medication. If our patient has had prior complications of holding antiplatelet or anticoagulants in the past and we have seen them after these events, we have addressed these concerns with the patient. They are at an unknown degree of increased risk for recurrent complication.  You may hold anticoagulation/antiplatelets for the procedure or surgery if the benefits of the procedure or surgery outweigh this nonmodifiable risk.      If Abram Barillas 1955 has new symptoms of heart failure decompensation, unstable arrythmia, or angina please reach out and we will assess the patient.      If you have other patient-specific concerns, please feel free to reach out to the patient's cardiologist directly at [phone].     Thank you,       [facility]      Electronically signed     SIOBHAN Alvarez

## 2024-09-20 ENCOUNTER — TELEPHONE (OUTPATIENT)
Dept: HEALTH INFORMATION MANAGEMENT | Facility: OTHER | Age: 69
End: 2024-09-20
Payer: MEDICARE

## 2024-09-23 ENCOUNTER — OFFICE VISIT (OUTPATIENT)
Dept: CARDIOLOGY | Facility: MEDICAL CENTER | Age: 69
End: 2024-09-23
Attending: FAMILY MEDICINE
Payer: MEDICARE

## 2024-09-23 VITALS
WEIGHT: 147 LBS | OXYGEN SATURATION: 98 % | HEIGHT: 64 IN | DIASTOLIC BLOOD PRESSURE: 60 MMHG | BODY MASS INDEX: 25.1 KG/M2 | HEART RATE: 60 BPM | SYSTOLIC BLOOD PRESSURE: 90 MMHG

## 2024-09-23 DIAGNOSIS — I10 ESSENTIAL HYPERTENSION: ICD-10-CM

## 2024-09-23 DIAGNOSIS — I25.84 CORONARY ARTERY DISEASE DUE TO CALCIFIED CORONARY LESION: ICD-10-CM

## 2024-09-23 DIAGNOSIS — E78.5 DYSLIPIDEMIA: ICD-10-CM

## 2024-09-23 DIAGNOSIS — Z95.810 AICD (AUTOMATIC CARDIOVERTER/DEFIBRILLATOR) PRESENT: ICD-10-CM

## 2024-09-23 DIAGNOSIS — Z95.1 HX OF CABG: ICD-10-CM

## 2024-09-23 DIAGNOSIS — I25.10 CORONARY ARTERY DISEASE DUE TO CALCIFIED CORONARY LESION: ICD-10-CM

## 2024-09-23 DIAGNOSIS — I25.5 ISCHEMIC CARDIOMYOPATHY: ICD-10-CM

## 2024-09-23 PROCEDURE — G2211 COMPLEX E/M VISIT ADD ON: HCPCS | Performed by: INTERNAL MEDICINE

## 2024-09-23 PROCEDURE — 99215 OFFICE O/P EST HI 40 MIN: CPT | Performed by: INTERNAL MEDICINE

## 2024-09-23 PROCEDURE — 99212 OFFICE O/P EST SF 10 MIN: CPT | Performed by: INTERNAL MEDICINE

## 2024-09-23 PROCEDURE — 3074F SYST BP LT 130 MM HG: CPT | Performed by: INTERNAL MEDICINE

## 2024-09-23 PROCEDURE — 3078F DIAST BP <80 MM HG: CPT | Performed by: INTERNAL MEDICINE

## 2024-09-23 ASSESSMENT — ENCOUNTER SYMPTOMS
RESPIRATORY NEGATIVE: 1
SHORTNESS OF BREATH: 0
PSYCHIATRIC NEGATIVE: 1
MUSCULOSKELETAL NEGATIVE: 1
ABDOMINAL PAIN: 0
FEVER: 0
MYALGIAS: 0
DIZZINESS: 0
CLAUDICATION: 0
HEADACHES: 0
CHILLS: 0
VOMITING: 0
COUGH: 0
PALPITATIONS: 0
FOCAL WEAKNESS: 0
NEUROLOGICAL NEGATIVE: 1
NERVOUS/ANXIOUS: 0
DEPRESSION: 0
CARDIOVASCULAR NEGATIVE: 1
EYES NEGATIVE: 1
CONSTITUTIONAL NEGATIVE: 1
BLURRED VISION: 0
NAUSEA: 0
GASTROINTESTINAL NEGATIVE: 1
DOUBLE VISION: 0
BRUISES/BLEEDS EASILY: 0
WEAKNESS: 0
WEIGHT LOSS: 0

## 2024-09-23 ASSESSMENT — FIBROSIS 4 INDEX: FIB4 SCORE: 3.12

## 2024-09-23 NOTE — PROGRESS NOTES
Chief Complaint   Patient presents with    Coronary Artery Disease     F/V Dx: Coronary artery disease due to calcified coronary lesion:Acute MI, anterior wall s/p bms to LAD 12/11 with -RCA and residual CX dz (stent failed)    Dyslipidemia    Hypertension       Subjective     Abram Barillas is a 69 y.o. male who presents today for new patient establishment for coronary artery disease with prior coronary artery bypass graft surgery, ischemic cardiomyopathy with AICD placement, hypertension and hyperlipidemia.    Since the patient's last visit on 11/22/16 with Joe Headley, who has since left our practice, he has been doing well clinically from cardiac standpoint. He denies fatigue, chest pain, shortness of breath, palpitations, lower extremity edema, dizziness or syncope. He keeps active walking 5 miles daily. He is retired.      Past Medical History:   Diagnosis Date    Acute MI, anterior wall s/p bms to LAD 12/11 with -RCA and residual CX dz (stent failed) 12/31/2011    AICD (automatic cardioverter/defibrillator) present St Zach placed 4/23/12 4/24/2012    DEVICE DATA:  1. Pulse generator:  St. Zach, model #BP5046-14, serial  #[id number]  2. Right ventricular lead:  St. Zach, model #7120/60,  serial #[id number]  MEASURED INTRAOPERATIVE DATA: R-waves measured 11.7 mV, pacing  threshold 0.75 volts at 0.5 msec, lead impedance of 859 ohms.  DEVICE SETTINGS: VVI 40 to 120.     AICD lead malfunction 8/23/2013    Arrhythmia     ASTHMA     as a child. 1/13/22-PRN use of inhaler for allergies    Atrial fib/flutter, transient (HCC)     when in cardiogenic shock    Bronchitis as child    CAD (coronary artery disease)     Calcium oxalate calculus 1/29/2022    Calcium oxalate calculus of kidney 1/19/2022    Cardiogenic shock (HCC) 1/2012    Cardiomyopathy, ischemic EF 35% 12/31/2011    Congestive heart failure (HCC)     Follow with Dr Yi    Dyslipidemia     Fatty liver     Former  smoker 8/27/2019    High cholesterol     History of 2019 novel coronavirus disease (COVID-19) 5/25/2022 1/2022    History of acute myocardial infarction 11/24/2021 2011    History of atrial fibrillation 5/25/2022    When in cardiogenic shock    Hypertension     ON NO MEDS, now on meds    Influenza     Left flank pain 01/13/2022    comes and goes-severe when present    Moderate persistent asthma without complication 5/25/2022    Myocardial infarct (HCC) 12/29/2011    Myocardial infarction of anterolateral wall ([facility]) 12/29/2011    Follow with Dr Yi    Nocturnal hypoxia 7/16/2018    Obstructive sleep apnea 3/30/2017    Pacemaker 04/23/2012    St Zach. Follows with Dr Yi with [facility]    Renal disorder     stones    Sleep apnea     Had study but was never prescribed CPAP.     Tuberculosis     20 years ago on meds for 6  mo    Uncontrolled type 2 diabetes mellitus with hyperglycemia ([facility]) 5/25/2022     Past Surgical History:   Procedure Laterality Date    RI CYSTOSCOPY,INSERT URETERAL STENT Left 1/14/2022    Procedure: CYSTOSCOPY, WITH URETERAL STENT INSERTION;  Surgeon: Duncan Blakely M.D.;  Location: [facility];  Service: Urology    RI CYSTO/URETERO/PYELOSCOPY, DX Left 1/14/2022    Procedure: URETEROSCOPY;  Surgeon: Duncan Blakely M.D.;  Location: [facility];  Service: Urology    LASERTRIPSY Left 1/14/2022    Procedure: LITHOTRIPSY, USING LASER;  Surgeon: Duncan Blakely M.D.;  Location: [facility];  Service: Urology    RECOVERY  8/30/2013    Performed by Cath-Recovery Surgery at SURGERY SAME DAY [facility] ORS    RECOVERY  4/23/2012    Performed by SURGERY, CATH-RECOVERY at SURGERY SAME DAY [facility] ORS    MULTIPLE CORONARY ARTERY BYPASS ENDO VEIN HARVEST  1/3/2012    Performed by PADMINI OCHOA at SURGERY [facility]ANELLE [facility]    UMBILICAL HERNIA REPAIR  1/14/2011    Performed by CHUY CHRISTENSEN at SURGERY SAME DAY [facility] ORS    COLONOSCOPY  2007     normal    PACEMAKER INSERTION       Family History   Problem Relation Age of Onset    Cancer Mother         ovarian cancer    Diabetes Sister         prediabetes    Heart Disease Maternal Aunt 17        unclear but MI!    Heart Disease Maternal Uncle 38    Diabetes Maternal Grandmother      Social History     Socioeconomic History    Marital status:      Spouse name: Not on file    Number of children: Not on file    Years of education: Not on file    Highest education level: Not on file   Occupational History     Comment: retired   Tobacco Use    Smoking status: Former     Current packs/day: 0.00     Average packs/day: 0.3 packs/day for 10.0 years (2.5 ttl pk-yrs)     Types: Cigarettes     Start date: 3/1/1972     Quit date: 3/1/1982     Years since quittin.5    Smokeless tobacco: Never    Tobacco comments:     QUIT    Vaping Use    Vaping status: Never Used   Substance and Sexual Activity    Alcohol use: No     Alcohol/week: 0.0 oz     Comment: occasionally ( 2 times a year)    Drug use: No    Sexual activity: Not Currently   Other Topics Concern    Not on file   Social History Narrative    Not on file     Social Determinants of Health     Financial Resource Strain: Low Risk  (2024)    Overall Financial Resource Strain (CARDIA)     Difficulty of Paying Living Expenses: Not hard at all   Food Insecurity: No Food Insecurity (2024)    Hunger Vital Sign     Worried About Running Out of Food in the Last Year: Never true     Ran Out of Food in the Last Year: Never true   Transportation Needs: No Transportation Needs (2024)    PRAPARE - Transportation     Lack of Transportation (Medical): No     Lack of Transportation (Non-Medical): No   Physical Activity: Not on file   Stress: Not on file   Social Connections: Not on file   Intimate Partner Violence: Not on file   Housing Stability: Not on file     Allergies   Allergen Reactions    Pcn [Penicillins] Rash     Rash     (Medications  reviewed.)  Outpatient Encounter Medications as of 9/23/2024   Medication Sig Dispense Refill    lisinopril (PRINIVIL) 10 MG Tab Take 1 tablet by mouth every day. 100 Tablet 3    glimepiride (AMARYL) 1 MG tablet Take 1 Tablet by mouth 2 times a day with meals. 200 Tablet 3    metoprolol SR (TOPROL XL) 50 MG TABLET SR 24 HR Take 1 Tablet by mouth every day. Need appt for future refill ([phone]) 100 Tablet 3    metFORMIN (GLUCOPHAGE) 500 MG Tab Take 1 tablet by mouth every evening. 100 Tablet 3    rosuvastatin (CRESTOR) 40 MG tablet Take 1 Tablet by mouth at bedtime. 100 Tablet 3    glucose blood (EXACTECH TEST) strip 1 Each 4 Times a Day,Before Meals and at Bedtime.      Blood Glucose Test Strips He is getting a relion meter and test strips 100 Strip 4    Multiple Vitamin (MULTI-VITAMIN DAILY PO) One A Day Vitamin      aspirin EC (ECOTRIN) 81 MG TBEC Take 1 Tablet by mouth every day.       No facility-administered encounter medications on file as of 9/23/2024.     Review of Systems   Constitutional: Negative.  Negative for chills, fever, malaise/fatigue and weight loss.   HENT: Negative.  Negative for hearing loss.    Eyes: Negative.  Negative for blurred vision and double vision.   Respiratory: Negative.  Negative for cough and shortness of breath.    Cardiovascular: Negative.  Negative for chest pain, palpitations, claudication and leg swelling.   Gastrointestinal: Negative.  Negative for abdominal pain, nausea and vomiting.   Genitourinary: Negative.  Negative for dysuria and urgency.   Musculoskeletal: Negative.  Negative for joint pain and myalgias.   Skin: Negative.  Negative for itching and rash.   Neurological: Negative.  Negative for dizziness, focal weakness, weakness and headaches.   Endo/Heme/Allergies: Negative.  Does not bruise/bleed easily.   Psychiatric/Behavioral: Negative.  Negative for depression. The patient is not nervous/anxious.               Objective     BP 90/60 (BP Location: Left arm,  "Patient Position: Sitting, BP Cuff Size: Adult)   Pulse 60   Ht 1.626 m (5' 4\")   Wt 66.7 kg (147 lb)   SpO2 98%   BMI 25.23 kg/m²     Physical Exam  Constitutional:       Appearance: Normal appearance. He is well-developed and normal weight.   HENT:      Head: Normocephalic and atraumatic.   Neck:      Vascular: No JVD.   Cardiovascular:      Rate and Rhythm: Normal rate and regular rhythm.      Heart sounds: Normal heart sounds.   Pulmonary:      Effort: Pulmonary effort is normal.      Breath sounds: Normal breath sounds.   Abdominal:      General: Bowel sounds are normal.      Palpations: Abdomen is soft.      Comments: No hepatosplenomegaly.   Musculoskeletal:         General: Normal range of motion.   Lymphadenopathy:      Cervical: No cervical adenopathy.   Skin:     General: Skin is warm and dry.   Neurological:      Mental Status: He is alert and oriented to person, place, and time.            CARDIAC STUDIES/PROCEDURES:    AICD PLACEMENT by Vladislav Boykin (08/30/24)   1.  Successful placement of new RV lead.  2.  Abnormal old RV lead and with inability to pass a stylet precluding   extraction.  3.  Plan is to monitor the patient.    CARDIAC CATHETERIZATION CONCLUSIONS by José Luis Ortiz (01/25/24)  Severe native three-vessel coronary artery disease with proximal chronic total occlusions of the left anterior descending and right coronary arteries.  Widely patent left internal mammary artery to left anterior descending coronary artery graft.  Widely patent saphenous vein grafts to the first obtuse marginal branch and distal right coronary artery.  (study result reviewed)     ECHOCARDIOGRAM CONCLUSIONS (12/03/19)  Normal left ventricular chamber size.   Normal left ventricular wall thickness.    Left ventricular ejection fraction is visually estimated to be 45%.   Hypokinesis of inferoseptal wall and apex.  Compared to the report of the study done 6/2016- there has been no significant change. "   (study result reviewed)     Laboratory results of (08/08/24) were reviewed. Cholesterol profile of 91/138/26/37 mg/dL noted.    Assessment & Plan     1. Coronary artery disease due to calcified coronary lesion:Acute MI, anterior wall s/p bms to LAD 12/11 with -RCA and residual CX dz (stent failed)        2. Hx of CABG        3. Ischemic cardiomyopathy        4. AICD (automatic cardioverter/defibrillator) present St Zach placed 4/23/12        5. Essential hypertension        6. Dyslipidemia            Medical Decision Making: Today's Assessment/Status/Plan:        Coronary artery disease with prior coronary bypass graft (x3, left internal mammary artery to left anterior descending mid, reverse saphenous vein graft to obtuse marginal, reverse saphenous vein graft to right coronary artery by David Carmen 01/03/12): He is a 69 year old female with coronary artery disease with prior coronary artery bypass graft surgery, ischemic cardiomyopathy with AICD placement, hypertension and hyperlipidemia. He is clinically doing well from coronary standpoint. I will continue with current medical care including aspirin, metoprolol, lisinopril and rosuvastatin.  Ischemic cardiomyopathy with AICD: No activity noted or reported. We will repeat an echocardiogram.   Hypertension: Blood pressure is well controlled. We will continue with beta blockade therapy and angiotensin receptor blockade.  Hyperlipidemia: He is doing well on statin therapy without myalgia symptoms. (Managed by primary care physician)   Greater than 45 minutes of time was spent to review all above information; of which more than 50% of the time was face to face reviewing thee patient's medical issues, medication evaluation, study result review, lab results review, prior surgeries and procedure, cardiac records.    We will follow up in 3 months.    CC David Galvan

## 2024-10-15 ENCOUNTER — HOSPITAL ENCOUNTER (OUTPATIENT)
Dept: CARDIOLOGY | Facility: MEDICAL CENTER | Age: 69
End: 2024-10-15
Attending: UROLOGY
Payer: MEDICARE

## 2024-10-15 ENCOUNTER — HOSPITAL ENCOUNTER (OUTPATIENT)
Dept: CARDIOLOGY | Facility: MEDICAL CENTER | Age: 69
End: 2024-10-15
Attending: INTERNAL MEDICINE
Payer: MEDICARE

## 2024-10-15 DIAGNOSIS — Z95.1 HX OF CABG: ICD-10-CM

## 2024-10-15 DIAGNOSIS — I25.84 CORONARY ARTERY DISEASE DUE TO CALCIFIED CORONARY LESION: ICD-10-CM

## 2024-10-15 DIAGNOSIS — I25.5 ISCHEMIC CARDIOMYOPATHY: ICD-10-CM

## 2024-10-15 DIAGNOSIS — I25.10 CORONARY ARTERY DISEASE DUE TO CALCIFIED CORONARY LESION: ICD-10-CM

## 2024-10-15 DIAGNOSIS — I10 ESSENTIAL HYPERTENSION: ICD-10-CM

## 2024-10-15 LAB — EKG IMPRESSION: NORMAL

## 2024-10-15 PROCEDURE — 700117 HCHG RX CONTRAST REV CODE 255: Performed by: INTERNAL MEDICINE

## 2024-10-15 PROCEDURE — 93010 ELECTROCARDIOGRAM REPORT: CPT | Performed by: INTERNAL MEDICINE

## 2024-10-15 PROCEDURE — 93005 ELECTROCARDIOGRAM TRACING: CPT | Performed by: UROLOGY

## 2024-10-15 PROCEDURE — 93306 TTE W/DOPPLER COMPLETE: CPT

## 2024-10-15 RX ADMIN — HUMAN ALBUMIN MICROSPHERES AND PERFLUTREN 3 ML: 10; .22 INJECTION, SOLUTION INTRAVENOUS at 18:30

## 2024-10-16 LAB — LV EJECT FRACT  99904: 40

## 2024-10-16 PROCEDURE — 93306 TTE W/DOPPLER COMPLETE: CPT | Mod: 26 | Performed by: INTERNAL MEDICINE

## 2024-10-18 PROCEDURE — RXMED WILLOW AMBULATORY MEDICATION CHARGE: Performed by: FAMILY MEDICINE

## 2024-10-24 ENCOUNTER — HOSPITAL ENCOUNTER (OUTPATIENT)
Facility: MEDICAL CENTER | Age: 69
End: 2024-10-24
Attending: UROLOGY
Payer: MEDICARE

## 2024-10-24 LAB
ANION GAP SERPL CALC-SCNC: 14 MMOL/L (ref 7–16)
APPEARANCE UR: CLEAR
BACTERIA #/AREA URNS HPF: ABNORMAL /HPF
BASOPHILS # BLD AUTO: 0.6 % (ref 0–1.8)
BASOPHILS # BLD: 0.04 K/UL (ref 0–0.12)
BILIRUB UR QL STRIP.AUTO: NEGATIVE
BUN SERPL-MCNC: 17 MG/DL (ref 8–22)
CALCIUM SERPL-MCNC: 9.9 MG/DL (ref 8.5–10.5)
CASTS URNS QL MICRO: ABNORMAL /LPF (ref 0–2)
CHLORIDE SERPL-SCNC: 102 MMOL/L (ref 96–112)
CO2 SERPL-SCNC: 26 MMOL/L (ref 20–33)
COLOR UR: YELLOW
CREAT SERPL-MCNC: 0.92 MG/DL (ref 0.5–1.4)
EOSINOPHIL # BLD AUTO: 0.11 K/UL (ref 0–0.51)
EOSINOPHIL NFR BLD: 1.7 % (ref 0–6.9)
EPITHELIAL CELLS 1715: ABNORMAL /HPF (ref 0–5)
ERYTHROCYTE [DISTWIDTH] IN BLOOD BY AUTOMATED COUNT: 46 FL (ref 35.9–50)
EST. AVERAGE GLUCOSE BLD GHB EST-MCNC: 117 MG/DL
GFR SERPLBLD CREATININE-BSD FMLA CKD-EPI: 90 ML/MIN/1.73 M 2
GLUCOSE SERPL-MCNC: 92 MG/DL (ref 65–99)
GLUCOSE UR STRIP.AUTO-MCNC: NEGATIVE MG/DL
GRANULAR CASTS  1716G: PRESENT /LPF
HBA1C MFR BLD: 5.7 % (ref 4–5.6)
HCT VFR BLD AUTO: 54.1 % (ref 42–52)
HGB BLD-MCNC: 18 G/DL (ref 14–18)
HYALINE CAST   1831: PRESENT /LPF
IMM GRANULOCYTES # BLD AUTO: 0.01 K/UL (ref 0–0.11)
IMM GRANULOCYTES NFR BLD AUTO: 0.2 % (ref 0–0.9)
INR PPP: 1.14 (ref 0.87–1.13)
KETONES UR STRIP.AUTO-MCNC: NEGATIVE MG/DL
LEUKOCYTE ESTERASE UR QL STRIP.AUTO: NEGATIVE
LYMPHOCYTES # BLD AUTO: 2.09 K/UL (ref 1–4.8)
LYMPHOCYTES NFR BLD: 31.9 % (ref 22–41)
MCH RBC QN AUTO: 32.1 PG (ref 27–33)
MCHC RBC AUTO-ENTMCNC: 33.3 G/DL (ref 32.3–36.5)
MCV RBC AUTO: 96.6 FL (ref 81.4–97.8)
MICRO URNS: ABNORMAL
MONOCYTES # BLD AUTO: 0.55 K/UL (ref 0–0.85)
MONOCYTES NFR BLD AUTO: 8.4 % (ref 0–13.4)
NEUTROPHILS # BLD AUTO: 3.76 K/UL (ref 1.82–7.42)
NEUTROPHILS NFR BLD: 57.2 % (ref 44–72)
NITRITE UR QL STRIP.AUTO: NEGATIVE
NRBC # BLD AUTO: 0 K/UL
NRBC BLD-RTO: 0 /100 WBC (ref 0–0.2)
PH UR STRIP.AUTO: 6 [PH] (ref 5–8)
PLATELET # BLD AUTO: 161 K/UL (ref 164–446)
PMV BLD AUTO: 10.2 FL (ref 9–12.9)
POTASSIUM SERPL-SCNC: 3.9 MMOL/L (ref 3.6–5.5)
PROT UR QL STRIP: 300 MG/DL
PROTHROMBIN TIME: 14.7 SEC (ref 12–14.6)
RBC # BLD AUTO: 5.6 M/UL (ref 4.7–6.1)
RBC # URNS HPF: ABNORMAL /HPF (ref 0–2)
RBC UR QL AUTO: ABNORMAL
SODIUM SERPL-SCNC: 142 MMOL/L (ref 135–145)
SP GR UR STRIP.AUTO: 1.02
UROBILINOGEN UR STRIP.AUTO-MCNC: 1 EU/DL
WBC # BLD AUTO: 6.6 K/UL (ref 4.8–10.8)
WBC #/AREA URNS HPF: ABNORMAL /HPF

## 2024-10-24 PROCEDURE — 85025 COMPLETE CBC W/AUTO DIFF WBC: CPT

## 2024-10-24 PROCEDURE — 85610 PROTHROMBIN TIME: CPT

## 2024-10-24 PROCEDURE — 80048 BASIC METABOLIC PNL TOTAL CA: CPT

## 2024-10-24 PROCEDURE — 81015 MICROSCOPIC EXAM OF URINE: CPT

## 2024-10-24 PROCEDURE — 83036 HEMOGLOBIN GLYCOSYLATED A1C: CPT

## 2024-10-24 PROCEDURE — 81003 URINALYSIS AUTO W/O SCOPE: CPT

## 2024-10-24 PROCEDURE — 87086 URINE CULTURE/COLONY COUNT: CPT

## 2024-10-26 LAB
BACTERIA UR CULT: NORMAL
SIGNIFICANT IND 70042: NORMAL
SITE SITE: NORMAL
SOURCE SOURCE: NORMAL

## 2024-10-29 ENCOUNTER — PHARMACY VISIT (OUTPATIENT)
Dept: PHARMACY | Facility: MEDICAL CENTER | Age: 69
End: 2024-10-29
Payer: COMMERCIAL

## 2024-11-06 ENCOUNTER — PRE-ADMISSION TESTING (OUTPATIENT)
Dept: ADMISSIONS | Facility: MEDICAL CENTER | Age: 69
End: 2024-11-06
Attending: UROLOGY
Payer: MEDICARE

## 2024-11-06 PROCEDURE — RXMED WILLOW AMBULATORY MEDICATION CHARGE: Performed by: FAMILY MEDICINE

## 2024-11-07 PROCEDURE — RXMED WILLOW AMBULATORY MEDICATION CHARGE: Performed by: FAMILY MEDICINE

## 2024-11-07 PROCEDURE — RXMED WILLOW AMBULATORY MEDICATION CHARGE: Performed by: NURSE PRACTITIONER

## 2024-11-12 ENCOUNTER — PRE-ADMISSION TESTING (OUTPATIENT)
Dept: ADMISSIONS | Facility: MEDICAL CENTER | Age: 69
End: 2024-11-12
Attending: UROLOGY
Payer: MEDICARE

## 2024-11-12 NOTE — OR NURSING
Preadmit phone call completed with patient using language line with . All testing was completed on 10/24 and 10/15. EKG result faxed to Dr Henry's office. Pacemaker form faxed to Dr Lynch's office. Pt states understanding of instructions. Medication list emailed to pt.

## 2024-11-15 ASSESSMENT — FIBROSIS 4 INDEX: FIB4 SCORE: 2.63

## 2024-11-17 ENCOUNTER — ANESTHESIA EVENT (OUTPATIENT)
Dept: SURGERY | Facility: MEDICAL CENTER | Age: 69
End: 2024-11-17
Payer: MEDICARE

## 2024-11-17 PROBLEM — Z95.5 STENTED CORONARY ARTERY: Status: ACTIVE | Noted: 2024-11-17

## 2024-11-17 PROBLEM — E03.9 HYPOTHYROIDISM: Status: ACTIVE | Noted: 2024-11-17

## 2024-11-17 NOTE — ANESTHESIA PREPROCEDURE EVALUATION
Case: [id number] Date/Time: 11/18/24 1145    Procedures:       INSERTION OF PENILE PROSTHESIS WITH PENILE SKIN CYST REMOVAL      EXCISION, LESION, SKIN, PENIS    Pre-op diagnosis: ERECTILE DYSFUNCTION CO-OCCURENT AND DUE TO ARTERIAL INSUFFICIENCY    Location: [facility] OR  / SURGERY [facility]    Surgeons: Bandar Henry M.D.            Relevant Problems   ANESTHESIA (within normal limits)      PULMONARY   (positive) Moderate persistent asthma without complication      NEURO (within normal limits)      CARDIAC   (positive) Coronary artery disease due to calcified coronary lesion:Acute MI, anterior wall s/p bms to LAD 12/11 with -RCA and residual CX dz (stent failed)   (positive) Erectile dysfunction due to arterial insufficiency   (positive) Essential hypertension   (positive) Stented coronary artery      GI (within normal limits)         (positive) Fatty liver      ENDO   (positive) Controlled type 2 diabetes mellitus with complication, without long-term current use of insulin (HCC)   (positive) Hypothyroidism      Other   (positive) AICD (automatic cardioverter/defibrillator) present St Zach placed 4/23/12   (positive) Hx of CABG   (positive) Ischemic cardiomyopathy       Physical Exam    Airway   Mallampati: II  TM distance: >3 FB  Neck ROM: full       Cardiovascular - normal exam  Rhythm: regular  Rate: normal  (-) murmur     Dental - normal exam           Pulmonary - normal exam  Breath sounds clear to auscultation     Abdominal    Neurological - normal exam                 EF 35-40%, no valve dz  AICD interrogated 8/24/24 VVI, no episodes. Oracio 10 mo.     Anesthesia Plan    ASA 3   ASA physical status 3 criteria: implanted pacemaker, MI or angina - history (> 3 months) and CAD/stents (> 3 months)    Plan - general       Airway plan will be ETT    (A-line)      Induction: intravenous    Postoperative Plan: Postoperative administration of opioids is intended.    Pertinent diagnostic labs and testing  reviewed    Informed Consent:    Anesthetic plan and risks discussed with patient.    Use of blood products discussed with: patient whom consented to blood products.

## 2024-11-18 ENCOUNTER — ANESTHESIA (OUTPATIENT)
Dept: SURGERY | Facility: MEDICAL CENTER | Age: 69
End: 2024-11-18
Payer: MEDICARE

## 2024-11-18 ENCOUNTER — PHARMACY VISIT (OUTPATIENT)
Dept: PHARMACY | Facility: MEDICAL CENTER | Age: 69
End: 2024-11-18
Payer: COMMERCIAL

## 2024-11-18 ENCOUNTER — HOSPITAL ENCOUNTER (OUTPATIENT)
Facility: MEDICAL CENTER | Age: 69
End: 2024-11-18
Attending: UROLOGY | Admitting: UROLOGY
Payer: MEDICARE

## 2024-11-18 VITALS
OXYGEN SATURATION: 92 % | HEIGHT: 65 IN | HEART RATE: 59 BPM | WEIGHT: 146.61 LBS | TEMPERATURE: 96.8 F | BODY MASS INDEX: 24.43 KG/M2 | SYSTOLIC BLOOD PRESSURE: 130 MMHG | DIASTOLIC BLOOD PRESSURE: 66 MMHG | RESPIRATION RATE: 16 BRPM

## 2024-11-18 DIAGNOSIS — N52.01 ERECTILE DYSFUNCTION DUE TO ARTERIAL INSUFFICIENCY: Primary | ICD-10-CM

## 2024-11-18 LAB
GLUCOSE BLD STRIP.AUTO-MCNC: 92 MG/DL (ref 65–99)
PATHOLOGY CONSULT NOTE: NORMAL

## 2024-11-18 PROCEDURE — 160029 HCHG SURGERY MINUTES - 1ST 30 MINS LEVEL 4: Performed by: UROLOGY

## 2024-11-18 PROCEDURE — 700105 HCHG RX REV CODE 258: Performed by: STUDENT IN AN ORGANIZED HEALTH CARE EDUCATION/TRAINING PROGRAM

## 2024-11-18 PROCEDURE — 700101 HCHG RX REV CODE 250: Performed by: STUDENT IN AN ORGANIZED HEALTH CARE EDUCATION/TRAINING PROGRAM

## 2024-11-18 PROCEDURE — 160047 HCHG PACU  - EA ADDL 30 MINS PHASE II: Performed by: UROLOGY

## 2024-11-18 PROCEDURE — 700111 HCHG RX REV CODE 636 W/ 250 OVERRIDE (IP): Performed by: UROLOGY

## 2024-11-18 PROCEDURE — A9270 NON-COVERED ITEM OR SERVICE: HCPCS | Performed by: UROLOGY

## 2024-11-18 PROCEDURE — 110371 HCHG SHELL REV 272: Performed by: UROLOGY

## 2024-11-18 PROCEDURE — 160009 HCHG ANES TIME/MIN: Performed by: UROLOGY

## 2024-11-18 PROCEDURE — 160041 HCHG SURGERY MINUTES - EA ADDL 1 MIN LEVEL 4: Performed by: UROLOGY

## 2024-11-18 PROCEDURE — 160002 HCHG RECOVERY MINUTES (STAT): Performed by: UROLOGY

## 2024-11-18 PROCEDURE — 88304 TISSUE EXAM BY PATHOLOGIST: CPT

## 2024-11-18 PROCEDURE — 82962 GLUCOSE BLOOD TEST: CPT

## 2024-11-18 PROCEDURE — C1813 PROSTHESIS, PENILE, INFLATAB: HCPCS | Performed by: UROLOGY

## 2024-11-18 PROCEDURE — 160025 RECOVERY II MINUTES (STATS): Performed by: UROLOGY

## 2024-11-18 PROCEDURE — 700111 HCHG RX REV CODE 636 W/ 250 OVERRIDE (IP): Mod: JZ | Performed by: STUDENT IN AN ORGANIZED HEALTH CARE EDUCATION/TRAINING PROGRAM

## 2024-11-18 PROCEDURE — 160046 HCHG PACU - 1ST 60 MINS PHASE II: Performed by: UROLOGY

## 2024-11-18 PROCEDURE — 160035 HCHG PACU - 1ST 60 MINS PHASE I: Performed by: UROLOGY

## 2024-11-18 PROCEDURE — 502000 HCHG MISC OR IMPLANTS RC 0278: Performed by: UROLOGY

## 2024-11-18 PROCEDURE — 700105 HCHG RX REV CODE 258: Performed by: UROLOGY

## 2024-11-18 PROCEDURE — 160048 HCHG OR STATISTICAL LEVEL 1-5: Performed by: UROLOGY

## 2024-11-18 PROCEDURE — RXMED WILLOW AMBULATORY MEDICATION CHARGE: Performed by: UROLOGY

## 2024-11-18 PROCEDURE — 700102 HCHG RX REV CODE 250 W/ 637 OVERRIDE(OP): Performed by: UROLOGY

## 2024-11-18 DEVICE — IMPLANTABLE DEVICE: Type: IMPLANTABLE DEVICE | Site: SCROTUM | Status: FUNCTIONAL

## 2024-11-18 RX ORDER — LIDOCAINE HYDROCHLORIDE 20 MG/ML
INJECTION, SOLUTION EPIDURAL; INFILTRATION; INTRACAUDAL; PERINEURAL PRN
Status: DISCONTINUED | OUTPATIENT
Start: 2024-11-18 | End: 2024-11-18 | Stop reason: HOSPADM

## 2024-11-18 RX ORDER — DEXAMETHASONE SODIUM PHOSPHATE 4 MG/ML
INJECTION, SOLUTION INTRA-ARTICULAR; INTRALESIONAL; INTRAMUSCULAR; INTRAVENOUS; SOFT TISSUE PRN
Status: DISCONTINUED | OUTPATIENT
Start: 2024-11-18 | End: 2024-11-18 | Stop reason: SURG

## 2024-11-18 RX ORDER — EPHEDRINE SULFATE 50 MG/ML
INJECTION, SOLUTION INTRAVENOUS PRN
Status: DISCONTINUED | OUTPATIENT
Start: 2024-11-18 | End: 2024-11-18 | Stop reason: SURG

## 2024-11-18 RX ORDER — ONDANSETRON 2 MG/ML
INJECTION INTRAMUSCULAR; INTRAVENOUS PRN
Status: DISCONTINUED | OUTPATIENT
Start: 2024-11-18 | End: 2024-11-18 | Stop reason: HOSPADM

## 2024-11-18 RX ORDER — EPHEDRINE SULFATE 50 MG/ML
5 INJECTION, SOLUTION INTRAVENOUS
Status: DISCONTINUED | OUTPATIENT
Start: 2024-11-18 | End: 2024-11-18 | Stop reason: HOSPADM

## 2024-11-18 RX ORDER — MIDAZOLAM HYDROCHLORIDE 1 MG/ML
INJECTION INTRAMUSCULAR; INTRAVENOUS PRN
Status: DISCONTINUED | OUTPATIENT
Start: 2024-11-18 | End: 2024-11-18 | Stop reason: SURG

## 2024-11-18 RX ORDER — HYDROMORPHONE HYDROCHLORIDE 1 MG/ML
0.1 INJECTION, SOLUTION INTRAMUSCULAR; INTRAVENOUS; SUBCUTANEOUS
Status: DISCONTINUED | OUTPATIENT
Start: 2024-11-18 | End: 2024-11-18 | Stop reason: HOSPADM

## 2024-11-18 RX ORDER — ROCURONIUM BROMIDE 10 MG/ML
INJECTION, SOLUTION INTRAVENOUS PRN
Status: DISCONTINUED | OUTPATIENT
Start: 2024-11-18 | End: 2024-11-18 | Stop reason: SURG

## 2024-11-18 RX ORDER — HALOPERIDOL 5 MG/ML
1 INJECTION INTRAMUSCULAR
Status: DISCONTINUED | OUTPATIENT
Start: 2024-11-18 | End: 2024-11-18 | Stop reason: HOSPADM

## 2024-11-18 RX ORDER — ALBUTEROL SULFATE 5 MG/ML
2.5 SOLUTION RESPIRATORY (INHALATION)
Status: DISCONTINUED | OUTPATIENT
Start: 2024-11-18 | End: 2024-11-18 | Stop reason: HOSPADM

## 2024-11-18 RX ORDER — LIDOCAINE HYDROCHLORIDE 10 MG/ML
INJECTION, SOLUTION INFILTRATION; PERINEURAL
Status: DISCONTINUED | OUTPATIENT
Start: 2024-11-18 | End: 2024-11-18 | Stop reason: HOSPADM

## 2024-11-18 RX ORDER — VANCOMYCIN 1 G/200ML
1000 INJECTION, SOLUTION INTRAVENOUS ONCE
Status: COMPLETED | OUTPATIENT
Start: 2024-11-18 | End: 2024-11-18

## 2024-11-18 RX ORDER — SODIUM CHLORIDE 9 MG/ML
INJECTION, SOLUTION INTRAVENOUS
Status: DISCONTINUED | OUTPATIENT
Start: 2024-11-18 | End: 2024-11-18 | Stop reason: SURG

## 2024-11-18 RX ORDER — HYDROMORPHONE HYDROCHLORIDE 1 MG/ML
0.2 INJECTION, SOLUTION INTRAMUSCULAR; INTRAVENOUS; SUBCUTANEOUS
Status: DISCONTINUED | OUTPATIENT
Start: 2024-11-18 | End: 2024-11-18 | Stop reason: HOSPADM

## 2024-11-18 RX ORDER — MELOXICAM 7.5 MG/1
7.5 TABLET ORAL DAILY
Qty: 10 TABLET | Refills: 0 | Status: SHIPPED | OUTPATIENT
Start: 2024-11-18

## 2024-11-18 RX ORDER — BACITRACIN ZINC 500 [USP'U]/G
OINTMENT TOPICAL
Status: DISCONTINUED | OUTPATIENT
Start: 2024-11-18 | End: 2024-11-18 | Stop reason: HOSPADM

## 2024-11-18 RX ORDER — BUPIVACAINE HYDROCHLORIDE 2.5 MG/ML
INJECTION, SOLUTION EPIDURAL; INFILTRATION; INTRACAUDAL
Status: DISCONTINUED | OUTPATIENT
Start: 2024-11-18 | End: 2024-11-18 | Stop reason: HOSPADM

## 2024-11-18 RX ORDER — OXYCODONE HYDROCHLORIDE 5 MG/1
5 TABLET ORAL EVERY 6 HOURS PRN
Qty: 12 TABLET | Refills: 0 | Status: SHIPPED | OUTPATIENT
Start: 2024-11-18 | End: 2024-11-21

## 2024-11-18 RX ORDER — OXYCODONE HCL 5 MG/5 ML
10 SOLUTION, ORAL ORAL
Status: DISCONTINUED | OUTPATIENT
Start: 2024-11-18 | End: 2024-11-18 | Stop reason: HOSPADM

## 2024-11-18 RX ORDER — ONDANSETRON 2 MG/ML
4 INJECTION INTRAMUSCULAR; INTRAVENOUS
Status: COMPLETED | OUTPATIENT
Start: 2024-11-18 | End: 2024-11-18

## 2024-11-18 RX ORDER — HYDRALAZINE HYDROCHLORIDE 20 MG/ML
5 INJECTION INTRAMUSCULAR; INTRAVENOUS
Status: DISCONTINUED | OUTPATIENT
Start: 2024-11-18 | End: 2024-11-18 | Stop reason: HOSPADM

## 2024-11-18 RX ORDER — SODIUM CHLORIDE 9 MG/ML
INJECTION, SOLUTION INTRAVENOUS
Status: COMPLETED | OUTPATIENT
Start: 2024-11-18 | End: 2024-11-18

## 2024-11-18 RX ORDER — OXYCODONE HCL 5 MG/5 ML
5 SOLUTION, ORAL ORAL
Status: DISCONTINUED | OUTPATIENT
Start: 2024-11-18 | End: 2024-11-18 | Stop reason: HOSPADM

## 2024-11-18 RX ORDER — PHENAZOPYRIDINE HYDROCHLORIDE 100 MG/1
200 TABLET, FILM COATED ORAL
Status: DISCONTINUED | OUTPATIENT
Start: 2024-11-18 | End: 2024-11-18 | Stop reason: HOSPADM

## 2024-11-18 RX ORDER — SULFAMETHOXAZOLE AND TRIMETHOPRIM 800; 160 MG/1; MG/1
1 TABLET ORAL 2 TIMES DAILY
Qty: 6 TABLET | Refills: 0 | Status: SHIPPED | OUTPATIENT
Start: 2024-11-18 | End: 2024-11-21

## 2024-11-18 RX ORDER — DIPHENHYDRAMINE HYDROCHLORIDE 50 MG/ML
12.5 INJECTION INTRAMUSCULAR; INTRAVENOUS
Status: DISCONTINUED | OUTPATIENT
Start: 2024-11-18 | End: 2024-11-18 | Stop reason: HOSPADM

## 2024-11-18 RX ORDER — HYDROMORPHONE HYDROCHLORIDE 1 MG/ML
0.4 INJECTION, SOLUTION INTRAMUSCULAR; INTRAVENOUS; SUBCUTANEOUS
Status: DISCONTINUED | OUTPATIENT
Start: 2024-11-18 | End: 2024-11-18 | Stop reason: HOSPADM

## 2024-11-18 RX ORDER — LABETALOL HYDROCHLORIDE 5 MG/ML
5 INJECTION, SOLUTION INTRAVENOUS
Status: DISCONTINUED | OUTPATIENT
Start: 2024-11-18 | End: 2024-11-18 | Stop reason: HOSPADM

## 2024-11-18 RX ADMIN — DEXAMETHASONE SODIUM PHOSPHATE 4 MG: 4 INJECTION INTRA-ARTICULAR; INTRALESIONAL; INTRAMUSCULAR; INTRAVENOUS; SOFT TISSUE at 12:58

## 2024-11-18 RX ADMIN — ONDANSETRON 4 MG: 2 INJECTION INTRAMUSCULAR; INTRAVENOUS at 14:41

## 2024-11-18 RX ADMIN — PROPOFOL 80 MG: 10 INJECTION, EMULSION INTRAVENOUS at 12:34

## 2024-11-18 RX ADMIN — GENTAMICIN SULFATE 300 MG: 40 INJECTION, SOLUTION INTRAMUSCULAR; INTRAVENOUS at 12:23

## 2024-11-18 RX ADMIN — VANCOMYCIN 1000 MG: 1 INJECTION, SOLUTION INTRAVENOUS at 10:54

## 2024-11-18 RX ADMIN — ONDANSETRON 4 MG: 2 INJECTION INTRAMUSCULAR; INTRAVENOUS at 14:25

## 2024-11-18 RX ADMIN — SODIUM CHLORIDE: 9 INJECTION, SOLUTION INTRAVENOUS at 12:23

## 2024-11-18 RX ADMIN — EPHEDRINE SULFATE 5 MG: 50 INJECTION, SOLUTION INTRAVENOUS at 12:40

## 2024-11-18 RX ADMIN — MIDAZOLAM HYDROCHLORIDE 2 MG: 1 INJECTION, SOLUTION INTRAMUSCULAR; INTRAVENOUS at 12:31

## 2024-11-18 RX ADMIN — FENTANYL CITRATE 250 MCG: 50 INJECTION, SOLUTION INTRAMUSCULAR; INTRAVENOUS at 12:31

## 2024-11-18 RX ADMIN — SODIUM CHLORIDE: 9 INJECTION, SOLUTION INTRAVENOUS at 10:45

## 2024-11-18 RX ADMIN — LIDOCAINE HYDROCHLORIDE 70 MG: 20 INJECTION, SOLUTION EPIDURAL; INFILTRATION; INTRACAUDAL; PERINEURAL at 12:34

## 2024-11-18 RX ADMIN — SUGAMMADEX 200 MG: 100 INJECTION, SOLUTION INTRAVENOUS at 14:19

## 2024-11-18 RX ADMIN — ROCURONIUM BROMIDE 50 MG: 50 INJECTION, SOLUTION INTRAVENOUS at 12:34

## 2024-11-18 ASSESSMENT — PAIN DESCRIPTION - PAIN TYPE
TYPE: SURGICAL PAIN
TYPE: ACUTE PAIN;SURGICAL PAIN

## 2024-11-18 ASSESSMENT — FIBROSIS 4 INDEX: FIB4 SCORE: 2.63

## 2024-11-18 NOTE — OP REPORT
OPERATIVE NOTE:     PREOPERATIVE DIAGNOSIS:    Erectile dysfunction due to arterial insufficiency    POSTOPERATIVE DIAGNOSIS:    Same        PROCEDURE DETAILS:       1. Intracorporal penile prosthesis placement: penoscrotal    2. Artificial erection  3.  Penile cyst excision 2 x 1 cm.     SURGERY TYPE:  Elective     SURGEONS:       Bandar Henry MD     Assistant:      Delores Rodríguez certified first assist    ANESTHETIC TECHNIQUE: General ET     OPERATIVE FINDINGS:       Corpora dilated to 12mm     18cm CX device with 0 cm rear tip extenders placed. Good placement of cylinders with tips palpated at the glans.  Peno-scrotal approach    Pump placed in dartos pouch in right hemiscrotum.      Reservoir placed in right space of Retzius.     WOUND CLASS: Clean     COMPLICATIONS/EVENTS:  None     SPECIMENS: None     ESTIMATED BLOOD LOSS: 50ml      BLOOD PRODUCTS:  none     DRAINS: 16F ward catheter      IMPLANTS:        , 18cm CX, 0cm RTE, Pump Right scrotum and Reservoir Right side    INDICATION     70 yo male with a history of erectile dysfunction refractory to medical therapy. After a thorough discussion of management options, he has elected to undergo intracorporal penile prosthesis placement.  He understands the risks including but not limited to bleeding, infection, damage to encountered structures (nerves, vessels, urethra), pain, penile foreshortening, device failure, and  repeat procedures.  With an understanding of the above, he elected and consented to proceed.      PROCEDURE DESCRIPTION     The patient was correctly identified in the preoperative holding area and surgical consent was obtained.  The patient was then transferred to the OR and placed on the OR table in a supine fashion. General anesthesia, venodyne boots,  and intravenous antibiotics consisting of vancomycin and gentamicin were administered.  After an adequate level of general anesthesia was obtained the patient was moved into a modified frog  leg position with bony prominences padded, secured to the operative table, and the operative site shaved, prepped, and draped in the usual sterile fashion.  A operative room team timeout was then carried out confirming the patient's identity and planned procedure.      The ~4cm horizontal penoscrotal and potential right inguinal counter incisions were marked and an Ioban drape was placed over the operative field to facilitate a no touch technique.  We then began by inducing a pharmacologic erection with 30cc of 1%  lidocaine.  This demonstrated 0 degree curvature. A dorsal penile block was performed with 20cc of Marcaine.      A 16F ward catheter was placed sterily into the bladder with return of yellow fluid, which was suctioned out to empty the bladder.  A 4cm horizontal incision was made with a #15 blade at the penoscrotal junction through to the subcutaneous tissue.  A combination of sharp dissection with Metzenbaum scissors and Bovie cautery was used to incise the subcutaneous tissue down to the corporal bodies taking care to protect the urethra.  A Lone star retractor was placed and secured with a yellow hook with the hook cut off.  A 1010 drape was placed and all gloves were changed to assist in the no touch technique.  We then placed skin hooks to aid in exposure.  A marking pen was used to delineate the corporotomy incisions.  Four Interrupted holding sutures were then placed in the in the tunica albuginea on either side of planned corporotomy consisting of 2-0 Monocryl.  The corporotomy was then performed with a #15 blade scalpel and extended proximally and distally using sharp scissors. We then performed proximal and distal dilation using the Hegar dilators from 9mm to 12mm.       We then measured the proximal and distal corporotomy length using the Claude with proximal length of 7cm and distal length of 11cm.   After irrigation of the corporal bodies with  irrigant without evidence of urethral injury,   and change of gloves, we then placed a 18cm CX device with 0 cm rear tip extenders to good effect. The distal cylinders were placed using the guide suture back loaded into a Claude with the needle directed to the lateral glans.  Once the device was formally seated and adequate placement / function was confirmed by cycling the surrogate reservoir, the previously placed corporotomy sutures were tied down to good effect.        We then turned our attention to the scrotal pump placement. A sub dartos pouch was identified in the right/midline hemiscrotum and dilated with nasal speculum without evidence of bleeding.  The pump was seated to good effect and secured with figure of eight 3-0 Monocryl while protecting the tubing.  We then changed our gloves and performed the reservoir placement.  Manual dissection was carried out in the right inguinal space anterior to the spermatic cord and a manual puncture was placed medial to the inguinal ring into the space of retzius.  The conceal reservoir was then placed in this space and inflated with 100 cc normal saline.       The device was then cycled confirming good position of the device and anatomic appearance. The wound was then irrigated again, meticulous hemostasis was achieved. The dartos layer was closed in interrupted figure of eight using 3-0 monocryl, a second subcutaneous layer using 3-0 monocryl in a running fashion, and the skin closed in running horizontal mattress using 4-0 Monocryl.  Dermabond was applied.    To excise the ventral cyst midshaft I made an ellipsoid incision 3 cm x 1 cm completely excising the likely epidermoid cyst.  This is sent for permanent pathology.  I closed this in 2 layers with deep interrupted 3-0 Monocryl and then interrupted horizontal mattress 4-0 Monocryl sutures.  Hemostasis was adequate.  Dermabond was applied.     A mummy wrap was placed on the penis and scrotum and supportive undergarments were applied.  He was then washed, dried,  awakened, extubated, and returned to the recovery area without obvious complication.  He tolerated the procedure well.

## 2024-11-18 NOTE — DISCHARGE INSTR - OTHER INFO
Penile Implant Instructions       LIFTING RESTRICTIONS:     No lifting over 10 pounds for 4 weeks post-op     Can shower 48 hours after surgery or after penile wrap dressing is removed- use light soap and water- do not soak incision.     No baths, swimming, hot tubs or soaking incision for 4 weeks. We want the incision healed.      Drain to be removed 2-3 days after surgery.       Medications:     A strong non-steroidal anti-inflammatory (NSAID) medication, meloxicam, is prescribed for 10 days. Do not take any Ibuprofen or other NSAID while taking this medication.      Take over the counter Tylenol (acetaminophen) around the clock for the first week after surgery     An antibiotic, usually Bactrim, is prescribed for the 3 days after surgery     A stronger pain medication, usually oxycodone, is prescribed for only AS NEEDED BREAKTHROUGH PAIN. Only take this if the Mexolicam and Tylenol are not controlling your pain. Take a stool softener, like Miralax, if you are taking opioid pain medication.      Ask your provider when to restart your blood thinning medication:      2-3 WEEKS AFTER SURGERY: Inflate to a mild stretch. No discomfort.      1st week: inflate and immediately deflate the device one time a day to get used to using the device.     2nd week: inflate, leave inflated for 5 minutes, 2 times a day     3rd-5th week: inflate, leave inflated for 20 minutes, 2 times a day       6 WEEKS AFTER SURGERY:     May begin to have intercourse at this point     Inflate the device DAILY to maximum rigidity (mild stretch or mild discomfort) for 10-15 minutes each day for 6 months.       General Follow-up schedule:     1 day post-op for wound check catheter removal ([facility] [place])    3 days post op for BENJAMÍN drain removal and if originally applied during surgery - the penis wrap dressing removal (coban), ([facility] [place])    1 week for wound check with physician assistant  ([facility] [place])    2-3 weeks post-op for device teaching  -“cycling the device.” ([facility] [place])    6-8 weeks post-op: Consider release to activity. ([facility] [place])    6 months post-op     Annual          FAQs     What is my expected recovery time?     Everyone heals at different rates, so recovery times can vary between patients. Generally, you will likely begin to feel better after the first week.      How active can I be after surgery?          There are some restrictions following your surgery. Specifically, no heavy lifting (nothing over 15 lbs.) for the first 4 weeks, and no rigorous exercise. Normal day to day activities may be resumed as you feel up to it.      Will I have to have my stitches removed?      No.  The sutures we use are dissolvable, but are made to stay in for a long time. Your suture will likely dissolve over 4-8 weeks.       Is this an out-patient procedure?      Yes. However, you may spend the night in the hospital and be discharged the following morning. This is because you will have a Garcia catheter in your bladder overnight, and a wrapped dressing over your penis and scrotum. If you are comfortable removing your catheter and the dressing yourself, you can be discharged the same day as your surgery and remove your catheter and dressing at home the following morning, or you may return to clinic to have this done for you.      Is bruising and swelling normal?      Yes. As with any surgery, there will be bruising and swelling. You will be prescribed post-operative pain medication and may utilize an ice pack to help with swelling. The overnight dressing will help with swelling a great deal, but everyone is different. Since scarring will start immediately as part of the healing process, it is important to keep your penis upright (facing your belly button) and wear supportive undergarments for the first 2 weeks after surgery. Doing this will help with swelling.        Because swelling can push the pump of your device upwards, it is very important to  GENTLY pull the pump towards the bottom of your scrotum starting the day after surgery. You should GENTLY pull the pump downwards 1-2 times a day until your post-operative appointment in clinic.         MEDICAL HELP DURING NORMAL BUSINESS HOURS:     Between the hours of 8 AM and 5 PM, please call [phone] to speak with Dr. Bandar Henry's staff.      MEDICAL HELP AFTER HOURS:     If you have a serious emergency such as chest pain, shortness of breath, relentless pain you should call 911. For other urgent problems after hours you may contact the urology physician on call by phoning the [phone]. You may also visit the Emergency room at local hospital for help.      For non-emergent problems such as prescription refills or routine questions, please do your best to contact us during normal business hours. This after-hours number should be used for urgent or emergent questions only.     Bandar Henry M.D.      [address]     [place] [id number]      [phone]

## 2024-11-18 NOTE — ANESTHESIA POSTPROCEDURE EVALUATION
Patient: Abram Barillas    Procedure Summary       Date: 11/18/24 Room / Location: [place] [address] / SURGERY [facility]    Anesthesia Start: 1223 Anesthesia Stop: 1438    Procedures:       INSERTION OF PENILE PROSTHESIS (Scrotum)      REMOVAL, CYST, SKIN, PENILE (Penis) Diagnosis: (ERECTILE DYSFUNCTION CO-OCCURENT AND DUE TO ARTERIAL INSUFFICIENCY)    Surgeons: Bandar Henry M.D. Responsible Provider: Patrica Thurman D.O.    Anesthesia Type: general ASA Status: 3            Final Anesthesia Type: general  Last vitals  BP   Blood Pressure : 136/88    Temp   36.2 °C (97.2 °F)    Pulse   (!) 59   Resp   18    SpO2   97 %      Anesthesia Post Evaluation    Patient location during evaluation: PACU  Patient participation: complete - patient participated  Level of consciousness: awake and alert    Airway patency: patent  Anesthetic complications: no  Cardiovascular status: hemodynamically stable  Respiratory status: acceptable  Hydration status: euvolemic    PONV: none          There were no known notable events for this encounter.     Nurse Pain Score: 0 (NPRS)

## 2024-11-18 NOTE — ANESTHESIA PROCEDURE NOTES
Airway    Date/Time: 11/18/2024 12:37 PM    Performed by: Patrica Thurman D.O.  Authorized by: Patrica Thurman D.O.    Location:  OR  Urgency:  Elective  Indications for Airway Management:  Anesthesia      Spontaneous Ventilation: absent    Sedation Level:  Deep  Preoxygenated: Yes    Patient Position:  Sniffing  Mask Difficulty Assessment:  1 - vent by mask  Final Airway Type:  Endotracheal airway  Final Endotracheal Airway:  ETT  Cuffed: Yes    Technique Used for Successful ETT Placement:  Direct laryngoscopy    Insertion Site:  Oral  Blade Type:  Bettie  Laryngoscope Blade/Videolaryngoscope Blade Size:  4  ETT Size (mm):  7.5  Measured from:  Lips  ETT to Lips (cm):  23  Placement Verified by: auscultation and capnometry    Cormack-Lehane Classification:  Grade I - full view of glottis  Number of Attempts at Approach:  1

## 2024-11-18 NOTE — DISCHARGE INSTRUCTIONS
HOME CARE INSTRUCTIONS    ACTIVITY: Rest and take it easy for the first 24 hours.  A responsible adult is recommended to remain with you during that time.  It is normal to feel sleepy.  We encourage you to not do anything that requires balance, judgment or coordination.    FOR 24 HOURS DO NOT:  Drive, operate machinery or run household appliances.  Drink beer or alcoholic beverages.  Make important decisions or sign legal documents.    SPECIAL INSTRUCTIONS: see above    DIET: To avoid nausea, slowly advance diet as tolerated, avoiding spicy or greasy foods for the first day.  Add more substantial food to your diet according to your physician's instructions.   INCREASE FLUIDS AND FIBER TO AVOID CONSTIPATION.    SURGICAL DRESSING/BATHING:     MEDICATIONS: Resume taking daily medication.  Take prescribed pain medication with food.  If no medication is prescribed, you may take non-aspirin pain medication if needed.  PAIN MEDICATION CAN BE VERY CONSTIPATING.  Take a stool softener or laxative such as senokot, pericolace, or milk of magnesia if needed.    Prescription given for Oxycodone and bactrim.  Last pain medication given at None.    A follow-up appointment should be arranged with your doctor in 1-2 weeks; call to schedule.    You should CALL YOUR PHYSICIAN if you develop:  Fever greater than 101 degrees F.  Pain not relieved by medication, or persistent nausea or vomiting.  Excessive bleeding (blood soaking through dressing) or unexpected drainage from the wound.  Extreme redness or swelling around the incision site, drainage of pus or foul smelling drainage.  Inability to urinate or empty your bladder within 8 hours.  Problems with breathing or chest pain.    You should call 911 if you develop problems with breathing or chest pain.    If you are unable to contact your doctor or surgical center, you should go to the nearest emergency room or urgent care center.  Physician's telephone #: [phone]    MILD  FLU-LIKE SYMPTOMS ARE NORMAL.  YOU MAY EXPERIENCE GENERALIZED MUSCLE ACHES, THROAT IRRITATION, HEADACHE AND/OR SOME NAUSEA.    If any questions arise, call your doctor.  If your doctor is not available, please feel free to call the Surgical Center at [phone].  The Center is open Monday through Friday from 7AM to 7PM.      A registered nurse may call you a few days after your surgery to see how you are doing after your procedure.    You may also receive a survey in the mail within the next two weeks and we ask that you take a few moments to complete the survey and return it to us.  Our goal is to provide you with very good care and we value your comments.     Depression / Suicide Risk    As you are discharged from this [facility] facility, it is important to learn how to keep safe from harming yourself.    Recognize the warning signs:  Abrupt changes in personality, positive or negative- including increase in energy   Giving away possessions  Change in eating patterns- significant weight changes-  positive or negative  Change in sleeping patterns- unable to sleep or sleeping all the time   Unwillingness or inability to communicate  Depression  Unusual sadness, discouragement and loneliness  Talk of wanting to die  Neglect of personal appearance   Rebelliousness- reckless behavior  Withdrawal from people/activities they love  Confusion- inability to concentrate     If you or a loved one observes any of these behaviors or has concerns about self-harm, here's what you can do:  Talk about it- your feelings and reasons for harming yourself  Remove any means that you might use to hurt yourself (examples: pills, rope, extension cords, firearm)  Get professional help from the community (Mental Health, Substance Abuse, psychological counseling)  Do not be alone:Call your Safe Contact- someone whom you trust who will be there for you.  Call your local CRISIS HOTLINE 472-2487 or [phone]  Call your local  Children's Mobile Crisis Response Team [place] (221) 647-0503 or www.[employer]  Call the toll free National Suicide Prevention Hotlines   National Suicide Prevention Lifeline 492-336-UOCV (2193)  [place] [employer] Line Network 800-SUICIDE (788-1327)    I acknowledge receipt and understanding of these Home Care instructions.

## 2024-11-18 NOTE — OR NURSING
1434: Pt arrived via gurney with anesthesia and RN. Report received. See flowsheet for VS. Dressing intact with minimal, old drainage. Benjamín drain and catheter in place. Orders reviewed and initiated.   1523: Report called to YONAS Lott. All questions answered. VSS. Dressing intact with minimal, old drainage. BENJAMÍN drain and ward catheter in place. No patient distress. Alert and oriented x4. Pt transported to phase 2 in stable condition on room air with no personal belongings.

## 2024-11-18 NOTE — PROGRESS NOTES
Medication history reviewed with PT at bedside. PT's daughter, Rand interpreted    Med rec is complete per PT reporting    Allergies reviewed.     Patient denies any outpatient antibiotics in the last 30 days.     Patient is not taking anticoagulants.    Preferred pharmacy for this visit - [facility] on [address] ([phone])

## 2024-11-18 NOTE — ANESTHESIA TIME REPORT
Anesthesia Start and Stop Event Times       Date Time Event    11/18/2024 1129 Ready for Procedure     1223 Anesthesia Start     1438 Anesthesia Stop          Responsible Staff  11/18/24      Name Role Begin End    Patrica Thurman D.O. Anesth 1223 1438          Overtime Reason:  no overtime (within assigned shift)    Comments:

## 2024-11-19 NOTE — OR NURSING
Arrived from PACU AXO, Pt's VSS; denies N/V; states pain is at tolerable level. Dressing CDI to penis.   Educated wife on ward care, emptying bag, how to empty BENJAMÍN drain, and squeeze it back to suction.     D/c orders received. IV dc'd. Pt changed into clothing with assistance. Discharge reviewed, Pt and family verbalized understanding and questions answered. Patient states ready to d/c home. Pt dc'd in w/c with Wife.

## 2024-11-20 PROCEDURE — 93295 DEV INTERROG REMOTE 1/2/MLT: CPT | Performed by: INTERNAL MEDICINE

## 2024-11-21 ENCOUNTER — PHARMACY VISIT (OUTPATIENT)
Dept: PHARMACY | Facility: MEDICAL CENTER | Age: 69
End: 2024-11-21
Payer: COMMERCIAL

## 2024-11-23 ENCOUNTER — NON-PROVIDER VISIT (OUTPATIENT)
Dept: CARDIOLOGY | Facility: MEDICAL CENTER | Age: 69
End: 2024-11-23
Payer: MEDICARE

## 2025-01-30 PROCEDURE — RXMED WILLOW AMBULATORY MEDICATION CHARGE: Performed by: FAMILY MEDICINE

## 2025-02-04 ENCOUNTER — PHARMACY VISIT (OUTPATIENT)
Dept: PHARMACY | Facility: MEDICAL CENTER | Age: 70
End: 2025-02-04
Payer: COMMERCIAL

## 2025-03-03 ENCOUNTER — NON-PROVIDER VISIT (OUTPATIENT)
Dept: CARDIOLOGY | Facility: MEDICAL CENTER | Age: 70
End: 2025-03-03
Payer: MEDICARE

## 2025-03-04 PROCEDURE — 93295 DEV INTERROG REMOTE 1/2/MLT: CPT | Performed by: STUDENT IN AN ORGANIZED HEALTH CARE EDUCATION/TRAINING PROGRAM

## 2025-03-05 NOTE — CARDIAC REMOTE MONITOR - SCAN
Device transmission reviewed. Device demonstrated appropriate function.       Electronically Signed by: Mery Narayanan MD, PhD    3/12/2025  1:16 PM

## 2025-03-12 PROCEDURE — RXMED WILLOW AMBULATORY MEDICATION CHARGE: Performed by: FAMILY MEDICINE

## 2025-03-13 ENCOUNTER — PHARMACY VISIT (OUTPATIENT)
Dept: PHARMACY | Facility: MEDICAL CENTER | Age: 70
End: 2025-03-13
Payer: COMMERCIAL

## 2025-03-13 DIAGNOSIS — I25.10 CORONARY ARTERY DISEASE DUE TO CALCIFIED CORONARY LESION: ICD-10-CM

## 2025-03-13 DIAGNOSIS — E78.5 DYSLIPIDEMIA: ICD-10-CM

## 2025-03-13 DIAGNOSIS — I25.84 CORONARY ARTERY DISEASE DUE TO CALCIFIED CORONARY LESION: ICD-10-CM

## 2025-03-14 RX ORDER — ROSUVASTATIN CALCIUM 40 MG/1
40 TABLET, COATED ORAL
Qty: 100 TABLET | Refills: 3 | Status: SHIPPED | OUTPATIENT
Start: 2025-03-14 | End: 2025-03-17 | Stop reason: SDUPTHER

## 2025-03-17 ENCOUNTER — OFFICE VISIT (OUTPATIENT)
Dept: MEDICAL GROUP | Facility: MEDICAL CENTER | Age: 70
End: 2025-03-17
Payer: MEDICARE

## 2025-03-17 VITALS
HEART RATE: 61 BPM | SYSTOLIC BLOOD PRESSURE: 118 MMHG | WEIGHT: 151.68 LBS | BODY MASS INDEX: 25.27 KG/M2 | TEMPERATURE: 97 F | OXYGEN SATURATION: 97 % | RESPIRATION RATE: 16 BRPM | DIASTOLIC BLOOD PRESSURE: 68 MMHG | HEIGHT: 65 IN

## 2025-03-17 DIAGNOSIS — I25.10 CORONARY ARTERY DISEASE DUE TO CALCIFIED CORONARY LESION: ICD-10-CM

## 2025-03-17 DIAGNOSIS — I25.5 ISCHEMIC CARDIOMYOPATHY: ICD-10-CM

## 2025-03-17 DIAGNOSIS — E11.8 CONTROLLED TYPE 2 DIABETES MELLITUS WITH COMPLICATION, WITHOUT LONG-TERM CURRENT USE OF INSULIN (HCC): ICD-10-CM

## 2025-03-17 DIAGNOSIS — I25.84 CORONARY ARTERY DISEASE DUE TO CALCIFIED CORONARY LESION: ICD-10-CM

## 2025-03-17 DIAGNOSIS — Z95.810 AICD (AUTOMATIC CARDIOVERTER/DEFIBRILLATOR) PRESENT: ICD-10-CM

## 2025-03-17 DIAGNOSIS — G89.29 CHRONIC PAIN IN RIGHT SHOULDER: ICD-10-CM

## 2025-03-17 DIAGNOSIS — I50.22 CHRONIC SYSTOLIC (CONGESTIVE) HEART FAILURE (HCC): ICD-10-CM

## 2025-03-17 DIAGNOSIS — D69.6 THROMBOCYTOPENIA (HCC): ICD-10-CM

## 2025-03-17 DIAGNOSIS — E03.9 HYPOTHYROIDISM, UNSPECIFIED TYPE: ICD-10-CM

## 2025-03-17 DIAGNOSIS — I10 ESSENTIAL HYPERTENSION: ICD-10-CM

## 2025-03-17 DIAGNOSIS — E78.5 DYSLIPIDEMIA: ICD-10-CM

## 2025-03-17 DIAGNOSIS — N13.8 BENIGN PROSTATIC HYPERPLASIA WITH URINARY OBSTRUCTION: ICD-10-CM

## 2025-03-17 DIAGNOSIS — M25.511 CHRONIC PAIN IN RIGHT SHOULDER: ICD-10-CM

## 2025-03-17 DIAGNOSIS — N40.1 BENIGN PROSTATIC HYPERPLASIA WITH URINARY OBSTRUCTION: ICD-10-CM

## 2025-03-17 PROBLEM — I47.20 VT (VENTRICULAR TACHYCARDIA) (HCC): Status: RESOLVED | Noted: 2024-01-10 | Resolved: 2025-03-17

## 2025-03-17 PROCEDURE — 3078F DIAST BP <80 MM HG: CPT | Performed by: FAMILY MEDICINE

## 2025-03-17 PROCEDURE — 3074F SYST BP LT 130 MM HG: CPT | Performed by: FAMILY MEDICINE

## 2025-03-17 PROCEDURE — 99214 OFFICE O/P EST MOD 30 MIN: CPT | Performed by: FAMILY MEDICINE

## 2025-03-17 RX ORDER — METOPROLOL SUCCINATE 50 MG/1
50 TABLET, EXTENDED RELEASE ORAL
Qty: 100 TABLET | Refills: 3 | Status: SHIPPED | OUTPATIENT
Start: 2025-03-17

## 2025-03-17 RX ORDER — ROSUVASTATIN CALCIUM 40 MG/1
40 TABLET, COATED ORAL
Qty: 100 TABLET | Refills: 3 | Status: SHIPPED | OUTPATIENT
Start: 2025-03-17

## 2025-03-17 RX ORDER — GLIMEPIRIDE 1 MG/1
1 TABLET ORAL 2 TIMES DAILY WITH MEALS
Qty: 200 TABLET | Refills: 3 | Status: SHIPPED | OUTPATIENT
Start: 2025-03-17

## 2025-03-17 RX ORDER — LISINOPRIL 10 MG/1
10 TABLET ORAL
Qty: 100 TABLET | Refills: 3 | Status: SHIPPED | OUTPATIENT
Start: 2025-03-17

## 2025-03-17 ASSESSMENT — PATIENT HEALTH QUESTIONNAIRE - PHQ9: CLINICAL INTERPRETATION OF PHQ2 SCORE: 0

## 2025-03-17 ASSESSMENT — FIBROSIS 4 INDEX: FIB4 SCORE: 2.63

## 2025-03-17 NOTE — PROGRESS NOTES
Diabetes Focused Exam:    Chief Complaint   Patient presents with    Follow-Up     Check Right shoulder, movement is painful on the outer side of the shoulder x2-3months    Diabetes Mellitus    Hypertension    Dyslipidemia    Heart Problem      Subjective:   HPI  Abram Barillas is a 69 y.o. male who presents for follow up of chronic conditions of diabetes mellitus, hypertension, and hyperlipidemia. He indicates that he is feeling well and denies any symptoms referable to the above diagnoses. Specifically denies chest pain, palpitations, dyspnea, orthopnea, PND or peripheral edema. Also denies polyuria, polydipsia, urinary complaints, abdominal complaints, myalgias, numbness, weakness or other related symptoms.     Right shoulder pain right deltoid for 4 months.  Sudden onset, no trauma.  Hurts when lifting even small weight.  Hurts at night to sleep on it.  Arm feels weaker.  Denies heat or redness.  Shoulder x-ray order discussed and placed.  Orthopedic referral placed.    Stable CHF.  Walks frequently.  Keeps excellent diet.  Has ischemic cardiomyopathy.  Stable on his medications.    The patient is taking ASA every day 81 mg and taking all other medications as prescribed. Patient denies any side effects of medication.  DM: A1c goal <7  Glucose monitoring frequency: daily      Fasting sugars: 100-107, very tight. Post-prandial sugars: occasional 130s.    Hypoglycemic episodes none  Diabetic complications: cardiovascular disease  ACR Albumin/Creatinine Ratio goal <30   HTN: Blood pressure goal <140/<80. Currently Rx ACE/ARB: Yes  Hyperlipidemia:Cholesterol goal LDL <100, total/HDL <5. Currently Rx Statin: Yes  Last eye exam 5/2024.  Will get upcoming appointment.   Denies visual blurring, double vision, eye pain and floaters  Last monofilament foot exam: 8/2024 Denies foot pain, numbness, calluses, ulcers    See medications and orders placed in encounter report.  Past medical history, family history, social  "history reviewed and updated as documented in medical record.  Current medications including changes today:  Current Outpatient Medications   Medication Sig Dispense Refill    rosuvastatin (CRESTOR) 40 MG tablet Take 1 Tablet by mouth at bedtime. 100 Tablet 3    metoprolol SR (TOPROL XL) 50 MG TABLET SR 24 HR Take 1 Tablet by mouth every day. 100 Tablet 3    lisinopril (PRINIVIL) 10 MG Tab Take 1 tablet by mouth every day. 100 Tablet 3    glimepiride (AMARYL) 1 MG tablet Take 1 Tablet by mouth 2 times a day with meals. 200 Tablet 3    metFORMIN (GLUCOPHAGE) 500 MG Tab Take 1 tablet by mouth every evening. 100 Tablet 3    glucose blood (EXACTECH TEST) strip 1 Each 4 Times a Day,Before Meals and at Bedtime.      Multiple Vitamin (MULTI-VITAMIN DAILY PO) Take 1 Tablet by mouth every day.         aspirin EC (ECOTRIN) 81 MG TBEC Take 81 mg by mouth every day.       No current facility-administered medications for this visit.     Allergies:   Allergies   Allergen Reactions    Pcn [Penicillins] Rash     Rash      Social History     Tobacco Use    Smoking status: Former     Current packs/day: 0.00     Average packs/day: 0.3 packs/day for 10.0 years (2.5 ttl pk-yrs)     Types: Cigarettes     Start date: 3/1/1972     Quit date: 3/1/1982     Years since quittin.0    Smokeless tobacco: Never    Tobacco comments:     QUIT    Vaping Use    Vaping status: Never Used   Substance Use Topics    Alcohol use: No     Alcohol/week: 0.0 oz     Comment: occasionally ( 2 times a year)    Drug use: No     Exercise: walks a lot, tries to get out daily if possible. 5-6 miles.    Health Maintenance/Immunizations: discussed.  Pros and cons for him regarding COVID vaccine.    ROS  Pertinent  ROS findings as above. Denies SOB or chest pain.     Objective:     OBJECTIVE:  /68 (BP Location: Right arm, Patient Position: Sitting, BP Cuff Size: Adult)   Pulse 61   Temp 36.1 °C (97 °F) (Temporal)   Resp 16   Ht 1.651 m (5' 5\")   Wt " 68.8 kg (151 lb 10.8 oz)   SpO2 97%   BMI 25.24 kg/m²  Body mass index is 25.24 kg/m². BMI: not obese  General: No apparent distress, conversant, cooperative and pleasant with the examination.  Psych: Alert and oriented x4, judgment and insight normal  Neck: No JVD or bruits, no adenopathy, supple  Thyroid: normal to inspection and palpation  Lungs: negative findings: normal respiratory rate and rhythm, lungs clear to auscultation  Heart: negative findings: regular rate and rhythm, S1 normal, S2 normal, no murmurs, clicks, or gallops  Abdomen: Soft, nontender, no hepatosplenomegaly or masses, normal bowel sounds  Skin: No rashes, no cyanosis, no lesions or ulcers  Extremities: No cyanosis clubbing or edema.     POC labs:   Lab Results   Component Value Date/Time    POCGLUCOSE 109 (H) 01/11/2012 11:31 AM          Last labs    Lab Results   Component Value Date/Time    CHOLSTRLTOT 91 (L) 08/08/2024 06:45 AM    LDL 37 08/08/2024 06:45 AM    HDL 26 (A) 08/08/2024 06:45 AM    TRIGLYCERIDE 138 08/08/2024 06:45 AM       Lab Results   Component Value Date/Time    SODIUM 142 10/24/2024 09:45 AM    POTASSIUM 3.9 10/24/2024 09:45 AM    GLUCOSE 92 10/24/2024 09:45 AM    BUNCREATRAT 15 12/08/2010 07:11 AM    GLOMRATE >59 12/08/2010 07:11 AM     Lab Results   Component Value Date/Time    HBA1C 5.7 (H) 10/24/2024 09:45 AM    HBA1C 5.6 08/08/2024 06:45 AM    HBA1C 5.7 (H) 08/08/2024 06:43 AM     Lab Results   Component Value Date/Time    MALBCRT 343 (H) 08/08/2024 06:45 AM    MICROALBUR 34.9 08/08/2024 06:45 AM          Assessment/Plan:   Medications, refills, and referrals per orders.   1. Controlled type 2 diabetes mellitus with complication, without long-term current use of insulin (HCC)  Comp Metabolic Panel    CBC WITHOUT DIFFERENTIAL    TSH    Lipid Profile    MICROALBUMIN CREAT RATIO URINE    HEMOGLOBIN A1C    rosuvastatin (CRESTOR) 40 MG tablet    glimepiride (AMARYL) 1 MG tablet    metFORMIN (GLUCOPHAGE) 500 MG Tab       2. Dyslipidemia  rosuvastatin (CRESTOR) 40 MG tablet      3. Essential hypertension  metoprolol SR (TOPROL XL) 50 MG TABLET SR 24 HR    lisinopril (PRINIVIL) 10 MG Tab      4. Chronic systolic (congestive) heart failure (HCC)  Comp Metabolic Panel    CBC WITHOUT DIFFERENTIAL    REFERRAL TO CARDIOLOGY      5. Ischemic cardiomyopathy  REFERRAL TO CARDIOLOGY      6. Coronary artery disease due to calcified coronary lesion:Acute MI, anterior wall s/p bms to LAD 12/11 with -RCA and residual CX dz (stent failed)  rosuvastatin (CRESTOR) 40 MG tablet    REFERRAL TO CARDIOLOGY      7. AICD (automatic cardioverter/defibrillator) present [facility] placed 4/23/12  REFERRAL TO CARDIOLOGY      8. Chronic pain in right shoulder  DX-SHOULDER 2+ RIGHT    Referral to Orthopedics      9. Hypothyroidism, unspecified type        10. Thrombocytopenia (HCC)        11. Benign prostatic hyperplasia with urinary obstruction          DM2 A1c is at goal   Patient to monitor sugars: daily frequency  Discussed diet, exercise, disease management and weight goals, is at excellent weight for him.   Education and advise provided today:All medications, side effects and compliance (discussed carefully)  Annual eye examinations at Ophthalmology  Diabetic diet discussed in detail, written exchange diet given  Foot care discussed and Podiatry visits  Glycohemoglobin and other lab monitoring  Home glucose monitoring emphasized  Labs immediately prior to next visit  Long term diabetic complications  Low cholesterol diet, weight control and daily exercise    Followup:   Do labs and RTC 6 months, sooner should new symptoms or problems arise.

## 2025-03-19 ENCOUNTER — APPOINTMENT (OUTPATIENT)
Dept: RADIOLOGY | Facility: MEDICAL CENTER | Age: 70
End: 2025-03-19
Attending: FAMILY MEDICINE
Payer: MEDICARE

## 2025-03-19 DIAGNOSIS — G89.29 CHRONIC PAIN IN RIGHT SHOULDER: ICD-10-CM

## 2025-03-19 DIAGNOSIS — M25.511 CHRONIC PAIN IN RIGHT SHOULDER: ICD-10-CM

## 2025-03-19 PROCEDURE — 73030 X-RAY EXAM OF SHOULDER: CPT | Mod: RT

## 2025-03-20 ENCOUNTER — RESULTS FOLLOW-UP (OUTPATIENT)
Dept: MEDICAL GROUP | Facility: MEDICAL CENTER | Age: 70
End: 2025-03-20

## 2025-03-24 ENCOUNTER — TELEPHONE (OUTPATIENT)
Dept: HEALTH INFORMATION MANAGEMENT | Facility: OTHER | Age: 70
End: 2025-03-24
Payer: MEDICARE

## 2025-03-26 PROCEDURE — RXMED WILLOW AMBULATORY MEDICATION CHARGE: Performed by: FAMILY MEDICINE

## 2025-03-27 ENCOUNTER — PHARMACY VISIT (OUTPATIENT)
Dept: PHARMACY | Facility: MEDICAL CENTER | Age: 70
End: 2025-03-27
Payer: COMMERCIAL

## 2025-03-28 ENCOUNTER — TELEPHONE (OUTPATIENT)
Dept: HEALTH INFORMATION MANAGEMENT | Facility: OTHER | Age: 70
End: 2025-03-28
Payer: MEDICARE

## 2025-04-05 ENCOUNTER — HOSPITAL ENCOUNTER (OUTPATIENT)
Dept: LAB | Facility: MEDICAL CENTER | Age: 70
End: 2025-04-05
Attending: FAMILY MEDICINE
Payer: MEDICARE

## 2025-04-05 ENCOUNTER — RESULTS FOLLOW-UP (OUTPATIENT)
Dept: MEDICAL GROUP | Facility: MEDICAL CENTER | Age: 70
End: 2025-04-05

## 2025-04-05 DIAGNOSIS — E11.8 CONTROLLED TYPE 2 DIABETES MELLITUS WITH COMPLICATION, WITHOUT LONG-TERM CURRENT USE OF INSULIN (HCC): ICD-10-CM

## 2025-04-05 DIAGNOSIS — I50.22 CHRONIC SYSTOLIC (CONGESTIVE) HEART FAILURE (HCC): ICD-10-CM

## 2025-04-05 LAB
ALBUMIN SERPL BCP-MCNC: 4.5 G/DL (ref 3.2–4.9)
ALBUMIN/GLOB SERPL: 1.5 G/DL
ALP SERPL-CCNC: 89 U/L (ref 30–99)
ALT SERPL-CCNC: 50 U/L (ref 2–50)
ANION GAP SERPL CALC-SCNC: 9 MMOL/L (ref 7–16)
AST SERPL-CCNC: 44 U/L (ref 12–45)
BILIRUB SERPL-MCNC: 0.7 MG/DL (ref 0.1–1.5)
BUN SERPL-MCNC: 15 MG/DL (ref 8–22)
CALCIUM ALBUM COR SERPL-MCNC: 9.2 MG/DL (ref 8.5–10.5)
CALCIUM SERPL-MCNC: 9.6 MG/DL (ref 8.5–10.5)
CHLORIDE SERPL-SCNC: 104 MMOL/L (ref 96–112)
CHOLEST SERPL-MCNC: 95 MG/DL (ref 100–199)
CO2 SERPL-SCNC: 25 MMOL/L (ref 20–33)
CREAT SERPL-MCNC: 1.02 MG/DL (ref 0.5–1.4)
CREAT UR-MCNC: 123 MG/DL
ERYTHROCYTE [DISTWIDTH] IN BLOOD BY AUTOMATED COUNT: 42.2 FL (ref 35.9–50)
EST. AVERAGE GLUCOSE BLD GHB EST-MCNC: 134 MG/DL
GFR SERPLBLD CREATININE-BSD FMLA CKD-EPI: 80 ML/MIN/1.73 M 2
GLOBULIN SER CALC-MCNC: 3 G/DL (ref 1.9–3.5)
GLUCOSE SERPL-MCNC: 127 MG/DL (ref 65–99)
HBA1C MFR BLD: 6.3 % (ref 4–5.6)
HCT VFR BLD AUTO: 50.4 % (ref 42–52)
HDLC SERPL-MCNC: 25 MG/DL
HGB BLD-MCNC: 17.4 G/DL (ref 14–18)
LDLC SERPL CALC-MCNC: 28 MG/DL
MCH RBC QN AUTO: 31.3 PG (ref 27–33)
MCHC RBC AUTO-ENTMCNC: 34.5 G/DL (ref 32.3–36.5)
MCV RBC AUTO: 90.6 FL (ref 81.4–97.8)
MICROALBUMIN UR-MCNC: 32.1 MG/DL
MICROALBUMIN/CREAT UR: 261 MG/G (ref 0–30)
PLATELET # BLD AUTO: 149 K/UL (ref 164–446)
PMV BLD AUTO: 9.9 FL (ref 9–12.9)
POTASSIUM SERPL-SCNC: 4 MMOL/L (ref 3.6–5.5)
PROT SERPL-MCNC: 7.5 G/DL (ref 6–8.2)
RBC # BLD AUTO: 5.56 M/UL (ref 4.7–6.1)
SODIUM SERPL-SCNC: 138 MMOL/L (ref 135–145)
TRIGL SERPL-MCNC: 212 MG/DL (ref 0–149)
TSH SERPL-ACNC: 3.43 UIU/ML (ref 0.38–5.33)
WBC # BLD AUTO: 7 K/UL (ref 4.8–10.8)

## 2025-04-05 PROCEDURE — 84443 ASSAY THYROID STIM HORMONE: CPT

## 2025-04-05 PROCEDURE — 80053 COMPREHEN METABOLIC PANEL: CPT

## 2025-04-05 PROCEDURE — 80061 LIPID PANEL: CPT

## 2025-04-05 PROCEDURE — 83036 HEMOGLOBIN GLYCOSYLATED A1C: CPT

## 2025-04-05 PROCEDURE — 85027 COMPLETE CBC AUTOMATED: CPT

## 2025-04-05 PROCEDURE — 36415 COLL VENOUS BLD VENIPUNCTURE: CPT

## 2025-04-05 PROCEDURE — 82570 ASSAY OF URINE CREATININE: CPT

## 2025-04-05 PROCEDURE — 82043 UR ALBUMIN QUANTITATIVE: CPT

## 2025-04-09 ENCOUNTER — OFFICE VISIT (OUTPATIENT)
Dept: MEDICAL GROUP | Facility: MEDICAL CENTER | Age: 70
End: 2025-04-09
Payer: MEDICARE

## 2025-04-09 ENCOUNTER — OFFICE VISIT (OUTPATIENT)
Dept: CARDIOLOGY | Facility: MEDICAL CENTER | Age: 70
End: 2025-04-09
Attending: FAMILY MEDICINE
Payer: MEDICARE

## 2025-04-09 VITALS
WEIGHT: 153 LBS | RESPIRATION RATE: 16 BRPM | SYSTOLIC BLOOD PRESSURE: 112 MMHG | OXYGEN SATURATION: 98 % | BODY MASS INDEX: 25.49 KG/M2 | DIASTOLIC BLOOD PRESSURE: 70 MMHG | HEIGHT: 65 IN | HEART RATE: 54 BPM

## 2025-04-09 VITALS
HEART RATE: 58 BPM | DIASTOLIC BLOOD PRESSURE: 60 MMHG | HEIGHT: 65 IN | OXYGEN SATURATION: 96 % | BODY MASS INDEX: 25.62 KG/M2 | SYSTOLIC BLOOD PRESSURE: 100 MMHG | WEIGHT: 153.8 LBS | TEMPERATURE: 98.2 F

## 2025-04-09 DIAGNOSIS — E11.69 DYSLIPIDEMIA ASSOCIATED WITH TYPE 2 DIABETES MELLITUS (HCC): ICD-10-CM

## 2025-04-09 DIAGNOSIS — E78.5 DYSLIPIDEMIA ASSOCIATED WITH TYPE 2 DIABETES MELLITUS (HCC): ICD-10-CM

## 2025-04-09 DIAGNOSIS — I25.10 CORONARY ARTERY DISEASE DUE TO CALCIFIED CORONARY LESION: ICD-10-CM

## 2025-04-09 DIAGNOSIS — Z95.1 HX OF CABG: ICD-10-CM

## 2025-04-09 DIAGNOSIS — Z95.810 AICD (AUTOMATIC CARDIOVERTER/DEFIBRILLATOR) PRESENT: ICD-10-CM

## 2025-04-09 DIAGNOSIS — E66.3 OVERWEIGHT (BMI 25.0-29.9): ICD-10-CM

## 2025-04-09 DIAGNOSIS — I25.5 ISCHEMIC CARDIOMYOPATHY: ICD-10-CM

## 2025-04-09 DIAGNOSIS — I25.84 CORONARY ARTERY DISEASE DUE TO CALCIFIED CORONARY LESION: ICD-10-CM

## 2025-04-09 DIAGNOSIS — I10 PRIMARY HYPERTENSION: ICD-10-CM

## 2025-04-09 DIAGNOSIS — I50.22 CHRONIC SYSTOLIC (CONGESTIVE) HEART FAILURE (HCC): ICD-10-CM

## 2025-04-09 PROBLEM — N18.2 CKD (CHRONIC KIDNEY DISEASE) STAGE 2, GFR 60-89 ML/MIN: Status: RESOLVED | Noted: 2025-04-09 | Resolved: 2025-04-09

## 2025-04-09 PROBLEM — E11.21 DIABETIC NEPHROPATHY ASSOCIATED WITH TYPE 2 DIABETES MELLITUS (HCC): Status: ACTIVE | Noted: 2025-04-09

## 2025-04-09 PROBLEM — E11.21 DIABETIC NEPHROPATHY ASSOCIATED WITH TYPE 2 DIABETES MELLITUS (HCC): Status: RESOLVED | Noted: 2025-04-09 | Resolved: 2025-04-09

## 2025-04-09 PROBLEM — N18.2 CKD (CHRONIC KIDNEY DISEASE) STAGE 2, GFR 60-89 ML/MIN: Status: ACTIVE | Noted: 2025-04-09

## 2025-04-09 PROCEDURE — 99212 OFFICE O/P EST SF 10 MIN: CPT | Performed by: INTERNAL MEDICINE

## 2025-04-09 PROCEDURE — 1125F AMNT PAIN NOTED PAIN PRSNT: CPT | Performed by: NURSE PRACTITIONER

## 2025-04-09 PROCEDURE — 3074F SYST BP LT 130 MM HG: CPT | Performed by: NURSE PRACTITIONER

## 2025-04-09 PROCEDURE — 3078F DIAST BP <80 MM HG: CPT | Performed by: NURSE PRACTITIONER

## 2025-04-09 PROCEDURE — G2211 COMPLEX E/M VISIT ADD ON: HCPCS | Performed by: INTERNAL MEDICINE

## 2025-04-09 PROCEDURE — 99214 OFFICE O/P EST MOD 30 MIN: CPT | Performed by: INTERNAL MEDICINE

## 2025-04-09 PROCEDURE — G0439 PPPS, SUBSEQ VISIT: HCPCS | Performed by: NURSE PRACTITIONER

## 2025-04-09 SDOH — ECONOMIC STABILITY: FOOD INSECURITY: WITHIN THE PAST 12 MONTHS, THE FOOD YOU BOUGHT JUST DIDN'T LAST AND YOU DIDN'T HAVE MONEY TO GET MORE.: NEVER TRUE

## 2025-04-09 SDOH — ECONOMIC STABILITY: FOOD INSECURITY: HOW HARD IS IT FOR YOU TO PAY FOR THE VERY BASICS LIKE FOOD, HOUSING, MEDICAL CARE, AND HEATING?: NOT VERY HARD

## 2025-04-09 SDOH — ECONOMIC STABILITY: FOOD INSECURITY: WITHIN THE PAST 12 MONTHS, YOU WORRIED THAT YOUR FOOD WOULD RUN OUT BEFORE YOU GOT THE MONEY TO BUY MORE.: NEVER TRUE

## 2025-04-09 SDOH — ECONOMIC STABILITY: TRANSPORTATION INSECURITY: IN THE PAST 12 MONTHS, HAS LACK OF TRANSPORTATION KEPT YOU FROM MEDICAL APPOINTMENTS OR FROM GETTING MEDICATIONS?: NO

## 2025-04-09 ASSESSMENT — ENCOUNTER SYMPTOMS
HEADACHES: 0
PALPITATIONS: 0
CHILLS: 0
CARDIOVASCULAR NEGATIVE: 1
DIZZINESS: 0
CONSTITUTIONAL NEGATIVE: 1
FEVER: 0
WEIGHT LOSS: 0
WEAKNESS: 0
NAUSEA: 0
GASTROINTESTINAL NEGATIVE: 1
PSYCHIATRIC NEGATIVE: 1
EYES NEGATIVE: 1
CLAUDICATION: 0
COUGH: 0
MYALGIAS: 0
BLURRED VISION: 0
DEPRESSION: 0
ABDOMINAL PAIN: 0
SHORTNESS OF BREATH: 0
BRUISES/BLEEDS EASILY: 0
RESPIRATORY NEGATIVE: 1
DOUBLE VISION: 0
GENERAL WELL-BEING: GOOD
VOMITING: 0
NERVOUS/ANXIOUS: 0
MUSCULOSKELETAL NEGATIVE: 1
FOCAL WEAKNESS: 0
NEUROLOGICAL NEGATIVE: 1

## 2025-04-09 ASSESSMENT — FIBROSIS 4 INDEX
FIB4 SCORE: 2.84
FIB4 SCORE: 2.84

## 2025-04-09 ASSESSMENT — ACTIVITIES OF DAILY LIVING (ADL)
LACK_OF_TRANSPORTATION: NO
BATHING_REQUIRES_ASSISTANCE: 0

## 2025-04-09 ASSESSMENT — PATIENT HEALTH QUESTIONNAIRE - PHQ9: CLINICAL INTERPRETATION OF PHQ2 SCORE: 0

## 2025-04-09 ASSESSMENT — PAIN SCALES - GENERAL: PAINLEVEL_OUTOF10: 9=SEVERE PAIN

## 2025-04-09 NOTE — PROGRESS NOTES
Comprehensive Health Assessment Program     Abram Barillas is a 69 y.o. here for his comprehensive health assessment.    Patient Active Problem List    Diagnosis Date Noted    Stented coronary artery 11/17/2024    Hypothyroidism 11/17/2024    Ischemic cardiomyopathy 09/23/2024    Microalbuminuria 08/27/2024    Thrombocytopenia (HCC) 08/27/2024    Hx of CABG 01/10/2024    Overweight (BMI 25.0-29.9) 08/01/2023    Benign prostatic hyperplasia with urinary obstruction 05/11/2023    Erectile dysfunction due to arterial insufficiency 05/11/2023    High prostate specific antigen (PSA) 05/10/2023    Chronic systolic (congestive) heart failure (HCC) 05/25/2022    Moderate persistent asthma without complication 05/25/2022    History of acute myocardial infarction 11/24/2021    Impotence of organic origin 02/22/2016    Primary hypertension     Coronary artery disease due to calcified coronary lesion:Acute MI, anterior wall s/p bms to LAD 12/11 with -RCA and residual CX dz (stent failed)     AICD (automatic cardioverter/defibrillator) present St Zach placed 4/23/12 04/24/2012    Dyslipidemia associated with type 2 diabetes mellitus (HCC)     Fatty liver        Current Outpatient Medications   Medication Sig Dispense Refill    rosuvastatin (CRESTOR) 40 MG tablet Take 1 Tablet by mouth at bedtime. 100 Tablet 3    metoprolol SR (TOPROL XL) 50 MG TABLET SR 24 HR Take 1 Tablet by mouth every day. 100 Tablet 3    lisinopril (PRINIVIL) 10 MG Tab Take 1 tablet by mouth every day. 100 Tablet 3    glimepiride (AMARYL) 1 MG tablet Take 1 Tablet by mouth 2 times a day with meals. 200 Tablet 3    metFORMIN (GLUCOPHAGE) 500 MG Tab Take 1 tablet by mouth every evening. 100 Tablet 3    glucose blood (EXACTECH TEST) strip 1 Each 4 Times a Day,Before Meals and at Bedtime.      Multiple Vitamin (MULTI-VITAMIN DAILY PO) Take 1 Tablet by mouth every day.         aspirin EC (ECOTRIN) 81 MG TBEC Take 81 mg by mouth every day.       No  current facility-administered medications for this visit.          Current supplements as per medication list.     Allergies:   Pcn [penicillins]  Social History     Tobacco Use    Smoking status: Former     Current packs/day: 0.00     Average packs/day: 0.3 packs/day for 10.0 years (2.5 ttl pk-yrs)     Types: Cigarettes     Start date: 3/1/1972     Quit date: 3/1/1982     Years since quittin.1    Smokeless tobacco: Never    Tobacco comments:     QUIT    Vaping Use    Vaping status: Never Used   Substance Use Topics    Alcohol use: No     Alcohol/week: 0.0 oz     Comment: occasionally ( 2 times a year)    Drug use: No     Family History   Problem Relation Age of Onset    Cancer Mother         ovarian cancer    Diabetes Sister         prediabetes    Heart Disease Maternal Aunt 17        unclear but MI!    Heart Disease Maternal Uncle 38    Diabetes Maternal Grandmother     Heart Attack Neg Hx      Abram  has a past medical history of Acute MI, anterior wall s/p bms to LAD  with -RCA and residual CX dz (stent failed) (2011), AICD (automatic cardioverter/defibrillator) present St Zach placed 12 (2012), AICD lead malfunction (2013), Arrhythmia, ASTHMA, Atrial fib/flutter, transient (HCC), Bronchitis (as child), CAD (coronary artery disease), Calcium oxalate calculus (2022), Calcium oxalate calculus of kidney (2022), Cardiogenic shock (HCC) (2012), Cardiomyopathy, ischemic EF 35% (2011), Congestive heart failure (HCC), Dyslipidemia, Fatty liver, Former smoker (2019), High cholesterol, History of 2019 novel coronavirus disease (COVID-19) (2022), History of acute myocardial infarction (2021), History of atrial fibrillation (2022), Hypertension, Influenza, Left flank pain (2022), Moderate persistent asthma without complication (2022), Myocardial infarct (HCC) (2011), Myocardial infarction of anterolateral wall (HCC)  (12/29/2011), Nocturnal hypoxia (07/16/2018), Obstructive sleep apnea (03/30/2017), Pacemaker (04/23/2012), Renal disorder, Sleep apnea, Tuberculosis, Uncontrolled type 2 diabetes mellitus with hyperglycemia ([facility]) (05/25/2022), and VT (ventricular tachycardia) ([facility]) (01/10/2024).   Past Surgical History:   Procedure Laterality Date    WI INSERT,INFLATABLE PENILE PROSTHESIS N/A 11/18/2024    Procedure: INSERTION OF PENILE PROSTHESIS;  Surgeon: Bandar Henry M.D.;  Location: [facility];  Service: Urology    EXCISION OF PENILE SKIN LESION N/A 11/18/2024    Procedure: REMOVAL, CYST, SKIN, PENILE;  Surgeon: Bandar Henry M.D.;  Location: [facility];  Service: Urology    WI CYSTOSCOPY,INSERT URETERAL STENT Left 1/14/2022    Procedure: CYSTOSCOPY, WITH URETERAL STENT INSERTION;  Surgeon: Duncan Blakely M.D.;  Location: [facility];  Service: Urology    WI CYSTO/URETERO/PYELOSCOPY, DX Left 1/14/2022    Procedure: URETEROSCOPY;  Surgeon: Duncan Blakely M.D.;  Location: [facility];  Service: Urology    LASERTRIPSY Left 1/14/2022    Procedure: LITHOTRIPSY, USING LASER;  Surgeon: Duncan Blakely M.D.;  Location: [facility];  Service: Urology    RECOVERY  8/30/2013    Performed by Cath-Recovery Surgery at SURGERY SAME DAY [facility] ORS    RECOVERY  4/23/2012    Performed by SURGERY, CATH-RECOVERY at SURGERY SAME DAY [facility] ORS    MULTIPLE CORONARY ARTERY BYPASS ENDO VEIN HARVEST  1/3/2012    Performed by PADMINI OCHOA at SURGERY [facility]    UMBILICAL HERNIA REPAIR  1/14/2011    Performed by CHUY CHRISTENSEN at SURGERY SAME DAY [facility] ORS    COLONOSCOPY  2007    normal    PACEMAKER INSERTION         Screening:  In the last six months have you experienced any leakage of urine? No    Depression Screening  Little interest or pleasure in doing things?  0 - not at all  Feeling down, depressed , or hopeless? 0 - not at all  Patient Health Questionnaire  Score: 0    If depressive symptoms identified deferred to follow up visit unless specifically addressed in assessment and plan.    Interpretation of PHQ-9 Total Score   Score Severity   1-4 No Depression   5-9 Mild Depression   10-14 Moderate Depression   15-19 Moderately Severe Depression   20-27 Severe Depression    Screening for Cognitive Impairment  Do you or any of your friends or family members have any concern about your memory? No  Three Minute Recall (Village, Kitchen, Baby) 3/3  Evert clock face with all 12 numbers and set the hands to show 10 minutes past 11.  Yes    Cognitive concerns identified deferred for follow up unless specifically addressed in assessment and plan.    Fall Risk Assessment  Has the patient had two or more falls in the last year or any fall with injury in the last year?  No    Safety Assessment  Do you always wear your seatbelt?  Yes  Any changes to home needed to function safely? No  Difficulty hearing.  No  Patient counseled about all safety risks that were identified.    Functional Assessment ADLs  Are there any barriers preventing you from cooking for yourself or meeting nutritional needs?  No.    Are there any barriers preventing you from driving safely or obtaining transportation?  No.    Are there any barriers preventing you from using a telephone or calling for help?  No    Are there any barriers preventing you from shopping?  No.    Are there any barriers preventing you from taking care of your own finances?  No    Are there any barriers preventing you from managing your medications?  No    Are there any barriers preventing you from showering, bathing or dressing yourself? No    Are there any barriers preventing you from doing housework or laundry? No    Are there any barriers preventing you from using the toilet?No    Are you currently engaging in any exercise or physical activity?  Yes. Walk.    Self-Assessment of Health  What is your perception of your health? Good    Do  you sleep more than six hours a night? Yes    In the past 7 days, how much did pain keep you from doing your normal work? None    Do you spend quality time with family or friends (virtually or in person)? Yes    Do you usually eat a heart healthy diet that constists of a variety of fruits, vegetables, whole grains and fiber? Yes    Do you eat foods high in fat and/or Fast Food more than three times per week? No    How concerned are you that your medical conditions are not being well managed? Not at all    Are you worried that in the next 2 months, you may not have stable housing that you own, rent, or stay in as part of a household? No        Advance Care Planning  Do you have an Advance Directive, Living Will, Durable Power of , or POLST? No                 Health Maintenance Summary            Needs Review       Hepatitis C Screening  Tentatively Complete      07/08/2023  Hepatitis C Antibody component of HEPATITIS PANEL ACUTE(4 COMPONENTS)    06/21/2019  Hepatitis C Antibody component of HEP C VIRUS ANTIBODY    01/03/2012  Hepatitis C Antibody component of NEEDLESTICK SOURCE              Colorectal Cancer Screening (Colonoscopy - Every 10 Years) Tentatively due on 8/6/2028 08/06/2018  REFERRAL TO GI FOR COLONOSCOPY                      Upcoming       Zoster (Shingles) Vaccines (2 of 3) Postponed until 8/17/2025 01/18/2017  Imm Admin: Zoster Vaccine Live (ZVL) (Zostavax) - HISTORICAL DATA              COVID-19 Vaccine (3 - 2024-25 season) Postponed until 9/1/2025 02/06/2023  Imm Admin: MODERNA BIVALENT BOOSTER SARS-COV-2 VACCINE (6+)    11/12/2021  Imm Admin: Eboni SARS-CoV-2 Vaccine    02/10/2021  Imm Admin: Eboni SARS-CoV-2 Vaccine              Pneumococcal Vaccine: 50+ Years (2 of 2 - PCV) Postponed until 10/1/2026      11/24/2021  Imm Admin: Pneumococcal polysaccharide vaccine (PPSV-23)    10/01/2012  Imm Admin: Pneumococcal polysaccharide vaccine (PPSV-23)    01/01/2007  Imm Admin:  Pneumococcal Vaccine (UF) - HISTORICAL DATA              Diabetes: Retinopathy Screening (Yearly) Next due on 5/23/2025 05/23/2024  RETINAL SCREENING RESULTS    07/28/2022  POCT Retinal Eye Exam              Diabetes: Monofilament / LE Exam (Yearly) Next due on 8/27/2025 08/27/2024  SmartData: WORKFLOW - DIABETES - DIABETIC FOOT EXAM PERFORMED    08/27/2024  Diabetic Monofilament Lower Extremity Exam    05/17/2023  Diabetic Monofilament LE Exam    05/17/2023  SmartData: WORKFLOW - DIABETES - DIABETIC FOOT EXAM PERFORMED    06/08/2022  SmartData: WORKFLOW - DIABETES - DIABETIC FOOT EXAM PERFORMED     Only the first 5 history entries have been loaded, but more history exists.            A1c Screening (Every 6 Months) Next due on 10/5/2025      04/05/2025  HEMOGLOBIN A1C    10/24/2024  HEMOGLOBIN A1C    08/08/2024  HEMOGLOBIN A1C    08/08/2024  HEMOGLOBIN A1C    04/24/2024  HEMOGLOBIN A1C      Only the first 5 history entries have been loaded, but more history exists.              Fasting Lipid Profile (Yearly) Next due on 4/5/2026 04/05/2025  Lipid Profile    08/08/2024  Lipid Profile    05/13/2023  Lipid Profile    11/14/2022  Lipid Profile    05/25/2022  Lipid Profile      Only the first 5 history entries have been loaded, but more history exists.              Diabetes: Urine Protein Screening (Yearly) Next due on 4/5/2026 04/05/2025  MICROALBUMIN CREAT RATIO URINE    08/08/2024  MICROALBUMIN CREAT RATIO URINE    11/14/2022  MICROALBUMIN CREAT RATIO URINE    06/08/2022  MICROALBUMIN CREAT RATIO URINE              SERUM CREATININE (Yearly) Next due on 4/5/2026 04/05/2025  Comp Metabolic Panel    10/24/2024  Basic Metabolic Panel    08/08/2024  Comp Metabolic Panel    01/25/2024  Comp Metabolic Panel    01/04/2024  Comp Metabolic Panel      Only the first 5 history entries have been loaded, but more history exists.              Annual Wellness Visit (Yearly) Next due on 4/9/2026       04/09/2025  Level of Service: DC ANNUAL WELLNESS VISIT-INCLUDES PPPS SUBSEQUE*    06/19/2024  Level of Service: DC ANNUAL WELLNESS VISIT-INCLUDES PPPS SUBSEQUE*    08/01/2023  Level of Service: DC ANNUAL WELLNESS VISIT-INCLUDES PPPS SUBSEQUE*    05/25/2022  Visit Dx: Medicare annual wellness visit, subsequent              IMM DTaP/Tdap/Td Vaccine (2 - Td or Tdap) Next due on 1/18/2027 01/18/2017  Imm Admin: Tdap Vaccine                      Completed or No Longer Recommended       Hepatitis B Vaccine (Hep B) (Series Information) Completed      11/01/1996  Imm Admin: Hepatitis B Vaccine (Adol/Adult)    06/01/1996  Imm Admin: Hepatitis B Vaccine (Adol/Adult)    03/01/1996  Imm Admin: Hepatitis B Vaccine (Adol/Adult)              Influenza Vaccine (Series Information) Completed      10/14/2024  Imm Admin: Influenza, unspecified formulation    11/17/2022  Imm Admin: Influenza Vaccine Adult HD    09/19/2021  Imm Admin: Influenza Vaccine Quad Inj (Pf)    09/21/2020  Imm Admin: Influenza Vaccine Quad Inj (Pf)    09/25/2019  Imm Admin: Influenza Vaccine Quad Inj (Pf)      Only the first 5 history entries have been loaded, but more history exists.              Abdominal Aortic Aneurysm (AAA) Screening  Completed      09/04/2023  US-AORTA/ILIACS DUPLEX COMPLETE    01/03/2022  Done - CT scan of the abdomen and pelvis did not show any aortic aneurysm.    08/25/2012  US-ABDOMEN COMPLETE SURVEY    02/01/2012  US-AORTA    08/06/2008  US-ABDOMEN COMPLETE SURVEY      Only the first 5 history entries have been loaded, but more history exists.              Hepatitis A Vaccine (Hep A) (Series Information) Aged Out      06/28/2017  Imm Admin: Hepatitis A Vaccine, Adult    12/27/2016  Imm Admin: Hepatitis A Vaccine, Adult              HPV Vaccines (Series Information) Aged Out      No completion history exists for this topic.              Polio Vaccine (Inactivated Polio) (Series Information) Aged Out     No completion history  "exists for this topic.              Meningococcal Immunization (Series Information) Aged Out     No completion history exists for this topic.                            Patient Care Team:  David Cervantes M.D. as PCP - General  Joe Yi M.D. as Attending Team Physician (Cardiovascular Disease (Cardiology))  Ольга Johns P.A.-C. as Attending Team Physician (Sleep Medicine)  Bandar Henry M.D. (Urology)    Financial Resource Strain: Low Risk  (4/9/2025)    Overall Financial Resource Strain (CARDIA)     Difficulty of Paying Living Expenses: Not very hard      Transportation Needs: No Transportation Needs (4/9/2025)    PRAPARE - Transportation     Lack of Transportation (Medical): No     Lack of Transportation (Non-Medical): No      Food Insecurity: No Food Insecurity (4/9/2025)    Hunger Vital Sign     Worried About Running Out of Food in the Last Year: Never true     Ran Out of Food in the Last Year: Never true        Encounter Vitals  Temperature: 36.8 °C (98.2 °F)  Blood Pressure : 100/60  Pulse: (!) 58  Pulse Oximetry: 96 %  Weight: 69.8 kg (153 lb 12.8 oz)  Height: 165.1 cm (5' 5\")  BMI (Calculated): 25.59  Pain Score: 9=Severe Pain (right shoulder pain, waiting for MRI.)     ROS:  No fever, chills, nausea, vomiting, diarrhea, chest pain or shortness of breath. See HPI.    Physical Exam:  Constitutional: NAD  HENMT: NC/AT, OP clear, TM's clear, no lymphadenopathy, no thyromegaly.  No JVD. (-) carotid bruit   Cardiovascular: RRR, No murmur   Lungs: CTAB, bilat   Extremities: 2+ DP, PT and Radial pulses bilaterally. No edema (-) monofilament   Skin: No legions notes  Neurologic: Alert & oriented     Assessment and Plan. The following treatment and monitoring plan is recommended:  Overweight (BMI 25.0-29.9)  Stable. BMI 25.59 Cont heart healthy diet and regular exercise. Cont f/u with PCP for ongoing monitoring and discussion      Dyslipidemia associated with type 2 diabetes mellitus (HCC)  Stable " on rosuvastatin / metformin/ glimepiride  DM dx approx 2 years ago Records review     Latest Reference Range & Units 04/05/25 07:32   Cholesterol,Tot 100 - 199 mg/dL 95 (L)   Triglycerides 0 - 149 mg/dL 212 (H)   HDL >=40 mg/dL 25 !   LDL <100 mg/dL 28   (L): Data is abnormally low  (H): Data is abnormally high  !: Data is abnormal .. Cont heart healthy diet and regular exercise. Cont f/u with PCP for ongoing monitoring and medication management      Primary hypertension  Stable. Patient taking metoprolol/ lisinopril  BP today 100/ 60  HR RRR No BLE edema. Cont f/u with cardiology  ( Dr Hui) for ongoing monitoring and medication management     AICD (automatic cardioverter/defibrillator) present St Zach placed 4/23/12  Stable Placed approx 12 years ago. Replaced one year later due to loose wire. No discharges Cont f/u with cardiology (Dr. Lynch) for ongoing interrogation     Chronic systolic (congestive) heart failure (HCC)  Stable. Records review Echo (10/16/24) reports Moderate LV systolic function. EF 35 % ; Mid to distal global and apical hypokinesis. Patient taking lisinopril / metoprolol/ ASA  No BLE edema HR RRR No crackles in lung bases. Cont f/u with cardiology (Dr Lynch) for ongoing monitoring and medication management     Ischemic cardiomyopathy  Stable. Records review Echo (10/16/24) reports Moderate LV systolic function. EF 35 % ; Mid to distal  global and apical hypokinesis. Patient taking lisinopril / metoprolol/ ASA  CABG x 4 vessels approx 12 years ago. Patient has AICD.  No BLE edema HR RRR No crackles in lung bases. Cont f/u with cardiology (Dr Lynch) for ongoing monitoring and medication management       Services suggested: No services needed at this time  Health Care Screening: Age-appropriate preventive services recommended by USPTF and ACIP covered by Medicare were discussed today. Services ordered if indicated and agreed upon by the patient.  Referrals offered: Community-based lifestyle  interventions to reduce health risks and promote self-management and wellness, fall prevention, nutrition, physical activity, tobacco-use cessation, weight loss, and mental health services as per orders if indicated.    Discussion today about general wellness and lifestyle habits:    Prevent falls and reduce trip hazards; Cautioned about securing or removing rugs.  Have a working fire alarm and carbon monoxide detector.  Engage in regular physical activity and social activities.    ANGELICA appt with Dr. Day in 2025     Follow-up: Return f/u with PCP as indicated.

## 2025-04-09 NOTE — ASSESSMENT & PLAN NOTE
Stable on rosuvastatin / metformin/ glimepiride  DM dx approx 2 years ago Records review     Latest Reference Range & Units 04/05/25 07:32   Cholesterol,Tot 100 - 199 mg/dL 95 (L)   Triglycerides 0 - 149 mg/dL 212 (H)   HDL >=40 mg/dL 25 !   LDL <100 mg/dL 28   (L): Data is abnormally low  (H): Data is abnormally high  !: Data is abnormal .. Cont heart healthy diet and regular exercise. Cont f/u with PCP for ongoing monitoring and medication management

## 2025-04-09 NOTE — ASSESSMENT & PLAN NOTE
Stable. CKD 2. Records review    Latest Reference Range & Units 10/24/24 09:45 04/05/25 07:32   GFR (CKD-EPI) >60 mL/min/1.73 m 2 90 80   CKD 2 associated with DM. Continue daily water hydration avoidance of OTC NSAIDs. Cont f/u with PCP for ongoing monitoring and discussion

## 2025-04-09 NOTE — ASSESSMENT & PLAN NOTE
Stable. Records review Echo (10/16/24) reports Moderate LV systolic function. EF 35 % ; Mid to distal  global and apical hypokinesis. Patient taking lisinopril / metoprolol/ ASA  CABG x 4 vessels approx 12 years ago. Patient has AICD.  No BLE edema HR RRR No crackles in lung bases. Cont f/u with cardiology (Dr Lynch) for ongoing monitoring and medication management

## 2025-04-09 NOTE — PROGRESS NOTES
Chief Complaint   Patient presents with    Coronary Artery Disease     F/v dx: Coronary artery disease due to calcified coronary lesion:Acute MI, anterior wall s/p bms to LAD 12/11 with -RCA and residual CX dz (stent failed)    Dyslipidemia    Congestive Heart Failure     F/v dx: Chronic systolic (congestive) heart failure (HCC)       Subjective     Abram Barillas is a 69 y.o. male who presents today for follow up of coronary artery disease with prior coronary artery bypass graft surgery, ischemic cardiomyopathy with AICD placement, hypertension and hyperlipidemia.    Since the patient's last visit on 09/23/24, he has been doing well clinically from cardiac standpoint. He denies fatigue, chest pain, shortness of breath, palpitations, lower extremity edema, dizziness or syncope. He keeps active walking 5 miles daily.    Past Medical History:   Diagnosis Date    Acute MI, anterior wall s/p bms to LAD 12/11 with -RCA and residual CX dz (stent failed) 12/31/2011    AICD (automatic cardioverter/defibrillator) present [employer] placed 4/23/12 04/24/2012    DEVICE DATA:  1. Pulse generator:  [employer], model #WF2874-98, serial  #[id number]  2. Right ventricular lead:  [employer], model #7120/60,  serial #[id number]  MEASURED INTRAOPERATIVE DATA: R-waves measured 11.7 mV, pacing  threshold 0.75 volts at 0.5 msec, lead impedance of 859 ohms.  DEVICE SETTINGS: VVI 40 to 120.     AICD lead malfunction 08/23/2013    Arrhythmia     ASTHMA     as a child. 1/13/22-PRN use of inhaler for allergies    Atrial fib/flutter, transient (HCC)     when in cardiogenic shock    Bronchitis as child    CAD (coronary artery disease)     Calcium oxalate calculus 01/29/2022    Calcium oxalate calculus of kidney 01/19/2022    Cardiogenic shock (HCC) 01/2012    Cardiomyopathy, ischemic EF 35% 12/31/2011    Congestive heart failure (HCC)     Follow with Dr Yi    Dyslipidemia     Fatty liver     Former smoker 08/27/2019     High cholesterol     History of 2019 novel coronavirus disease (COVID-19) 05/25/2022 1/2022    History of acute myocardial infarction 11/24/2021 2011    History of atrial fibrillation 05/25/2022    When in cardiogenic shock    Hypertension     ON NO MEDS, now on meds    Influenza     Left flank pain 01/13/2022    comes and goes-severe when present    Moderate persistent asthma without complication 05/25/2022    Myocardial infarct ([facility]) 12/29/2011    Myocardial infarction of anterolateral wall ([facility]) 12/29/2011    Follow with Dr Yi    Nocturnal hypoxia 07/16/2018    Obstructive sleep apnea 03/30/2017    Pacemaker 04/23/2012    [facility]. [facility]    Renal disorder     stones    Sleep apnea     Had study but was never prescribed CPAP.     Tuberculosis     20 years ago on meds for 6  mo    Uncontrolled type 2 diabetes mellitus with hyperglycemia ([facility]) 05/25/2022    VT (ventricular tachycardia) ([facility]) 01/10/2024     Past Surgical History:   Procedure Laterality Date    FL INSERT,INFLATABLE PENILE PROSTHESIS N/A 11/18/2024    Procedure: INSERTION OF PENILE PROSTHESIS;  Surgeon: Bandar Henry M.D.;  Location: [facility];  Service: Urology    EXCISION OF PENILE SKIN LESION N/A 11/18/2024    Procedure: REMOVAL, CYST, SKIN, PENILE;  Surgeon: Bandar Henry M.D.;  Location: [facility];  Service: Urology    FL CYSTOSCOPY,INSERT URETERAL STENT Left 1/14/2022    Procedure: CYSTOSCOPY, WITH URETERAL STENT INSERTION;  Surgeon: Duncan Blakely M.D.;  Location: [facility];  Service: Urology    FL CYSTO/URETERO/PYELOSCOPY, DX Left 1/14/2022    Procedure: URETEROSCOPY;  Surgeon: Duncan Blakely M.D.;  Location: [facility];  Service: Urology    LASERTRIPSY Left 1/14/2022    Procedure: LITHOTRIPSY, USING LASER;  Surgeon: Duncan Blakely M.D.;  Location: [facility];  Service: Urology    RECOVERY  8/30/2013    Performed by  Cath-Recovery Surgery at SURGERY SAME DAY [facility] ORS    RECOVERY  2012    Performed by SURGERY, CATH[facility]RECOVERY at [facility]    MULTIPLE CORONARY ARTERY BYPASS ENDO VEIN HARVEST  1/3/2012    Performed by PADMINI OCHOA at [facility]    UMBILICAL HERNIA REPAIR  2011    Performed by CHUY CHRISTENSEN at [facility]    COLONOSCOPY  2007    normal    PACEMAKER INSERTION       Family History   Problem Relation Age of Onset    Cancer Mother         ovarian cancer    Diabetes Sister         prediabetes    Heart Disease Maternal Aunt 17        unclear but MI!    Heart Disease Maternal Uncle 38    Diabetes Maternal Grandmother     Heart Attack Neg Hx      Social History     Socioeconomic History    Marital status:      Spouse name: Not on file    Number of children: Not on file    Years of education: Not on file    Highest education level: Not on file   Occupational History     Comment: retired   Tobacco Use    Smoking status: Former     Current packs/day: 0.00     Average packs/day: 0.3 packs/day for 10.0 years (2.5 ttl pk-yrs)     Types: Cigarettes     Start date: 3/1/1972     Quit date: 3/1/1982     Years since quittin.1    Smokeless tobacco: Never    Tobacco comments:     QUIT 1985   Vaping Use    Vaping status: Never Used   Substance and Sexual Activity    Alcohol use: No     Alcohol/week: 0.0 oz     Comment: occasionally ( 2 times a year)    Drug use: No    Sexual activity: Not Currently   Other Topics Concern    Not on file   Social History Narrative    Not on file     Social Drivers of Health     Financial Resource Strain: Low Risk  (2025)    Overall Financial Resource Strain (CARDIA)     Difficulty of Paying Living Expenses: Not very hard   Food Insecurity: No Food Insecurity (2025)    Hunger Vital Sign     Worried About Running Out of Food in the Last Year: Never true     Ran Out of Food in the Last Year: Never true    Transportation Needs: No Transportation Needs (4/9/2025)    PRAPARE - Transportation     Lack of Transportation (Medical): No     Lack of Transportation (Non-Medical): No   Physical Activity: Not on file   Stress: Not on file   Social Connections: Not on file   Intimate Partner Violence: Not on file   Housing Stability: Not on file     Allergies   Allergen Reactions    Pcn [Penicillins] Rash     Rash     (Medications reviewed.)  Outpatient Encounter Medications as of 4/9/2025   Medication Sig Dispense Refill    rosuvastatin (CRESTOR) 40 MG tablet Take 1 Tablet by mouth at bedtime. 100 Tablet 3    metoprolol SR (TOPROL XL) 50 MG TABLET SR 24 HR Take 1 Tablet by mouth every day. 100 Tablet 3    lisinopril (PRINIVIL) 10 MG Tab Take 1 tablet by mouth every day. 100 Tablet 3    glimepiride (AMARYL) 1 MG tablet Take 1 Tablet by mouth 2 times a day with meals. 200 Tablet 3    metFORMIN (GLUCOPHAGE) 500 MG Tab Take 1 tablet by mouth every evening. 100 Tablet 3    glucose blood (EXACTECH TEST) strip 1 Each 4 Times a Day,Before Meals and at Bedtime.      Multiple Vitamin (MULTI-VITAMIN DAILY PO) Take 1 Tablet by mouth every day.         aspirin EC (ECOTRIN) 81 MG TBEC Take 81 mg by mouth every day.       No facility-administered encounter medications on file as of 4/9/2025.     Review of Systems   Constitutional: Negative.  Negative for chills, fever, malaise/fatigue and weight loss.   HENT: Negative.  Negative for hearing loss.    Eyes: Negative.  Negative for blurred vision and double vision.   Respiratory: Negative.  Negative for cough and shortness of breath.    Cardiovascular: Negative.  Negative for chest pain, palpitations, claudication and leg swelling.   Gastrointestinal: Negative.  Negative for abdominal pain, nausea and vomiting.   Genitourinary: Negative.  Negative for dysuria and urgency.   Musculoskeletal: Negative.  Negative for joint pain and myalgias.   Skin: Negative.  Negative for itching and rash.  "  Neurological: Negative.  Negative for dizziness, focal weakness, weakness and headaches.   Endo/Heme/Allergies: Negative.  Does not bruise/bleed easily.   Psychiatric/Behavioral: Negative.  Negative for depression. The patient is not nervous/anxious.               Objective     /70 (BP Location: Left arm, Patient Position: Sitting, BP Cuff Size: Adult)   Pulse (!) 54   Resp 16   Ht 1.651 m (5' 5\")   Wt 69.4 kg (153 lb)   SpO2 98%   BMI 25.46 kg/m²     Physical Exam  Constitutional:       Appearance: Normal appearance. He is well-developed and normal weight.   HENT:      Head: Normocephalic and atraumatic.   Neck:      Vascular: No JVD.   Cardiovascular:      Rate and Rhythm: Normal rate and regular rhythm.      Heart sounds: Normal heart sounds.   Pulmonary:      Effort: Pulmonary effort is normal.      Breath sounds: Normal breath sounds.   Abdominal:      General: Bowel sounds are normal.      Palpations: Abdomen is soft.      Comments: No hepatosplenomegaly.   Musculoskeletal:         General: Normal range of motion.   Lymphadenopathy:      Cervical: No cervical adenopathy.   Skin:     General: Skin is warm and dry.   Neurological:      Mental Status: He is alert and oriented to person, place, and time.            CARDIAC STUDIES/PROCEDURES:    ABDOMINAL ULTRASOUND (09/04/23)  No evidence of abdominal aortic or iliac artery aneurysm.   (study result reviewed)      AICD PLACEMENT by Vladislav Boykin (08/30/24)   1.  Successful placement of new RV lead.  2.  Abnormal old RV lead and with inability to pass a stylet precluding   extraction.  3.  Plan is to monitor the patient.     CARDIAC CATHETERIZATION CONCLUSIONS by José Luis Ortiz (01/25/24)  Severe native three-vessel coronary artery disease with proximal chronic total occlusions of the left anterior descending and right coronary arteries.  Widely patent left internal mammary artery to left anterior descending coronary artery graft.  Widely " patent saphenous vein grafts to the first obtuse marginal branch and distal right coronary artery.    ECHOCARDIOGRAM CONCLUSIONS (10/15/24)  Prior study on 10/3/2019, compared to the report of the prior study,   there has been no significant change.   Moderately reduced left ventricular systolic function.  The left ventricular ejection fraction is visually estimated to be 35-40%.  Mid to distal global and apical hypokinesis.  Pacer/ICD wire seen in the right ventricle.  No significant valvular abnormalities.  (study result reviewed)     ECHOCARDIOGRAM CONCLUSIONS (12/03/19)  Normal left ventricular chamber size.   Normal left ventricular wall thickness.    Left ventricular ejection fraction is visually estimated to be 45%.   Hypokinesis of inferoseptal wall and apex.  Compared to the report of the study done 6/2016- there has been no significant change.     EKG performed on (10/15/24) was reviewed: EKG personally interpreted shows sinus bradycardia.     Laboratory results of (04/05/25) were reviewed. Cholesterol profile of 95/212/25/28 mg/dL noted.  Laboratory results of (08/08/24) Cholesterol profile of 91/138/26/37 mg/dL noted.    MYOCARDIAL PERFUSION STUDY CONCLUSIONS (01/02/25)  NUCLEAR IMAGING INTERPRETATION  Abnormal perfursion imaging with largely fixed anterior and apical perfusion deficit   compatible with infarct with a mild amount of ischemia.  Moderately reduced LV systolic function with anterior and apical hypokinesis.  LV ejection fraction = 37%.  ECG INTERPRETATION  Negative stress ECG for ischemia.  (study result reviewed)    Assessment & Plan     1. Coronary artery disease due to calcified coronary lesion:Acute MI, anterior wall s/p bms to LAD 12/11 with -RCA and residual CX dz (stent failed)        2. Hx of CABG        3. Ischemic cardiomyopathy        4. AICD (automatic cardioverter/defibrillator) present [facility] placed 4/23/12        5. Primary hypertension        6. Dyslipidemia associated  with type 2 diabetes mellitus (HCC)            Medical Decision Making: Today's Assessment/Status/Plan:        Coronary artery disease with prior coronary bypass graft (x3, left internal mammary artery to left anterior descending mid, reverse saphenous vein graft to obtuse marginal, reverse saphenous vein graft to right coronary artery by David Carmen 01/03/12): He is a 69 year old female with coronary artery disease with prior coronary artery bypass graft surgery, ischemic cardiomyopathy with AICD placement, hypertension and hyperlipidemia. He is clinically doing well from coronary standpoint. I will continue with current medical care including aspirin, metoprolol, lisinopril and rosuvastatin.  Ischemic cardiomyopathy with AICD: No activity noted or reported. We will repeat an echocardiogram in one year.  Hypertension: Currently, the average blood pressure is well controlled. We will continue with beta blockade therapy and ace inhibitor therapy.   Hyperlipidemia: He is doing well on statin therapy without myalgia symptoms. (Managed by primary care physician)    We will follow up the patient in 1 year.    CC David Galvan

## 2025-04-09 NOTE — ASSESSMENT & PLAN NOTE
Stable. Patient taking metoprolol/ lisinopril  BP today 100/ 60  HR RRR No BLE edema. Cont f/u with cardiology  ( Dr Hui) for ongoing monitoring and medication management

## 2025-04-09 NOTE — ASSESSMENT & PLAN NOTE
Stable. Records review Echo (10/16/24) reports Moderate LV systolic function. EF 35 % ; Mid to distal global and apical hypokinesis. Patient taking lisinopril / metoprolol/ ASA  No BLE edema HR RRR No crackles in lung bases. Cont f/u with cardiology (Dr Lynch) for ongoing monitoring and medication management

## 2025-04-09 NOTE — ASSESSMENT & PLAN NOTE
Stable Placed approx 12 years ago. Replaced one year later due to loose wire. No discharges Cont f/u with cardiology (Dr. Lynch) for ongoing interrogation

## 2025-04-09 NOTE — ASSESSMENT & PLAN NOTE
Stable. BMI 25.59 Cont heart healthy diet and regular exercise. Cont f/u with PCP for ongoing monitoring and discussion

## 2025-04-15 PROCEDURE — RXMED WILLOW AMBULATORY MEDICATION CHARGE: Performed by: FAMILY MEDICINE

## 2025-05-26 PROCEDURE — RXMED WILLOW AMBULATORY MEDICATION CHARGE: Performed by: FAMILY MEDICINE

## 2025-05-27 ENCOUNTER — PHARMACY VISIT (OUTPATIENT)
Dept: PHARMACY | Facility: MEDICAL CENTER | Age: 70
End: 2025-05-27
Payer: COMMERCIAL

## 2025-05-29 ENCOUNTER — TELEPHONE (OUTPATIENT)
Dept: CARDIOLOGY | Facility: MEDICAL CENTER | Age: 70
End: 2025-05-29
Payer: MEDICARE

## 2025-05-29 DIAGNOSIS — I47.20 VT (VENTRICULAR TACHYCARDIA) (HCC): ICD-10-CM

## 2025-05-29 DIAGNOSIS — Z95.810 AICD (AUTOMATIC CARDIOVERTER/DEFIBRILLATOR) PRESENT: Primary | ICD-10-CM

## 2025-05-29 DIAGNOSIS — I25.5 ISCHEMIC CARDIOMYOPATHY: ICD-10-CM

## 2025-05-29 NOTE — TELEPHONE ENCOUNTER
Mery Narayanan MD, PhD  Luz Ram, Med Ass't  Caller: Unspecified (Today,  7:30 AM)  It is appropriate to have generator change.  Thank you

## 2025-05-29 NOTE — TELEPHONE ENCOUNTER
Remote transmission received via home monitor, device triggered JOAN.   Full report scanned to Media.   -Brand: Abbott  -Type of Device: AICD    Timeframe to Gen Change: 2-3 weeks

## 2025-05-29 NOTE — TELEPHONE ENCOUNTER
Patient scheduled for ICD gen change on 6-4-25 with Dr. Narayanan at [facility]. Patient has been instructed to check in at 7:30 for 9:00 case time. Hold Metformin and Amaryl am of. Message sent to authorizations. Sera with [facility] [facility] notified.

## 2025-05-29 NOTE — TELEPHONE ENCOUNTER
Dr. Narayanan,    This patient is scheduled at [facility] with you on 6-4-25. Please review and confirm he is appropriate for [facility].    Thank You,  Luz

## 2025-06-02 ENCOUNTER — NON-PROVIDER VISIT (OUTPATIENT)
Dept: CARDIOLOGY | Facility: MEDICAL CENTER | Age: 70
End: 2025-06-02
Payer: MEDICARE

## 2025-06-02 PROCEDURE — 93295 DEV INTERROG REMOTE 1/2/MLT: CPT | Performed by: STUDENT IN AN ORGANIZED HEALTH CARE EDUCATION/TRAINING PROGRAM

## 2025-06-02 NOTE — CARDIAC REMOTE MONITOR - SCAN
Device transmission reviewed. Device demonstrated appropriate function.   Battery at [facility], and patient underwent pulse generator change on 6/4/2025    Electronically Signed by: Mery Narayanan MD, PhD    6/4/2025  1:55 PM

## 2025-06-03 ENCOUNTER — PRE-ADMISSION TESTING (OUTPATIENT)
Dept: ADMISSIONS | Facility: MEDICAL CENTER | Age: 70
End: 2025-06-03
Attending: STUDENT IN AN ORGANIZED HEALTH CARE EDUCATION/TRAINING PROGRAM
Payer: MEDICARE

## 2025-06-03 DIAGNOSIS — Z01.810 PRE-OPERATIVE CARDIOVASCULAR EXAMINATION: ICD-10-CM

## 2025-06-03 DIAGNOSIS — Z01.812 PRE-OPERATIVE LABORATORY EXAMINATION: Primary | ICD-10-CM

## 2025-06-03 LAB
ALBUMIN SERPL BCP-MCNC: 4.4 G/DL (ref 3.2–4.9)
ALBUMIN/GLOB SERPL: 1.4 G/DL
ALP SERPL-CCNC: 94 U/L (ref 30–99)
ALT SERPL-CCNC: 37 U/L (ref 2–50)
ANION GAP SERPL CALC-SCNC: 8 MMOL/L (ref 7–16)
AST SERPL-CCNC: 38 U/L (ref 12–45)
BILIRUB SERPL-MCNC: 0.7 MG/DL (ref 0.1–1.5)
BUN SERPL-MCNC: 19 MG/DL (ref 8–22)
CALCIUM ALBUM COR SERPL-MCNC: 9.3 MG/DL (ref 8.5–10.5)
CALCIUM SERPL-MCNC: 9.6 MG/DL (ref 8.5–10.5)
CHLORIDE SERPL-SCNC: 105 MMOL/L (ref 96–112)
CO2 SERPL-SCNC: 25 MMOL/L (ref 20–33)
CREAT SERPL-MCNC: 0.98 MG/DL (ref 0.5–1.4)
EKG IMPRESSION: NORMAL
ERYTHROCYTE [DISTWIDTH] IN BLOOD BY AUTOMATED COUNT: 43.2 FL (ref 35.9–50)
GFR SERPLBLD CREATININE-BSD FMLA CKD-EPI: 84 ML/MIN/1.73 M 2
GLOBULIN SER CALC-MCNC: 3.1 G/DL (ref 1.9–3.5)
GLUCOSE SERPL-MCNC: 156 MG/DL (ref 65–99)
HCT VFR BLD AUTO: 47.7 % (ref 42–52)
HGB BLD-MCNC: 16.3 G/DL (ref 14–18)
INR PPP: 1.11 (ref 0.87–1.13)
MCH RBC QN AUTO: 31.4 PG (ref 27–33)
MCHC RBC AUTO-ENTMCNC: 34.2 G/DL (ref 32.3–36.5)
MCV RBC AUTO: 91.9 FL (ref 81.4–97.8)
PLATELET # BLD AUTO: 137 K/UL (ref 164–446)
PMV BLD AUTO: 9.8 FL (ref 9–12.9)
POTASSIUM SERPL-SCNC: 4.1 MMOL/L (ref 3.6–5.5)
PROT SERPL-MCNC: 7.5 G/DL (ref 6–8.2)
PROTHROMBIN TIME: 14.4 SEC (ref 12–14.6)
RBC # BLD AUTO: 5.19 M/UL (ref 4.7–6.1)
SODIUM SERPL-SCNC: 138 MMOL/L (ref 135–145)
WBC # BLD AUTO: 6.5 K/UL (ref 4.8–10.8)

## 2025-06-03 PROCEDURE — 93005 ELECTROCARDIOGRAM TRACING: CPT | Mod: TC

## 2025-06-03 PROCEDURE — 93010 ELECTROCARDIOGRAM REPORT: CPT | Performed by: INTERNAL MEDICINE

## 2025-06-03 PROCEDURE — 85027 COMPLETE CBC AUTOMATED: CPT

## 2025-06-03 PROCEDURE — 36415 COLL VENOUS BLD VENIPUNCTURE: CPT

## 2025-06-03 PROCEDURE — 85610 PROTHROMBIN TIME: CPT

## 2025-06-03 PROCEDURE — 80053 COMPREHEN METABOLIC PANEL: CPT

## 2025-06-04 ENCOUNTER — APPOINTMENT (OUTPATIENT)
Dept: CARDIOLOGY | Facility: MEDICAL CENTER | Age: 70
End: 2025-06-04
Attending: INTERNAL MEDICINE
Payer: MEDICARE

## 2025-06-04 ENCOUNTER — PHARMACY VISIT (OUTPATIENT)
Dept: PHARMACY | Facility: MEDICAL CENTER | Age: 70
End: 2025-06-04
Payer: COMMERCIAL

## 2025-06-04 ENCOUNTER — HOSPITAL ENCOUNTER (OUTPATIENT)
Facility: MEDICAL CENTER | Age: 70
End: 2025-06-04
Attending: STUDENT IN AN ORGANIZED HEALTH CARE EDUCATION/TRAINING PROGRAM | Admitting: STUDENT IN AN ORGANIZED HEALTH CARE EDUCATION/TRAINING PROGRAM
Payer: MEDICARE

## 2025-06-04 VITALS
OXYGEN SATURATION: 95 % | WEIGHT: 153.66 LBS | HEIGHT: 65 IN | TEMPERATURE: 96.5 F | HEART RATE: 59 BPM | RESPIRATION RATE: 18 BRPM | DIASTOLIC BLOOD PRESSURE: 64 MMHG | SYSTOLIC BLOOD PRESSURE: 104 MMHG | BODY MASS INDEX: 25.6 KG/M2

## 2025-06-04 DIAGNOSIS — Z95.810 AICD (AUTOMATIC CARDIOVERTER/DEFIBRILLATOR) PRESENT: Primary | ICD-10-CM

## 2025-06-04 DIAGNOSIS — I47.20 VT (VENTRICULAR TACHYCARDIA) (HCC): ICD-10-CM

## 2025-06-04 DIAGNOSIS — Z95.810 AICD (AUTOMATIC CARDIOVERTER/DEFIBRILLATOR) PRESENT: ICD-10-CM

## 2025-06-04 DIAGNOSIS — I25.5 ISCHEMIC CARDIOMYOPATHY: ICD-10-CM

## 2025-06-04 DIAGNOSIS — Z95.0 PULSE GENERATOR OF CARDIAC PACEMAKER HAS ELECTIVE REPLACEMENT INDICATION: ICD-10-CM

## 2025-06-04 PROCEDURE — 700111 HCHG RX REV CODE 636 W/ 250 OVERRIDE (IP)

## 2025-06-04 PROCEDURE — 160046 HCHG PACU - 1ST 60 MINS PHASE II

## 2025-06-04 PROCEDURE — RXMED WILLOW AMBULATORY MEDICATION CHARGE: Performed by: STUDENT IN AN ORGANIZED HEALTH CARE EDUCATION/TRAINING PROGRAM

## 2025-06-04 PROCEDURE — 99152 MOD SED SAME PHYS/QHP 5/>YRS: CPT | Performed by: STUDENT IN AN ORGANIZED HEALTH CARE EDUCATION/TRAINING PROGRAM

## 2025-06-04 PROCEDURE — 160015 HCHG STAT PREOP MINUTES

## 2025-06-04 PROCEDURE — 160002 HCHG RECOVERY MINUTES (STAT)

## 2025-06-04 PROCEDURE — 33262 RMVL& REPLC PULSE GEN 1 LEAD: CPT | Performed by: STUDENT IN AN ORGANIZED HEALTH CARE EDUCATION/TRAINING PROGRAM

## 2025-06-04 PROCEDURE — 160047 HCHG PACU  - EA ADDL 30 MINS PHASE II

## 2025-06-04 PROCEDURE — 99153 MOD SED SAME PHYS/QHP EA: CPT

## 2025-06-04 PROCEDURE — 160035 HCHG PACU - 1ST 60 MINS PHASE I

## 2025-06-04 PROCEDURE — 700101 HCHG RX REV CODE 250

## 2025-06-04 RX ORDER — ONDANSETRON 2 MG/ML
4 INJECTION INTRAMUSCULAR; INTRAVENOUS EVERY 6 HOURS PRN
Status: DISCONTINUED | OUTPATIENT
Start: 2025-06-04 | End: 2025-06-04 | Stop reason: HOSPADM

## 2025-06-04 RX ORDER — MIDAZOLAM HYDROCHLORIDE 1 MG/ML
INJECTION INTRAMUSCULAR; INTRAVENOUS
Status: COMPLETED
Start: 2025-06-04 | End: 2025-06-04

## 2025-06-04 RX ORDER — ACETAMINOPHEN 325 MG/1
650 TABLET ORAL EVERY 4 HOURS PRN
Status: DISCONTINUED | OUTPATIENT
Start: 2025-06-04 | End: 2025-06-04 | Stop reason: HOSPADM

## 2025-06-04 RX ORDER — CEFAZOLIN SODIUM 1 G/3ML
INJECTION, POWDER, FOR SOLUTION INTRAMUSCULAR; INTRAVENOUS
Status: COMPLETED
Start: 2025-06-04 | End: 2025-06-04

## 2025-06-04 RX ORDER — LIDOCAINE HYDROCHLORIDE 20 MG/ML
INJECTION, SOLUTION INFILTRATION; PERINEURAL
Status: COMPLETED
Start: 2025-06-04 | End: 2025-06-04

## 2025-06-04 RX ORDER — BUPIVACAINE HYDROCHLORIDE 5 MG/ML
INJECTION, SOLUTION EPIDURAL; INTRACAUDAL; PERINEURAL
Status: COMPLETED
Start: 2025-06-04 | End: 2025-06-04

## 2025-06-04 RX ORDER — DOXYCYCLINE HYCLATE 100 MG
100 TABLET ORAL 2 TIMES DAILY
Qty: 14 TABLET | Refills: 0 | Status: ON HOLD | OUTPATIENT
Start: 2025-06-04 | End: 2025-06-11

## 2025-06-04 RX ADMIN — BUPIVACAINE HYDROCHLORIDE: 5 INJECTION, SOLUTION EPIDURAL; INTRACAUDAL; PERINEURAL at 09:35

## 2025-06-04 RX ADMIN — LIDOCAINE HYDROCHLORIDE: 20 INJECTION, SOLUTION INFILTRATION; PERINEURAL at 09:35

## 2025-06-04 RX ADMIN — MIDAZOLAM HYDROCHLORIDE 2 MG: 1 INJECTION, SOLUTION INTRAMUSCULAR; INTRAVENOUS at 09:52

## 2025-06-04 RX ADMIN — MIDAZOLAM HYDROCHLORIDE 2 MG: 1 INJECTION, SOLUTION INTRAMUSCULAR; INTRAVENOUS at 09:35

## 2025-06-04 RX ADMIN — FENTANYL CITRATE 100 MCG: 50 INJECTION, SOLUTION INTRAMUSCULAR; INTRAVENOUS at 09:41

## 2025-06-04 RX ADMIN — CEFAZOLIN 3000 MG: 1 INJECTION, POWDER, FOR SOLUTION INTRAMUSCULAR; INTRAVENOUS at 09:35

## 2025-06-04 ASSESSMENT — FIBROSIS 4 INDEX: FIB4 SCORE: 3.1

## 2025-06-04 NOTE — OR NURSING
1030 Patient arrived to unit from cath lab.  Report from RN.  Patient is sleeping.  Dressing to left upper chest has pressure dressing in place, area is soft.  1055 Wife to bedside, updated on plan of care.  1130 Pressure dressing removed, by Ailin.  Swollen area noted.  1145 Ice placed per APRN order.  1210 Ice removed, area does not appear to be more swollen, patient denies pain.  1230 Ailin APN updated.  BP 86/65, patient denies discomfort.  Fluids given.  1245 Reviewed discharge paperwork with pt and wife Rand. Discussed diet, activity, medications, follow up care and worsening symptoms. No questions at this time.   1300 Patient up to edge of bed, denies discomfort.  Access site is unchanged.  Patient ambulated around unit, tolerated well.  Back to [facility], /64.  Patient verbalizes he feels ready to discharge home.  All lines and monitors discontinued.  1310 Pt discharged home with wife via wheelchair by CNA.

## 2025-06-04 NOTE — OP REPORT
Electrophysiology Procedure Note  [facility]      PEOCEDURE DATE: 6/4/2025    PROCEDURE: ICD generator change    INDICATION: Pulse generator at JOAN status    : Mery Narayanan M.D., PhD    ASSISTANT: None    ANESTHESIA: Moderate sedation, start time: 09:39, stop time: 10:20 am, IV versed 4 mg/IV fentanyl 100 mcg    ESTIMATED BLOOD LOSS: 10 cc.    SPECIMENS: None.    COMPLICATIONS: None.    STATEMENT OF MEDICAL NECESSITY:     DESCRIPTION OF PROCEDURE: After informed written consent, the patient was brought to the electrophysiology lab in the fasting, unsedated state. The patient was prepped and draped in the usual sterile fashion. The procedure was performed under moderate sedation with local anesthetic. A left infraclavicular incision was made with a scalpel along the old scar. Access to the device pocket was made using a combination of blunt dissection and electrocautery. The old generator and leads were freed from adhesions and the generator disconnected from the leads. The pocket was irrigated with antibiotic solution, and the new generator was connected to the leads and inserted back in the pocket. TRYX antibiotic pouch was inserted. The wound was closed with three layers of absorbable sutures and covered with Steri-Strips and Aquacel. Following recovery from sedation, the patient was transferred to a monitored bed in good condition.    REMOVED DEVICE INFORMATION:  Pulse generator is a Francis model # QN4932-84  Serial number [id number]      IMPLANTED DEVICE INFORMATION:    Pulse generator is a Abbott model # [id number]  Serial number [id number]      LEAD INFORMATION:    1. Right ventricular lead is a Abbott model 7120/60, serial number [id number], R wave above 12 millivolts, threshold 0.75 Volts at 0.5 milliseconds, pacing impedance 460 Ohms, implant date 08/30/2013      DEVICE PROGRAMMING:    Lai therapy: VVI 40 BPM  Tachy therapy:    VF zone  187 BPM, atpX1, defib 36 J, 40 J x 5  VT zone  160 bpm, monitor      IMPRESSIONS:  1. Successful automatic implantable cardioverter defibrillator generator change.    RECOMMENDATIONS:  1. Transfer to PPU.  2. Follow-up in device clinic for wound check and device interrogation.

## 2025-06-04 NOTE — DISCHARGE INSTRUCTIONS
What to Expect Post Sedation    Rest and take it easy for the first 24 hours.  A responsible adult is recommended to remain with you during that time.  It is normal to feel sleepy.  We encourage you to not do anything that requires balance, judgment or coordination.    FOR 24 HOURS DO NOT:  Drive, operate machinery or run household appliances.  Drink beer or alcoholic beverages.  Make important decisions or sign legal documents.    To avoid nausea, slowly advance diet as tolerated, avoiding spicy or greasy foods for the first day.  Add more substantial food to your diet according to your provider's instructions.  INCREASE FLUIDS AND FIBER TO AVOID CONSTIPATION.    MILD FLU-LIKE SYMPTOMS ARE NORMAL.  YOU MAY EXPERIENCE GENERALIZED MUSCLE ACHES, THROAT IRRITATION, HEADACHE AND/OR SOME NAUSEA.  If any questions arise, call your provider.  If your provider is not available, please feel free to call the [facility] at [phone].    MEDICATIONS: Resume taking daily medication.  Take prescribed pain medication with food.  If no medication is prescribed, you may take non-aspirin pain medication if needed.  PAIN MEDICATION CAN BE VERY CONSTIPATING.  Take a stool softener or laxative such as senokot, pericolace, or milk of magnesia if needed.    Diet    Resume your normal diet as tolerated.  A diet low in cholesterol, fat, and sodium is recommended for good health.     Discharge Instructions:    Pacemaker Discharge Instructions/[facility]  1.  No showers until seen in follow up; may take sponge bath.  Keep dressing dry & in place until seen at for you follow up visit at the cardiology office.     2.  Call our office ([phone]) if you notice any increased swelling, redness, warmth, or drainage at the implant site.  3.  Needs to be seen in emergency if you develop fever > 101F or uncontrolled pain.    BOWEL FUNCTION:  If you are having problems, use what you normally would or call your provider for  suggestions. It also helps to stay regular by including fiber in your diet (for example: bran or fruits and vegetables) and drink plenty of liquids (water, juice, etc.).    Instrucciones Para [place]  (Home Care Instructions)    ACTIVIDAD: Descanse y tome todo con mucha calma las primeras 24 horas después de chapman cirugía.  Delisa persona adulta responsable debe permanecer con usted riki beck periodo de tiempo.  Es normal sentirse sonoliento o sonolienta riki esas primeras horas.  Le recomendamos que no catarina nada que requiera equilibrio, jarod decisiones a mucha coordinación de chapman parte.    NO CATARINA ESTO PURANTE LAS PRIMERAS 24 HORAS:   Manejar o conducir algún vehiculo, operar maquinarias o utilizar electrodomesticos.   Beber cerveza o algún otro tipo de bebida alcohólica.   Jarod decisiones importantes o firmar documentos legales.    INSTRUCCIONES ESPECIALES:    Instrucciones para el cuidado después de la implantación del [place]/ [facility]  1. No se puede bañar en la ducha hasta después que te hemos visto en la oficina de cardiología. Te puedes bañar con toallitas mojadas. Mantenga la venda del pecho seco y en chapman lugar hasta que te vemos en tu linus en la oficina de cardiología.  2. No se puede levantar nada que pese más que 10 libras (4 kilos) por 6 semanas.  3. Si tiene fibre ,as alto que 101F (o 38.3C) necesitas ir que te vean en la kareen de emergencias    DIETA: Para evitar las nauseas, prosiga despacito con chapman dieta a medida que pueda ir tolerándola mejor, evite comidas muy condimentadas o grasosas riki beck primer día.  Vaya agregando comidas más substanciadas a chapman dieta a medida que asi lo indique chapman médica.  Los bebés pueden beber leche preparada o formula, ásl lori también leche del seno de la madre a medida que vayan teniendo hambre.  SIGA AGREGANDO LIQUIDOS Y COMIDAS CON FIBRA PARA EVITAR ESTREÑIMIENTO.    MEDICAMENTOS/MEDICINAS:  Vuelva a jarod quiana medicamentos diarios.  Innovation los medicamentos  que se le prescribe con un poco de comida.  Si no le prescribe ningún tipo de medicamento, entonces puede maddy medicinas para el dolor que no contienen aspirina, si las necesita.  LAS MEDICINAS PARA EL DOLOR PUEDEN ESTREÑIRLE MUCHO.  [place] un suavizante para el excremento o materia fecal (stool softener) o un laxativo lori por ejemplo: senokot, pericolase, o leche de magnesia, si lo necesita.    Usted debe LIAMAR A CHAPMAN MEDICO si tiene los siguientes síntomas:   -   Delisa fiebre más malcolm de 101 grados Fahrenheit.   -   Un dolor incesante aún con los medicamentos, o nauseas y vómito persistente.   -   Un sangrado excesivo (song que traspasa los vendajes o gasas) o algúln tipo de drenaje inesperado que proviene de la henda.     -   Un color minor exagerado o hinchazón alrededor del área en donde se le hizo incisión o mariposa, o un drenaje de pus o con olor darryl proveniente de la henda.   -    La inhabilidad de orinar o vaciar chapman vejiga en 8 horas.   -    Problemas con a respiración o amrlen en el pecho.    Usted debe llamar al 911 si se presentan problemas con el dolor al respirar o el pecho.  Si no se puede ponnoer en comunicación con un medica o con el centro de cirugía, usted debe ir a la estación de emergencia (emergency room) más cercana o a un centro de atención de urgencia (urgent care center).  El teléfono del medico es: [phone]    LOS SÍNTOMAS DE UN LEVE RESFRIO SON MUY NORMALES.  ADEMÁS USTED PUEDE LLEGAR A SENTIR MARLEN GENERALES DE MÚSCULOS, IRRITACIÓN EN LA GARGANTA, MARLEN DE SONYA Y/O UN POCO DE NAUSEAS.    Sie tiene alguna pregunta, llame a chapman médico.  Si chapman médico no se encuentra disponible, por favor llame al Centro de Cirugía at [phone].  el Centro está abierto de Lunes a Viernes desde las 7:00 de la manana hasta las 5:00 de la noche.      Mi firma a continuación indica que he recibido y entiendco estas instrucciones acera de los cuidados en la casa (Home Care Instructions)    Usted  recibirá cathy encuesta en la correspondencia en las siguientes semanas y le pedimos que por favor tome un momento para completar juliane encuesta y regresaría a hosotros.  Nuestro objetivó es brindarle un cuidado muy [place] y par lo tanto apreciamos quiana coméntanos.  Muchas erin por lianna escogido el [facility] de [facility].

## 2025-06-04 NOTE — H&P
Physician H&P    Patient ID:  Abram Barillas  [id number]  69 y.o. male  1955    History:  Primary Diagnosis: ICD at JOAN status    HPI:  69 years old man with medical history of CAD, ICMO, s/p single lead ICD (DF-1, 2012) and ICD lead malfunction with second lCD lead (2013) came for elective pulse generator change. He denies fever, chill, chest pain, SOB, syncope.     Past Medical History:  has a past medical history of Acute MI, anterior wall s/p bms to LAD 12/11 with -RCA and residual CX dz (stent failed) (12/31/2011), AICD (automatic cardioverter/defibrillator) present St Zach placed 4/23/12 (04/24/2012), AICD lead malfunction (08/23/2013), Arrhythmia, ASTHMA, Atrial fib/flutter, transient ([facility]), Bronchitis (as child), CAD (coronary artery disease), Calcium oxalate calculus (01/29/2022), Calcium oxalate calculus of kidney (01/19/2022), Cardiogenic shock ([facility]) (01/2012), Cardiomyopathy, ischemic EF 35% (12/31/2011), Congestive heart failure ([facility]), Dyslipidemia, Fatty liver, Former smoker (08/27/2019), High cholesterol, History of 2019 novel coronavirus disease (COVID-19) (05/25/2022), History of acute myocardial infarction (11/24/2021), History of atrial fibrillation (05/25/2022), Hypertension, Influenza, Left flank pain (01/13/2022), Moderate persistent asthma without complication (05/25/2022), Myocardial infarct ([facility]) (12/29/2011), Myocardial infarction of anterolateral wall ([facility]) (12/29/2011), Nocturnal hypoxia (07/16/2018), Obstructive sleep apnea (03/30/2017), Pacemaker (04/23/2012), Renal disorder, Sleep apnea, Tuberculosis, Uncontrolled type 2 diabetes mellitus with hyperglycemia ([facility]) (05/25/2022), and VT (ventricular tachycardia) ([facility]) (01/10/2024).  Past Surgical History:  has a past surgical history that includes multiple coronary artery bypass endo vein harvest (1/3/2012); colonoscopy (2007); umbilical hernia repair (1/14/2011); recovery (4/23/2012); recovery (8/30/2013); pacemaker insertion;  pr cystoscopy,insert ureteral stent (Left, 1/14/2022); pr cysto/uretero/pyeloscopy, dx (Left, 1/14/2022); lasertripsy (Left, 1/14/2022); pr insert,inflatable penile prosthesis (N/A, 11/18/2024); and excision of penile skin lesion (N/A, 11/18/2024).  Past Social History:  reports that he quit smoking about 43 years ago. His smoking use included cigarettes. He started smoking about 53 years ago. He has a 2.5 pack-year smoking history. He has never used smokeless tobacco. He reports that he does not drink alcohol and does not use drugs.  Past Family History:   Family History   Problem Relation Age of Onset    Cancer Mother         ovarian cancer    Diabetes Sister         prediabetes    Heart Disease Maternal Aunt 17        unclear but MI!    Heart Disease Maternal Uncle 38    Diabetes Maternal Grandmother     Heart Attack Neg Hx      Allergies: Pcn [penicillins]    Current Medications:  Prior to Admission medications   Medication Sig Start Date End Date Taking? Authorizing Provider   rosuvastatin (CRESTOR) 40 MG tablet Take 1 Tablet by mouth at bedtime. 3/17/25  Yes David Cervantes M.D.   metoprolol SR (TOPROL XL) 50 MG TABLET SR 24 HR Take 1 Tablet by mouth every day. 3/17/25  Yes David Cervantes M.D.   lisinopril (PRINIVIL) 10 MG Tab Take 1 tablet by mouth every day. 3/17/25  Yes David Cervantes M.D.   glimepiride (AMARYL) 1 MG tablet Take 1 Tablet by mouth 2 times a day with meals. 3/17/25  Yes David Cervantes M.D.   metFORMIN (GLUCOPHAGE) 500 MG Tab Take 1 tablet by mouth every evening. 3/17/25  Yes David Cervantes M.D.   Multiple Vitamin (MULTI-VITAMIN DAILY PO) Take 1 Tablet by mouth every day.      Yes Physician Outpatient   aspirin EC (ECOTRIN) 81 MG TBEC Take 81 mg by mouth every day.   Yes Physician Outpatient   glucose blood (EXACTECH TEST) strip 1 Each 4 Times a Day,Before Meals and at Bedtime.    Physician Outpatient       Review of Systems:  Review of Systems   All other systems reviewed and are  "negative.    /71   Pulse (!) 54   Temp 36.1 °C (96.9 °F) (Temporal)   Resp 16   Ht 1.651 m (5' 5\")   Wt 69.7 kg (153 lb 10.6 oz)   SpO2 95%     Physical Examination:  Physical Exam  General: NAD, conversant  Cardiovascular: Regular heartbeats, no JVD  Pulmonary: On room air, no respiratory distress  Abdomen: Soft, no tenderness  Extremities: Moves all, no edema  Neuro: AAO x 3, normal mood    Impression:  ICMO, s/p single lead ICD (DF-1, 2012) and ICD lead malfunction with second lCD lead (2013)  Pulse generator at [facility]    Plan:    I discussed with pt the indications/benefits/risks of pulse generator change. He understands and agrees to procced.  Mery Narayanan MD, PhD  6/4/2025  "

## 2025-06-11 ENCOUNTER — HOSPITAL ENCOUNTER (OUTPATIENT)
Facility: MEDICAL CENTER | Age: 70
End: 2025-06-11
Attending: EMERGENCY MEDICINE | Admitting: INTERNAL MEDICINE
Payer: MEDICARE

## 2025-06-11 ENCOUNTER — APPOINTMENT (OUTPATIENT)
Dept: CARDIOLOGY | Facility: MEDICAL CENTER | Age: 70
End: 2025-06-11
Attending: NURSE PRACTITIONER
Payer: MEDICARE

## 2025-06-11 ENCOUNTER — NON-PROVIDER VISIT (OUTPATIENT)
Dept: CARDIOLOGY | Facility: MEDICAL CENTER | Age: 70
End: 2025-06-11

## 2025-06-11 ENCOUNTER — PHARMACY VISIT (OUTPATIENT)
Dept: PHARMACY | Facility: MEDICAL CENTER | Age: 70
End: 2025-06-11
Payer: COMMERCIAL

## 2025-06-11 ENCOUNTER — NON-PROVIDER VISIT (OUTPATIENT)
Dept: CARDIOLOGY | Facility: MEDICAL CENTER | Age: 70
End: 2025-06-11
Attending: INTERNAL MEDICINE
Payer: MEDICARE

## 2025-06-11 VITALS
DIASTOLIC BLOOD PRESSURE: 71 MMHG | RESPIRATION RATE: 18 BRPM | BODY MASS INDEX: 25.31 KG/M2 | SYSTOLIC BLOOD PRESSURE: 114 MMHG | WEIGHT: 151.9 LBS | HEART RATE: 59 BPM | TEMPERATURE: 96.8 F | HEIGHT: 65 IN | OXYGEN SATURATION: 95 %

## 2025-06-11 DIAGNOSIS — T14.8XXA WOUND INFECTION: ICD-10-CM

## 2025-06-11 DIAGNOSIS — I25.5 ISCHEMIC CARDIOMYOPATHY: ICD-10-CM

## 2025-06-11 DIAGNOSIS — Z95.810 ICD (IMPLANTABLE CARDIOVERTER-DEFIBRILLATOR) IN PLACE: ICD-10-CM

## 2025-06-11 DIAGNOSIS — T81.30XA WOUND DEHISCENCE: Primary | ICD-10-CM

## 2025-06-11 DIAGNOSIS — L08.9 WOUND INFECTION: ICD-10-CM

## 2025-06-11 DIAGNOSIS — Z95.810 AICD (AUTOMATIC CARDIOVERTER/DEFIBRILLATOR) PRESENT: Primary | ICD-10-CM

## 2025-06-11 PROBLEM — T82.7XXA: Status: ACTIVE | Noted: 2025-06-11

## 2025-06-11 LAB
ALBUMIN SERPL BCP-MCNC: 4.7 G/DL (ref 3.2–4.9)
ALBUMIN/GLOB SERPL: 1.5 G/DL
ALP SERPL-CCNC: 99 U/L (ref 30–99)
ALT SERPL-CCNC: 24 U/L (ref 2–50)
ANION GAP SERPL CALC-SCNC: 10 MMOL/L (ref 7–16)
AST SERPL-CCNC: 36 U/L (ref 12–45)
BASOPHILS # BLD AUTO: 0.7 % (ref 0–1.8)
BASOPHILS # BLD: 0.04 K/UL (ref 0–0.12)
BILIRUB SERPL-MCNC: 0.7 MG/DL (ref 0.1–1.5)
BUN SERPL-MCNC: 19 MG/DL (ref 8–22)
CALCIUM ALBUM COR SERPL-MCNC: 8.9 MG/DL (ref 8.5–10.5)
CALCIUM SERPL-MCNC: 9.5 MG/DL (ref 8.5–10.5)
CHLORIDE SERPL-SCNC: 108 MMOL/L (ref 96–112)
CO2 SERPL-SCNC: 20 MMOL/L (ref 20–33)
CREAT SERPL-MCNC: 1.03 MG/DL (ref 0.5–1.4)
EOSINOPHIL # BLD AUTO: 0.14 K/UL (ref 0–0.51)
EOSINOPHIL NFR BLD: 2.5 % (ref 0–6.9)
ERYTHROCYTE [DISTWIDTH] IN BLOOD BY AUTOMATED COUNT: 42.7 FL (ref 35.9–50)
GFR SERPLBLD CREATININE-BSD FMLA CKD-EPI: 79 ML/MIN/1.73 M 2
GLOBULIN SER CALC-MCNC: 3.2 G/DL (ref 1.9–3.5)
GLUCOSE SERPL-MCNC: 109 MG/DL (ref 65–99)
HCT VFR BLD AUTO: 48.5 % (ref 42–52)
HGB BLD-MCNC: 16.7 G/DL (ref 14–18)
IMM GRANULOCYTES # BLD AUTO: 0.02 K/UL (ref 0–0.11)
IMM GRANULOCYTES NFR BLD AUTO: 0.4 % (ref 0–0.9)
INR PPP: 1.1 (ref 0.87–1.13)
LYMPHOCYTES # BLD AUTO: 1.83 K/UL (ref 1–4.8)
LYMPHOCYTES NFR BLD: 32.9 % (ref 22–41)
MCH RBC QN AUTO: 31.8 PG (ref 27–33)
MCHC RBC AUTO-ENTMCNC: 34.4 G/DL (ref 32.3–36.5)
MCV RBC AUTO: 92.4 FL (ref 81.4–97.8)
MONOCYTES # BLD AUTO: 0.46 K/UL (ref 0–0.85)
MONOCYTES NFR BLD AUTO: 8.3 % (ref 0–13.4)
NEUTROPHILS # BLD AUTO: 3.07 K/UL (ref 1.82–7.42)
NEUTROPHILS NFR BLD: 55.2 % (ref 44–72)
NRBC # BLD AUTO: 0 K/UL
NRBC BLD-RTO: 0 /100 WBC (ref 0–0.2)
PLATELET # BLD AUTO: 153 K/UL (ref 164–446)
PMV BLD AUTO: 9.4 FL (ref 9–12.9)
POTASSIUM SERPL-SCNC: 3.8 MMOL/L (ref 3.6–5.5)
PROT SERPL-MCNC: 7.9 G/DL (ref 6–8.2)
PROTHROMBIN TIME: 14.2 SEC (ref 12–14.6)
RBC # BLD AUTO: 5.25 M/UL (ref 4.7–6.1)
SODIUM SERPL-SCNC: 138 MMOL/L (ref 135–145)
WBC # BLD AUTO: 5.6 K/UL (ref 4.8–10.8)

## 2025-06-11 PROCEDURE — RXMED WILLOW AMBULATORY MEDICATION CHARGE: Performed by: NURSE PRACTITIONER

## 2025-06-11 PROCEDURE — 93282 PRGRMG EVAL IMPLANTABLE DFB: CPT | Performed by: INTERNAL MEDICINE

## 2025-06-11 PROCEDURE — 99285 EMERGENCY DEPT VISIT HI MDM: CPT

## 2025-06-11 PROCEDURE — 85025 COMPLETE CBC W/AUTO DIFF WBC: CPT

## 2025-06-11 PROCEDURE — G0378 HOSPITAL OBSERVATION PER HR: HCPCS

## 2025-06-11 PROCEDURE — 700101 HCHG RX REV CODE 250

## 2025-06-11 PROCEDURE — 700111 HCHG RX REV CODE 636 W/ 250 OVERRIDE (IP): Mod: JZ

## 2025-06-11 PROCEDURE — 99223 1ST HOSP IP/OBS HIGH 75: CPT | Performed by: INTERNAL MEDICINE

## 2025-06-11 PROCEDURE — 36415 COLL VENOUS BLD VENIPUNCTURE: CPT

## 2025-06-11 PROCEDURE — 85610 PROTHROMBIN TIME: CPT

## 2025-06-11 PROCEDURE — 80053 COMPREHEN METABOLIC PANEL: CPT

## 2025-06-11 PROCEDURE — 99152 MOD SED SAME PHYS/QHP 5/>YRS: CPT | Performed by: INTERNAL MEDICINE

## 2025-06-11 PROCEDURE — 12001 RPR S/N/AX/GEN/TRNK 2.5CM/<: CPT | Performed by: INTERNAL MEDICINE

## 2025-06-11 PROCEDURE — 99153 MOD SED SAME PHYS/QHP EA: CPT

## 2025-06-11 RX ORDER — BUPIVACAINE HYDROCHLORIDE 5 MG/ML
INJECTION, SOLUTION EPIDURAL; INTRACAUDAL; PERINEURAL
Status: COMPLETED
Start: 2025-06-11 | End: 2025-06-11

## 2025-06-11 RX ORDER — ROSUVASTATIN CALCIUM 20 MG/1
40 TABLET, COATED ORAL
Status: DISCONTINUED | OUTPATIENT
Start: 2025-06-11 | End: 2025-06-11 | Stop reason: HOSPADM

## 2025-06-11 RX ORDER — LISINOPRIL 10 MG/1
10 TABLET ORAL DAILY
Status: DISCONTINUED | OUTPATIENT
Start: 2025-06-11 | End: 2025-06-11 | Stop reason: HOSPADM

## 2025-06-11 RX ORDER — CEPHALEXIN 500 MG/1
500 CAPSULE ORAL 2 TIMES DAILY
Qty: 14 CAPSULE | Refills: 0 | Status: SHIPPED | OUTPATIENT
Start: 2025-06-11 | End: 2025-06-16 | Stop reason: SDUPTHER

## 2025-06-11 RX ORDER — MIDAZOLAM HYDROCHLORIDE 1 MG/ML
INJECTION INTRAMUSCULAR; INTRAVENOUS
Status: COMPLETED
Start: 2025-06-11 | End: 2025-06-11

## 2025-06-11 RX ORDER — ACETAMINOPHEN 325 MG/1
650 TABLET ORAL EVERY 4 HOURS PRN
Status: DISCONTINUED | OUTPATIENT
Start: 2025-06-11 | End: 2025-06-11

## 2025-06-11 RX ORDER — POLYETHYLENE GLYCOL 3350 17 G/17G
1 POWDER, FOR SOLUTION ORAL
Status: DISCONTINUED | OUTPATIENT
Start: 2025-06-11 | End: 2025-06-11 | Stop reason: HOSPADM

## 2025-06-11 RX ORDER — METOPROLOL SUCCINATE 25 MG/1
50 TABLET, EXTENDED RELEASE ORAL DAILY
Status: DISCONTINUED | OUTPATIENT
Start: 2025-06-11 | End: 2025-06-11 | Stop reason: HOSPADM

## 2025-06-11 RX ORDER — ASPIRIN 81 MG/1
81 TABLET ORAL EVERY EVENING
Status: DISCONTINUED | OUTPATIENT
Start: 2025-06-12 | End: 2025-06-11 | Stop reason: HOSPADM

## 2025-06-11 RX ORDER — ACETAMINOPHEN 325 MG/1
650 TABLET ORAL EVERY 6 HOURS PRN
Status: DISCONTINUED | OUTPATIENT
Start: 2025-06-11 | End: 2025-06-11 | Stop reason: HOSPADM

## 2025-06-11 RX ORDER — AMOXICILLIN 250 MG
2 CAPSULE ORAL EVERY EVENING
Status: DISCONTINUED | OUTPATIENT
Start: 2025-06-11 | End: 2025-06-11 | Stop reason: HOSPADM

## 2025-06-11 RX ORDER — CEFAZOLIN SODIUM 1 G/3ML
INJECTION, POWDER, FOR SOLUTION INTRAMUSCULAR; INTRAVENOUS
Status: COMPLETED
Start: 2025-06-11 | End: 2025-06-11

## 2025-06-11 RX ORDER — LIDOCAINE HYDROCHLORIDE 20 MG/ML
INJECTION, SOLUTION INFILTRATION; PERINEURAL
Status: COMPLETED
Start: 2025-06-11 | End: 2025-06-11

## 2025-06-11 RX ORDER — ONDANSETRON 2 MG/ML
4 INJECTION INTRAMUSCULAR; INTRAVENOUS EVERY 6 HOURS PRN
Status: DISCONTINUED | OUTPATIENT
Start: 2025-06-11 | End: 2025-06-11 | Stop reason: HOSPADM

## 2025-06-11 RX ADMIN — FENTANYL CITRATE 100 MCG: 50 INJECTION, SOLUTION INTRAMUSCULAR; INTRAVENOUS at 13:25

## 2025-06-11 RX ADMIN — CEFAZOLIN 3000 MG: 1 INJECTION, POWDER, FOR SOLUTION INTRAMUSCULAR; INTRAVENOUS at 13:03

## 2025-06-11 RX ADMIN — LIDOCAINE HYDROCHLORIDE: 20 INJECTION, SOLUTION INFILTRATION; PERINEURAL at 13:07

## 2025-06-11 RX ADMIN — MIDAZOLAM HYDROCHLORIDE 2 MG: 1 INJECTION, SOLUTION INTRAMUSCULAR; INTRAVENOUS at 13:25

## 2025-06-11 RX ADMIN — BUPIVACAINE HYDROCHLORIDE: 5 INJECTION, SOLUTION EPIDURAL; INTRACAUDAL; PERINEURAL at 13:07

## 2025-06-11 SDOH — ECONOMIC STABILITY: TRANSPORTATION INSECURITY
IN THE PAST 12 MONTHS, HAS LACK OF RELIABLE TRANSPORTATION KEPT YOU FROM MEDICAL APPOINTMENTS, MEETINGS, WORK OR FROM GETTING THINGS NEEDED FOR DAILY LIVING?: NO

## 2025-06-11 ASSESSMENT — LIFESTYLE VARIABLES
TOTAL SCORE: 0
TOTAL SCORE: 0
ON A TYPICAL DAY WHEN YOU DRINK ALCOHOL HOW MANY DRINKS DO YOU HAVE: 1
HOW MANY TIMES IN THE PAST YEAR HAVE YOU HAD 5 OR MORE DRINKS IN A DAY: 0
TOTAL SCORE: 0
CONSUMPTION TOTAL: NEGATIVE
ALCOHOL_USE: YES
HAVE YOU EVER FELT YOU SHOULD CUT DOWN ON YOUR DRINKING: NO
AVERAGE NUMBER OF DAYS PER WEEK YOU HAVE A DRINK CONTAINING ALCOHOL: 0
DOES PATIENT WANT TO STOP DRINKING: NO
HAVE PEOPLE ANNOYED YOU BY CRITICIZING YOUR DRINKING: NO
EVER HAD A DRINK FIRST THING IN THE MORNING TO STEADY YOUR NERVES TO GET RID OF A HANGOVER: NO
EVER FELT BAD OR GUILTY ABOUT YOUR DRINKING: NO

## 2025-06-11 ASSESSMENT — SOCIAL DETERMINANTS OF HEALTH (SDOH)
WITHIN THE PAST 12 MONTHS, THE FOOD YOU BOUGHT JUST DIDN'T LAST AND YOU DIDN'T HAVE MONEY TO GET MORE: NEVER TRUE
WITHIN THE PAST 12 MONTHS, YOU WORRIED THAT YOUR FOOD WOULD RUN OUT BEFORE YOU GOT THE MONEY TO BUY MORE: NEVER TRUE
WITHIN THE LAST YEAR, HAVE YOU BEEN AFRAID OF YOUR PARTNER OR EX-PARTNER?: NO
WITHIN THE LAST YEAR, HAVE TO BEEN RAPED OR FORCED TO HAVE ANY KIND OF SEXUAL ACTIVITY BY YOUR PARTNER OR EX-PARTNER?: NO
WITHIN THE LAST YEAR, HAVE YOU BEEN KICKED, HIT, SLAPPED, OR OTHERWISE PHYSICALLY HURT BY YOUR PARTNER OR EX-PARTNER?: NO
IN THE PAST 12 MONTHS, HAS THE ELECTRIC, GAS, OIL, OR WATER COMPANY THREATENED TO SHUT OFF SERVICE IN YOUR HOME?: NO
WITHIN THE LAST YEAR, HAVE YOU BEEN HUMILIATED OR EMOTIONALLY ABUSED IN OTHER WAYS BY YOUR PARTNER OR EX-PARTNER?: NO

## 2025-06-11 ASSESSMENT — COGNITIVE AND FUNCTIONAL STATUS - GENERAL
MOBILITY SCORE: 24
SUGGESTED CMS G CODE MODIFIER DAILY ACTIVITY: CH
SUGGESTED CMS G CODE MODIFIER MOBILITY: CH
DAILY ACTIVITIY SCORE: 24

## 2025-06-11 ASSESSMENT — ENCOUNTER SYMPTOMS
LIGHT-HEADEDNESS: 0
PALPITATIONS: 0
COUGH: 0
ABDOMINAL PAIN: 0
SPEECH DIFFICULTY: 0
BACK PAIN: 0
FEVER: 0
FATIGUE: 0
DIARRHEA: 0
DIZZINESS: 0
TROUBLE SWALLOWING: 0
NAUSEA: 0
VOMITING: 0
WEAKNESS: 0
SHORTNESS OF BREATH: 0
BLOOD IN STOOL: 0
NUMBNESS: 0
HEADACHES: 0
CHILLS: 0
CONSTIPATION: 0
WHEEZING: 0

## 2025-06-11 ASSESSMENT — PAIN DESCRIPTION - PAIN TYPE: TYPE: ACUTE PAIN

## 2025-06-11 ASSESSMENT — PATIENT HEALTH QUESTIONNAIRE - PHQ9
1. LITTLE INTEREST OR PLEASURE IN DOING THINGS: NOT AT ALL
2. FEELING DOWN, DEPRESSED, IRRITABLE, OR HOPELESS: NOT AT ALL
SUM OF ALL RESPONSES TO PHQ9 QUESTIONS 1 AND 2: 0

## 2025-06-11 ASSESSMENT — FIBROSIS 4 INDEX
FIB4 SCORE: 3.1
FIB4 SCORE: 3.27

## 2025-06-11 NOTE — DISCHARGE SUMMARY
Discharge Summary    CHIEF COMPLAINT ON ADMISSION  Chief Complaint   Patient presents with    Wound Check     Pt had a defibrillator placed last Wednesday 6/4 left upper chest and now feels like there is a corner that could be infected at the site. Bandage in place. Slight swelling and redness some drainage by pt noted but hard to see under bandage. Left bandage intact because pt was sent by MD for this reason    Sent by MD    Post-Op Complications       Reason for Admission  Wound Check     Admission Date  6/11/2025    CODE STATUS  Full Code    HPI & HOSPITAL COURSE  This is Abram Barillas is a 69 y.o. male who presented 6/11/2025 with wound drainage from recent defibrillator placement.  AICD generator exchanged on 6/4/25.  Patient was taking doxycyline and has about two days left.  He denied any chest pain, no headaches, no fevers or chills, no N/V.     Patient went straight to short operation with Dr. Rivera.  They had a successful revision and wash out of the AICD generator pocket.  I discussed with Cardiology team. Procedure was quick, no complications, they were okay to discharge patient.  Prescription for oral Keflex was sent to [facility].    I discussed with patient and his wife at bedside, they were in agreement to discharging home, patient had no acute complaints.  All instructions given to them on care, avoiding shower for now.  Follow-up appointment with cardiology on 6/18/2025.    Therefore, he is discharged in good and stable condition to home with close outpatient follow-up.    The patient recovered much more quickly than anticipated on admission.    Discharge Date  06/11/2025 same day admit and discharge    FOLLOW UP ITEMS POST DISCHARGE  With Cardiology    DISCHARGE DIAGNOSES  Principal Problem:    Infection of AICD generator, initial encounter (HCC) (POA: Yes)  Active Problems:    Dyslipidemia associated with type 2 diabetes mellitus (HCC) (POA: Yes)    AICD (automatic  cardioverter/defibrillator) present St Zach placed 4/23/12 (POA: Yes)      Overview: DEVICE DATA:       1. Pulse generator:  St. Zach, model #GF9005-57, serial       #[id number]       2. Right ventricular lead:  St. Zach, model #7120/60,       serial #[id number]       MEASURED INTRAOPERATIVE DATA: R-waves measured 11.7 mV, pacing       threshold 0.75 volts at 0.5 msec, lead impedance of 859 ohms.       DEVICE SETTINGS: VVI 40 to 120.            [facility]      REMOVED DEVICE INFORMATION:      Pulse generator is a Francis model # KJ5470-77      Serial number [id number]              IMPLANTED DEVICE INFORMATION:        Pulse generator is a Abbott model # [id number]      Serial number [id number]              LEAD INFORMATION:            1. Right ventricular lead is a Abbott model 7120/60, serial number       [id number], R wave above 12 millivolts, threshold 0.75 Volts at 0.5       milliseconds, pacing impedance 460 Ohms, implant date 08/30/2013             DEVICE PROGRAMMING:        Lai therapy: VVI 40 BPM      Tachy therapy:        VF zone  187 BPM, atpX1, defib 36 J, 40 J x 5      VT zone 160 bpm, monitor    Coronary artery disease due to calcified coronary lesion:Acute MI, anterior wall s/p bms to LAD 12/11 with -RCA and residual CX dz (stent failed) (POA: Yes)    Primary hypertension (POA: Yes)      Overview: ON NO MEDS, now on meds    Chronic systolic (congestive) heart failure (HCC) (POA: Yes)  Resolved Problems:    * No resolved hospital problems. *      FOLLOW UP  Future Appointments   Date Time Provider Department Center   6/11/2025  4:00 PM [facility] CATH LAB 3 CLOT None   6/18/2025  9:00 AM PACER CHECK-CAM B [facility] None   9/17/2025  9:00 AM David Cervantes M.D. [address]     No follow-up provider specified.    MEDICATIONS ON DISCHARGE     Medication List        START taking these medications        Instructions   cephALEXin 500 MG Caps  Commonly known as: Keflex   Take 1 Capsule  by mouth 2 times a day.  Dose: 500 mg            CONTINUE taking these medications        Instructions   aspirin EC 81 MG Tbec  Commonly known as: Ecotrin   Take 81 mg by mouth every evening.  Dose: 81 mg     glimepiride 1 MG tablet  Commonly known as: Amaryl   [place] 1 tableta por vía oral dos veces al día con las comidas.  (Take 1 Tablet by mouth 2 times a day with meals.)  Dose: 1 mg     lisinopril 10 MG Tabs  Commonly known as: Prinivil   Take 1 tablet by mouth every day.  Dose: 10 mg     metFORMIN 500 MG Tabs  Commonly known as: Glucophage   Take 1 tablet by mouth every evening.  Dose: 500 mg     metoprolol SR 50 MG Tb24  Commonly known as: Toprol XL   Take 1 Tablet by mouth every day.  Dose: 50 mg     MULTI-VITAMIN DAILY PO   Take 1 Tablet by mouth every day.   Dose: 1 Tablet     rosuvastatin 40 MG tablet  Commonly known as: Crestor   Take 1 Tablet by mouth at bedtime.  Dose: 40 mg            STOP taking these medications      doxycycline 100 MG Tabs  Commonly known as: Vibramycin              Allergies  Allergies[1]    DIET  Orders Placed This Encounter   Procedures    Diet Order Diet: Cardiac     Standing Status:   Standing     Number of Occurrences:   1     Diet::   Cardiac [6]       ACTIVITY  As tolerated.  Weight bearing as tolerated    CONSULTATIONS  [facility]    PROCEDURES  Revision of AICD    LABORATORY  Lab Results   Component Value Date    SODIUM 138 06/11/2025    POTASSIUM 3.8 06/11/2025    CHLORIDE 108 06/11/2025    CO2 20 06/11/2025    GLUCOSE 109 (H) 06/11/2025    BUN 19 06/11/2025    CREATININE 1.03 06/11/2025    CREATININE 0.71 (L) 12/08/2010    GLOMRATE >59 12/08/2010        Lab Results   Component Value Date    WBC 5.6 06/11/2025    WBC 7.7 12/08/2010    HEMOGLOBIN 16.7 06/11/2025    HEMATOCRIT 48.5 06/11/2025    PLATELETCT 153 (L) 06/11/2025      CL-REVISION OR RELOCATION OF PM/ICD/BIV POCKET    (Results Pending)       Total time of the discharge process exceeds 71 minutes.         [1]    Allergies  Allergen Reactions    Pcn [Penicillins] Rash     Rash

## 2025-06-11 NOTE — H&P
Hospital Medicine History & Physical Note    Date of Service  6/11/2025    Primary Care Physician  David Cervantes M.D.    Consultants  cardiology    Specialist Names: EPS    Code Status  Full Code    Chief Complaint  Chief Complaint   Patient presents with    Wound Check     Pt had a defibrillator placed last Wednesday 6/4 left upper chest and now feels like there is a corner that could be infected at the site. Bandage in place. Slight swelling and redness some drainage by pt noted but hard to see under bandage. Left bandage intact because pt was sent by MD for this reason    Sent by MD    Post-Op Complications       History of Presenting Illness  Abram Barillas is a 69 y.o. male who presented 6/11/2025 with wound drainage from recent defibrillator placement.  AICD generator exchanged on 6/4/25.  Patient was taking doxycyline and has about two days left.  He denied any chest pain, no headaches, no fevers or chills, no N/V.    I discussed with EDP Dr. Diggs. EPS will revise die device.    I discussed the plan of care with patient, bedside RN, charge RN, , pharmacy, and cardiology.    Review of Systems  Review of Systems   Constitutional:  Negative for chills, fever and malaise/fatigue.   Respiratory:  Negative for cough and shortness of breath.    Cardiovascular:  Negative for chest pain and palpitations.   Gastrointestinal:  Negative for abdominal pain, constipation, diarrhea, nausea and vomiting.   Musculoskeletal:  Negative for back pain and joint pain.   Neurological:  Negative for dizziness and headaches.   All other systems reviewed and are negative.      Past Medical History   has a past medical history of Acute MI, anterior wall s/p bms to LAD 12/11 with -RCA and residual CX dz (stent failed) (12/31/2011), AICD (automatic cardioverter/defibrillator) present [facility] placed 4/23/12 (04/24/2012), AICD lead malfunction (08/23/2013), Arrhythmia, ASTHMA, Atrial fib/flutter, transient  ([facility]), Bronchitis (as child), CAD (coronary artery disease), Calcium oxalate calculus (01/29/2022), Calcium oxalate calculus of kidney (01/19/2022), Cardiogenic shock ([facility]) (01/2012), Cardiomyopathy, ischemic EF 35% (12/31/2011), Congestive heart failure ([facility]), Dyslipidemia, Fatty liver, Former smoker (08/27/2019), High cholesterol, History of 2019 novel coronavirus disease (COVID-19) (05/25/2022), History of acute myocardial infarction (11/24/2021), History of atrial fibrillation (05/25/2022), Hypertension, Influenza, Left flank pain (01/13/2022), Moderate persistent asthma without complication (05/25/2022), Myocardial infarct ([facility]) (12/29/2011), Myocardial infarction of anterolateral wall ([facility]) (12/29/2011), Nocturnal hypoxia (07/16/2018), Obstructive sleep apnea (03/30/2017), Pacemaker (04/23/2012), Renal disorder, Sleep apnea, Tuberculosis, Uncontrolled type 2 diabetes mellitus with hyperglycemia ([facility]) (05/25/2022), and VT (ventricular tachycardia) ([facility]) (01/10/2024).    Surgical History   has a past surgical history that includes multiple coronary artery bypass endo vein harvest (1/3/2012); colonoscopy (2007); umbilical hernia repair (1/14/2011); recovery (4/23/2012); recovery (8/30/2013); pacemaker insertion; pr cystoscopy,insert ureteral stent (Left, 1/14/2022); pr cysto/uretero/pyeloscopy, dx (Left, 1/14/2022); lasertripsy (Left, 1/14/2022); pr insert,inflatable penile prosthesis (N/A, 11/18/2024); and excision of penile skin lesion (N/A, 11/18/2024).     Family History  Family History   Problem Relation Age of Onset    Cancer Mother         ovarian cancer    Diabetes Sister         prediabetes    Heart Disease Maternal Aunt 17        unclear but MI!    Heart Disease Maternal Uncle 38    Diabetes Maternal Grandmother     Heart Attack Neg Hx         Family history reviewed with patient. There is no family history that is pertinent to the chief complaint.     Social History   reports that he quit smoking about  43 years ago. His smoking use included cigarettes. He started smoking about 53 years ago. He has a 2.5 pack-year smoking history. He has never used smokeless tobacco. He reports that he does not drink alcohol and does not use drugs.    Allergies  Allergies[1]    Medications  Prior to Admission Medications   Prescriptions Last Dose Informant Patient Reported? Taking?   Multiple Vitamin (MULTI-VITAMIN DAILY PO) 6/9/2025 Patient Yes No   Sig: Take 1 Tablet by mouth every day.      aspirin EC (ECOTRIN) 81 MG TBEC 6/10/2025 Evening Patient Yes Yes   Sig: Take 81 mg by mouth every evening.   doxycycline (VIBRAMYCIN) 100 MG Tab 6/10/2025 Evening Patient No Yes   Sig: Take 1 Tablet by mouth 2 times a day.   glimepiride (AMARYL) 1 MG tablet 6/10/2025 Evening Patient No Yes   Sig: Take 1 Tablet by mouth 2 times a day with meals.   lisinopril (PRINIVIL) 10 MG Tab 6/10/2025 Patient No Yes   Sig: Take 1 tablet by mouth every day.   metFORMIN (GLUCOPHAGE) 500 MG Tab 6/10/2025 Evening Patient No Yes   Sig: Take 1 tablet by mouth every evening.   metoprolol SR (TOPROL XL) 50 MG TABLET SR 24 HR 6/10/2025 Patient No Yes   Sig: Take 1 Tablet by mouth every day.   rosuvastatin (CRESTOR) 40 MG tablet 6/10/2025 Evening Patient No Yes   Sig: Take 1 Tablet by mouth at bedtime.      Facility-Administered Medications: None       Physical Exam  Temp:  [36.1 °C (97 °F)] 36.1 °C (97 °F)  Pulse:  [55-63] 59  Resp:  [16-18] 17  BP: ()/(63-81) 97/63  SpO2:  [94 %-96 %] 95 %  Blood Pressure : 107/81   Temperature: 36.1 °C (97 °F)   Pulse: (!) 55   Respiration: 18   Pulse Oximetry: 95 %       Physical Exam  Vitals and nursing note reviewed.   Constitutional:       General: He is not in acute distress.     Appearance: He is not diaphoretic.   HENT:      Head: Normocephalic and atraumatic.      Nose: Nose normal. No congestion.      Mouth/Throat:      Mouth: Mucous membranes are dry.      Pharynx: No oropharyngeal exudate.   Eyes:      General:  "No scleral icterus.     Extraocular Movements: Extraocular movements intact.   Cardiovascular:      Rate and Rhythm: Normal rate and regular rhythm.      Pulses: Normal pulses.      Heart sounds: Normal heart sounds. No murmur heard.  Pulmonary:      Effort: Pulmonary effort is normal. No respiratory distress.      Breath sounds: Normal breath sounds. No wheezing or rales.   Abdominal:      General: Abdomen is flat. Bowel sounds are normal. There is no distension.      Palpations: Abdomen is soft.      Tenderness: There is no abdominal tenderness.   Musculoskeletal:         General: No swelling or tenderness. Normal range of motion.      Cervical back: Normal range of motion and neck supple.   Skin:     General: Skin is warm.      Capillary Refill: Capillary refill takes less than 2 seconds.      Coloration: Skin is not jaundiced or pale.      Findings: No erythema.   Neurological:      General: No focal deficit present.      Mental Status: He is alert and oriented to person, place, and time. Mental status is at baseline.      Motor: No weakness.   Psychiatric:         Mood and Affect: Mood normal.         Behavior: Behavior normal.         Thought Content: Thought content normal.         Judgment: Judgment normal.         Laboratory:  Recent Labs     06/11/25  1005   WBC 5.6   RBC 5.25   HEMOGLOBIN 16.7   HEMATOCRIT 48.5   MCV 92.4   MCH 31.8   MCHC 34.4   RDW 42.7   PLATELETCT 153*   MPV 9.4     Recent Labs     06/11/25  1005   SODIUM 138   POTASSIUM 3.8   CHLORIDE 108   CO2 20   GLUCOSE 109*   BUN 19   CREATININE 1.03   CALCIUM 9.5     Recent Labs     06/11/25  1005   ALTSGPT 24   ASTSGOT 36   ALKPHOSPHAT 99   TBILIRUBIN 0.7   GLUCOSE 109*         No results for input(s): \"NTPROBNP\" in the last 72 hours.      No results for input(s): \"TROPONINT\" in the last 72 hours.    Imaging:  CL-REVISION OR RELOCATION OF PM/ICD/BIV POCKET    (Results Pending)       no X-Ray or EKG requiring " interpretation    Assessment/Plan:  Justification for Admission Status  I anticipate this patient is appropriate for observation status at this time because patient will need IV antibiotics, revision of AICD generator and possible discharge on oral antibiotics.    Patient will need a Med/Surg bed on MEDICINE service .  The need is secondary to as above.    * Infection of AICD generator, initial encounter (HCC)- (present on admission)  Assessment & Plan  Pt was reporting small purulent discharge on left upper corner of the AICD  He has been taking Doxycycline  EPS team to take patient to OR and exchange/relocate AICD.  Keep NPO  Failed outpatient Abx. Starting IV Ancef (PCN allergy).  I am admitting to obs, procedure could occur today and patient potentially can transition to PO Bactrim or PO Keflex + Doxycycline on discharge.    Chronic systolic (congestive) heart failure (HCC)- (present on admission)  Assessment & Plan  I reviewed echo 10/15/2024, LVEF 35-40%, trace MR.  Continue metroprolol, lisinopril, rosuvastatin.    Primary hypertension- (present on admission)  Assessment & Plan  Continue lisinopril and metoprolol    Coronary artery disease due to calcified coronary lesion:Acute MI, anterior wall s/p bms to LAD 12/11 with -RCA and residual CX dz (stent failed)- (present on admission)  Assessment & Plan  Continue home meds, follow up with cardiology    AICD (automatic cardioverter/defibrillator) present [facility] placed 4/23/12- (present on admission)  Assessment & Plan  06/04/2025  IMPLANTED DEVICE INFORMATION:    Pulse generator is a Abbott model # [id number]  Serial number [id number]      EPS to revise generator implant  Keep NPO  Starting IV Unasyn, vanco    Dyslipidemia associated with type 2 diabetes mellitus (HCC)- (present on admission)  Assessment & Plan  Continue Rosuvastatin 40mg  Hold home Glimepiride and metformin        VTE prophylaxis: SCDs/TEDs and pharmacologic prophylaxis contraindicated due to  procoedure      Total time spent on admission 76 minutes.    This included my review with ER physician, review of ED events, patient's history, outside records, recent records, face to face interview, physical examination; my review of lab results (CBC, chemistry panel), imaging review (CXR) and ECG. Also includes counseling patient on admission, treatment plan, and documentation of encounter.  In addition, speaking with specialist cardiologist/EPS.         [1]   Allergies  Allergen Reactions    Pcn [Penicillins] Rash     Rash

## 2025-06-11 NOTE — PROGRESS NOTES
Discharge instructions reviewed with PT including rinfrcing dressing, no showers and new medication. PT verbalized understanding. PT instructed to follow discharge instructions and if signs/symptoms of infection return to return to the ED. PT to ER front entrance via wheelchair by CNA. Wife at patient side, she will be transporting patient home.

## 2025-06-11 NOTE — CONSULTS
Cardiology Initial Consultation    Date of Service  6/11/2025    Referring Physician  Faustino Ponce M.D.    Date of Service  6/11/2025    Attending Physician  Faustino Ponce M.D.    Reason for Consultation   ICD pocket evaluation     HPI  Abram Barillas is a 69 y.o. male admitted 6/11/2025 from device clinic with concerns for ICD pocket healing.  He recently underwent ICD generator change 6/4/25 with Dr. Narayanan.  Was seen in PPM clinic this AM for one week follow up, there as concern for some serous fluid drainage.  He denies any recent fevers or chills.  No systemic symptoms reported.     He has a past medical history significnat for CAD S/P prior CABG, HFrEF/ICM S/P single chamber ICD (Francis, RV lead from 2012) HTN, HLD.       Review of Systems  Review of Systems   Constitutional:  Negative for chills, fatigue and fever.   HENT:  Negative for congestion, nosebleeds, tinnitus and trouble swallowing.    Respiratory:  Negative for cough, shortness of breath and wheezing.    Cardiovascular:  Negative for chest pain, palpitations and leg swelling.   Gastrointestinal:  Negative for abdominal pain and blood in stool.   Genitourinary:  Negative for hematuria.   Neurological:  Negative for dizziness, syncope, speech difficulty, weakness, light-headedness and numbness.   All other systems reviewed and are negative.      Past Medical History   has a past medical history of Acute MI, anterior wall s/p bms to LAD 12/11 with -RCA and residual CX dz (stent failed) (12/31/2011), AICD (automatic cardioverter/defibrillator) present [facility] placed 4/23/12 (04/24/2012), AICD lead malfunction (08/23/2013), Arrhythmia, ASTHMA, Atrial fib/flutter, transient (HCC), Bronchitis (as child), CAD (coronary artery disease), Calcium oxalate calculus (01/29/2022), Calcium oxalate calculus of kidney (01/19/2022), Cardiogenic shock (HCC) (01/2012), Cardiomyopathy, ischemic EF 35% (12/31/2011), Congestive heart failure (HCC),  Dyslipidemia, Fatty liver, Former smoker (08/27/2019), High cholesterol, History of 2019 novel coronavirus disease (COVID-19) (05/25/2022), History of acute myocardial infarction (11/24/2021), History of atrial fibrillation (05/25/2022), Hypertension, Influenza, Left flank pain (01/13/2022), Moderate persistent asthma without complication (05/25/2022), Myocardial infarct ([facility]) (12/29/2011), Myocardial infarction of anterolateral wall ([facility]) (12/29/2011), Nocturnal hypoxia (07/16/2018), Obstructive sleep apnea (03/30/2017), Pacemaker (04/23/2012), Renal disorder, Sleep apnea, Tuberculosis, Uncontrolled type 2 diabetes mellitus with hyperglycemia ([facility]) (05/25/2022), and VT (ventricular tachycardia) ([facility]) (01/10/2024).    Surgical History   has a past surgical history that includes multiple coronary artery bypass endo vein harvest (1/3/2012); colonoscopy (2007); umbilical hernia repair (1/14/2011); recovery (4/23/2012); recovery (8/30/2013); pacemaker insertion; pr cystoscopy,insert ureteral stent (Left, 1/14/2022); pr cysto/uretero/pyeloscopy, dx (Left, 1/14/2022); lasertripsy (Left, 1/14/2022); pr insert,inflatable penile prosthesis (N/A, 11/18/2024); and excision of penile skin lesion (N/A, 11/18/2024).    Family History  Family History   Problem Relation Age of Onset    Cancer Mother         ovarian cancer    Diabetes Sister         prediabetes    Heart Disease Maternal Aunt 17        unclear but MI!    Heart Disease Maternal Uncle 38    Diabetes Maternal Grandmother     Heart Attack Neg Hx        Social History   reports that he quit smoking about 43 years ago. His smoking use included cigarettes. He started smoking about 53 years ago. He has a 2.5 pack-year smoking history. He has never used smokeless tobacco. He reports that he does not drink alcohol and does not use drugs.    Medications  Prior to Admission Medications   Prescriptions Last Dose Informant Patient Reported? Taking?   Multiple Vitamin  (MULTI-VITAMIN DAILY PO) 6/9/2025 Patient Yes No   Sig: Take 1 Tablet by mouth every day.      aspirin EC (ECOTRIN) 81 MG TBEC 6/10/2025 Evening Patient Yes Yes   Sig: Take 81 mg by mouth every evening.   doxycycline (VIBRAMYCIN) 100 MG Tab 6/10/2025 Evening Patient No Yes   Sig: Take 1 Tablet by mouth 2 times a day.   glimepiride (AMARYL) 1 MG tablet 6/10/2025 Evening Patient No Yes   Sig: Take 1 Tablet by mouth 2 times a day with meals.   lisinopril (PRINIVIL) 10 MG Tab 6/10/2025 Patient No Yes   Sig: Take 1 tablet by mouth every day.   metFORMIN (GLUCOPHAGE) 500 MG Tab 6/10/2025 Evening Patient No Yes   Sig: Take 1 tablet by mouth every evening.   metoprolol SR (TOPROL XL) 50 MG TABLET SR 24 HR 6/10/2025 Patient No Yes   Sig: Take 1 Tablet by mouth every day.   rosuvastatin (CRESTOR) 40 MG tablet 6/10/2025 Evening Patient No Yes   Sig: Take 1 Tablet by mouth at bedtime.      Facility-Administered Medications: None       Allergies  Allergies[1]    Vital signs in last 24 hours  Temp:  [36.1 °C (97 °F)] 36.1 °C (97 °F)  Pulse:  [55-63] 59  Resp:  [16-18] 17  BP: ()/(63-81) 97/63  SpO2:  [94 %-96 %] 95 %    Physical Exam  Physical Exam  Vitals and nursing note reviewed.   Constitutional:       Appearance: Normal appearance.   HENT:      Head: Normocephalic and atraumatic.      Nose: No congestion.   Eyes:      Conjunctiva/sclera: Conjunctivae normal.      Pupils: Pupils are equal, round, and reactive to light.   Cardiovascular:      Rate and Rhythm: Normal rate and regular rhythm.      Pulses: Normal pulses.      Heart sounds: Normal heart sounds. No murmur heard.     No friction rub. No gallop.   Pulmonary:      Effort: Pulmonary effort is normal.      Breath sounds: Normal breath sounds. No wheezing, rhonchi or rales.   Chest:      Comments: Left anterior chest ICD site with near separation of lateral aspect of incision  No erythema or warmth appreciated. No drainage noted.  Mild soft edema noted.  "  Abdominal:      General: Bowel sounds are normal.      Tenderness: There is no abdominal tenderness.   Musculoskeletal:         General: Normal range of motion.      Cervical back: Normal range of motion.      Right lower leg: No edema.      Left lower leg: No edema.   Skin:     General: Skin is warm and dry.   Neurological:      Mental Status: He is alert and oriented to person, place, and time.      Gait: Gait normal.   Psychiatric:         Mood and Affect: Mood normal.         Behavior: Behavior normal.         Thought Content: Thought content normal.         Judgment: Judgment normal.         Lab Review  Lab Results   Component Value Date/Time    WBC 5.6 06/11/2025 10:05 AM    WBC 7.7 12/08/2010 07:11 AM    RBC 5.25 06/11/2025 10:05 AM    RBC 5.58 12/08/2010 07:11 AM    HEMOGLOBIN 16.7 06/11/2025 10:05 AM    HEMATOCRIT 48.5 06/11/2025 10:05 AM    MCV 92.4 06/11/2025 10:05 AM    MCV 96 12/08/2010 07:11 AM    MCH 31.8 06/11/2025 10:05 AM    MCH 32.8 12/08/2010 07:11 AM    MCHC 34.4 06/11/2025 10:05 AM    MPV 9.4 06/11/2025 10:05 AM      Lab Results   Component Value Date/Time    SODIUM 138 06/11/2025 10:05 AM    POTASSIUM 3.8 06/11/2025 10:05 AM    CHLORIDE 108 06/11/2025 10:05 AM    CO2 20 06/11/2025 10:05 AM    GLUCOSE 109 (H) 06/11/2025 10:05 AM    BUN 19 06/11/2025 10:05 AM    CREATININE 1.03 06/11/2025 10:05 AM    CREATININE 0.71 (L) 12/08/2010 07:11 AM    BUNCREATRAT 15 12/08/2010 07:11 AM    GLOMRATE >59 12/08/2010 07:11 AM      Lab Results   Component Value Date/Time    ASTSGOT 36 06/11/2025 10:05 AM    ALTSGPT 24 06/11/2025 10:05 AM     Lab Results   Component Value Date/Time    CHOLSTRLTOT 95 (L) 04/05/2025 07:32 AM    LDL 28 04/05/2025 07:32 AM    HDL 25 (A) 04/05/2025 07:32 AM    TRIGLYCERIDE 212 (H) 04/05/2025 07:32 AM       No results for input(s): \"NTPROBNP\" in the last 72 hours.    Cardiac Imaging and Procedures Review  Imaging    Assessment/Plan  Primary Prevention ICD in situ  - Seen in device " clinic for routine wound check post generator replacement last week.  Noted to have changes to pocket, referred to ER for evaluation.  - On inspection device with impending erosion of device, however does not appear that device had eroded though the tissue.  Mild erythema to lateral corner of incision.  No warmth or drainage noted.   - The patient denies any constitutional symptoms.  WBC normal.   - Discussed plan for pocket revision with washout.  He is agreeable to proceed.  Please keep NPO.  Will plan to add on this afternoon.     ANMOL Barker.   [facility]  [phone]               [1]   Allergies  Allergen Reactions    Pcn [Penicillins] Rash     Rash

## 2025-06-11 NOTE — ASSESSMENT & PLAN NOTE
I reviewed echo 10/15/2024, LVEF 35-40%, trace MR.  Continue metroprolol, lisinopril, rosuvastatin.

## 2025-06-11 NOTE — DISCHARGE INSTRUCTIONS
Keep left chest dressing clean and dry. No showers while dressing is in place. You've been given gauze and tegaderm to reinforce the dressing if it starts to come loose. No showers until cleared by cardiology at your follow up appointments.

## 2025-06-11 NOTE — ASSESSMENT & PLAN NOTE
06/04/2025  IMPLANTED DEVICE INFORMATION:    Pulse generator is a Abbott model # [id number]  Serial number [id number]      EPS to revise generator implant  Keep NPO  Starting IV Unasyn, vanco

## 2025-06-11 NOTE — ED NOTES
Received bedside report from YONAS Parsons to assume care of pt.  IV established, labs drawn and sent.  All monitors in place.  Sinus azucena noted on monitor.  POC reviewed.  Pt. A&o x 4.  Call light in reach.  Denies needs at this time.

## 2025-06-11 NOTE — DISCHARGE PLANNING
TCN following. HTH/SCP chart review completed. Note pt currently in ED secondary to wound check for defibrillator placed on 6/4; sent by MD.      Per review, has ambulated in ED with no AD or assist and is currently on RA.  Per PCP note patient no history of falls and requires no assists with ADLs. At this time anticipating that pt will dc to home with OP f/u (either directly from ED or after admission to [facility] if warranted). Per chart patient has [facility] PCP and PCP f/u scheduled for 9/17.      If pt admits to [facility], TCN will continue to monitor and assist with SCP/HTH authorization consideration for post acute needs if indicated. Please reach out to TCN via [facility]ALTE if post acute transitional care needs are warranted for dc planning.

## 2025-06-11 NOTE — ED TRIAGE NOTES
"Chief Complaint   Patient presents with    Wound Check     Pt had a defibrillator placed last Wednesday 6/4 left upper chest and now feels like there is a corner that could be infected at the site. Bandage in place. Slight swelling and redness some drainage by pt noted but hard to see under bandage. Left bandage intact because pt was sent by MD for this reason    Sent by MD    Post-Op Complications     Ambulatory to triage for above.     Denies fever or chills vss aox4 gcs 15       /70   Pulse 63   Temp 36.1 °C (97 °F) (Temporal)   Resp 18   Ht 1.651 m (5' 5\")   Wt 69.9 kg (154 lb 1.6 oz)   SpO2 94%   BMI 25.64 kg/m²     "

## 2025-06-11 NOTE — ED NOTES
Med Rec complete per patient   Allergies reviewed  Antibiotics in the past 30 days:yes  Anticoagulant in past 14 days:no  Pharmacy patient utilizes:[facility]    Patient takes Aspirin 81 mg nightly    Patient has one day remaining of Doxy 100 mg

## 2025-06-11 NOTE — PROGRESS NOTES
Patient arrives to CDU via gurney. Up self to hospital bed. New surgical dressing CDI, patient has no current complaints. Oriented to call bell use; verbalized understanding.

## 2025-06-11 NOTE — ED NOTES
Jake repositioned for pt. Comfort.  Pt. Denies other needs at this time.  Pt. Remains sinus azucena on monitor.  No s/s of distress.

## 2025-06-11 NOTE — OP REPORT
[facility]     PROCEDURE:   1) ICD pocket washout and revision    : Remington Rivera M.D.    ASSISTANT: None    ANESTHESIA: Moderate sedation and local anesthetic (start time 1305, stop time 1338, total dose given 2 mg IV versed, 100 mcg IV fentanyl)    ESTIMATED BLOOD LOSS: 20 cc.    SPECIMENS: None.    COMPLICATIONS: None.    INDICATION(S):   ICD pocket wound dehiscence    DESCRIPTION OF PROCEDURE: After informed written consent, the patient was brought to the electrophysiology lab in the fasting, unsedated state. The patient was prepped and draped in the usual sterile fashion. The procedure was performed under moderate sedation with local anesthetic. A left infraclavicular incision was made with a scalpel along the old scar incision where there was dehiscence along the lateral edge. Access to the device pocket was made using a combination of blunt dissection and electrocautery. The old generator and leads were taken out of the pocket, and the pocket was copiously irrigated with antibiotic solution. The old TYRX was remnants were removed. The pocket was extended inferiorly with electrocautery and blunt dissection to allow some more room for the generator. A new TYRX pouch along with the device was inserted back into the pocket. The wound was closed with three layers of absorbable sutures and covered with Steri-Strips. Following recovery from sedation, the patient was transferred to a monitored bed in good condition.    See prior gen change op note for device information    IMPRESSIONS:  1. Successful pocket revision     RECOMMENDATIONS:  1. Transfer to PPU.  2. Follow-up in device clinic for wound check and device interrogation

## 2025-06-11 NOTE — DISCHARGE PLANNING
Patient discharged home with referral for [facility] for:  Pacer Check Only on Wednesday Jun 18, 2025 9:00 AM.

## 2025-06-11 NOTE — ED PROVIDER NOTES
ER Provider Note    Scribed for Papi Diggs M.d. by Jacque Nickerson. 6/11/2025  8:10 AM    Primary Care Provider: David Cervantes M.D.    CHIEF COMPLAINT   Chief Complaint   Patient presents with    Wound Check     Pt had a defibrillator placed last Wednesday 6/4 left upper chest and now feels like there is a corner that could be infected at the site. Bandage in place. Slight swelling and redness some drainage by pt noted but hard to see under bandage. Left bandage intact because pt was sent by MD for this reason    Sent by MD    Post-Op Complications     EXTERNAL RECORDS REVIEWED  Inpatient Notes The patient had a defibrillator placed on 6/4/25 by Dr. Narayanan.     HPI/ROS  LIMITATION TO HISTORY   Select: : None  OUTSIDE HISTORIAN(S):  None    Abram Barillas is a 69 y.o. male who presents to the ED for evaluation of post-surgical wound onset 7 days ago. The patient had a defibrillator placed on 6/4/25 in his left upper chest. He states that he started to notice a corner of the surgical site appeared to have swelling and redness. He presents to the ED after seeing his cardiologist earlier today who prompted him to present for concerns of infection. He denies drainage. The patient has a history of MI, arrhythmia, CAD, hypertension, dyslipidemia, and high cholesterol. There are no known alleviating or exacerbating factors. He is allergic to penicillins.     PAST MEDICAL HISTORY  Past Medical History[1]    SURGICAL HISTORY  Past Surgical History[2]    FAMILY HISTORY  Family History   Problem Relation Age of Onset    Cancer Mother         ovarian cancer    Diabetes Sister         prediabetes    Heart Disease Maternal Aunt 17        unclear but MI!    Heart Disease Maternal Uncle 38    Diabetes Maternal Grandmother     Heart Attack Neg Hx        SOCIAL HISTORY   reports that he quit smoking about 43 years ago. His smoking use included cigarettes. He started smoking about 53 years ago. He has a 2.5 pack-year  "smoking history. He has never used smokeless tobacco. He reports that he does not drink alcohol and does not use drugs.    CURRENT MEDICATIONS  Current medications can be seen on the nurse's note.      ALLERGIES  Pcn [penicillins]    PHYSICAL EXAM  /72   Pulse (!) 59   Temp 36.1 °C (97 °F) (Temporal)   Resp 16   Ht 1.651 m (5' 5\")   Wt 69.9 kg (154 lb 1.6 oz)   SpO2 96%   BMI 25.64 kg/m²     Constitutional: Well developed, Well nourished, No acute distress, Non-toxic appearance.   HENT: Normocephalic, Atraumatic,   .   Neck: Normal range of motion,  Cardiovascular: Normal heart rate, Normal rhythm, No murmurs, No rubs, No gallops.   Thorax & Lungs: Normal breath sounds, No respiratory distress, No wheezing, defibrillator pocket using the left upper chest is not erythematous.  The wound seems to have dehisced on the lateral aspect with opening and to fistulous appearing tract to the pocket.  Some mild erythema but no purulence.    Musculoskeletal: Good range of motion in all major joints.  Neurologic: Alert,, No focal deficits noted.       DIAGNOSTIC STUDIES    LABS  Results for orders placed or performed during the hospital encounter of 06/11/25   CBC WITH DIFFERENTIAL    Collection Time: 06/11/25 10:05 AM   Result Value Ref Range    WBC 5.6 4.8 - 10.8 K/uL    RBC 5.25 4.70 - 6.10 M/uL    Hemoglobin 16.7 14.0 - 18.0 g/dL    Hematocrit 48.5 42.0 - 52.0 %    MCV 92.4 81.4 - 97.8 fL    MCH 31.8 27.0 - 33.0 pg    MCHC 34.4 32.3 - 36.5 g/dL    RDW 42.7 35.9 - 50.0 fL    Platelet Count 153 (L) 164 - 446 K/uL    MPV 9.4 9.0 - 12.9 fL    Neutrophils-Polys 55.20 44.00 - 72.00 %    Lymphocytes 32.90 22.00 - 41.00 %    Monocytes 8.30 0.00 - 13.40 %    Eosinophils 2.50 0.00 - 6.90 %    Basophils 0.70 0.00 - 1.80 %    Immature Granulocytes 0.40 0.00 - 0.90 %    Nucleated RBC 0.00 0.00 - 0.20 /100 WBC    Neutrophils (Absolute) 3.07 1.82 - 7.42 K/uL    Lymphs (Absolute) 1.83 1.00 - 4.80 K/uL    Monos (Absolute) 0.46 " 0.00 - 0.85 K/uL    Eos (Absolute) 0.14 0.00 - 0.51 K/uL    Baso (Absolute) 0.04 0.00 - 0.12 K/uL    Immature Granulocytes (abs) 0.02 0.00 - 0.11 K/uL    NRBC (Absolute) 0.00 K/uL   COMP METABOLIC PANEL    Collection Time: 06/11/25 10:05 AM   Result Value Ref Range    Sodium 138 135 - 145 mmol/L    Potassium 3.8 3.6 - 5.5 mmol/L    Chloride 108 96 - 112 mmol/L    Co2 20 20 - 33 mmol/L    Anion Gap 10.0 7.0 - 16.0    Glucose 109 (H) 65 - 99 mg/dL    Bun 19 8 - 22 mg/dL    Creatinine 1.03 0.50 - 1.40 mg/dL    Calcium 9.5 8.5 - 10.5 mg/dL    Correct Calcium 8.9 8.5 - 10.5 mg/dL    AST(SGOT) 36 12 - 45 U/L    ALT(SGPT) 24 2 - 50 U/L    Alkaline Phosphatase 99 30 - 99 U/L    Total Bilirubin 0.7 0.1 - 1.5 mg/dL    Albumin 4.7 3.2 - 4.9 g/dL    Total Protein 7.9 6.0 - 8.2 g/dL    Globulin 3.2 1.9 - 3.5 g/dL    A-G Ratio 1.5 g/dL   ESTIMATED GFR    Collection Time: 06/11/25 10:05 AM   Result Value Ref Range    GFR (CKD-EPI) 79 >60 mL/min/1.73 m 2      RADIOLOGY/PROCEDURES   The attending emergency physician has independently interpreted the diagnostic imaging associated with this visit and am waiting the final reading from the radiologist.   My preliminary interpretation is a follows: Radiology studies are pending.    Radiologist interpretation:  CL-REVISION OR RELOCATION OF PM/ICD/BIV POCKET    (Results Pending)       COURSE & MEDICAL DECISION MAKING     ASSESSMENT, COURSE AND PLAN  Care Narrative:     8:24 AM Patient presents to the ED with post-surgical sound. Ordered for CBC with diff and CMP to evaluate his symptoms.      8:35 AM Consulted with Dr. Parker (Cardiology), about the patient's condition. They will have the pacemaker team evaluate the patient.    10:00 AM Patient was evaluated by pacemaker team. They have ordered for a CL- Revision or relocation of the PM/ICD/BIV pocket.     Ordered preoperative labs.  The pacemaker team requested that we hospitalis the patient in observation status since will be until  much later today to do this.    11:12 AM I discussed the patient's case and the above findings with Dr. Ponce (Hospitalist) who agrees to evaluate the patient for hospitalization.           DISPOSITION AND DISCUSSIONS  I have discussed management of the patient with the following physicians and GISELLE's:  Dr. Parker (Cardiology), Dr. Ponce (Hospitalist)    Escalation of care considered, and ultimately not performed: Considered antibiotics but not indicated at this time.    DISPOSITION:  Patient will be hospitalized by Dr. Ponce in guarded condition.     FINAL DIAGNOSIS  1. Wound dehiscence    2. Wound infection          Jacque VALDOVINOS (Scribe), am scribing for, and in the presence of, Papi Diggs M.D..    Electronically signed by: Jacque Nickerson (Scribe), 6/11/2025    Papi VALDOVINOS M.D. personally performed the services described in this documentation, as scribed by Jacque Nickerson in my presence, and it is both accurate and complete.   The note accurately reflects work and decisions made by me.  Papi Diggs M.D.  6/11/2025  10:34 AM         [1]   Past Medical History:  Diagnosis Date    Acute MI, anterior wall s/p bms to LAD 12/11 with -RCA and residual CX dz (stent failed) 12/31/2011    AICD (automatic cardioverter/defibrillator) present [employer] placed 4/23/12 04/24/2012    DEVICE DATA:  1. Pulse generator:  [employer], model #RP6317-43, serial  #[id number]  2. Right ventricular lead:  [employer], model #7120/60,  serial #[id number]  MEASURED INTRAOPERATIVE DATA: R-waves measured 11.7 mV, pacing  threshold 0.75 volts at 0.5 msec, lead impedance of 859 ohms.  DEVICE SETTINGS: VVI 40 to 120.     AICD lead malfunction 08/23/2013    Arrhythmia     ASTHMA     as a child. 1/13/22-PRN use of inhaler for allergies    Atrial fib/flutter, transient (HCC)     when in cardiogenic shock    Bronchitis as child    CAD (coronary artery disease)     Calcium oxalate calculus 01/29/2022     Calcium oxalate calculus of kidney 01/19/2022    Cardiogenic shock (HCC) 01/2012    Cardiomyopathy, ischemic EF 35% 12/31/2011    Congestive heart failure ([facility])     Follow with Dr Yi    Dyslipidemia     Fatty liver     Former smoker 08/27/2019    High cholesterol     History of 2019 novel coronavirus disease (COVID-19) 05/25/2022 1/2022    History of acute myocardial infarction 11/24/2021 2011    History of atrial fibrillation 05/25/2022    When in cardiogenic shock    Hypertension     ON NO MEDS, now on meds    Influenza     Left flank pain 01/13/2022    comes and goes-severe when present    Moderate persistent asthma without complication 05/25/2022    Myocardial infarct ([facility]) 12/29/2011    Myocardial infarction of anterolateral wall ([facility]) 12/29/2011    Follow with Dr Yi    Nocturnal hypoxia 07/16/2018    Obstructive sleep apnea 03/30/2017    Pacemaker 04/23/2012    [employer]. [facility]    Renal disorder     stones    Sleep apnea     Had study but was never prescribed CPAP.     Tuberculosis     20 years ago on meds for 6  mo    Uncontrolled type 2 diabetes mellitus with hyperglycemia ([facility]) 05/25/2022    VT (ventricular tachycardia) ([facility]) 01/10/2024   [2]   Past Surgical History:  Procedure Laterality Date    UT INSERT,INFLATABLE PENILE PROSTHESIS N/A 11/18/2024    Procedure: INSERTION OF PENILE PROSTHESIS;  Surgeon: Bandar Henry M.D.;  Location: [facility];  Service: Urology    EXCISION OF PENILE SKIN LESION N/A 11/18/2024    Procedure: REMOVAL, CYST, SKIN, PENILE;  Surgeon: Bandar Henry M.D.;  Location: [facility];  Service: Urology    UT CYSTOSCOPY,INSERT URETERAL STENT Left 1/14/2022    Procedure: CYSTOSCOPY, WITH URETERAL STENT INSERTION;  Surgeon: Duncan Blakely M.D.;  Location: [facility];  Service: Urology    UT CYSTO/URETERO/PYELOSCOPY, DX Left 1/14/2022    Procedure: URETEROSCOPY;  Surgeon: Duncan Blakely M.D.;   Location: SURGERY [facility];  Service: Urology    LASERTRIPSY Left 1/14/2022    Procedure: LITHOTRIPSY, USING LASER;  Surgeon: Duncan Blakely M.D.;  Location: SURGERY [facility];  Service: Urology    RECOVERY  8/30/2013    Performed by Cath-Recovery Surgery at SURGERY SAME DAY [facility]    RECOVERY  4/23/2012    Performed by SURGERY, CATH-RECOVERY at SURGERY SAME DAY [facility] ORS    MULTIPLE CORONARY ARTERY BYPASS ENDO VEIN HARVEST  1/3/2012    Performed by PADMINI OCHOA at SURGERY [facility]    UMBILICAL HERNIA REPAIR  1/14/2011    Performed by CHUY CHRISTENSEN at SURGERY SAME DAY [facility] ORS    COLONOSCOPY  2007    normal    PACEMAKER INSERTION

## 2025-06-11 NOTE — ASSESSMENT & PLAN NOTE
Pt was reporting small purulent discharge on left upper corner of the AICD  He has been taking Doxycycline  EPS team to take patient to OR and exchange/relocate AICD.  Keep NPO  Failed outpatient Abx. Starting IV Ancef (PCN allergy).  I am admitting to obs, procedure could occur today and patient potentially can transition to PO Bactrim or PO Keflex + Doxycycline on discharge.

## 2025-06-12 ENCOUNTER — PATIENT OUTREACH (OUTPATIENT)
Dept: MEDICAL GROUP | Facility: MEDICAL CENTER | Age: 70
End: 2025-06-12
Payer: MEDICARE

## 2025-06-12 NOTE — PROGRESS NOTES
Transitional Care Management  TCM Outreach Date and Time: Filed (6/12/2025  9:07 AM)    Discharge Questions  Actual Discharge Date: 06/11/25  Now that you are home, how are you feeling?: Good (site looks good)  Did you receive any new prescriptions?: Yes (Keflex; STOP doxycycline)  Were you able to get them filled?: Yes  Meds to Bed or Pharmacy filled?: Pharmacy  Do you have any questions about your current medications or new medications (Review Med Rec)?: No  Did you have any durable medical equipment ordered?: No  Do you have a follow up appointment scheduled with your PCP?: Yes  Appointment Date: 06/16/25  Appointment Time: 1440  Any issues or paperwork you wish to discuss with your PCP?: No  Are you (patient) able to get to the appointment?: Yes  If Home Health was ordered, have they contacted you (Patient): Not Applicable  Did you have enough support after your last discharge?: Yes  Does this patient qualify for the CCM program?: Yes (referred to Elaine Monahan RN)    Transitional Care  Number of attempts made to contact patient: 1  Current or previous attempts completed within two business days of discharge? : Yes  Provided education regarding treatment plan, medications, self-management, ADLs?: Yes (s/s of infection and ED precautions)  Has patient completed an Advanced Directive?: No  Has the Care Manager's phone number provided?: Yes  Is there anything else I can help you with?: No    Discharge Summary  Chief Complaint: Wound Check        Pt had a defibrillator placed last Wednesday 6/4 left upper chest and now feels like there is a corner that could be infected at the site. Bandage in place. Slight swelling and redness some drainage by pt noted but hard to see under bandage. Left bandage intact because pt was sent by MD for this reason    Sent by MD    Post-Op Complications  Admitting Diagnosis: Wound Check  Discharge Diagnosis: Infection of AICD generator, initial encounter

## 2025-06-16 ENCOUNTER — OFFICE VISIT (OUTPATIENT)
Dept: MEDICAL GROUP | Facility: MEDICAL CENTER | Age: 70
End: 2025-06-16
Payer: MEDICARE

## 2025-06-16 VITALS
BODY MASS INDEX: 25.83 KG/M2 | HEART RATE: 54 BPM | SYSTOLIC BLOOD PRESSURE: 122 MMHG | WEIGHT: 155 LBS | OXYGEN SATURATION: 97 % | DIASTOLIC BLOOD PRESSURE: 70 MMHG | HEIGHT: 65 IN | RESPIRATION RATE: 14 BRPM | TEMPERATURE: 97.6 F

## 2025-06-16 DIAGNOSIS — Z95.810 ICD (IMPLANTABLE CARDIOVERTER-DEFIBRILLATOR) IN PLACE: ICD-10-CM

## 2025-06-16 DIAGNOSIS — T82.7XXD INFECTION OF IMPLANTABLE CARDIOVERTER-DEFIBRILLATOR (ICD) GENERATOR, SUBSEQUENT ENCOUNTER: ICD-10-CM

## 2025-06-16 PROCEDURE — RXMED WILLOW AMBULATORY MEDICATION CHARGE: Performed by: FAMILY MEDICINE

## 2025-06-16 RX ORDER — CEPHALEXIN 500 MG/1
500 CAPSULE ORAL 3 TIMES DAILY
Qty: 21 CAPSULE | Refills: 0 | Status: SHIPPED | OUTPATIENT
Start: 2025-06-16 | End: 2025-06-25

## 2025-06-16 ASSESSMENT — FIBROSIS 4 INDEX: FIB4 SCORE: 3.27

## 2025-06-16 NOTE — PROGRESS NOTES
"Subjective:     Abram Barillas is a 69 y.o. male who presents for Hospital Follow-up.  Wife is with him here today.    HPI:   Recently hospitalized for infection in AICD pocket.  The AICD was removed and pocket irrigated by EP cardiology, new AICD implanted.  Now on keflex bid, will increase to tid.  Denies fever or chills.  Eating well.  Able to walk.  Some limitation on movement of left arm and shoulder but this is improving.    Current medicines (including reconciliation performed today)  Current Medications[1]    Allergies:   Pcn [penicillins]    Social History[2]    ROS:  Denies chest pain or shortness of breath.  Denies palpitations.  Denies syncope or presyncope.  Denies any fever or chills.    Objective:     Vitals:    06/16/25 1509   BP: 122/70   Pulse: (!) 54   Resp: 14   Temp: 36.4 °C (97.6 °F)   TempSrc: Temporal   SpO2: 97%   Weight: 70.3 kg (155 lb)   Height: 1.651 m (5' 5\")     Body mass index is 25.79 kg/m².    Physical Exam:  Vital signs reviewed  Alert, lucid, appropriate  Neck supple, no JVD  Left upper chest still moderate edema and mild erythema.  No streaking.  He and his wife feel it is getting better.  Lungs CTA A and P  no rales  Denies edema legs and feet.    Assessment and Plan:   1. ICD (implantable cardioverter-defibrillator) in place  - cephALEXin (KEFLEX) 500 MG Cap; Take 1 Capsule by mouth 3 times a day for 7 days.  Dispense: 21 Capsule; Refill: 0    2. Infection of implantable cardioverter-defibrillator (ICD) generator, subsequent encounter  - cephALEXin (KEFLEX) 500 MG Cap; Take 1 Capsule by mouth 3 times a day for 7 days.  Dispense: 21 Capsule; Refill: 0      - Chart and discharge summary were reviewed.   - Hospitalization and results reviewed with patient.   - Medications reviewed including instructions regarding high risk medications, dosing and side effects.  - Recommended Services: No services needed at this time  - Advance directive/POLST on file?  No   packet provided, " discussed    Follow-up:Return in about 3 months (around 2025), or if symptoms worsen or fail to improve.    Face-to-face transitional care management services with MODERATE (today's visit is within 14 days post discharge & LACE+ score of 28-58) medical decision complexity were provided.     LACE+ Historical Score Over Time (0-28: Low, 29-58: Medium, 59+: High): 75      Critical Care                       [1]   Current Outpatient Medications   Medication Sig Dispense Refill    cephALEXin (KEFLEX) 500 MG Cap Take 1 Capsule by mouth 3 times a day for 7 days. 21 Capsule 0    rosuvastatin (CRESTOR) 40 MG tablet Take 1 Tablet by mouth at bedtime. 100 Tablet 3    metoprolol SR (TOPROL XL) 50 MG TABLET SR 24 HR Take 1 Tablet by mouth every day. 100 Tablet 3    lisinopril (PRINIVIL) 10 MG Tab Take 1 tablet by mouth every day. 100 Tablet 3    glimepiride (AMARYL) 1 MG tablet Take 1 Tablet by mouth 2 times a day with meals. 200 Tablet 3    metFORMIN (GLUCOPHAGE) 500 MG Tab Take 1 tablet by mouth every evening. 100 Tablet 3    Multiple Vitamin (MULTI-VITAMIN DAILY PO) Take 1 Tablet by mouth every day.         aspirin EC (ECOTRIN) 81 MG TBEC Take 81 mg by mouth every evening.       No current facility-administered medications for this visit.   [2]   Social History  Tobacco Use    Smoking status: Former     Current packs/day: 0.00     Average packs/day: 0.3 packs/day for 10.0 years (2.5 ttl pk-yrs)     Types: Cigarettes     Start date: 3/1/1972     Quit date: 3/1/1982     Years since quittin.3    Smokeless tobacco: Never    Tobacco comments:     QUIT    Vaping Use    Vaping status: Never Used   Substance Use Topics    Alcohol use: No     Alcohol/week: 0.0 oz     Comment: occasionally ( 2 times a year)    Drug use: No

## 2025-06-18 ENCOUNTER — NON-PROVIDER VISIT (OUTPATIENT)
Dept: CARDIOLOGY | Facility: MEDICAL CENTER | Age: 70
End: 2025-06-18
Attending: STUDENT IN AN ORGANIZED HEALTH CARE EDUCATION/TRAINING PROGRAM
Payer: MEDICARE

## 2025-06-18 ENCOUNTER — PHARMACY VISIT (OUTPATIENT)
Dept: PHARMACY | Facility: MEDICAL CENTER | Age: 70
End: 2025-06-18
Payer: COMMERCIAL

## 2025-06-30 ENCOUNTER — TELEPHONE (OUTPATIENT)
Dept: RADIOLOGY | Facility: MEDICAL CENTER | Age: 70
End: 2025-06-30
Payer: MEDICARE

## 2025-07-01 ENCOUNTER — OFFICE VISIT (OUTPATIENT)
Dept: CARDIOLOGY | Facility: MEDICAL CENTER | Age: 70
End: 2025-07-01
Attending: NURSE PRACTITIONER
Payer: MEDICARE

## 2025-07-01 VITALS
HEIGHT: 65 IN | OXYGEN SATURATION: 94 % | RESPIRATION RATE: 14 BRPM | HEART RATE: 61 BPM | WEIGHT: 154 LBS | SYSTOLIC BLOOD PRESSURE: 90 MMHG | BODY MASS INDEX: 25.66 KG/M2 | DIASTOLIC BLOOD PRESSURE: 58 MMHG

## 2025-07-01 DIAGNOSIS — Z95.810 AICD (AUTOMATIC CARDIOVERTER/DEFIBRILLATOR) PRESENT: Primary | ICD-10-CM

## 2025-07-01 LAB — EKG IMPRESSION: NORMAL

## 2025-07-01 PROCEDURE — 93289 INTERROG DEVICE EVAL HEART: CPT | Performed by: NURSE PRACTITIONER

## 2025-07-01 PROCEDURE — 3078F DIAST BP <80 MM HG: CPT | Performed by: NURSE PRACTITIONER

## 2025-07-01 PROCEDURE — 3074F SYST BP LT 130 MM HG: CPT | Performed by: NURSE PRACTITIONER

## 2025-07-01 PROCEDURE — 93289 INTERROG DEVICE EVAL HEART: CPT | Mod: 26 | Performed by: NURSE PRACTITIONER

## 2025-07-01 PROCEDURE — 99212 OFFICE O/P EST SF 10 MIN: CPT | Performed by: NURSE PRACTITIONER

## 2025-07-01 PROCEDURE — 93005 ELECTROCARDIOGRAM TRACING: CPT | Mod: TC | Performed by: NURSE PRACTITIONER

## 2025-07-01 PROCEDURE — 99213 OFFICE O/P EST LOW 20 MIN: CPT | Mod: 25 | Performed by: NURSE PRACTITIONER

## 2025-07-01 ASSESSMENT — ENCOUNTER SYMPTOMS
TINGLING: 0
PND: 0
STRIDOR: 0
DIZZINESS: 0
CHILLS: 0
SENSORY CHANGE: 0
PALPITATIONS: 0
WEIGHT LOSS: 0
WHEEZING: 0
HEMOPTYSIS: 0
SHORTNESS OF BREATH: 0
SORE THROAT: 0
SPUTUM PRODUCTION: 0
SPEECH CHANGE: 0
FEVER: 0
HEADACHES: 0
ORTHOPNEA: 0
FOCAL WEAKNESS: 0
TREMORS: 0
COUGH: 0

## 2025-07-01 ASSESSMENT — FIBROSIS 4 INDEX: FIB4 SCORE: 3.27

## 2025-07-01 NOTE — PROGRESS NOTES
Chief Complaint   Patient presents with    Follow-Up     F/V Dx:Chronic systolic (congestive) heart failure (HCC)       Subjective     Abram Barillas is a 69 y.o. male who presents today for nonconditional  MRI screening/considering.    He has a [employer] AICD, last generator change 2025.  Who presented to the ER 2025 with concerns of near erosion and poorly healed pocket, he underwent pocket revision 2025 with Dr. Rivera.      He has a capped dual coil ICD that was initially implanted in , capped in .      Past Medical History[1]  Past Surgical History[2]  Family History   Problem Relation Age of Onset    Cancer Mother         ovarian cancer    Diabetes Sister         prediabetes    Heart Disease Maternal Aunt 17        unclear but MI!    Heart Disease Maternal Uncle 38    Diabetes Maternal Grandmother     Heart Attack Neg Hx      Social History     Socioeconomic History    Marital status:      Spouse name: Not on file    Number of children: Not on file    Years of education: Not on file    Highest education level: Not on file   Occupational History     Comment: retired   Tobacco Use    Smoking status: Former     Current packs/day: 0.00     Average packs/day: 0.3 packs/day for 10.0 years (2.5 ttl pk-yrs)     Types: Cigarettes     Start date: 3/1/1972     Quit date: 3/1/1982     Years since quittin.3    Smokeless tobacco: Never    Tobacco comments:     QUIT    Vaping Use    Vaping status: Never Used   Substance and Sexual Activity    Alcohol use: No     Alcohol/week: 0.0 oz     Comment: occasionally ( 2 times a year)    Drug use: No    Sexual activity: Not Currently   Other Topics Concern    Not on file   Social History Narrative    Not on file     Social Drivers of Health     Financial Resource Strain: Low Risk  (2025)    Overall Financial Resource Strain (CARDIA)     Difficulty of Paying Living Expenses: Not very hard   Food Insecurity: No Food Insecurity (2025)     Hunger Vital Sign     Worried About Running Out of Food in the Last Year: Never true     Ran Out of Food in the Last Year: Never true   Transportation Needs: No Transportation Needs (6/11/2025)    PRAPARE - Transportation     Lack of Transportation (Medical): No     Lack of Transportation (Non-Medical): No   Physical Activity: Not on file   Stress: Not on file   Social Connections: Not on file   Intimate Partner Violence: Not At Risk (6/11/2025)    Humiliation, Afraid, Rape, and Kick questionnaire     Fear of Current or Ex-Partner: No     Emotionally Abused: No     Physically Abused: No     Sexually Abused: No   Housing Stability: Low Risk  (6/11/2025)    Housing Stability Vital Sign     Unable to Pay for Housing in the Last Year: No     Number of Times Moved in the Last Year: 0     Homeless in the Last Year: No     Allergies[3]  Encounter Medications[4]  Review of Systems   Constitutional:  Negative for chills, fever, malaise/fatigue and weight loss.   HENT:  Negative for congestion, sore throat and tinnitus.    Respiratory:  Negative for cough, hemoptysis, sputum production, shortness of breath, wheezing and stridor.    Cardiovascular:  Negative for chest pain, palpitations, orthopnea, leg swelling and PND.   Neurological:  Negative for dizziness, tingling, tremors, sensory change, speech change, focal weakness and headaches.   All other systems reviewed and are negative.             Objective     There were no vitals taken for this visit.    Physical Exam  Vitals reviewed.   Constitutional:       Appearance: Normal appearance. He is well-developed.   HENT:      Head: Normocephalic and atraumatic.      Mouth/Throat:      Mouth: Mucous membranes are moist.      Pharynx: Oropharynx is clear.   Eyes:      Extraocular Movements: Extraocular movements intact.      Conjunctiva/sclera: Conjunctivae normal.      Pupils: Pupils are equal, round, and reactive to light.   Neck:      Vascular: No JVD.   Cardiovascular:       Rate and Rhythm: Normal rate and regular rhythm.      Pulses: Normal pulses.      Heart sounds: Normal heart sounds. No murmur heard.     No friction rub. No gallop.   Pulmonary:      Effort: Pulmonary effort is normal.      Breath sounds: Normal breath sounds. No wheezing or rales.   Chest:      Chest wall: No tenderness.      Comments: Left chest site is CDI, incision well approximated, no erythema.   Musculoskeletal:      Cervical back: Normal range of motion and neck supple.   Skin:     General: Skin is warm and dry.      Capillary Refill: Capillary refill takes 2 to 3 seconds.   Neurological:      Mental Status: He is alert and oriented to person, place, and time.   Psychiatric:         Mood and Affect: Mood normal.         Behavior: Behavior normal.         Thought Content: Thought content normal.         Judgment: Judgment normal.          Assessment & Plan     1. AICD (automatic cardioverter/defibrillator) present  EKG        Medical Decision Making: Today's Assessment/Status/Plan:   1.  ICD in situ  -Device interrogated in office today and demonstrates appropriate function with stable lead parameter.  He has not had any events or therapies.  -Initial device placed in 2012, dual coil ICD lead replaced in 2013.  The initial dual coil ICD lead was left capped and abandoned in place.  - Unfortunately this disqualifies him from nonconditional device MRI protocol.  This was discussed with the patient.  He will need to follow-up with his orthopedic provider to seek potential alternative imaging modality.    Return to clinic in 6 weeks for repeat wound check.  Sooner if clinical condition changes.  PLEASE NOTE: This Note was created using voice recognition Software. I have made every reasonable attempt to correct obvious errors, but I expect that there are errors of grammar and possibly content that I did not discover before finalizing the note                                    [1]   Past Medical  History:  Diagnosis Date    Acute MI, anterior wall s/p bms to LAD 12/11 with -RCA and residual CX dz (stent failed) 12/31/2011    AICD (automatic cardioverter/defibrillator) present [employer] placed 4/23/12 04/24/2012    DEVICE DATA:  1. Pulse generator:  [employer], model #AK2688-50, serial  #[id number]  2. Right ventricular lead:  [employer], model #7120/60,  serial #[id number]  MEASURED INTRAOPERATIVE DATA: R-waves measured 11.7 mV, pacing  threshold 0.75 volts at 0.5 msec, lead impedance of 859 ohms.  DEVICE SETTINGS: VVI 40 to 120.     AICD lead malfunction 08/23/2013    Arrhythmia     ASTHMA     as a child. 1/13/22-PRN use of inhaler for allergies    Atrial fib/flutter, transient (HCC)     when in cardiogenic shock    Atrial fibrillation (HCC) 2011    Bronchitis as child    CAD (coronary artery disease)     Calcium oxalate calculus 01/29/2022    Calcium oxalate calculus of kidney 01/19/2022    Cardiogenic shock (HCC) 01/2012    Cardiomyopathy, ischemic EF 35% 12/31/2011    Congestive heart failure (HCC)     Follow with Dr Yi    Dyslipidemia     Fatty liver     Former smoker 08/27/2019    High cholesterol     History of 2019 novel coronavirus disease (COVID-19) 05/25/2022 1/2022    History of acute myocardial infarction 11/24/2021 2011    History of atrial fibrillation 05/25/2022    When in cardiogenic shock    Hypertension     ON NO MEDS, now on meds    Influenza     Left flank pain 01/13/2022    comes and goes-severe when present    Moderate persistent asthma without complication 05/25/2022    Myocardial infarct (HCC) 12/29/2011    Myocardial infarction of anterolateral wall (HCC) 12/29/2011    Follow with Dr Yi    Nocturnal hypoxia 07/16/2018    Obstructive sleep apnea 03/30/2017    Pacemaker 04/23/2012    [employer]. Follows with Dr Yi with [facility]    Positive PPD 1991    Renal disorder     stones    Sleep apnea     Had study but was never prescribed CPAP.      Tuberculosis     20 years ago on meds for 6  mo    Uncontrolled type 2 diabetes mellitus with hyperglycemia ([facility]) 05/25/2022    VT (ventricular tachycardia) ([facility]) 01/10/2024   [2]   Past Surgical History:  Procedure Laterality Date    IA INSERT,INFLATABLE PENILE PROSTHESIS N/A 11/18/2024    Procedure: INSERTION OF PENILE PROSTHESIS;  Surgeon: Bandar Henry M.D.;  Location: [facility];  Service: Urology    EXCISION OF PENILE SKIN LESION N/A 11/18/2024    Procedure: REMOVAL, CYST, SKIN, PENILE;  Surgeon: Bandar Henry M.D.;  Location: [facility];  Service: Urology    IA CYSTOSCOPY,INSERT URETERAL STENT Left 1/14/2022    Procedure: CYSTOSCOPY, WITH URETERAL STENT INSERTION;  Surgeon: Duncan Blakely M.D.;  Location: [facility];  Service: Urology    IA CYSTO/URETERO/PYELOSCOPY, DX Left 1/14/2022    Procedure: URETEROSCOPY;  Surgeon: Duncan Blakely M.D.;  Location: SURGERY [facility];  Service: Urology    LASERTRIPSY Left 1/14/2022    Procedure: LITHOTRIPSY, USING LASER;  Surgeon: Duncan Blakely M.D.;  Location: [facility];  Service: Urology    RECOVERY  8/30/2013    Performed by Cath-Recovery Surgery at SURGERY SAME DAY [facility]    RECOVERY  4/23/2012    Performed by SURGERY, CATH-RECOVERY at SURGERY SAME DAY [facility]    MULTIPLE CORONARY ARTERY BYPASS ENDO VEIN HARVEST  1/3/2012    Performed by PADMINI OCHOA at SURGERY [facility]    UMBILICAL HERNIA REPAIR  1/14/2011    Performed by CHUY CHRISTENSEN at SURGERY SAME DAY [facility]    COLONOSCOPY  2007    normal    PACEMAKER INSERTION     [3]   Allergies  Allergen Reactions    Pcn [Penicillins] Rash     Rash   [4]   Outpatient Encounter Medications as of 7/1/2025   Medication Sig Dispense Refill    rosuvastatin (CRESTOR) 40 MG tablet Take 1 Tablet by mouth at bedtime. 100 Tablet 3    metoprolol SR (TOPROL XL) 50 MG TABLET SR 24 HR Take 1 Tablet by mouth every day. 100 Tablet 3    lisinopril  (PRINIVIL) 10 MG Tab Take 1 tablet by mouth every day. 100 Tablet 3    glimepiride (AMARYL) 1 MG tablet Take 1 Tablet by mouth 2 times a day with meals. 200 Tablet 3    metFORMIN (GLUCOPHAGE) 500 MG Tab Take 1 tablet by mouth every evening. 100 Tablet 3    Multiple Vitamin (MULTI-VITAMIN DAILY PO) Take 1 Tablet by mouth every day.         aspirin EC (ECOTRIN) 81 MG TBEC Take 81 mg by mouth every evening.       No facility-administered encounter medications on file as of 7/1/2025.

## 2025-07-02 ENCOUNTER — APPOINTMENT (OUTPATIENT)
Dept: CARDIOLOGY | Facility: MEDICAL CENTER | Age: 70
End: 2025-07-02
Attending: INTERNAL MEDICINE
Payer: MEDICARE

## 2025-07-17 ENCOUNTER — PHARMACY VISIT (OUTPATIENT)
Dept: PHARMACY | Facility: MEDICAL CENTER | Age: 70
End: 2025-07-17
Payer: COMMERCIAL

## 2025-07-17 PROCEDURE — RXMED WILLOW AMBULATORY MEDICATION CHARGE: Performed by: FAMILY MEDICINE

## 2025-08-12 ENCOUNTER — OFFICE VISIT (OUTPATIENT)
Dept: CARDIOLOGY | Facility: MEDICAL CENTER | Age: 70
End: 2025-08-12
Attending: NURSE PRACTITIONER
Payer: MEDICARE

## 2025-08-12 VITALS
HEIGHT: 65 IN | OXYGEN SATURATION: 94 % | RESPIRATION RATE: 16 BRPM | WEIGHT: 155 LBS | BODY MASS INDEX: 25.83 KG/M2 | SYSTOLIC BLOOD PRESSURE: 98 MMHG | HEART RATE: 70 BPM | DIASTOLIC BLOOD PRESSURE: 58 MMHG

## 2025-08-12 DIAGNOSIS — I25.84 CORONARY ARTERY DISEASE DUE TO CALCIFIED CORONARY LESION: Primary | ICD-10-CM

## 2025-08-12 DIAGNOSIS — I25.10 CORONARY ARTERY DISEASE DUE TO CALCIFIED CORONARY LESION: Primary | ICD-10-CM

## 2025-08-12 DIAGNOSIS — Z95.810 AICD (AUTOMATIC CARDIOVERTER/DEFIBRILLATOR) PRESENT: ICD-10-CM

## 2025-08-12 PROCEDURE — 99212 OFFICE O/P EST SF 10 MIN: CPT | Performed by: NURSE PRACTITIONER

## 2025-08-12 PROCEDURE — 3074F SYST BP LT 130 MM HG: CPT | Performed by: NURSE PRACTITIONER

## 2025-08-12 PROCEDURE — 93282 PRGRMG EVAL IMPLANTABLE DFB: CPT | Mod: 26 | Performed by: NURSE PRACTITIONER

## 2025-08-12 PROCEDURE — 3078F DIAST BP <80 MM HG: CPT | Performed by: NURSE PRACTITIONER

## 2025-08-12 PROCEDURE — 93005 ELECTROCARDIOGRAM TRACING: CPT | Mod: TC | Performed by: NURSE PRACTITIONER

## 2025-08-12 PROCEDURE — 93282 PRGRMG EVAL IMPLANTABLE DFB: CPT | Performed by: NURSE PRACTITIONER

## 2025-08-12 PROCEDURE — 99213 OFFICE O/P EST LOW 20 MIN: CPT | Mod: 25 | Performed by: NURSE PRACTITIONER

## 2025-08-12 ASSESSMENT — ENCOUNTER SYMPTOMS
SENSORY CHANGE: 0
BRUISES/BLEEDS EASILY: 0
NAUSEA: 0
CLAUDICATION: 0
RESPIRATORY NEGATIVE: 1
PND: 0
MUSCULOSKELETAL NEGATIVE: 1
DEPRESSION: 0
EYES NEGATIVE: 1
NERVOUS/ANXIOUS: 0
SHORTNESS OF BREATH: 0
NEUROLOGICAL NEGATIVE: 1
ORTHOPNEA: 0
PALPITATIONS: 0
WHEEZING: 0
DIZZINESS: 0
GASTROINTESTINAL NEGATIVE: 1
HALLUCINATIONS: 0
CONSTITUTIONAL NEGATIVE: 1

## 2025-08-12 ASSESSMENT — FIBROSIS 4 INDEX: FIB4 SCORE: 3.31

## 2025-08-13 LAB — EKG IMPRESSION: NORMAL

## 2025-08-13 PROCEDURE — 93010 ELECTROCARDIOGRAM REPORT: CPT | Performed by: STUDENT IN AN ORGANIZED HEALTH CARE EDUCATION/TRAINING PROGRAM

## 2025-08-26 ENCOUNTER — PHARMACY VISIT (OUTPATIENT)
Dept: PHARMACY | Facility: MEDICAL CENTER | Age: 70
End: 2025-08-26
Payer: COMMERCIAL

## 2025-08-26 PROCEDURE — RXMED WILLOW AMBULATORY MEDICATION CHARGE: Performed by: FAMILY MEDICINE

## (undated) DEVICE — GOWN WARMING STANDARD FLEX - (30/CA)

## (undated) DEVICE — RESERVOIR SUCTION 100 CC - SILICONE (20EA/CA)

## (undated) DEVICE — DRAPE STRLE REG TOWEL 18X24 - (10/BX 4BX/CA)"

## (undated) DEVICE — DRAPE LAPAROTOMY T SHEET - (12EA/CA)

## (undated) DEVICE — SPONGE GAUZESTER 4 X 4 4PLY - (128PK/CA)

## (undated) DEVICE — SET EXTENSION WITH 2 PORTS (48EA/CA) ***PART #2C8610 IS A SUBSTITUTE*****

## (undated) DEVICE — BOVIE NEEDLE TIP INSULATD NON-SAFETY 2CM (50/PK)

## (undated) DEVICE — SUTURE GENERAL

## (undated) DEVICE — PACK CYSTOSCOPY III - (8/CA)

## (undated) DEVICE — DRESSING XEROFORM 1X8 - (50/BX 4BX/CA)

## (undated) DEVICE — SODIUM CHL IRRIGATION 0.9% 1000ML (12EA/CA)

## (undated) DEVICE — GLOVE BIOGEL SZ 7.5 SURGICAL PF LTX - (50PR/BX 4BX/CA)

## (undated) DEVICE — LACTATED RINGERS INJ 1000 ML - (14EA/CA 60CA/PF)

## (undated) DEVICE — SYRINGE 30 ML LL (56/BX)

## (undated) DEVICE — CANISTER SUCTION 3000ML MECHANICAL FILTER AUTO SHUTOFF MEDI-VAC NONSTERILE LF DISP (40EA/CA)

## (undated) DEVICE — NEPTUNE 4 PORT MANIFOLD - (20/PK)

## (undated) DEVICE — BANDAGE STERILE 2 IN X 75 IN (12EA/BX 8BX/CA)

## (undated) DEVICE — SET IRRIGATION CYSTOSCOPY Y-TYPE L81 IN (20EA/CA)

## (undated) DEVICE — GOWN SURGEONS X-LARGE - DISP. (30/CA)

## (undated) DEVICE — JELLY SURGILUBE STERILE TUBE 4.25 OZ (1/EA)

## (undated) DEVICE — TUBING CLEARLINK DUO-VENT - C-FLO (48EA/CA)

## (undated) DEVICE — BLADE SURGICAL #15 - (50/BX 3BX/CA)

## (undated) DEVICE — DRAIN JACKSON PRATT 10FR - (10/CS)

## (undated) DEVICE — GLOVE BIOGEL INDICATOR SZ 8 SURGICAL PF LTX - (50/BX 4BX/CA)

## (undated) DEVICE — WATER IRRIGATION STERILE 1000ML (12EA/CA)

## (undated) DEVICE — SYRINGE 50 ML LL (40EA/BX 4BX/CA)

## (undated) DEVICE — CATHETER URET OPEN END 6FR (10EA/BX)

## (undated) DEVICE — SPONGE XRAY 8X4 STERL. 12PL - (10EA/TY 80TY/CA)

## (undated) DEVICE — ELECTRODE DUAL RETURN W/ CORD - (50/PK)

## (undated) DEVICE — HEAD HOLDER JUNIOR/ADULT

## (undated) DEVICE — SUTURE 3-0 CHROMIC GUT SH 27 (36PK/BX)"

## (undated) DEVICE — SUTURE 4-0 MONOCRYL PLUS PS-1 - 27 INCH (36/BX)

## (undated) DEVICE — HUMID-VENT HEAT AND MOISTURE EXCHANGE- (50/BX)

## (undated) DEVICE — BAG SPONGE COUNT 10.25 X 32 - BLUE (250/CA)

## (undated) DEVICE — SOD. CHL. INJ. 0.9% 1000 ML - (14EA/CA 60CA/PF)

## (undated) DEVICE — SET LEADWIRE 5 LEAD BEDSIDE DISPOSABLE ECG (1SET OF 5/EA)

## (undated) DEVICE — ZIPWIRE STIFF .035 ANGLED TIP (5EA/BX)

## (undated) DEVICE — DRAPE IOBAN II 23 IN X 33 IN - (10/BX)

## (undated) DEVICE — KIT ANESTHESIA W/CIRCUIT & 3/LT BAG W/FILTER (20EA/CA)

## (undated) DEVICE — SENSOR OXIMETER ADULT SPO2 RD SET (20EA/BX)

## (undated) DEVICE — STAPLER SKIN DISP - (6/BX 10BX/CA) VISISTAT

## (undated) DEVICE — CHLORAPREP 26 ML APPLICATOR - ORANGE TINT(25/CA)

## (undated) DEVICE — SPONGE GAUZE NON-STERILE 4X4 - (2000/CA 10PK/CA)

## (undated) DEVICE — WRAP CO-FLEX 4IN X 5YD STERIL - SELF-ADHERENT (18/CA)

## (undated) DEVICE — PROTECTOR ULNA NERVE - (36PR/CA)

## (undated) DEVICE — TOWEL STOP TIMEOUT SAFETY FLAG (40EA/CA)

## (undated) DEVICE — SLEEVE, VASO, THIGH, MED

## (undated) DEVICE — COVER LIGHT HANDLE ALC PLUS DISP (18EA/BX)

## (undated) DEVICE — GLOVE, LITE (PAIR)

## (undated) DEVICE — DRAPE MAYO STAND - (30/CA)

## (undated) DEVICE — SENSOR SPO2 NEO LNCS ADHESIVE (20/BX) SEE USER NOTES

## (undated) DEVICE — SYRINGE NON SAFETY 10 CC 21 GA X 1-1/2 IN (100/BX 4BX/CA)

## (undated) DEVICE — SYRINGE 10 ML CONTROL LL (25EA/BX 4BX/CA)

## (undated) DEVICE — TUBE CONNECT SUCTION CLEAR 120 X 1/4" (50EA/CA)"

## (undated) DEVICE — PACK MINOR BASIN - (2EA/CA)

## (undated) DEVICE — SUTURE 3-0 MONOCRYL SH (36PK/BX)

## (undated) DEVICE — BASIN EMESIS DISP. - (250/CA)

## (undated) DEVICE — SYRINGE DISP. 12 CC LL - (100/BX)

## (undated) DEVICE — CANISTER SUCTION RIGID RED 1500CC (40EA/CA)

## (undated) DEVICE — PACK SINGLE BASIN - (6/CA)

## (undated) DEVICE — DILATOR NOTTINGHAM 6F-10FRX70CM

## (undated) DEVICE — NEEDLE NON SAFETY HYPO 22 GA X 1 1/2 IN (100/BX)

## (undated) DEVICE — PLUG CATHETER DRAIN TUBE PROTECTOR CAP

## (undated) DEVICE — DERMABOND ADVANCED - (12EA/BX)

## (undated) DEVICE — ELECTRODE 850 FOAM ADHESIVE - HYDROGEL RADIOTRNSPRNT (50/PK)

## (undated) DEVICE — SCOPE DIGITAL URETEROSCOPE DISPOSABLE

## (undated) DEVICE — KIT SKIN PREP SURG. SOLUTION - DURAPREP (20 KT/CA)

## (undated) DEVICE — MASK ANESTHESIA ADULT  - (100/CA)

## (undated) DEVICE — WATER IRRIG. STER 3000 ML - (4/CA)

## (undated) DEVICE — SYRINGE 50ML CATHETER TIP (40EA/BX 4BX/CA)

## (undated) DEVICE — SUCTION INSTRUMENT YANKAUER BULBOUS TIP W/O VENT (50EA/CA)

## (undated) DEVICE — SUTURE 3-0 ETHILON FS-1 - (36/BX) 30 INCH

## (undated) DEVICE — CATHETER URETHRAL FOLEY SILICONE OD16 FR 10 ML (10EA/CA)

## (undated) DEVICE — CANISTER SUCTION 3000ML MECHANICAL FILTER AUTO SHUTOFF MEDI-VAC NONSTERILE LF DISP  (40EA/CA)

## (undated) DEVICE — TUBE CONNECTING SUCTION - CLEAR PLASTIC STERILE 72 IN (50EA/CA)

## (undated) DEVICE — DRAPE TABLE UROLOGY DRAINAGE (20EA/BX)

## (undated) DEVICE — GLOVESZ 8.5 BIOGEL PI MICRO - PF LF (50PR/BX)

## (undated) DEVICE — SODIUM CHL. IRRIGATION 0.9% 3000ML (4EA/CA 65CA/PF)

## (undated) DEVICE — Device

## (undated) DEVICE — GOWN SURGICAL X-LARGE ULTRA - FILM-REINFORCED (20/CA)

## (undated) DEVICE — BASKET BAGLEY 1.9 - CAN SUB 340-122 IN A PINCH

## (undated) DEVICE — BANDAGE ROLL STERILE BULKEE 4.5 IN X 4 YD (100EA/CA)

## (undated) DEVICE — SET BLOOD COLLECTION 23 GA NEEDLE (50/PK)